# Patient Record
Sex: FEMALE | Race: WHITE | NOT HISPANIC OR LATINO | Employment: OTHER | URBAN - NONMETROPOLITAN AREA
[De-identification: names, ages, dates, MRNs, and addresses within clinical notes are randomized per-mention and may not be internally consistent; named-entity substitution may affect disease eponyms.]

---

## 2017-03-02 ENCOUNTER — OFFICE VISIT (OUTPATIENT)
Dept: NEUROLOGY | Facility: CLINIC | Age: 72
End: 2017-03-02

## 2017-03-02 VITALS
HEIGHT: 68 IN | BODY MASS INDEX: 29.1 KG/M2 | HEART RATE: 76 BPM | WEIGHT: 192 LBS | DIASTOLIC BLOOD PRESSURE: 84 MMHG | SYSTOLIC BLOOD PRESSURE: 138 MMHG

## 2017-03-02 DIAGNOSIS — Z72.0 TOBACCO ABUSE: ICD-10-CM

## 2017-03-02 DIAGNOSIS — G47.33 OSA ON CPAP: Primary | ICD-10-CM

## 2017-03-02 DIAGNOSIS — Z99.89 OSA ON CPAP: Primary | ICD-10-CM

## 2017-03-02 PROCEDURE — 99212 OFFICE O/P EST SF 10 MIN: CPT | Performed by: CLINICAL NURSE SPECIALIST

## 2017-03-02 RX ORDER — AMLODIPINE BESYLATE 5 MG/1
5 TABLET ORAL DAILY
COMMUNITY

## 2017-03-02 RX ORDER — CLONIDINE HYDROCHLORIDE 0.1 MG/1
0.1 TABLET ORAL 2 TIMES DAILY
COMMUNITY
End: 2022-08-03

## 2017-03-02 NOTE — PATIENT INSTRUCTIONS
Sleep Apnea  Sleep apnea is disorder that affects a person's sleep. A person with sleep apnea has abnormal pauses in their breathing when they sleep. It is hard for them to get a good sleep. This makes a person tired during the day. It also can lead to other physical problems. There are three types of sleep apnea. One type is when breathing stops for a short time because your airway is blocked (obstructive sleep apnea). Another type is when the brain sometimes fails to give the normal signal to breathe to the muscles that control your breathing (central sleep apnea). The third type is a combination of the other two types.  HOME CARE  · Do not sleep on your back. Try to sleep on your side.  · Take all medicine as told by your doctor.  · Avoid alcohol, calming medicines (sedatives), and depressant drugs.  · Try to lose weight if you are overweight. Talk to your doctor about a healthy weight goal.  Your doctor may have you use a device that helps to open your airway. It can help you get the air that you need. It is called a positive airway pressure (PAP) device. There are three types of PAP devices:  · Continuous positive airway pressure (CPAP) device.  · Nasal expiratory positive airway pressure (EPAP) device.  · Bilevel positive airway pressure (BPAP) device.  MAKE SURE YOU:  · Understand these instructions.  · Will watch your condition.  · Will get help right away if you are not doing well or get worse.     This information is not intended to replace advice given to you by your health care provider. Make sure you discuss any questions you have with your health care provider.     Document Released: 09/26/2009 Document Revised: 01/08/2016 Document Reviewed: 09/26/2016  [employer] Patient Education ©2016 [employer]

## 2017-03-02 NOTE — PROGRESS NOTES
Subjective     Chief Complaint   Patient presents with   • Sleep Apnea     Doing good. Compliance report in media tab       Edie Hinton is a 71 y.o. female right handed retiree.  She is here today for follow up for ADAL. She does use CPAP. Her DME is [facility].  She denies problems with mask or equipment. She denies changes in medical conditions. She continues to smoke 1/2-1 ppd cigaretts. I did receive compliance report and she is compliance 78% 12/1/16-2/28/17. She has no complaints today.     Sleep Apnea   This is a chronic problem. The current episode started more than 1 year ago. The problem has been unchanged. Pertinent negatives include no arthralgias, chest pain, fatigue, fever, headaches, myalgias, sore throat or weakness. Associated symptoms comments: SOB, daytime sleepiness. Exacerbated by: tobacco use. The treatment provided moderate relief.        Current Outpatient Prescriptions   Medication Sig Dispense Refill   • amLODIPine (NORVASC) 5 MG tablet Take 5 mg by mouth Daily.     • atorvastatin (LIPITOR) 10 MG tablet Take 20 mg by mouth Daily.     • CloNIDine (CATAPRES) 0.1 MG tablet Take 0.1 mg by mouth 2 (Two) Times a Day.     • losartan (COZAAR) 25 MG tablet Take 100 mg by mouth Daily.     • metoclopramide (REGLAN) 10 MG tablet Take 10 mg by mouth 4 (Four) Times a Day Before Meals & at Bedtime.     • metoprolol succinate XL (TOPROL-XL) 100 MG 24 hr tablet Take 100 mg by mouth 2 (Two) Times a Day.     • vitamin D (ERGOCALCIFEROL) 71147 UNITS capsule capsule Take 50,000 Units by mouth 1 (One) Time Per Week.       No current facility-administered medications for this visit.        Past Medical History   Diagnosis Date   • Sleep apnea        Past Surgical History   Procedure Laterality Date   • Appendectomy     • Tonsillectomy     • Hand surgery     • Breast surgery     • Hysterectomy     • Knee surgery Bilateral    • Cervical spine surgery     • Back surgery     • Cataract extraction         family  "history includes No Known Problems in her father and mother.    Social History   Substance Use Topics   • Smoking status: Current Every Day Smoker     Packs/day: 0.50     Types: Cigarettes   • Smokeless tobacco: Never Used   • Alcohol use Yes      Comment: glass of whiskey & sprite daily       Review of Systems   Constitutional: Negative.  Negative for fatigue and fever.   HENT: Negative.  Negative for sinus pressure and sore throat.    Eyes: Negative.    Respiratory: Positive for shortness of breath. Negative for apnea.    Cardiovascular: Negative.  Negative for chest pain.   Gastrointestinal: Negative.  Negative for constipation and diarrhea.   Endocrine: Negative.    Genitourinary: Negative.  Negative for dysuria and frequency.   Musculoskeletal: Negative for arthralgias, gait problem and myalgias.   Skin: Negative.    Allergic/Immunologic: Negative.    Neurological: Negative for dizziness, speech difficulty, weakness and headaches.   Hematological: Negative.  Negative for adenopathy.   Psychiatric/Behavioral: Negative.  Negative for agitation and confusion.   All other systems reviewed and are negative.      Objective     Visit Vitals   • /84   • Pulse 76   • Ht 68\" (172.7 cm)   • Wt 192 lb (87.1 kg)   • BMI 29.19 kg/m2   , Body mass index is 29.19 kg/(m^2).    Physical Exam   Constitutional: She is oriented to person, place, and time. Vital signs are normal. She appears well-developed and well-nourished.   HENT:   Head: Normocephalic and atraumatic.   Right Ear: Hearing, tympanic membrane and external ear normal.   Left Ear: Hearing, tympanic membrane and external ear normal.   Nose: Nose normal.   Mouth/Throat: Uvula is midline, oropharynx is clear and moist and mucous membranes are normal.   Eyes: EOM and lids are normal. Pupils are equal, round, and reactive to light.   Neck: Trachea normal and normal range of motion. Neck supple. Carotid bruit is not present.   Cardiovascular: Normal rate, regular " rhythm, S2 normal and normal heart sounds.    No murmur heard.  Pulmonary/Chest: Effort normal and breath sounds normal.   Abdominal: Soft. Bowel sounds are normal.   Musculoskeletal: Normal range of motion.   Neurological: She is alert and oriented to person, place, and time. She has normal strength and normal reflexes. No cranial nerve deficit. She displays a negative Romberg sign. Gait normal. GCS eye subscore is 4. GCS motor subscore is 6.   Skin: Skin is warm and dry.   Psychiatric: She has a normal mood and affect. Her speech is normal and behavior is normal. Cognition and memory are normal.   Nursing note and vitals reviewed.      No results found for this or any previous visit.     ASSESSMENT/PLAN    Diagnoses and all orders for this visit:    ADAL on CPAP    Tobacco abuse    Other orders  -     CloNIDine (CATAPRES) 0.1 MG tablet; Take 0.1 mg by mouth 2 (Two) Times a Day.  -     amLODIPine (NORVASC) 5 MG tablet; Take 5 mg by mouth Daily.    MEDICAL DECISION MAKIN. Will continue with CPAP   2. Discussed tobacco cessation for greater than 3 minutes to include options for cessation and information given for support groups. All questions answered.  3. F/u PRN for compliance.     allergies and all known medications/prescriptions have been reviewed using resources available on this encounter.    Return if symptoms worsen or fail to improve.        Zhane Parada, MAURICE

## 2017-10-15 ENCOUNTER — HOSPITAL ENCOUNTER (INPATIENT)
Age: 72
LOS: 2 days | Discharge: NURSING, REHAB OR LONG-TERM CARE | DRG: 470 | End: 2017-10-18
Attending: EMERGENCY MEDICINE | Admitting: INTERNAL MEDICINE
Payer: MEDICARE

## 2017-10-15 ENCOUNTER — APPOINTMENT (OUTPATIENT)
Dept: CT IMAGING | Age: 72
DRG: 470 | End: 2017-10-15
Payer: MEDICARE

## 2017-10-15 ENCOUNTER — APPOINTMENT (OUTPATIENT)
Dept: GENERAL RADIOLOGY | Age: 72
DRG: 470 | End: 2017-10-15
Payer: MEDICARE

## 2017-10-15 DIAGNOSIS — E87.1 HYPONATREMIA: ICD-10-CM

## 2017-10-15 DIAGNOSIS — S72.002A CLOSED FRACTURE OF LEFT HIP, INITIAL ENCOUNTER (HCC): ICD-10-CM

## 2017-10-15 DIAGNOSIS — N17.9 ACUTE RENAL FAILURE, UNSPECIFIED ACUTE RENAL FAILURE TYPE (HCC): ICD-10-CM
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
## 2017-10-15 LAB
ANION GAP SERPL CALCULATED.3IONS-SCNC: 18 MMOL/L (ref 7–19)
BUN BLDV-MCNC: 18 MG/DL (ref 8–23)
CALCIUM SERPL-MCNC: 8.9 MG/DL (ref 8.8–10.2)
CHLORIDE BLD-SCNC: 88 MMOL/L (ref 98–111)
CO2: 24 MMOL/L (ref 22–29)
CREAT SERPL-MCNC: 1.4 MG/DL (ref 0.5–0.9)
ETHANOL: 165 MG/DL (ref 0–0.08)
GFR NON-AFRICAN AMERICAN: 37
GLUCOSE BLD-MCNC: 107 MG/DL (ref 74–109)
HCT VFR BLD CALC: 40 % (ref 37–47)
HEMOGLOBIN: 14.1 G/DL (ref 12–16)
INR BLD: 0.87 (ref 0.88–1.18)
MCH RBC QN AUTO: 33.7 PG (ref 27–31)
MCHC RBC AUTO-ENTMCNC: 35.3 G/DL (ref 33–37)
MCV RBC AUTO: 95.5 FL (ref 81–99)
PDW BLD-RTO: 14 % (ref 11.5–14.5)
PLATELET # BLD: 215 K/UL (ref 130–400)
PMV BLD AUTO: 10.1 FL (ref 9.4–12.3)
POTASSIUM SERPL-SCNC: 4.4 MMOL/L (ref 3.5–5)
PROTHROMBIN TIME: 11.7 SEC (ref 12–14.6)
RBC # BLD: 4.19 M/UL (ref 4.2–5.4)
SODIUM BLD-SCNC: 130 MMOL/L (ref 136–145)
WBC # BLD: 7.8 K/UL (ref 4.8–10.8)

## 2017-10-15 PROCEDURE — 71035 XR CHEST 1 VW: CPT

## 2017-10-15 PROCEDURE — 6360000002 HC RX W HCPCS: Performed by: EMERGENCY MEDICINE

## 2017-10-15 PROCEDURE — 36415 COLL VENOUS BLD VENIPUNCTURE: CPT

## 2017-10-15 PROCEDURE — 85610 PROTHROMBIN TIME: CPT

## 2017-10-15 PROCEDURE — 99285 EMERGENCY DEPT VISIT HI MDM: CPT

## 2017-10-15 PROCEDURE — 93005 ELECTROCARDIOGRAM TRACING: CPT

## 2017-10-15 PROCEDURE — 70450 CT HEAD/BRAIN W/O DYE: CPT

## 2017-10-15 PROCEDURE — 80048 BASIC METABOLIC PNL TOTAL CA: CPT

## 2017-10-15 PROCEDURE — 96374 THER/PROPH/DIAG INJ IV PUSH: CPT

## 2017-10-15 PROCEDURE — G0480 DRUG TEST DEF 1-7 CLASSES: HCPCS

## 2017-10-15 PROCEDURE — 73502 X-RAY EXAM HIP UNI 2-3 VIEWS: CPT

## 2017-10-15 PROCEDURE — 85027 COMPLETE CBC AUTOMATED: CPT

## 2017-10-15 RX ORDER — MORPHINE SULFATE 4 MG/ML
4 INJECTION, SOLUTION INTRAMUSCULAR; INTRAVENOUS ONCE
Status: COMPLETED | OUTPATIENT
Start: 2017-10-15 | End: 2017-10-15

## 2017-10-15 RX ORDER — METOPROLOL TARTRATE 100 MG/1
100 TABLET ORAL 2 TIMES DAILY
COMMUNITY
End: 2020-10-26 | Stop reason: DRUGHIGH

## 2017-10-15 RX ORDER — LOSARTAN POTASSIUM 100 MG/1
100 TABLET ORAL DAILY
COMMUNITY
End: 2020-10-26 | Stop reason: ALTCHOICE

## 2017-10-15 RX ORDER — ATORVASTATIN CALCIUM 20 MG/1
40 TABLET, FILM COATED ORAL DAILY
COMMUNITY
End: 2019-10-02 | Stop reason: DRUGHIGH

## 2017-10-15 RX ORDER — CLONIDINE HYDROCHLORIDE 0.1 MG/1
0.1 TABLET ORAL DAILY
COMMUNITY
End: 2020-10-26 | Stop reason: ALTCHOICE

## 2017-10-15 RX ORDER — ERGOCALCIFEROL 1.25 MG/1
50000 CAPSULE ORAL WEEKLY
COMMUNITY
End: 2020-10-26 | Stop reason: DRUGHIGH

## 2017-10-15 RX ORDER — AMLODIPINE BESYLATE 5 MG/1
5 TABLET ORAL DAILY
Status: ON HOLD | COMMUNITY
End: 2017-10-28 | Stop reason: HOSPADM

## 2017-10-15 RX ADMIN — MORPHINE SULFATE 4 MG: 4 INJECTION, SOLUTION INTRAMUSCULAR; INTRAVENOUS at 23:58

## 2017-10-15 ASSESSMENT — PAIN DESCRIPTION - PAIN TYPE: TYPE: ACUTE PAIN

## 2017-10-15 ASSESSMENT — PAIN DESCRIPTION - ORIENTATION: ORIENTATION: LEFT

## 2017-10-15 ASSESSMENT — ENCOUNTER SYMPTOMS
DIARRHEA: 0
EYE PAIN: 0
SHORTNESS OF BREATH: 0
VOMITING: 0
ABDOMINAL PAIN: 0

## 2017-10-15 ASSESSMENT — PAIN SCALES - GENERAL
PAINLEVEL_OUTOF10: 9
PAINLEVEL_OUTOF10: 10

## 2017-10-15 ASSESSMENT — PAIN DESCRIPTION - LOCATION: LOCATION: HIP

## 2017-10-16 ENCOUNTER — ANESTHESIA EVENT (OUTPATIENT)
Dept: OPERATING ROOM | Age: 72
DRG: 470 | End: 2017-10-16
Payer: MEDICARE

## 2017-10-16 ENCOUNTER — APPOINTMENT (OUTPATIENT)
Dept: GENERAL RADIOLOGY | Age: 72
DRG: 470 | End: 2017-10-16
Payer: MEDICARE

## 2017-10-16 ENCOUNTER — ANESTHESIA (OUTPATIENT)
Dept: OPERATING ROOM | Age: 72
DRG: 470 | End: 2017-10-16
Payer: MEDICARE

## 2017-10-16 VITALS
OXYGEN SATURATION: 88 % | SYSTOLIC BLOOD PRESSURE: 179 MMHG | TEMPERATURE: 97.1 F | RESPIRATION RATE: 1 BRPM | DIASTOLIC BLOOD PRESSURE: 91 MMHG

## 2017-10-16 PROBLEM — S72.002A HIP FRACTURE REQUIRING OPERATIVE REPAIR, LEFT, CLOSED, INITIAL ENCOUNTER (HCC): Status: ACTIVE | Noted: 2017-10-16

## 2017-10-16 PROBLEM — N17.9 ACUTE KIDNEY FAILURE (HCC): Status: ACTIVE | Noted: 2017-10-16

## 2017-10-16 PROBLEM — I10 HYPERTENSION: Status: ACTIVE | Noted: 2017-10-16

## 2017-10-16 LAB
EKG P AXIS: 67 DEGREES
EKG P AXIS: 73 DEGREES
EKG P-R INTERVAL: 150 MS
EKG P-R INTERVAL: 162 MS
EKG Q-T INTERVAL: 406 MS
EKG Q-T INTERVAL: 428 MS
EKG QRS DURATION: 88 MS
EKG QRS DURATION: 90 MS
EKG QTC CALCULATION (BAZETT): 420 MS
EKG QTC CALCULATION (BAZETT): 442 MS
EKG T AXIS: 68 DEGREES
EKG T AXIS: 72 DEGREES

## 2017-10-16 PROCEDURE — 2500000003 HC RX 250 WO HCPCS: Performed by: NURSE ANESTHETIST, CERTIFIED REGISTERED

## 2017-10-16 PROCEDURE — 73501 X-RAY EXAM HIP UNI 1 VIEW: CPT

## 2017-10-16 PROCEDURE — 0SRS0J9 REPLACEMENT OF LEFT HIP JOINT, FEMORAL SURFACE WITH SYNTHETIC SUBSTITUTE, CEMENTED, OPEN APPROACH: ICD-10-PCS | Performed by: ORTHOPAEDIC SURGERY

## 2017-10-16 PROCEDURE — 3600000004 HC SURGERY LEVEL 4 BASE: Performed by: ORTHOPAEDIC SURGERY

## 2017-10-16 PROCEDURE — 1210000000 HC MED SURG R&B

## 2017-10-16 PROCEDURE — 6360000002 HC RX W HCPCS: Performed by: NURSE ANESTHETIST, CERTIFIED REGISTERED

## 2017-10-16 PROCEDURE — 6360000002 HC RX W HCPCS: Performed by: ORTHOPAEDIC SURGERY

## 2017-10-16 PROCEDURE — 93005 ELECTROCARDIOGRAM TRACING: CPT

## 2017-10-16 PROCEDURE — 2580000003 HC RX 258: Performed by: EMERGENCY MEDICINE

## 2017-10-16 PROCEDURE — 6370000000 HC RX 637 (ALT 250 FOR IP): Performed by: INTERNAL MEDICINE

## 2017-10-16 PROCEDURE — C1713 ANCHOR/SCREW BN/BN,TIS/BN: HCPCS | Performed by: ORTHOPAEDIC SURGERY

## 2017-10-16 PROCEDURE — 2500000003 HC RX 250 WO HCPCS: Performed by: ORTHOPAEDIC SURGERY

## 2017-10-16 PROCEDURE — 99223 1ST HOSP IP/OBS HIGH 75: CPT | Performed by: INTERNAL MEDICINE

## 2017-10-16 PROCEDURE — 2580000003 HC RX 258: Performed by: ANESTHESIOLOGY

## 2017-10-16 PROCEDURE — 3600000014 HC SURGERY LEVEL 4 ADDTL 15MIN: Performed by: ORTHOPAEDIC SURGERY

## 2017-10-16 PROCEDURE — 2720000000 HC MISC SURG SUPPLY STERILE $0-50: Performed by: ORTHOPAEDIC SURGERY

## 2017-10-16 PROCEDURE — 3700000000 HC ANESTHESIA ATTENDED CARE: Performed by: ORTHOPAEDIC SURGERY

## 2017-10-16 PROCEDURE — 2720000001 HC MISC SURG SUPPLY STERILE $51-500: Performed by: ORTHOPAEDIC SURGERY

## 2017-10-16 PROCEDURE — C1776 JOINT DEVICE (IMPLANTABLE): HCPCS | Performed by: ORTHOPAEDIC SURGERY

## 2017-10-16 PROCEDURE — 2720000003 HC MISC SUTURE/STAPLES/RELOADS/ETC: Performed by: ORTHOPAEDIC SURGERY

## 2017-10-16 PROCEDURE — 2580000003 HC RX 258: Performed by: INTERNAL MEDICINE

## 2017-10-16 PROCEDURE — 99284 EMERGENCY DEPT VISIT MOD MDM: CPT | Performed by: EMERGENCY MEDICINE

## 2017-10-16 PROCEDURE — 6360000002 HC RX W HCPCS: Performed by: INTERNAL MEDICINE

## 2017-10-16 PROCEDURE — L8690 AUD OSSEO DEV, INT/EXT COMP: HCPCS | Performed by: ORTHOPAEDIC SURGERY

## 2017-10-16 PROCEDURE — 6360000002 HC RX W HCPCS: Performed by: EMERGENCY MEDICINE

## 2017-10-16 PROCEDURE — 7100000001 HC PACU RECOVERY - ADDTL 15 MIN: Performed by: ORTHOPAEDIC SURGERY

## 2017-10-16 PROCEDURE — A4364 ADHESIVE, LIQUID OR EQUAL: HCPCS | Performed by: ORTHOPAEDIC SURGERY

## 2017-10-16 PROCEDURE — 7100000000 HC PACU RECOVERY - FIRST 15 MIN: Performed by: ORTHOPAEDIC SURGERY

## 2017-10-16 PROCEDURE — 3700000001 HC ADD 15 MINUTES (ANESTHESIA): Performed by: ORTHOPAEDIC SURGERY

## 2017-10-16 PROCEDURE — 73502 X-RAY EXAM HIP UNI 2-3 VIEWS: CPT

## 2017-10-16 DEVICE — SPACER FEM +5MM OFFSET 12/14 TAPR HIP MOD CATHCART UPLR: Type: IMPLANTABLE DEVICE | Site: HIP | Status: FUNCTIONAL

## 2017-10-16 DEVICE — STEM FEM SZ 5 L120MM NK L34MM 40MM OFFSET 130DEG 12/14 TAPR: Type: IMPLANTABLE DEVICE | Site: HIP | Status: FUNCTIONAL

## 2017-10-16 DEVICE — PREP IM ENCHANCED TOTAL HIP BONE                                    PREPARATION KIT
Type: IMPLANTABLE DEVICE | Site: HIP | Status: FUNCTIONAL
Brand: PREP-IM

## 2017-10-16 DEVICE — DISCONTINUED USE 416978 CEMENT PALACOS R SING DOSE 40GR: Type: IMPLANTABLE DEVICE | Site: HIP | Status: FUNCTIONAL

## 2017-10-16 DEVICE — CENTRALIZER STEM DIA10MM DST FEM CEM MOLD SUMMIT BASIC: Type: IMPLANTABLE DEVICE | Site: HIP | Status: FUNCTIONAL

## 2017-10-16 DEVICE — HEAD UPLR DIA47MM HIP BALL MOD CATHCART SELF CNTR: Type: IMPLANTABLE DEVICE | Site: HIP | Status: FUNCTIONAL

## 2017-10-16 RX ORDER — PROMETHAZINE HYDROCHLORIDE 25 MG/ML
6.25 INJECTION, SOLUTION INTRAMUSCULAR; INTRAVENOUS
Status: DISCONTINUED | OUTPATIENT
Start: 2017-10-16 | End: 2017-10-16 | Stop reason: HOSPADM

## 2017-10-16 RX ORDER — METOPROLOL TARTRATE 50 MG/1
100 TABLET, FILM COATED ORAL 2 TIMES DAILY
Status: DISCONTINUED | OUTPATIENT
Start: 2017-10-16 | End: 2017-10-18 | Stop reason: HOSPADM

## 2017-10-16 RX ORDER — FENTANYL CITRATE 50 UG/ML
50 INJECTION, SOLUTION INTRAMUSCULAR; INTRAVENOUS
Status: DISCONTINUED | OUTPATIENT
Start: 2017-10-16 | End: 2017-10-16 | Stop reason: HOSPADM

## 2017-10-16 RX ORDER — DEXAMETHASONE SODIUM PHOSPHATE 10 MG/ML
INJECTION INTRAMUSCULAR; INTRAVENOUS PRN
Status: DISCONTINUED | OUTPATIENT
Start: 2017-10-16 | End: 2017-10-16 | Stop reason: SDUPTHER

## 2017-10-16 RX ORDER — SODIUM CHLORIDE 9 MG/ML
INJECTION, SOLUTION INTRAVENOUS CONTINUOUS
Status: DISCONTINUED | OUTPATIENT
Start: 2017-10-16 | End: 2017-10-16

## 2017-10-16 RX ORDER — ENALAPRILAT 2.5 MG/2ML
1.25 INJECTION INTRAVENOUS
Status: DISCONTINUED | OUTPATIENT
Start: 2017-10-16 | End: 2017-10-16 | Stop reason: HOSPADM

## 2017-10-16 RX ORDER — SODIUM CHLORIDE 0.9 % (FLUSH) 0.9 %
10 SYRINGE (ML) INJECTION EVERY 12 HOURS SCHEDULED
Status: DISCONTINUED | OUTPATIENT
Start: 2017-10-16 | End: 2017-10-16

## 2017-10-16 RX ORDER — LIDOCAINE HYDROCHLORIDE 10 MG/ML
1 INJECTION, SOLUTION EPIDURAL; INFILTRATION; INTRACAUDAL; PERINEURAL
Status: DISCONTINUED | OUTPATIENT
Start: 2017-10-16 | End: 2017-10-16 | Stop reason: HOSPADM

## 2017-10-16 RX ORDER — SODIUM CHLORIDE, SODIUM LACTATE, POTASSIUM CHLORIDE, CALCIUM CHLORIDE 600; 310; 30; 20 MG/100ML; MG/100ML; MG/100ML; MG/100ML
INJECTION, SOLUTION INTRAVENOUS CONTINUOUS
Status: DISCONTINUED | OUTPATIENT
Start: 2017-10-16 | End: 2017-10-16

## 2017-10-16 RX ORDER — CLONIDINE HYDROCHLORIDE 0.1 MG/1
0.1 TABLET ORAL 2 TIMES DAILY
Status: DISCONTINUED | OUTPATIENT
Start: 2017-10-16 | End: 2017-10-16

## 2017-10-16 RX ORDER — SODIUM CHLORIDE 0.9 % (FLUSH) 0.9 %
10 SYRINGE (ML) INJECTION PRN
Status: CANCELLED | OUTPATIENT
Start: 2017-10-16

## 2017-10-16 RX ORDER — PROPOFOL 10 MG/ML
INJECTION, EMULSION INTRAVENOUS PRN
Status: DISCONTINUED | OUTPATIENT
Start: 2017-10-16 | End: 2017-10-16 | Stop reason: SDUPTHER

## 2017-10-16 RX ORDER — SODIUM CHLORIDE 0.9 % (FLUSH) 0.9 %
10 SYRINGE (ML) INJECTION PRN
Status: DISCONTINUED | OUTPATIENT
Start: 2017-10-16 | End: 2017-10-16

## 2017-10-16 RX ORDER — BUPIVACAINE HYDROCHLORIDE AND EPINEPHRINE 2.5; 5 MG/ML; UG/ML
INJECTION, SOLUTION EPIDURAL; INFILTRATION; INTRACAUDAL; PERINEURAL PRN
Status: DISCONTINUED | OUTPATIENT
Start: 2017-10-16 | End: 2017-10-16 | Stop reason: HOSPADM

## 2017-10-16 RX ORDER — FENTANYL CITRATE 50 UG/ML
INJECTION, SOLUTION INTRAMUSCULAR; INTRAVENOUS PRN
Status: DISCONTINUED | OUTPATIENT
Start: 2017-10-16 | End: 2017-10-16 | Stop reason: SDUPTHER

## 2017-10-16 RX ORDER — SODIUM CHLORIDE 0.9 % (FLUSH) 0.9 %
10 SYRINGE (ML) INJECTION EVERY 12 HOURS SCHEDULED
Status: DISCONTINUED | OUTPATIENT
Start: 2017-10-16 | End: 2017-10-18 | Stop reason: HOSPADM

## 2017-10-16 RX ORDER — METOPROLOL TARTRATE 5 MG/5ML
INJECTION INTRAVENOUS PRN
Status: DISCONTINUED | OUTPATIENT
Start: 2017-10-16 | End: 2017-10-16 | Stop reason: SDUPTHER

## 2017-10-16 RX ORDER — PROMETHAZINE HYDROCHLORIDE 25 MG/ML
12.5 INJECTION, SOLUTION INTRAMUSCULAR; INTRAVENOUS EVERY 4 HOURS PRN
Status: DISCONTINUED | OUTPATIENT
Start: 2017-10-16 | End: 2017-10-18

## 2017-10-16 RX ORDER — DIPHENHYDRAMINE HYDROCHLORIDE 50 MG/ML
12.5 INJECTION INTRAMUSCULAR; INTRAVENOUS
Status: DISCONTINUED | OUTPATIENT
Start: 2017-10-16 | End: 2017-10-16 | Stop reason: HOSPADM

## 2017-10-16 RX ORDER — CLINDAMYCIN PHOSPHATE 150 MG/ML
INJECTION, SOLUTION INTRAVENOUS PRN
Status: DISCONTINUED | OUTPATIENT
Start: 2017-10-16 | End: 2017-10-16 | Stop reason: SDUPTHER

## 2017-10-16 RX ORDER — SODIUM CHLORIDE 0.9 % (FLUSH) 0.9 %
10 SYRINGE (ML) INJECTION EVERY 12 HOURS SCHEDULED
Status: DISCONTINUED | OUTPATIENT
Start: 2017-10-16 | End: 2017-10-16 | Stop reason: HOSPADM

## 2017-10-16 RX ORDER — SODIUM CHLORIDE 0.9 % (FLUSH) 0.9 %
10 SYRINGE (ML) INJECTION PRN
Status: DISCONTINUED | OUTPATIENT
Start: 2017-10-16 | End: 2017-10-16 | Stop reason: HOSPADM

## 2017-10-16 RX ORDER — DOCUSATE SODIUM 100 MG/1
100 CAPSULE, LIQUID FILLED ORAL 2 TIMES DAILY
Status: DISCONTINUED | OUTPATIENT
Start: 2017-10-16 | End: 2017-10-18 | Stop reason: HOSPADM

## 2017-10-16 RX ORDER — CLINDAMYCIN PHOSPHATE 900 MG/50ML
900 INJECTION INTRAVENOUS
Status: CANCELLED | OUTPATIENT
Start: 2017-10-16 | End: 2017-10-16

## 2017-10-16 RX ORDER — MORPHINE SULFATE 4 MG/ML
2 INJECTION, SOLUTION INTRAMUSCULAR; INTRAVENOUS
Status: DISCONTINUED | OUTPATIENT
Start: 2017-10-16 | End: 2017-10-18 | Stop reason: HOSPADM

## 2017-10-16 RX ORDER — LABETALOL HYDROCHLORIDE 5 MG/ML
5 INJECTION, SOLUTION INTRAVENOUS EVERY 10 MIN PRN
Status: DISCONTINUED | OUTPATIENT
Start: 2017-10-16 | End: 2017-10-16 | Stop reason: HOSPADM

## 2017-10-16 RX ORDER — ONDANSETRON 2 MG/ML
4 INJECTION INTRAMUSCULAR; INTRAVENOUS EVERY 6 HOURS PRN
Status: DISCONTINUED | OUTPATIENT
Start: 2017-10-16 | End: 2017-10-18 | Stop reason: HOSPADM

## 2017-10-16 RX ORDER — MIDAZOLAM HYDROCHLORIDE 1 MG/ML
2 INJECTION INTRAMUSCULAR; INTRAVENOUS
Status: DISCONTINUED | OUTPATIENT
Start: 2017-10-16 | End: 2017-10-16 | Stop reason: HOSPADM

## 2017-10-16 RX ORDER — ACETAMINOPHEN 325 MG/1
650 TABLET ORAL EVERY 4 HOURS PRN
Status: DISCONTINUED | OUTPATIENT
Start: 2017-10-16 | End: 2017-10-18 | Stop reason: HOSPADM

## 2017-10-16 RX ORDER — KETOROLAC TROMETHAMINE 30 MG/ML
INJECTION, SOLUTION INTRAMUSCULAR; INTRAVENOUS PRN
Status: DISCONTINUED | OUTPATIENT
Start: 2017-10-16 | End: 2017-10-16 | Stop reason: SDUPTHER

## 2017-10-16 RX ORDER — ERGOCALCIFEROL 1.25 MG/1
50000 CAPSULE ORAL WEEKLY
Status: DISCONTINUED | OUTPATIENT
Start: 2017-10-21 | End: 2017-10-18 | Stop reason: HOSPADM

## 2017-10-16 RX ORDER — VIT C/E/ZN/COPPR/LUTEIN/ZEAXAN 60 MG-6 MG
1 CAPSULE ORAL DAILY
Status: DISCONTINUED | OUTPATIENT
Start: 2017-10-16 | End: 2017-10-18 | Stop reason: HOSPADM

## 2017-10-16 RX ORDER — LIDOCAINE HYDROCHLORIDE 10 MG/ML
INJECTION, SOLUTION INFILTRATION; PERINEURAL PRN
Status: DISCONTINUED | OUTPATIENT
Start: 2017-10-16 | End: 2017-10-16 | Stop reason: SDUPTHER

## 2017-10-16 RX ORDER — PROMETHAZINE HYDROCHLORIDE 25 MG/ML
12.5 INJECTION, SOLUTION INTRAMUSCULAR; INTRAVENOUS EVERY 4 HOURS PRN
Status: DISCONTINUED | OUTPATIENT
Start: 2017-10-16 | End: 2017-10-16

## 2017-10-16 RX ORDER — MORPHINE SULFATE 4 MG/ML
2 INJECTION, SOLUTION INTRAMUSCULAR; INTRAVENOUS EVERY 5 MIN PRN
Status: DISCONTINUED | OUTPATIENT
Start: 2017-10-16 | End: 2017-10-16 | Stop reason: HOSPADM

## 2017-10-16 RX ORDER — SODIUM CHLORIDE 9 MG/ML
INJECTION, SOLUTION INTRAVENOUS CONTINUOUS
Status: DISCONTINUED | OUTPATIENT
Start: 2017-10-16 | End: 2017-10-18

## 2017-10-16 RX ORDER — OXYCODONE HYDROCHLORIDE AND ACETAMINOPHEN 5; 325 MG/1; MG/1
1 TABLET ORAL EVERY 4 HOURS PRN
Status: DISCONTINUED | OUTPATIENT
Start: 2017-10-16 | End: 2017-10-18 | Stop reason: HOSPADM

## 2017-10-16 RX ORDER — CLINDAMYCIN PHOSPHATE 900 MG/50ML
900 INJECTION INTRAVENOUS EVERY 8 HOURS
Status: COMPLETED | OUTPATIENT
Start: 2017-10-16 | End: 2017-10-17

## 2017-10-16 RX ORDER — MEPERIDINE HYDROCHLORIDE 50 MG/ML
12.5 INJECTION INTRAMUSCULAR; INTRAVENOUS; SUBCUTANEOUS EVERY 5 MIN PRN
Status: DISCONTINUED | OUTPATIENT
Start: 2017-10-16 | End: 2017-10-16 | Stop reason: HOSPADM

## 2017-10-16 RX ORDER — BACITRACIN 50000 [USP'U]/1
INJECTION, POWDER, LYOPHILIZED, FOR SOLUTION INTRAMUSCULAR PRN
Status: DISCONTINUED | OUTPATIENT
Start: 2017-10-16 | End: 2017-10-16 | Stop reason: HOSPADM

## 2017-10-16 RX ORDER — ATORVASTATIN CALCIUM 20 MG/1
20 TABLET, FILM COATED ORAL DAILY
Status: DISCONTINUED | OUTPATIENT
Start: 2017-10-16 | End: 2017-10-18 | Stop reason: HOSPADM

## 2017-10-16 RX ORDER — ONDANSETRON 2 MG/ML
INJECTION INTRAMUSCULAR; INTRAVENOUS PRN
Status: DISCONTINUED | OUTPATIENT
Start: 2017-10-16 | End: 2017-10-16 | Stop reason: SDUPTHER

## 2017-10-16 RX ORDER — MORPHINE SULFATE 4 MG/ML
4 INJECTION, SOLUTION INTRAMUSCULAR; INTRAVENOUS EVERY 5 MIN PRN
Status: DISCONTINUED | OUTPATIENT
Start: 2017-10-16 | End: 2017-10-16 | Stop reason: HOSPADM

## 2017-10-16 RX ORDER — LOSARTAN POTASSIUM 100 MG/1
100 TABLET ORAL DAILY
Status: DISCONTINUED | OUTPATIENT
Start: 2017-10-16 | End: 2017-10-18 | Stop reason: HOSPADM

## 2017-10-16 RX ORDER — ROCURONIUM BROMIDE 10 MG/ML
INJECTION, SOLUTION INTRAVENOUS PRN
Status: DISCONTINUED | OUTPATIENT
Start: 2017-10-16 | End: 2017-10-16 | Stop reason: SDUPTHER

## 2017-10-16 RX ORDER — AMLODIPINE BESYLATE 5 MG/1
5 TABLET ORAL DAILY
Status: DISCONTINUED | OUTPATIENT
Start: 2017-10-16 | End: 2017-10-18 | Stop reason: HOSPADM

## 2017-10-16 RX ORDER — SODIUM CHLORIDE 0.9 % (FLUSH) 0.9 %
10 SYRINGE (ML) INJECTION EVERY 12 HOURS SCHEDULED
Status: CANCELLED | OUTPATIENT
Start: 2017-10-16

## 2017-10-16 RX ORDER — SODIUM CHLORIDE 0.9 % (FLUSH) 0.9 %
10 SYRINGE (ML) INJECTION PRN
Status: DISCONTINUED | OUTPATIENT
Start: 2017-10-16 | End: 2017-10-18 | Stop reason: HOSPADM

## 2017-10-16 RX ORDER — ACETAMINOPHEN 325 MG/1
650 TABLET ORAL EVERY 4 HOURS PRN
Status: DISCONTINUED | OUTPATIENT
Start: 2017-10-16 | End: 2017-10-16

## 2017-10-16 RX ORDER — HYDRALAZINE HYDROCHLORIDE 20 MG/ML
5 INJECTION INTRAMUSCULAR; INTRAVENOUS EVERY 10 MIN PRN
Status: DISCONTINUED | OUTPATIENT
Start: 2017-10-16 | End: 2017-10-16 | Stop reason: HOSPADM

## 2017-10-16 RX ORDER — METOCLOPRAMIDE HYDROCHLORIDE 5 MG/ML
10 INJECTION INTRAMUSCULAR; INTRAVENOUS
Status: DISCONTINUED | OUTPATIENT
Start: 2017-10-16 | End: 2017-10-16 | Stop reason: HOSPADM

## 2017-10-16 RX ORDER — FENTANYL CITRATE 50 UG/ML
25 INJECTION, SOLUTION INTRAMUSCULAR; INTRAVENOUS
Status: DISCONTINUED | OUTPATIENT
Start: 2017-10-16 | End: 2017-10-16 | Stop reason: HOSPADM

## 2017-10-16 RX ORDER — MORPHINE SULFATE 4 MG/ML
4 INJECTION, SOLUTION INTRAMUSCULAR; INTRAVENOUS ONCE
Status: COMPLETED | OUTPATIENT
Start: 2017-10-16 | End: 2017-10-16

## 2017-10-16 RX ORDER — HEPARIN SODIUM 5000 [USP'U]/ML
5000 INJECTION, SOLUTION INTRAVENOUS; SUBCUTANEOUS EVERY 8 HOURS SCHEDULED
Status: DISCONTINUED | OUTPATIENT
Start: 2017-10-16 | End: 2017-10-16

## 2017-10-16 RX ADMIN — DOCUSATE SODIUM 100 MG: 100 CAPSULE, LIQUID FILLED ORAL at 20:55

## 2017-10-16 RX ADMIN — HYDROMORPHONE HYDROCHLORIDE 0.5 MG: 1 INJECTION, SOLUTION INTRAMUSCULAR; INTRAVENOUS; SUBCUTANEOUS at 16:29

## 2017-10-16 RX ADMIN — KETOROLAC TROMETHAMINE 15 MG: 30 INJECTION, SOLUTION INTRAMUSCULAR at 15:14

## 2017-10-16 RX ADMIN — PROPOFOL 130 MG: 10 INJECTION, EMULSION INTRAVENOUS at 13:17

## 2017-10-16 RX ADMIN — METOPROLOL TARTRATE 100 MG: 50 TABLET ORAL at 11:06

## 2017-10-16 RX ADMIN — SODIUM CHLORIDE: 9 INJECTION, SOLUTION INTRAVENOUS at 03:24

## 2017-10-16 RX ADMIN — SODIUM CHLORIDE, SODIUM LACTATE, POTASSIUM CHLORIDE, AND CALCIUM CHLORIDE: 600; 310; 30; 20 INJECTION, SOLUTION INTRAVENOUS at 14:06

## 2017-10-16 RX ADMIN — METOPROLOL TARTRATE 2.5 MG: 5 INJECTION, SOLUTION INTRAVENOUS at 16:20

## 2017-10-16 RX ADMIN — FENTANYL CITRATE 100 MCG: 50 INJECTION INTRAMUSCULAR; INTRAVENOUS at 13:17

## 2017-10-16 RX ADMIN — ROCURONIUM BROMIDE 50 MG: 10 INJECTION INTRAVENOUS at 13:17

## 2017-10-16 RX ADMIN — MORPHINE SULFATE 2 MG: 4 INJECTION, SOLUTION INTRAMUSCULAR; INTRAVENOUS at 11:06

## 2017-10-16 RX ADMIN — MORPHINE SULFATE 2 MG: 4 INJECTION, SOLUTION INTRAMUSCULAR; INTRAVENOUS at 06:09

## 2017-10-16 RX ADMIN — DEXAMETHASONE SODIUM PHOSPHATE 10 MG: 10 INJECTION INTRAMUSCULAR; INTRAVENOUS at 13:38

## 2017-10-16 RX ADMIN — HEPARIN SODIUM 5000 UNITS: 5000 INJECTION INTRAVENOUS; SUBCUTANEOUS at 05:55

## 2017-10-16 RX ADMIN — MORPHINE SULFATE 4 MG: 4 INJECTION, SOLUTION INTRAMUSCULAR; INTRAVENOUS at 01:21

## 2017-10-16 RX ADMIN — SODIUM CHLORIDE, SODIUM LACTATE, POTASSIUM CHLORIDE, AND CALCIUM CHLORIDE: 600; 310; 30; 20 INJECTION, SOLUTION INTRAVENOUS at 13:07

## 2017-10-16 RX ADMIN — LIDOCAINE HYDROCHLORIDE 50 MG: 10 INJECTION, SOLUTION INFILTRATION; PERINEURAL at 13:17

## 2017-10-16 RX ADMIN — CLINDAMYCIN PHOSPHATE 900 MG: 150 INJECTION, SOLUTION INTRAMUSCULAR; INTRAVENOUS at 13:26

## 2017-10-16 RX ADMIN — METOPROLOL TARTRATE 2.5 MG: 5 INJECTION, SOLUTION INTRAVENOUS at 16:14

## 2017-10-16 RX ADMIN — ONDANSETRON HYDROCHLORIDE 4 MG: 2 INJECTION, SOLUTION INTRAVENOUS at 13:38

## 2017-10-16 RX ADMIN — FENTANYL CITRATE 50 MCG: 50 INJECTION INTRAMUSCULAR; INTRAVENOUS at 13:53

## 2017-10-16 RX ADMIN — HYDROMORPHONE HYDROCHLORIDE 0.5 MG: 1 INJECTION, SOLUTION INTRAMUSCULAR; INTRAVENOUS; SUBCUTANEOUS at 17:57

## 2017-10-16 RX ADMIN — SODIUM CHLORIDE: 9 INJECTION, SOLUTION INTRAVENOUS at 00:19

## 2017-10-16 RX ADMIN — PROMETHAZINE HYDROCHLORIDE 12.5 MG: 25 INJECTION INTRAMUSCULAR; INTRAVENOUS at 02:56

## 2017-10-16 RX ADMIN — HYDROMORPHONE HYDROCHLORIDE 0.5 MG: 1 INJECTION, SOLUTION INTRAMUSCULAR; INTRAVENOUS; SUBCUTANEOUS at 16:43

## 2017-10-16 RX ADMIN — CLINDAMYCIN PHOSPHATE 900 MG: 900 INJECTION INTRAVENOUS at 20:54

## 2017-10-16 RX ADMIN — METOPROLOL TARTRATE 100 MG: 50 TABLET ORAL at 20:55

## 2017-10-16 RX ADMIN — FENTANYL CITRATE 50 MCG: 50 INJECTION INTRAMUSCULAR; INTRAVENOUS at 14:40

## 2017-10-16 RX ADMIN — SUGAMMADEX 180 MG: 100 INJECTION, SOLUTION INTRAVENOUS at 15:21

## 2017-10-16 ASSESSMENT — PAIN SCALES - GENERAL
PAINLEVEL_OUTOF10: 8
PAINLEVEL_OUTOF10: 9
PAINLEVEL_OUTOF10: 8
PAINLEVEL_OUTOF10: 9

## 2017-10-16 NOTE — PLAN OF CARE
Problem: Pain - Acute  Goal: Pain control  Patient will demonstrate personal actions to control pain.     Outcome: Ongoing      Problem: HIP PRECAUTIONS  Goal: STG - Pt will demonstrate comprehension of Total Hip precautions to perform LE self cares with/without adaptive equipment  Outcome: Ongoing

## 2017-10-16 NOTE — CONSULTS
tenderness to palpation about the hip, knee, ankle or foot. Unrestricted full function motion is present. Stability is normal with provocative tests, 5/5 strength, and skin is normal.     Left lower extremity exam:  Left lower extremity is shortened and externally rotated. There is exquisite pain with attempted log roll of the left lower extremity. Sensation is intact to light touch in the superficial and deep peroneals, saphenous and cervical, medial and lateral plantar distributions. Dorsalis pedis and tibialis posterior pulses are 2+ palpable. Extensor hallucis longus, flexor hallucis longus, tibialis anterior and gastrocsoleus motor is 5 out of 5 and symmetric to the uninvolved right lower extremity. DATA:    CBC with Differential:    Lab Results   Component Value Date    WBC 7.8 10/15/2017    RBC 4.19 10/15/2017    HGB 14.1 10/15/2017    HCT 40.0 10/15/2017     10/15/2017    MCV 95.5 10/15/2017    MCH 33.7 10/15/2017    MCHC 35.3 10/15/2017    RDW 14.0 10/15/2017     CMP:    Lab Results   Component Value Date     10/15/2017    K 4.4 10/15/2017    CL 88 10/15/2017    CO2 24 10/15/2017    BUN 18 10/15/2017    CREATININE 1.4 10/15/2017    LABGLOM 37 10/15/2017    GLUCOSE 107 10/15/2017    CALCIUM 8.9 10/15/2017     BMP:    Lab Results   Component Value Date     10/15/2017    K 4.4 10/15/2017    CL 88 10/15/2017    CO2 24 10/15/2017    BUN 18 10/15/2017    CREATININE 1.4 10/15/2017    CALCIUM 8.9 10/15/2017    LABGLOM 37 10/15/2017    GLUCOSE 107 10/15/2017         Radiology: I have reviewed the radiology images listed below and agree with the findings of the interpreting radiologist(s). Ct Head Wo Contrast    Result Date: 10/16/2017  Examination. CT HEAD WO CONTRAST History: The patient fell and has a closed head trauma. DLP: 948 mGy. The CT scan of the head is performed without intravenous contrast enhancement.  The images are acquired in axial plane with subsequent reconstruction

## 2017-10-16 NOTE — BRIEF OP NOTE
Brief Postoperative Note  ______________________________________________________________    Patient: Brooklyn Baez  YOB: 1945  MRN: [id number]  Date of Procedure: 10/16/2017    Pre-Op Diagnosis: Left displaced femoral neck fracture    Post-Op Diagnosis: Same       Procedure(s):  HIP HEMIARTHROPLASTY    Anesthesia: Other    Surgeon(s):  Carey Mclain MD    Staff:  First Assistant: Sendy Biggs  Scrub Person First: Lyle Oakley     Estimated Blood Loss: 100 (units unknown)    Complications: None    Specimens:   * No specimens in log *    Implants:    Implant Name Type Inv.  Item Serial No.  Lot No. LRB No. Used   CEMENT PALACOS R SING DOSE 40GR Cement CEMENT PALACOS R SING DOSE 40GR  Rohith Kitchen [id number] Left 1   IMPL HIP FEM DISTL STEM CENTRALZR SZ10 Hip IMPL HIP FEM DISTL STEM CENTRALZR SZ10  JNJ: [facility] [id number] Left 1   CEMENT PALACOS R SING DOSE 40GR Cement CEMENT PALACOS R SING DOSE 40GR  Rohith Kitchen [id number] Left 1   IMPL HIP FEM BALL HEAD FX UNIPOLAR 47MM Hip IMPL HIP FEM BALL HEAD FX UNIPOLAR 47MM  JNJ: Michelle Heller [id number] Left 1   IMPL HIP FEM ACET TAPR SPACR 5.0MM Hip IMPL HIP FEM ACET TAPR SPACR 5.0MM  J: [facility] [id number] Left 1   IMPL HIP FEM STEM CMNTD TAPR SZ 5 Hip IMPL HIP FEM STEM CMNTD TAPR SZ 5   JNJ: Michelle Heller [id number] Left 1         Drains:   Urethral Catheter Straight-tip 16 fr (Active)   Catheter Indications Perioperative use in selected surgeries including but not limited to urologic, pelvic or need for intraoperative monitoring of urinary output due to prolonged surgery, large volume infusion or need for diuretic therapy in surgery 10/16/2017  2:27 AM   Urine Color Yellow 10/16/2017  2:27 AM   Urine Appearance Clear 10/16/2017  2:27 AM   Output (mL) 400 mL 10/16/2017  3:45 AM       Findings: Left displaced femoral neck fracture    Carey Mclain MD  Date: 10/16/2017  Time: 4:13 PM

## 2017-10-16 NOTE — ANESTHESIA PRE PROCEDURE
Department of Anesthesiology  Preprocedure Note       Name:  Saravanan Oneil   Age:  80 y.o.  :  1945                                          MRN:  [id number]         Date:  10/16/2017      Surgeon: Ilir Portillo):  Eric June MD    Procedure: Procedure(s):  HIP HEMIARTHROPLASTY    Medications prior to admission:   Prior to Admission medications    Medication Sig Start Date End Date Taking?  Authorizing Provider   amLODIPine (NORVASC) 5 MG tablet Take 5 mg by mouth daily   Yes Historical Provider, MD   vitamin D (ERGOCALCIFEROL) 70160 units CAPS capsule Take 50,000 Units by mouth once a week   Yes Historical Provider, MD   Multiple Vitamins-Minerals (OCUVITE ADULT 50+ PO) Take by mouth   Yes Historical Provider, MD   metoprolol (LOPRESSOR) 100 MG tablet Take 100 mg by mouth 2 times daily   Yes Historical Provider, MD   atorvastatin (LIPITOR) 20 MG tablet Take 40 mg by mouth daily    Yes Historical Provider, MD   losartan (COZAAR) 100 MG tablet Take 100 mg by mouth daily   Yes Historical Provider, MD   cloNIDine (CATAPRES) 0.1 MG tablet Take 0.1 mg by mouth 2 times daily   Yes Historical Provider, MD       Current medications:    Current Facility-Administered Medications   Medication Dose Route Frequency Provider Last Rate Last Dose    0.9 % sodium chloride infusion   Intravenous Continuous Senthil Moeller  mL/hr at 10/16/17 0019      morphine injection 2 mg  2 mg Intravenous Q3H PRN Ann-Marie Gonzales MD   2 mg at 10/16/17 1106    amLODIPine (NORVASC) tablet 5 mg  5 mg Oral Daily Ann-Marie Gonzales MD   Stopped at 10/16/17 1119    [START ON 10/21/2017] vitamin D (ERGOCALCIFEROL) capsule 50,000 Units  50,000 Units Oral Weekly Ann-Marie Gonzales MD        ocuvite-lutein multivitamin 1 capsule  1 capsule Oral Daily Ann-Marie Gonzales MD        metoprolol tartrate (LOPRESSOR) tablet 100 mg  100 mg Oral BID Ann-Marie Gonzales MD   100 mg at 10/16/17 1106    atorvastatin (LIPITOR) tablet 20 mg  20 mg Oral Daily Laura Albrecht MD        losartan (COZAAR) tablet 100 mg  100 mg Oral Daily Laura Albrecht MD        cloNIDine (CATAPRES) tablet 0.1 mg  0.1 mg Oral BID Laura Albrecht MD        0.9 % sodium chloride infusion   Intravenous Continuous Laura Albrecht MD 75 mL/hr at 10/16/17 0324      sodium chloride flush 0.9 % injection 10 mL  10 mL Intravenous 2 times per day Laura Albrecht MD        sodium chloride flush 0.9 % injection 10 mL  10 mL Intravenous PRN Laura Albrecht MD        acetaminophen (TYLENOL) tablet 650 mg  650 mg Oral Q4H PRN Laura Albrecht MD        docusate sodium (COLACE) capsule 100 mg  100 mg Oral BID Laura Albrecht MD        ondansetron [facility]) injection 4 mg  4 mg Intravenous Q6H PRN Laura Albrecht MD        heparin (porcine) injection 5,000 Units  5,000 Units Subcutaneous 3 times per day Laura Albrecht MD   5,000 Units at 10/16/17 0555    promethazine (PHENERGAN) injection 12.5 mg  12.5 mg Intravenous Q4H PRN Laura Albrecht MD   12.5 mg at 10/16/17 0256       Allergies:     Allergies   Allergen Reactions    Lyrica [Pregabalin] Other (See Comments)     \"jitters\"    Bactrim [Sulfamethoxazole-Trimethoprim] Rash    Keflex [Cephalexin] Rash       Problem List:    Patient Active Problem List   Diagnosis Code    [facility] S[facility].002A    Hypertension I10    Acute kidney failure ([facility] N17.9       Past Medical History:        Diagnosis Date    Hypertension        Past Surgical History:        Procedure Laterality Date    APPENDECTOMY      BACK SURGERY      BREAST SURGERY      biopsy    HAND SURGERY Bilateral     HYSTERECTOMY      KNEE SURGERY Bilateral     L 2008/ R 2009    TONSILLECTOMY         Social History:    Social History   Substance Use Topics    Smoking status: Current Every Day Smoker     Packs/day: 0.50     Types: Cigarettes    Smokeless tobacco: Never Used    Alcohol use Yes      Comment: 3/ day                                Ready to quit: Not Answered  Counseling given: Not Answered      Vital Signs (Current):   Vitals:    10/16/17 0111 10/16/17 0154 10/16/17 0609 10/16/17 1118   BP: 122/64 117/63 (!) 155/80 (!) 144/76   Pulse: 70 68 71 79   Resp: 18 20 18 18   Temp: 98 °F (36.7 °C) 98.2 °F (36.8 °C) 99.2 °F (37.3 °C) 98.8 °F (37.1 °C)   TempSrc:  Temporal Temporal Oral   SpO2: 94% 92% 94% 93%   Weight:  187 lb (84.8 kg)     Height:  5' 8\" (1.727 m)                                                BP Readings from Last 3 Encounters:   10/16/17 (!) 144/76       NPO Status:                                                                                 BMI:   Wt Readings from Last 3 Encounters:   10/16/17 187 lb (84.8 kg)     Body mass index is 28.43 kg/m². CBC:   Lab Results   Component Value Date    WBC 7.8 10/15/2017    RBC 4.19 10/15/2017    HGB 14.1 10/15/2017    HCT 40.0 10/15/2017    MCV 95.5 10/15/2017    RDW 14.0 10/15/2017     10/15/2017       CMP:   Lab Results   Component Value Date     10/15/2017    K 4.4 10/15/2017    CL 88 10/15/2017    CO2 24 10/15/2017    BUN 18 10/15/2017    CREATININE 1.4 10/15/2017    LABGLOM 37 10/15/2017    GLUCOSE 107 10/15/2017    CALCIUM 8.9 10/15/2017       POC Tests: No results for input(s): POCGLU, POCNA, POCK, POCCL, POCBUN, POCHEMO, POCHCT in the last 72 hours.     Coags:   Lab Results   Component Value Date    PROTIME 11.7 10/15/2017    INR 0.87 10/15/2017       HCG (If Applicable): No results found for: PREGTESTUR, PREGSERUM, HCG, HCGQUANT     ABGs: No results found for: PHART, PO2ART, MPT0JNG, TAF2JOB, BEART, L3ZDUAOK     Type & Screen (If Applicable):  No results found for: [facility]    Anesthesia Evaluation  Patient summary reviewed and Nursing notes reviewed no history of anesthetic complications:   Airway: Mallampati: II  TM distance: >3 FB   Neck ROM: full   Dental:    (+) edentulous      Pulmonary:   (+) COPD:  sleep apnea:                          ROS comment: Tob 1/2 PPD    Home O2 qhs   Cardiovascular:    (+) hypertension:,     (-) pacemaker, past MI and CAD       Beta Blocker:  Dose within 24 Hrs         Neuro/Psych:   {neg ROS              GI/Hepatic/Renal:        (-) GERD       Endo/Other:        (-) hypothyroidism, hyperthyroidism, blood dyscrasia, no Type II DM, no Type I DM               Abdominal:           Vascular:                                      Anesthesia Plan      general     ASA 3     (Decadron/Zofran Intraop)  Induction: intravenous. BIS  MIPS: Postoperative opioids intended and Prophylactic antiemetics administered. Anesthetic plan and risks discussed with patient.                       Kera Almazan MD   10/16/2017

## 2017-10-16 NOTE — ANESTHESIA POSTPROCEDURE EVALUATION
Department of Anesthesiology  Postprocedure Note    Patient: Heide Palma  MRN: [id number]  YOB: 1945  Date of evaluation: 10/16/2017  Time:  4:16 PM     Procedure Summary     Date:  10/16/17 Room / Location:  [facility] OR  / [facility] OR    Anesthesia Start:  7060 Anesthesia Stop:  0198    Procedure:  HIP HEMIARTHROPLASTY (Left Hip) Diagnosis:  (Left displaced femoral neck fracture)    Surgeon:  Juan Francisco Dejesus MD Responsible Provider:  Michael Silver CRNA    Anesthesia Type:  general ASA Status:  3          Anesthesia Type: general    Maryjane Phase I:      Maryjane Phase II:      Last vitals: Reviewed and per EMR flowsheets. Anesthesia Post Evaluation    Patient location during evaluation: PACU  Patient participation: complete - patient participated  Level of consciousness: awake and alert  Pain score: 0  Airway patency: patent  Nausea & Vomiting: no nausea and no vomiting  Complications: no  Cardiovascular status: hypertensive  Respiratory status: acceptable  Hydration status: euvolemic  Comments: Patients /109 in PACU; patient states that she doesn't know if she is hurting. IV Metoprolol given.

## 2017-10-16 NOTE — ED NOTES
Bed: 04  Expected date:   Expected time:   Means of arrival:   Comments:  [facility]/ [address] [place] [address] [place], RN  10/15/17 0411

## 2017-10-16 NOTE — H&P
Lyrica [pregabalin]; Bactrim [sulfamethoxazole-trimethoprim]; and Keflex [cephalexin]    Social History:    The patient currently lives at home  Tobacco:   reports that she has been smoking Cigarettes. She has been smoking about 0.50 packs per day. She has never used smokeless tobacco.  Alcohol:   reports that she drinks alcohol. Illicit Drugs:     Family History:     Reviewed in detail and negative for DM, CAD, Cancer, CVA. Positive as follows:  History reviewed. No pertinent family history. Review of Systems:   Pertinent items are noted in HPI. Physical Examination:  General Appearance:  awake, alert, oriented, in no acute distress, well developed, well nourished and in no acute distress  Skin:  Skin color, texture, turgor normal. No rashes or lesions. Head/face:  NCAT  Eyes:  No gross abnormalities. , PERRL and EOMI  Neck:  neck- supple, no mass, non-tender and no bruits  Lungs:  Normal expansion. Clear to auscultation. No rales, rhonchi, or wheezing. Heart:  Heart sounds are normal.  Regular rate and rhythm without murmur, gallop or rub. Heart regular rate and rhythm  Abdomen:  Soft, non-tender, normal bowel sounds. No bruits, organomegaly or masses. Extremities: Extremities warm to touch, pink, with no edema.  and pulses present in all extremities  Joint:  ROM limited flexion, limited extension  tenderness of hip(s): left  pain elicited with movement of hip(s): left  Neurologic:  negative    Diagnostic Data:  CXR:  I have reviewed the report with the following interpretation: Normal  EKG:  I have reviewed the EKG with the following interpretation: Pending  LEFT HIP XRAY: Fracture neck of femur  LABS:   CBC:  Recent Labs      10/15/17   2235   WBC  7.8   HGB  14.1   HCT  40.0   PLT  215     BMP:  Recent Labs      10/15/17   2235   NA  130*   K  4.4   CL  88*   CO2  24   BUN  18   CREATININE  1.4*   CALCIUM  8.9   No results for input(s): AST, ALT, BILIDIR, BILITOT, ALKPHOS in the last 72 hours.  Coag Panel:   Recent Labs      10/15/17   2235   INR  0.87*   PROTIME  11.7*     Cardiac Enzymes: No results for input(s): Tremayne Napier in the last 72 hours. ABGs:No results found for: PHART, PO2ART, CLI0PEW  Urinalysis:No results found for: NITRU, WBCUA, BACTERIA, RBCUA, BLOODU, SPECGRAV, GLUCOSEU    Assessment:  Active Hospital Problems    Diagnosis Date Noted    [facility] operative repair, left, closed[facility] Patt Linares 10/16/2017    Hypertension [I10] 10/16/2017    Acute kidney failure ([facility] [N17.9] 10/16/2017   Hyponatremia    Plan:  1. Analgesia  2. Fluids  3. Heparin  4. Continue anti hypertensives  5. Orthopedic consult  6.  NPO    DVT Prophylaxis: Heparin  Diet: Diet NPO Effective Now  Code Status: Full Code  GI Prophylaxis:   PT/OT Eval Status: Yes after surgery  Baltazar Wong MD  10/16/69866:22 AM

## 2017-10-16 NOTE — OP NOTE
Patient Name: Connor Santoyo  : 1945  MRN: [id number]      DATE of SURGERY: 10/16/2017    SURGEON: Carey Mclain MD    ASSISTANT: NONE    Preoperative Diagnosis:  Acute traumatic displaced subcapital fracture of the neck of the Left femur, initial encounter for closed fracture     Postoperative Diagnosis:  Acute traumatic displaced subcapital fracture of the neck of the Left femur, initial encounter for closed fracture     Procedure Performed: Left Hip Hemiarthroplasty    Implants: DePuy Corail System with the following components:            47 mm monopolar head          +5 neck           Size 5 cemented fit stem    Anesthesia Used: GETA    Operative Indications: 80 y.o. female status post fall with a displaced femoral neck fracture. Surgical indications include fracture displacement, pain control, ability to mobilize the patient, and prevent sequelae of prolonged bedrest (DVT, pneumonia, skin ulceration). Risk include, but are not limited to, bleeding, infection, pain, damage to neurovascular structures, leg length inequality, hip dislocation, blood clots, intraoperative death. Risks, benefits, and alternatives were discussed and the patient wished to proceed. Estimated Blood Loss: 100 mL    Drains: None    Specimens: None    Complications: None    Procedure In Detail:  The patient was seen in the preoperative holding room, the informed consent was reviewed and signed, and the correct operative extremity marked with the patients agreement. The patient was transported to the operating room, where a timeout was performed identifying the correct patient and operative site. Perioperative antibiotics were administered prior to incision. Once placed under general anesthesia, the patient was positioned in the lateral decubitus position and all bony prominences were padded. An axillary roll was placed. The extremity was prepped and draped in the usual sterile fashion.     An anterolateral approach to the hip was utilized as a slightly curved incision was made centered from the posterior aspect of the greater trochanter. Soft tissue was dissected in-line with the incision and the tensor fascia and gluteus guillermina muscle were split. The trochanteric bursa was excised revealing the short external rotators of the hip, which were tagged and incised from the posterolateral aspect of the greater trochanter. A T-shaped capsulotomy was performed and with progressive external rotation of the hip the fracture was noted. While protecting soft tissue structures, a standard femoral neck cut was made with an oscillating saw approximately one finger-breadth proximal to the lesser trochanter. The native femoral head was removed with a corkscrew device and taken the back table and measured. Preparation of the femoral canal was then performed, first with a box-cut chisel, followed by a canal finder, lateralizing device, and sequential broaches up to a stable fit. Trial components were inserted, the hip was reduced showing excellent stability and approximately equal leg lengths. The trial components were removed, the proximal femoral canal was prepared and cement was mixed in the back table and injected into the proximal femur, then final implants were advanced into appropriate position without complication with findings as described. The incision was thoroughly irrigated followed by closure of the capsule with 0-vicryl, re-approximation of the rotators with #2 fiberwire suture, and closure of the wound in layers. The skin was closed with adhesive glue. A soft tissue dressing was placed. The patient was placed supine on a hospital bed, legs lengths again checked showing them to be equal and a knee immobilizer was placed. The patient was awakened by anesthesia transported to the PACU in stable condition. POSTOPERATIVE PLAN: Admit inpatient for monitoring, PT/OT, 3 weeks of DVT prophylaxis, and IV antibiotics for 24 hrs. Weight bear as tolerated with posterior hip precautions.

## 2017-10-16 NOTE — ED PROVIDER NOTES
[facility] EMERGENCY DEPT  eMERGENCY dEPARTMENT eNCOUnter      Pt Name: Alessia Casey  MRN: [id number]  [place] 1945  Date of evaluation: 10/15/2017  Provider: Axel Hickman MD    [address] [place] [address]       Chief Complaint   Patient presents with    Fall     left hip pain         HISTORY OF PRESENT ILLNESS   (Location/Symptom, Timing/Onset, Context/Setting, Quality, Duration, Modifying Factors, Severity)  Note limiting factors. Alessia Casey is a 80 y.o. female who presents to the emergency department Complaining of left hip pain after fall. Patient intoxicated. She admits to drinking 3 drinks tonight and said she drinks nearly every evening. Said that she tripped as she was stepping over house shoes and fell and landed on her left hip. Having left hip pain and has been unable to ambulate since she fell. Denies any other injuries. Denies headache or neck pain. No back pain. No chest pain or difficulty breathing. No abdominal pain. No numbness or weakness. HPI    Nursing Notes were reviewed. REVIEW OF SYSTEMS    (2-9 systems for level 4, 10 or more for level 5)     Review of Systems   Constitutional: Negative for fever. Eyes: Negative for pain. Respiratory: Negative for shortness of breath. Cardiovascular: Negative for chest pain and palpitations. Gastrointestinal: Negative for abdominal pain, diarrhea and vomiting. Genitourinary: Negative for dysuria. Skin: Negative for rash. Neurological: Negative for weakness and headaches. All other systems reviewed and are negative. A complete review of systems was performed and is negative except as noted above in the HPI.        PAST MEDICAL HISTORY     Past Medical History:   Diagnosis Date    Hypertension          SURGICAL HISTORY       Past Surgical History:   Procedure Laterality Date    APPENDECTOMY      BACK SURGERY      BREAST SURGERY      biopsy    HAND SURGERY Bilateral     HYSTERECTOMY      KNEE SURGERY Bilateral     L 2008/ R LABS:  Labs Reviewed   CBC - Abnormal; Notable for the following:        Result Value    RBC 4.19 (*)     MCH 33.7 (*)     All other components within normal limits   BASIC METABOLIC PANEL - Abnormal; Notable for the following:     Sodium 130 (*)     Chloride 88 (*)     CREATININE 1.4 (*)     GFR Non- 37 (*)     All other components within normal limits   PROTIME-INR - Abnormal; Notable for the following:     Protime 11.7 (*)     INR 0.87 (*)     All other components within normal limits   ETHANOL       All other labs were within normal range or not returned as of this dictation. EMERGENCY DEPARTMENT COURSE and DIFFERENTIAL DIAGNOSIS/MDM:   Vitals:    Vitals:    10/15/17 2230 10/15/17 2239 10/16/17 0000   BP:  (!) 140/71 138/72   Pulse:  65 70   Resp:  14 20   Temp:  98 °F (36.7 °C)    SpO2:  92% 92%   Weight: 182 lb (82.6 kg)     Height: 5' 8\" (1.727 m)         MDM  Case discussed with Dr. Micaela Wyatt who will see in consult in the morning. We'll keep patient NPO per his instructions as he will likely do surgery in the morning. Call placed to hospitalist for admission. Case d/w Dr. Lopez Lenz who is agreeable with plan of care and admit. CONSULTS:  IP CONSULT TO ORTHOPEDIC SURGERY    PROCEDURES:  Unless otherwise noted below, none     Procedures    FINAL IMPRESSION      1. Fall, initial encounter    2. Closed fracture of left hip, initial encounter ([facility] [address]    3. Acute renal failure, unspecified acute renal failure type ([facility] [address]    4. Hyponatremia          DISPOSITION/PLAN   DISPOSITION     PATIENT REFERRED TO:  No follow-up provider specified.     DISCHARGE MEDICATIONS:  New Prescriptions    No medications on file          (Please note that portions of this note were completed with a voice recognition program.  Efforts were made to edit the dictations but occasionally words are mis-transcribed.)    April Dial MD (electronically signed)  Attending Emergency Physician        Demetrice Tong

## 2017-10-17 PROBLEM — E78.2 MIXED HYPERLIPIDEMIA: Chronic | Status: ACTIVE | Noted: 2017-10-17

## 2017-10-17 LAB
ANION GAP SERPL CALCULATED.3IONS-SCNC: 13 MMOL/L (ref 7–19)
BASOPHILS ABSOLUTE: 0 K/UL (ref 0–0.2)
BASOPHILS RELATIVE PERCENT: 0.1 % (ref 0–1)
BUN BLDV-MCNC: 17 MG/DL (ref 8–23)
CALCIUM SERPL-MCNC: 7.9 MG/DL (ref 8.8–10.2)
CHLORIDE BLD-SCNC: 95 MMOL/L (ref 98–111)
CO2: 25 MMOL/L (ref 22–29)
CREAT SERPL-MCNC: 0.9 MG/DL (ref 0.5–0.9)
EOSINOPHILS ABSOLUTE: 0 K/UL (ref 0–0.6)
EOSINOPHILS RELATIVE PERCENT: 0 % (ref 0–5)
GFR NON-AFRICAN AMERICAN: >60
GLUCOSE BLD-MCNC: 161 MG/DL (ref 74–109)
HCT VFR BLD CALC: 33.5 % (ref 37–47)
HEMOGLOBIN: 11.3 G/DL (ref 12–16)
LYMPHOCYTES ABSOLUTE: 0.4 K/UL (ref 1.1–4.5)
LYMPHOCYTES RELATIVE PERCENT: 3.6 % (ref 20–40)
MCH RBC QN AUTO: 33.5 PG (ref 27–31)
MCHC RBC AUTO-ENTMCNC: 33.7 G/DL (ref 33–37)
MCV RBC AUTO: 99.4 FL (ref 81–99)
MONOCYTES ABSOLUTE: 0.4 K/UL (ref 0–0.9)
MONOCYTES RELATIVE PERCENT: 3.8 % (ref 0–10)
NEUTROPHILS ABSOLUTE: 9.2 K/UL (ref 1.5–7.5)
NEUTROPHILS RELATIVE PERCENT: 92.2 % (ref 50–65)
PDW BLD-RTO: 14.1 % (ref 11.5–14.5)
PLATELET # BLD: 171 K/UL (ref 130–400)
PMV BLD AUTO: 9.9 FL (ref 9.4–12.3)
POTASSIUM SERPL-SCNC: 4.1 MMOL/L (ref 3.5–5)
RBC # BLD: 3.37 M/UL (ref 4.2–5.4)
SODIUM BLD-SCNC: 133 MMOL/L (ref 136–145)
WBC # BLD: 10 K/UL (ref 4.8–10.8)

## 2017-10-17 PROCEDURE — 2580000003 HC RX 258: Performed by: ORTHOPAEDIC SURGERY

## 2017-10-17 PROCEDURE — 97166 OT EVAL MOD COMPLEX 45 MIN: CPT

## 2017-10-17 PROCEDURE — G8978 MOBILITY CURRENT STATUS: HCPCS

## 2017-10-17 PROCEDURE — 6370000000 HC RX 637 (ALT 250 FOR IP): Performed by: ORTHOPAEDIC SURGERY

## 2017-10-17 PROCEDURE — 80048 BASIC METABOLIC PNL TOTAL CA: CPT

## 2017-10-17 PROCEDURE — 99232 SBSQ HOSP IP/OBS MODERATE 35: CPT | Performed by: HOSPITALIST

## 2017-10-17 PROCEDURE — 6370000000 HC RX 637 (ALT 250 FOR IP): Performed by: INTERNAL MEDICINE

## 2017-10-17 PROCEDURE — 85025 COMPLETE CBC W/AUTO DIFF WBC: CPT

## 2017-10-17 PROCEDURE — G8988 SELF CARE GOAL STATUS: HCPCS

## 2017-10-17 PROCEDURE — 36415 COLL VENOUS BLD VENIPUNCTURE: CPT

## 2017-10-17 PROCEDURE — G8979 MOBILITY GOAL STATUS: HCPCS

## 2017-10-17 PROCEDURE — G8987 SELF CARE CURRENT STATUS: HCPCS

## 2017-10-17 PROCEDURE — 2500000003 HC RX 250 WO HCPCS: Performed by: ORTHOPAEDIC SURGERY

## 2017-10-17 PROCEDURE — 97161 PT EVAL LOW COMPLEX 20 MIN: CPT

## 2017-10-17 PROCEDURE — 6360000002 HC RX W HCPCS: Performed by: ORTHOPAEDIC SURGERY

## 2017-10-17 PROCEDURE — 1210000000 HC MED SURG R&B

## 2017-10-17 RX ADMIN — DOCUSATE SODIUM 100 MG: 100 CAPSULE, LIQUID FILLED ORAL at 20:23

## 2017-10-17 RX ADMIN — HYDROMORPHONE HYDROCHLORIDE 0.5 MG: 1 INJECTION, SOLUTION INTRAMUSCULAR; INTRAVENOUS; SUBCUTANEOUS at 08:18

## 2017-10-17 RX ADMIN — RIVAROXABAN 10 MG: 10 TABLET, FILM COATED ORAL at 18:44

## 2017-10-17 RX ADMIN — OXYCODONE HYDROCHLORIDE AND ACETAMINOPHEN 1 TABLET: 5; 325 TABLET ORAL at 21:32

## 2017-10-17 RX ADMIN — Medication 10 ML: at 11:28

## 2017-10-17 RX ADMIN — HYDROMORPHONE HYDROCHLORIDE 0.5 MG: 1 INJECTION, SOLUTION INTRAMUSCULAR; INTRAVENOUS; SUBCUTANEOUS at 17:13

## 2017-10-17 RX ADMIN — Medication 10 ML: at 20:23

## 2017-10-17 RX ADMIN — Medication 1 CAPSULE: at 09:07

## 2017-10-17 RX ADMIN — OXYCODONE HYDROCHLORIDE AND ACETAMINOPHEN 1 TABLET: 5; 325 TABLET ORAL at 13:08

## 2017-10-17 RX ADMIN — HYDROMORPHONE HYDROCHLORIDE 0.5 MG: 1 INJECTION, SOLUTION INTRAMUSCULAR; INTRAVENOUS; SUBCUTANEOUS at 11:27

## 2017-10-17 RX ADMIN — METOPROLOL TARTRATE 100 MG: 50 TABLET ORAL at 20:23

## 2017-10-17 RX ADMIN — RIVAROXABAN 10 MG: 10 TABLET, FILM COATED ORAL at 00:10

## 2017-10-17 RX ADMIN — LOSARTAN POTASSIUM 100 MG: 100 TABLET ORAL at 09:07

## 2017-10-17 RX ADMIN — SODIUM CHLORIDE: 9 INJECTION, SOLUTION INTRAVENOUS at 12:51

## 2017-10-17 RX ADMIN — METOPROLOL TARTRATE 100 MG: 50 TABLET ORAL at 09:10

## 2017-10-17 RX ADMIN — CLINDAMYCIN PHOSPHATE 900 MG: 900 INJECTION INTRAVENOUS at 05:46

## 2017-10-17 RX ADMIN — DOCUSATE SODIUM 100 MG: 100 CAPSULE, LIQUID FILLED ORAL at 09:07

## 2017-10-17 ASSESSMENT — PAIN DESCRIPTION - ORIENTATION: ORIENTATION: LEFT

## 2017-10-17 ASSESSMENT — PAIN SCALES - GENERAL
PAINLEVEL_OUTOF10: 7
PAINLEVEL_OUTOF10: 10
PAINLEVEL_OUTOF10: 7
PAINLEVEL_OUTOF10: 8
PAINLEVEL_OUTOF10: 6
PAINLEVEL_OUTOF10: 7
PAINLEVEL_OUTOF10: 7

## 2017-10-17 ASSESSMENT — PAIN DESCRIPTION - LOCATION
LOCATION: HIP

## 2017-10-17 ASSESSMENT — PAIN DESCRIPTION - PAIN TYPE: TYPE: SURGICAL PAIN

## 2017-10-17 NOTE — PROGRESS NOTES
Orthopedic Surgery Progress Note    Gema Rankinker  10/17/2017      Subjective:     Systemic or Specific Complaints: No Complaints    Pain mild    Objective:     Patient Vitals for the past 24 hrs:   BP Temp Temp src Pulse Resp SpO2   10/17/17 0910 (!) 153/85 - - 64 - -   10/17/17 0842 (!) 153/85 98 °F (36.7 °C) Temporal 64 14 98 %   10/17/17 0657 (!) 143/76 96.7 °F (35.9 °C) Temporal 63 17 92 %   10/17/17 0338 (!) 145/82 98.3 °F (36.8 °C) Temporal 59 20 95 %   10/16/17 2239 133/78 98.3 °F (36.8 °C) Temporal 60 16 92 %   10/16/17 2016 132/71 97.2 °F (36.2 °C) Temporal 60 20 92 %   10/16/17 1940 122/79 98.1 °F (36.7 °C) Temporal 54 20 93 %   10/16/17 1820 137/80 97.8 °F (36.6 °C) Temporal 64 20 92 %   10/16/17 1725 (!) 143/85 97.7 °F (36.5 °C) Temporal 73 17 95 %   10/16/17 1709 - - - 62 - -   10/16/17 1700 116/73 - - 63 9 93 %   10/16/17 1655 136/72 - - 63 9 92 %   10/16/17 1650 138/70 - - 70 13 93 %   10/16/17 1645 (!) 142/79 - - 68 14 94 %   10/16/17 1640 129/88 - - 68 12 91 %   10/16/17 1635 (!) 163/89 - - 72 16 94 %   10/16/17 1630 (!) 187/84 - - 78 15 95 %   10/16/17 1620 (!) 177/91 - - 72 16 96 %   10/16/17 1615 (!) 197/109 97 °F (36.1 °C) Temporal 79 18 90 %   10/16/17 1610 (!) 180/109 97 °F (36.1 °C) Temporal 79 18 90 %   10/16/17 1118 (!) 144/76 98.8 °F (37.1 °C) Oral 79 18 93 %       left lower  General: alert, appears stated age and cooperative   Wound: clean, dry, intact             Dressing: clean, dry, and intact   Extremity: Distal NVI           DVT Exam: No evidence of DVT seen on physical exam.                   Data Review:  Recent Labs      10/15/17   2235  10/17/17   0152   HGB  14.1  11.3*     Recent Labs      10/17/17   0152   NA  133*   K  4.1   CREATININE  0.9       Assessment:     POD# 1 s/p left hip hardy    Plan:      1:  DVT prophylaxis, ICE, elevate  2:  Pain control  3:  Physical therapy/Occupational therapy  4:  Anticipate discharge tomorrow if pain well controlled  5: Weight bearing

## 2017-10-17 NOTE — PROGRESS NOTES
member  Objective          AROM RLE (degrees)  RLE AROM: WFL  AROM LLE (degrees)  LLE General AROM: LIMITED DUE TO PAIN  Strength RLE  Comment: GROSSLY 5/5  Strength LLE  Comment: GROSSLY +3/5        Bed mobility  Supine to Sit: Minimal assistance (Simultaneous filing. User may not have seen previous data.)  Transfers  Sit to Stand: Moderate Assistance  Bed to Chair: Minimal assistance  Ambulation  WB Status: WBAT  Ambulation 1  Device: Rolling Walker  Assistance: Minimal assistance  Quality of Gait: SUFFICIENT WEIGHTBEARING, DEC STEP LENGTH  Distance: 5'     Balance  Sitting - Dynamic: Good  Standing - Dynamic: Fair;+        Assessment   Body structures, Functions, Activity limitations: Decreased functional mobility ; Decreased balance;Decreased strength  Assessment: UP TO RECLINER, MOST DIFFICULTY WITH SIT TO STAND. Patient Education: REVIEWED POSTERIOR HIP PRECAUTIONS AND KNEE IMMOBILIZER WITH Pt/SPOUSE  REQUIRES PT FOLLOW UP: Yes  Activity Tolerance  Activity Tolerance: Patient Tolerated treatment well     Discharge Recommendations:  Continue to assess pending progress      Plan   Plan  Times per week: AT LEAST 7  Current Treatment Recommendations: Strengthening, Balance Training, Functional Mobility Training, Transfer Training, Gait Training, Patient/Caregiver Education & Training, Safety Education & Training, Stair training  Safety Devices  Type of devices: Left in chair, Call light within reach    G-Code  PT G-Codes  Functional Assessment Tool Used: BED TO CHAIR  Functional Limitation: Mobility: Walking and moving around  Mobility: Walking and Moving Around Current Status (): At least 60 percent but less than 80 percent impaired, limited or restricted  Mobility: Walking and Moving Around Goal Status ():  At least 1 percent but less than 20 percent impaired, limited or restricted  OutComes Score                                           Goals  Short term goals  Time Frame for Short term goals: 15

## 2017-10-17 NOTE — PROGRESS NOTES
Occupational Therapy   Occupational Therapy Initial Assessment  Date: 10/17/2017   Patient Name: Victor Manuel Kendrick  MRN: [id number]     : 1945    Patient Diagnosis(es): The primary encounter diagnosis was Fall, initial encounter. Diagnoses of Closed fracture of left hip, initial encounter ([facility] [address] Acute renal failure, unspecified acute renal failure type ([facility] [address] and Hyponatremia were also pertinent to this visit. has a past medical history of Hypertension. has a past surgical history that includes Tonsillectomy; Appendectomy; Breast surgery; Hand surgery (Bilateral); Hysterectomy; back surgery; and knee surgery (Bilateral).     Treatment Diagnosis: Left Hip Jerman Arthroplasty      Restrictions  Restrictions/Precautions  Restrictions/Precautions: Weight Bearing  Lower Extremity Weight Bearing Restrictions  Left Lower Extremity Weight Bearing: Weight Bearing As Tolerated  Position Activity Restriction  Hip Precautions: Posterior hip precautions  Other position/activity restrictions: KNEE IMMOBILIZER WHEN IN BED    Subjective   General  Chart Reviewed: Yes  Patient assessed for rehabilitation services?: Yes  Family / Caregiver Present: Yes ()  Pain Assessment  Patient Currently in Pain: Yes  Pain Level: 7  Pain Type: Surgical pain  Pain Location: Hip  Pain Orientation: Left  Pain Intervention(s): Medication (see eMar)     Social/Functional History  Social/Functional History  Lives With: Spouse  Type of Home: House  Home Layout: Performs ADL's on one level  Home Access: Stairs to enter with rails  Entrance Stairs - Number of Steps: 4  Bathroom Shower/Tub: Walk-in shower  Bathroom Toilet: Handicap height  Home Equipment: Standard walker  ADL Assistance: Independent  Homemaking Assistance: Independent  Ambulation Assistance: Independent (no device)  Transfer Assistance: Independent  Active : Yes  Mode of Transportation: Car  Additional Comments: may have a BSC of a family member       Objective   Vision: Within Functional Limits  Hearing: Exceptions to [facility]  Hearing Exceptions: Bilateral hearing aid (but not here)    Orientation  Overall Orientation Status: Within Normal Limits     Balance  Sitting Balance: Supervision  Standing Balance: Contact guard assistance (to min A)  ADL  Feeding: Independent  Grooming: Independent  UE Bathing: Supervision  LE Bathing: Moderate assistance  UE Dressing: Independent;Setup  LE Dressing: Moderate assistance  Toileting: Moderate assistance           Transfers  Stand Step Transfers: Minimal assistance (to mod A due to need for boost for sit to stand, then CGA once up)     Cognition  Overall Cognitive Status: WFL                 LUE PROM (degrees)  LUE PROM: WFL  LUE AROM (degrees)  LUE AROM : WFL  RUE PROM (degrees)  RUE PROM: WFL  RUE AROM (degrees)  RUE AROM : WFL  LUE Strength  Gross LUE Strength: WFL  RUE Strength  Gross RUE Strength: WFL                  Assessment   Performance deficits / Impairments: Decreased functional mobility ; Decreased ADL status; Decreased high-level IADLs  Assessment: Primary limiting factor is decreased sit to stand and bed mobility. Will also need further training on application of posterior hip precautions to ADL  Treatment Diagnosis: Left Hip Jerman Arthroplasty  Decision Making: Medium Complexity  Patient Education: fall protocol, posterior precautions  Barriers to Learning: none but will benefit from repetition  Discharge Recommendations: Patient would benefit from continued therapy after discharge  REQUIRES OT FOLLOW UP: Yes  Activity Tolerance  Activity Tolerance: Patient limited by pain  Activity Tolerance: but did well for first time OOB  Safety Devices  Safety Devices in place: Yes  Type of devices: Call light within reach; Left in chair        Discharge Recommendations:  Patient would benefit from continued therapy after discharge     Plan   Plan  Times per week: 4-8 visits weekly qd to bid as tolerated  Current Treatment Recommendations:

## 2017-10-18 ENCOUNTER — HOSPITAL ENCOUNTER (INPATIENT)
Age: 72
LOS: 10 days | Discharge: HOME HEALTH | DRG: 560 | End: 2017-10-28
Attending: PSYCHIATRY & NEUROLOGY | Admitting: PSYCHIATRY & NEUROLOGY
Payer: MEDICARE

## 2017-10-18 VITALS
RESPIRATION RATE: 14 BRPM | OXYGEN SATURATION: 96 % | BODY MASS INDEX: 28.34 KG/M2 | HEIGHT: 68 IN | TEMPERATURE: 98.5 F | SYSTOLIC BLOOD PRESSURE: 158 MMHG | HEART RATE: 69 BPM | DIASTOLIC BLOOD PRESSURE: 78 MMHG | WEIGHT: 187 LBS

## 2017-10-18 DIAGNOSIS — S72.002A HIP FRACTURE REQUIRING OPERATIVE REPAIR, LEFT, CLOSED, INITIAL ENCOUNTER (HCC): ICD-10-CM

## 2017-10-18 DIAGNOSIS — I10 ESSENTIAL HYPERTENSION: ICD-10-CM

## 2017-10-18 DIAGNOSIS — R26.9 GAIT ABNORMALITY: Primary | ICD-10-CM

## 2017-10-18 LAB
ANION GAP SERPL CALCULATED.3IONS-SCNC: 15 MMOL/L (ref 7–19)
BACTERIA: NEGATIVE /HPF
BILIRUBIN URINE: NEGATIVE
BLOOD, URINE: ABNORMAL
BUN BLDV-MCNC: 17 MG/DL (ref 8–23)
CALCIUM SERPL-MCNC: 7.7 MG/DL (ref 8.8–10.2)
CHLORIDE BLD-SCNC: 97 MMOL/L (ref 98–111)
CLARITY: CLEAR
CO2: 23 MMOL/L (ref 22–29)
COLOR: YELLOW
CREAT SERPL-MCNC: 0.8 MG/DL (ref 0.5–0.9)
EPITHELIAL CELLS, UA: 2 /HPF (ref 0–5)
GFR NON-AFRICAN AMERICAN: >60
GLUCOSE BLD-MCNC: 113 MG/DL (ref 74–109)
GLUCOSE URINE: NEGATIVE MG/DL
HCT VFR BLD CALC: 31.9 % (ref 37–47)
HEMOGLOBIN: 10.8 G/DL (ref 12–16)
HYALINE CASTS: 0 /HPF (ref 0–8)
KETONES, URINE: NEGATIVE MG/DL
LEUKOCYTE ESTERASE, URINE: NEGATIVE
MCH RBC QN AUTO: 33.5 PG (ref 27–31)
MCHC RBC AUTO-ENTMCNC: 33.9 G/DL (ref 33–37)
MCV RBC AUTO: 99.1 FL (ref 81–99)
NITRITE, URINE: NEGATIVE
PDW BLD-RTO: 14.2 % (ref 11.5–14.5)
PH UA: 7
PLATELET # BLD: 167 K/UL (ref 130–400)
PMV BLD AUTO: 9.8 FL (ref 9.4–12.3)
POTASSIUM SERPL-SCNC: 3.6 MMOL/L (ref 3.5–5)
PROTEIN UA: NEGATIVE MG/DL
RBC # BLD: 3.22 M/UL (ref 4.2–5.4)
RBC UA: 27 /HPF (ref 0–4)
SODIUM BLD-SCNC: 135 MMOL/L (ref 136–145)
SPECIFIC GRAVITY UA: 1.01
UROBILINOGEN, URINE: 0.2 E.U./DL
WBC # BLD: 7.8 K/UL (ref 4.8–10.8)
WBC UA: 0 /HPF (ref 0–5)

## 2017-10-18 PROCEDURE — 1180000000 HC REHAB R&B

## 2017-10-18 PROCEDURE — 2700000000 HC OXYGEN THERAPY PER DAY

## 2017-10-18 PROCEDURE — 2580000003 HC RX 258: Performed by: ORTHOPAEDIC SURGERY

## 2017-10-18 PROCEDURE — 85027 COMPLETE CBC AUTOMATED: CPT

## 2017-10-18 PROCEDURE — 36415 COLL VENOUS BLD VENIPUNCTURE: CPT

## 2017-10-18 PROCEDURE — 6370000000 HC RX 637 (ALT 250 FOR IP): Performed by: ORTHOPAEDIC SURGERY

## 2017-10-18 PROCEDURE — 6370000000 HC RX 637 (ALT 250 FOR IP): Performed by: INTERNAL MEDICINE

## 2017-10-18 PROCEDURE — 81001 URINALYSIS AUTO W/SCOPE: CPT

## 2017-10-18 PROCEDURE — 80048 BASIC METABOLIC PNL TOTAL CA: CPT

## 2017-10-18 PROCEDURE — 97116 GAIT TRAINING THERAPY: CPT

## 2017-10-18 PROCEDURE — 6370000000 HC RX 637 (ALT 250 FOR IP): Performed by: HOSPITALIST

## 2017-10-18 PROCEDURE — 97535 SELF CARE MNGMENT TRAINING: CPT

## 2017-10-18 PROCEDURE — 97530 THERAPEUTIC ACTIVITIES: CPT

## 2017-10-18 PROCEDURE — 6370000000 HC RX 637 (ALT 250 FOR IP): Performed by: PSYCHIATRY & NEUROLOGY

## 2017-10-18 PROCEDURE — 87086 URINE CULTURE/COLONY COUNT: CPT

## 2017-10-18 PROCEDURE — 99239 HOSP IP/OBS DSCHRG MGMT >30: CPT | Performed by: HOSPITALIST

## 2017-10-18 RX ORDER — SODIUM CHLORIDE 0.9 % (FLUSH) 0.9 %
10 SYRINGE (ML) INJECTION EVERY 12 HOURS SCHEDULED
Status: CANCELLED | OUTPATIENT
Start: 2017-10-18

## 2017-10-18 RX ORDER — SODIUM CHLORIDE 0.9 % (FLUSH) 0.9 %
10 SYRINGE (ML) INJECTION PRN
Status: DISCONTINUED | OUTPATIENT
Start: 2017-10-18 | End: 2017-10-19

## 2017-10-18 RX ORDER — DOCUSATE SODIUM 100 MG/1
100 CAPSULE, LIQUID FILLED ORAL 2 TIMES DAILY
Status: DISCONTINUED | OUTPATIENT
Start: 2017-10-18 | End: 2017-10-28 | Stop reason: HOSPADM

## 2017-10-18 RX ORDER — ONDANSETRON 2 MG/ML
4 INJECTION INTRAMUSCULAR; INTRAVENOUS EVERY 6 HOURS PRN
Status: CANCELLED | OUTPATIENT
Start: 2017-10-18

## 2017-10-18 RX ORDER — DOCUSATE SODIUM 100 MG/1
100 CAPSULE, LIQUID FILLED ORAL 2 TIMES DAILY PRN
Status: DISCONTINUED | OUTPATIENT
Start: 2017-10-18 | End: 2017-10-28 | Stop reason: HOSPADM

## 2017-10-18 RX ORDER — ERGOCALCIFEROL 1.25 MG/1
50000 CAPSULE ORAL WEEKLY
Status: CANCELLED | OUTPATIENT
Start: 2017-10-21

## 2017-10-18 RX ORDER — OXYCODONE HYDROCHLORIDE AND ACETAMINOPHEN 5; 325 MG/1; MG/1
1 TABLET ORAL EVERY 4 HOURS PRN
Status: DISCONTINUED | OUTPATIENT
Start: 2017-10-18 | End: 2017-10-28 | Stop reason: HOSPADM

## 2017-10-18 RX ORDER — BISACODYL 10 MG
10 SUPPOSITORY, RECTAL RECTAL DAILY PRN
Status: DISCONTINUED | OUTPATIENT
Start: 2017-10-18 | End: 2017-10-28 | Stop reason: HOSPADM

## 2017-10-18 RX ORDER — OXYCODONE HYDROCHLORIDE AND ACETAMINOPHEN 5; 325 MG/1; MG/1
1 TABLET ORAL EVERY 4 HOURS PRN
Status: CANCELLED | OUTPATIENT
Start: 2017-10-18

## 2017-10-18 RX ORDER — OXYCODONE HYDROCHLORIDE AND ACETAMINOPHEN 5; 325 MG/1; MG/1
1 TABLET ORAL EVERY 4 HOURS PRN
Status: DISCONTINUED | OUTPATIENT
Start: 2017-10-18 | End: 2017-10-19

## 2017-10-18 RX ORDER — METOPROLOL TARTRATE 50 MG/1
100 TABLET, FILM COATED ORAL 2 TIMES DAILY
Status: DISCONTINUED | OUTPATIENT
Start: 2017-10-18 | End: 2017-10-22

## 2017-10-18 RX ORDER — ONDANSETRON 2 MG/ML
4 INJECTION INTRAMUSCULAR; INTRAVENOUS EVERY 6 HOURS PRN
Status: DISCONTINUED | OUTPATIENT
Start: 2017-10-18 | End: 2017-10-28 | Stop reason: HOSPADM

## 2017-10-18 RX ORDER — AMLODIPINE BESYLATE 5 MG/1
5 TABLET ORAL DAILY
Status: CANCELLED | OUTPATIENT
Start: 2017-10-19

## 2017-10-18 RX ORDER — VIT C/E/ZN/COPPR/LUTEIN/ZEAXAN 60 MG-6 MG
1 CAPSULE ORAL DAILY
Status: DISCONTINUED | OUTPATIENT
Start: 2017-10-19 | End: 2017-10-28 | Stop reason: HOSPADM

## 2017-10-18 RX ORDER — ACETAMINOPHEN 325 MG/1
650 TABLET ORAL EVERY 4 HOURS PRN
Status: DISCONTINUED | OUTPATIENT
Start: 2017-10-18 | End: 2017-10-28 | Stop reason: HOSPADM

## 2017-10-18 RX ORDER — ATORVASTATIN CALCIUM 20 MG/1
20 TABLET, FILM COATED ORAL DAILY
Status: CANCELLED | OUTPATIENT
Start: 2017-10-19

## 2017-10-18 RX ORDER — SODIUM CHLORIDE 0.9 % (FLUSH) 0.9 %
10 SYRINGE (ML) INJECTION EVERY 12 HOURS SCHEDULED
Status: DISCONTINUED | OUTPATIENT
Start: 2017-10-18 | End: 2017-10-19

## 2017-10-18 RX ORDER — MORPHINE SULFATE 4 MG/ML
2 INJECTION, SOLUTION INTRAMUSCULAR; INTRAVENOUS
Status: CANCELLED | OUTPATIENT
Start: 2017-10-18

## 2017-10-18 RX ORDER — SODIUM CHLORIDE 0.9 % (FLUSH) 0.9 %
10 SYRINGE (ML) INJECTION PRN
Status: CANCELLED | OUTPATIENT
Start: 2017-10-18

## 2017-10-18 RX ORDER — VIT C/E/ZN/COPPR/LUTEIN/ZEAXAN 60 MG-6 MG
1 CAPSULE ORAL DAILY
Status: CANCELLED | OUTPATIENT
Start: 2017-10-19

## 2017-10-18 RX ORDER — DOCUSATE SODIUM 100 MG/1
100 CAPSULE, LIQUID FILLED ORAL 2 TIMES DAILY
Status: CANCELLED | OUTPATIENT
Start: 2017-10-18

## 2017-10-18 RX ORDER — OXYCODONE HYDROCHLORIDE AND ACETAMINOPHEN 5; 325 MG/1; MG/1
1 TABLET ORAL EVERY 4 HOURS PRN
Qty: 40 TABLET | Refills: 0 | Status: ON HOLD | OUTPATIENT
Start: 2017-10-18 | End: 2017-10-28 | Stop reason: HOSPADM

## 2017-10-18 RX ORDER — MORPHINE SULFATE 4 MG/ML
2 INJECTION, SOLUTION INTRAMUSCULAR; INTRAVENOUS
Status: DISCONTINUED | OUTPATIENT
Start: 2017-10-18 | End: 2017-10-28 | Stop reason: HOSPADM

## 2017-10-18 RX ORDER — LOSARTAN POTASSIUM 100 MG/1
100 TABLET ORAL DAILY
Status: CANCELLED | OUTPATIENT
Start: 2017-10-19

## 2017-10-18 RX ORDER — PSEUDOEPHEDRINE HCL 30 MG
100 TABLET ORAL 2 TIMES DAILY
Qty: 60 CAPSULE | Refills: 1 | Status: SHIPPED | OUTPATIENT
Start: 2017-10-18 | End: 2020-10-26 | Stop reason: ALTCHOICE

## 2017-10-18 RX ORDER — ERGOCALCIFEROL 1.25 MG/1
50000 CAPSULE ORAL WEEKLY
Status: DISCONTINUED | OUTPATIENT
Start: 2017-10-21 | End: 2017-10-28 | Stop reason: HOSPADM

## 2017-10-18 RX ORDER — ATORVASTATIN CALCIUM 10 MG/1
20 TABLET, FILM COATED ORAL DAILY
Status: DISCONTINUED | OUTPATIENT
Start: 2017-10-19 | End: 2017-10-28 | Stop reason: HOSPADM

## 2017-10-18 RX ORDER — ACETAMINOPHEN 325 MG/1
650 TABLET ORAL EVERY 4 HOURS PRN
Status: CANCELLED | OUTPATIENT
Start: 2017-10-18

## 2017-10-18 RX ORDER — METOPROLOL TARTRATE 50 MG/1
100 TABLET, FILM COATED ORAL 2 TIMES DAILY
Status: CANCELLED | OUTPATIENT
Start: 2017-10-18

## 2017-10-18 RX ORDER — LOSARTAN POTASSIUM 50 MG/1
100 TABLET ORAL DAILY
Status: DISCONTINUED | OUTPATIENT
Start: 2017-10-19 | End: 2017-10-28 | Stop reason: HOSPADM

## 2017-10-18 RX ORDER — AMLODIPINE BESYLATE 5 MG/1
5 TABLET ORAL DAILY
Status: DISCONTINUED | OUTPATIENT
Start: 2017-10-19 | End: 2017-10-21

## 2017-10-18 RX ADMIN — AMLODIPINE BESYLATE 5 MG: 10 TABLET ORAL at 09:52

## 2017-10-18 RX ADMIN — DOCUSATE SODIUM 100 MG: 100 CAPSULE, LIQUID FILLED ORAL at 09:52

## 2017-10-18 RX ADMIN — LOSARTAN POTASSIUM 100 MG: 100 TABLET ORAL at 09:52

## 2017-10-18 RX ADMIN — METOPROLOL TARTRATE 100 MG: 50 TABLET ORAL at 21:13

## 2017-10-18 RX ADMIN — ATORVASTATIN CALCIUM 20 MG: 20 TABLET, FILM COATED ORAL at 09:52

## 2017-10-18 RX ADMIN — DOCUSATE SODIUM 100 MG: 100 CAPSULE, LIQUID FILLED ORAL at 21:13

## 2017-10-18 RX ADMIN — OXYCODONE HYDROCHLORIDE AND ACETAMINOPHEN 1 TABLET: 5; 325 TABLET ORAL at 09:52

## 2017-10-18 RX ADMIN — Medication 1 CAPSULE: at 09:53

## 2017-10-18 RX ADMIN — OXYCODONE HYDROCHLORIDE AND ACETAMINOPHEN 1 TABLET: 5; 325 TABLET ORAL at 18:07

## 2017-10-18 RX ADMIN — Medication 10 ML: at 09:55

## 2017-10-18 RX ADMIN — OXYCODONE HYDROCHLORIDE AND ACETAMINOPHEN 1 TABLET: 5; 325 TABLET ORAL at 04:55

## 2017-10-18 RX ADMIN — METOPROLOL TARTRATE 100 MG: 50 TABLET ORAL at 09:52

## 2017-10-18 ASSESSMENT — PAIN SCALES - GENERAL
PAINLEVEL_OUTOF10: 9
PAINLEVEL_OUTOF10: 9
PAINLEVEL_OUTOF10: 7
PAINLEVEL_OUTOF10: 6

## 2017-10-18 ASSESSMENT — PAIN DESCRIPTION - DESCRIPTORS: DESCRIPTORS: ACHING

## 2017-10-18 ASSESSMENT — PAIN DESCRIPTION - PAIN TYPE: TYPE: SURGICAL PAIN

## 2017-10-18 ASSESSMENT — PAIN DESCRIPTION - LOCATION: LOCATION: HIP

## 2017-10-18 ASSESSMENT — PAIN DESCRIPTION - ORIENTATION: ORIENTATION: LEFT

## 2017-10-18 NOTE — PROGRESS NOTES
[facility]      Patient:  Baljit Browne  YOB: 1945  Date of Service: 10/18/2017  MRN: [id number]   Acct: [de-identified]   Primary Care Physician: Radha Bowers MD  Advance Directive: Full Code  Admit Date: 10/15/2017       Hospital Day: 2    CHIEF COMPLAINT Left hip pain - \"I don't think I can make it at home yet. \"     SUBJECTIVE: Mrs. Graham [place] complaints of pain but states it is well controlled overall. She has been ambulatory with physical therapy and is now resting comfortably in the chair. Cumulative Hospital Course: The patient is a 80 y.o. female who presented to ED with left hip pain after a fall. She has had her usual evening alcohol drink and was half asleep when she was awakened to go to bed. She stood up and fell unbale to bear weight on the left leg and was brought to the hospital. There was no LOC no other trauma. She denies chest pain SOB and has no neurological symptoms. Case was discussed with orthopedics and she underwent hemiarthroplasty 10/16/17.      14 point review of systems is negative except as specifically addressed above.     Objective:   VITALS:  BP (!) 150/78   Pulse 79   Temp 98.2 °F (36.8 °C) (Temporal)   Resp 14   Ht 5' 8\" (1.727 m)   Wt 187 lb (84.8 kg)   SpO2 95%   BMI 28.43 kg/m²   24HR INTAKE/OUTPUT:      Intake/Output Summary (Last 24 hours) at 10/18/17 1108  Last data filed at 10/18/17 0900   Gross per 24 hour   Intake             2145 ml   Output              700 ml   Net             1445 ml     General appearance: alert and cooperative with exam  HEENT: atraumatic, eyes with clear conjunctiva and normal lids, pupils and irises normal, external ears and nose are normal, lips normal. Neck without masses, lympadenopathy, bruit, thyroid normal  Lungs: no increased work of breathing, clear to auscultation bilaterally without rales, rhonchi or wheezes  Heart: regular rate and rhythm, S1, S2 normal, no murmur, click, rub or gallop  Abdomen: soft,

## 2017-10-18 NOTE — CARE COORDINATION
Spoke with patient regarding MD orders for home health services. Patient was agreeable and chose [facility]. Before I called [facility] with referral, received a call from [facility], stating patient was going to [facility]. Will not be sending referral and sign off. Home Health can be set up when patient is ready to leave [facility] as her needs might change.    Electronically signed by Tran Richards RN on 10/18/17 at 2:06 PM

## 2017-10-18 NOTE — PLAN OF CARE
Surgical intervention   [x] Safety   [] Body mechanics and or joint protection   [] Health maintenance         PHYSICAL THERAPY:  Goals:                  Short term goals  Time Frame for Short term goals: 1 WEEK  Short term goal 1: TF FIM 4  Short term goal 2: WALK FIM 5  Short term goal 3: STEP FIM 1 (UP/DOWN 1 STEP W/ CGA)  Short term goal 4: HEP SBA            Long term goals  Time Frame for Long term goals : 2-3 WEEKS  Long term goal 1: TF FIM 7  Long term goal 2: WALK FIM 6  Long term goal 3: STEP FIM 5.0 (UP/DOWN 6 STEPS INDEP)  Long term goal 4: W/C FIM 5  Long term goal 5: HEP INDEPENDENT  These goals were reviewed with this patient at the time of assessment and Brittni Winkler is in agreement. Plan of Care: Pt to be seen 5 days per week for a minimum of 60 minutes. OCCUPATIONAL THERAPY:  Goals:             Short term goals  Time Frame for Short term goals: 5-7 days   Short term goal 1: CGA for Lower Body Dressing and Bathing with AE prn  Short term goal 2: Sup A Toilet Transfers with recommended technique and DME  Short term goal 3: Sup A toileting  Short term goal 4: CGA shower transfer with AE prn  Short term goal 5: Stand 5 minutes during minimallly challenging dynamic task  Short term goal 6: Sup A with light ambulatory kitchen/homemaking tasks  Short term goal 7: Demonstrate 4-/5 strength throughout BUE major muscle groups :  Long term goals  Time Frame for Long term goals : 10-12 days  Long term goal 1: Mod I Total Body Dressing with AE prn  Long term goal 2: Mod I Total Body Bathing with AE prn  Long term goal 3: Mod I Toilet Transfer with recommended technique and DME  Long term goal 4: Mod I Toileting  Long term goal 5: Mod I Shower transfer with AE prn  Long term goals 6: Stand 7 minutes during moderately challenging dynamic tasks  Long term goal 7: Mod I with light ambulatory kitchen/homemaking tasks  Long term goal 8: Verbalize DME needs :     These goals were reviewed occur  GOAL: Dressing-Upper: 7  Dressing-Lower: 0 - Did not occur  GOAL: Dressing-Lower: 6  Toiletin - Requires setup/supervision/cues  GOAL: Toiletin  Bowel Level of Assistance: 5- Supervision (Subject equal 100%)  Transfers  Bed, Chair, Wheel Chair: 4 - Requires steadying assistance only <25% assist  and/or requires assist with one leg only  GOAL: Bed, Chair, Wheel Chair: 7  Toilet Transfer: 4 - Requires steadying assistance only < 25% assist  GOAL: Toilet: 6  Primary Mode: Shower  Tub Transfer: 0 - Activity does not occur  Shower Transfer: 0 - Activity does not occur  Sphincter Control  Bladder: 5 - Voids, but staff empties urinal/BSC and/or requires setup/sup/cues to urinate (e.g. timed voids/self cath) (no accidents, no touching) (help w/I&O cath)  Bladder Level of Assistance: 5- Supervision (Subject equal 100%)  Bladder Frequency of Accidents: 6 - No accidents: uses device  Bowel: 5 - Requires setup/supervision/cues to manage device  Bowel Level of Assistance: 5- Supervision (Subject equal 100%)  Bowel Frequency of Accidents: 6 - No accidents: uses device  Locomotion  Primary Mode: Walk  GOAL: Walk/Wheel Chair: 6  Distance Walked: 50 FT  Distance Traveled in Wheel Chair: 160 FT  Walk: 4 - Contact Guard/Minimal Assistance Requires up to [employer] or Minimal Assistance to walk/operate wheelchair at least 150 feet  Wheel Chair: 4 - [address] [place] up to [employer] or Minimal Assistance to walk/operate wheelchair at least 150 feet  Stairs: 0 - Activity Does not Occur ( 0 only for the admission assessment)  GOAL: Stairs: 5  Communication  Comprehension: 5 - Patient understands basic needs (hungry/hot/pain)  GOAL: Comprehension: 7  Expression: 5 - Expresses basic ideas/needs only (hungry/hot/pain)  GOAL: Expression: 7  Social Cognition  Social Interaction: 5 - Patient is appropriate with supervision/cues  GOAL: Social Interaction: 7  Problem Solvin - Patient able to solve simple/routine tasks  GOAL: Problem Solvin  Memory: 5 - Patient requires prompting with stress/unfamiliar situations  GOAL: Memory: 7    Activities Prior to Admit:         Mode of Transportation: Car  Occupation: Retired            Alessia Casey will be seen a minimum of 3 hours of therapy per day/a minimum of 5 out of 7 days per week. [] In this rare instance due to the nature of this patient's medical involvement, this patient will be seen 15 hours per week (900 minutes within a 7 day period). Treatments may include therapeutic exercises, gait training, neuromuscular re-ed, transfer training, community reintegration, bed mobility, w/c mobility and training, self care, home mgmt, cognitive training, energy conservation,dysphagia tx, speech/language/communication therapy, group therapy, and patient/family education. In addition, dietician/nutritionist may monitor calorie count as well as intake and collaboratively work with SLP on dietary upgrades. Neuropsychology/Psychology may evaluate and provide necessary support. Medical issues being managed closely and that require 24 hour availability of a physician:   [] Swallowing Precautions  [x] Bowel/Bladder Fx  [] Weight bearing precautions   [x] Wound Care    [x] Pain Mgmt   [] Infection Protection   [] DVT Prophylaxis   [x] Fall Precautions  [] Fluid/Electrolyte/Nutrition Balance   [] Voice Protection   [] Respiratory  [] Other:    Medical Prognosis: [] Good  [x] Fair    [] Guarded   Total expected IRF days:  12  Anticipated discharge destination:    [] Home Independently   [x] Home with supervision    []SNF     [] Other                                           Physician anticipated functional outcomes:  Ambulate independently household distances with assistive device.    IPOC brief synthesis: Acute inpatient rehabilitation with occupational   physical therapy and 180 minutes, 5 every 7   days will address basic and advancing mobility with self-care

## 2017-10-18 NOTE — CARE COORDINATION
SW met with Pt in the room to discuss DC plans. Pt stated she resides at home with her spouse and plans to DC home with [facility] services. Pt does not have any DME needs at this time. ALLYSSA will continue to monitor and update as needed.   Electronically signed by Wanda Dukes on 10/18/2017 at 10:04 AM

## 2017-10-18 NOTE — DISCHARGE SUMMARY
Sienna Colbert  :  1945  MRN:  [id number]    Admit date:  10/15/2017  Discharge date:  10/18/17  Admitting Physician:  Seth Dewey DO    Advance Directive: Full Code    Consults: Orthopedics; Dr Daryl Ramos    Primary Care Physician:  Nawaf Saleem MD    Discharge Diagnoses:      Principal Problem:   Hip fracture requiring operative repair, left, closed - s/p left hemiathroplasty 10/16/2017 per Dr. Shadia Boateng for  DVT prophylaxis, to Rehab for ongoing PT/ OT     Active Problems:    Acute kidney failure - resolved    Hypertension - controlled, monitor    Mixed hyperlipidemia - statin continued    Acute blood loss anemia - expected 2' post op blood loss-monitor, no need for PRBCs        Significant Diagnostic Studies:     Ct Head Wo Contrast 10/16/17   Impression:  No acute intracranial abnormality. Chronic ischemic and atrophic changes. No evidence of a skull fracture. Evidence of right maxillary sinusitis and right mastoiditis. Signed by Dr Lieutenant Wilkins 10/16/2017  Impression  No active cardiopulmonary disease. Chronic inflammatory and granulomatous lung changes. Signed by Dr Debbie Miranda on  7:51 AM    2-3 vw hip 10/16/2017  1. Left hip arthroplasty. No postoperative complication identified. Xr Hip 2-3 Vw W Pelvis Left  10/16/2017  Impression  There is a transcervical fracture of the left proximal femur with moderate varus displacement. The right hip is normal. There is hardware fusion of the lower lumbar spine. A transcervical fracture of the left proximal femur with moderate varus displacement.      Signed by Dr Samantha Allen:   CBC:   Recent Labs      10/15/17   2235  10/17/17   0152  10/18/17   0245   WBC  7.8  10.0  7.8   HGB  14.1  11.3*  10.8*   PLT  215  171  167     BMP:    Recent Labs      10/15/17   2235  10/17/17   0152  10/18/17   0245   NA  130*  133*  135*   K  4.4  4.1  3.6   CL  88*  95*  97*   CO2  24  25  23   BUN     CREATININE 1.4*  0.9  0.8   GLUCOSE  107  161*  113*     INR:   Recent Labs      10/15/17   2235   INR  0.87*     Lipids: No results for input(s): CHOL, HDL in the last 72 hours. Invalid input(s): LDLCALCU  ABGs:No results for input(s): PHART, IRW1UCC, PO2ART, UHL0QQW, BEART, HGBAE, Y0WDEGDH, CARBOXHGBART, 02THERAPY in the last 72 hours. HgBA1c:  No components found for: HEMOGLOBIN A1C    Procedures: L hip hemiarthroplasty Dr Aida Moses 10/16/17      Hospital Course:    80 y.o. female who presented to [address] [place] [address] left hip pain after a fall. She has had her usual evening alcohol drink and was half asleep when she was awakened to go to bed. She stood up and fell unbale to bear weight on the left leg and was brought to the hospital. There was no LOC no other trauma. She denies chest pain SOB and has no neurological symptoms. Case was discussed with orthopedics and she underwent hemiarthroplasty 10/16/17. She did well but is not yet confident she can safely go home, has been accepted at [facility] unit today.       Physical Exam:  Vital Signs: BP (!) 150/78   Pulse 79   Temp 98.2 °F (36.8 °C) (Temporal)   Resp 14   Ht 5' 8\" (1.727 m)   Wt 187 lb (84.8 kg)   SpO2 95%   BMI 28.43 kg/m²     General appearance: alert and cooperative with exam  HEENT: atraumatic, eyes with clear conjunctiva and normal lids, pupils and irises normal, external ears and nose are normal, lips normal. Neck without masses, lympadenopathy, bruit, thyroid normal  Lungs: no increased work of breathing, clear to auscultation bilaterally without rales, rhonchi or wheezes  Heart: regular rate and rhythm, S1, S2 normal, no murmur, click, rub or gallop  Abdomen: soft, non-tender; bowel sounds normal; no masses,  no organomegaly  Extremities: extremities normal, atraumatic, no cyanosis or edema  Neurologic: No focal neurologic deficits, normal sensation, alert and oriented, affect and mood appropriate.     Discharge Medications:       Lance Almaguer Home Medication Instructions [facility]:[id number]    Printed on:10/18/17 5564   Medication Information                      amLODIPine (NORVASC) 5 MG tablet  Take 5 mg by mouth daily             atorvastatin (LIPITOR) 20 MG tablet  Take 40 mg by mouth daily              cloNIDine (CATAPRES) 0.1 MG tablet  Take 0.1 mg by mouth 2 times daily             losartan (COZAAR) 100 MG tablet  Take 100 mg by mouth daily             metoprolol (LOPRESSOR) 100 MG tablet  Take 100 mg by mouth 2 times daily             Multiple Vitamins-Minerals (OCUVITE ADULT 50+ PO)  Take by mouth             vitamin D (ERGOCALCIFEROL) 82354 units CAPS capsule  Take 50,000 Units by mouth once a week                 Discharge Instructions: Follow up with Radha Bowers MD in after DC from Rehab Unit days. Take medications as directed. Resume activity per PT/OT/Orthopedics. Diet: Dietary Nutrition Supplements: Standard High Calorie Oral Supplement  DIET GENERAL;     Disposition: Patient is medically stable and will be discharged to [address] [place] [address]. Time spent on discharge 33 mintues.     Signed:  Ronnie Casillas MD

## 2017-10-18 NOTE — PROGRESS NOTES
Orthopedic Surgery Progress Note    Kelsey Shook  10/18/2017      Subjective:     Systemic or Specific Complaints: No Complaints aside from mobility issues - going to inpatient rehab today    Pain mild    Objective:     Patient Vitals for the past 24 hrs:   BP Temp Temp src Pulse Resp SpO2   10/18/17 1413 (!) 158/78 98.5 °F (36.9 °C) Temporal 69 14 96 %   10/18/17 1007 (!) 150/78 98.2 °F (36.8 °C) Temporal 79 14 95 %   10/18/17 0628 (!) 149/71 98.9 °F (37.2 °C) Temporal 66 14 90 %   10/18/17 0328 137/73 97 °F (36.1 °C) Temporal 64 18 98 %   10/17/17 2354 (!) 149/85 97.4 °F (36.3 °C) Temporal 64 18 94 %   10/17/17 1927 115/60 97.5 °F (36.4 °C) Temporal 65 20 97 %       left lower  General: alert, appears stated age and cooperative   Wound: clean, dry, intact             Dressing: clean, dry, and intact   Extremity: Distal NVI           DVT Exam: No evidence of DVT seen on physical exam.                   Data Review:  Recent Labs      10/17/17   0152  10/18/17   0245   HGB  11.3*  10.8*     Recent Labs      10/18/17   0245   NA  135*   K  3.6   CREATININE  0.8       Assessment:     POD# 2 s/p left hip hardy    Plan:      1:  DVT prophylaxis, ICE, elevate  2:  Pain control  3:  Physical therapy/Occupational therapy  4:  Anticipate discharge today if pain well controlled  5: Weight bearing as tolerated   6: May D/C use of knee immobilizer now       Electronically signed by Caitlyn Aranda MD on 10/18/2017 at 4:06 PM

## 2017-10-18 NOTE — PROGRESS NOTES
Physical Therapy  Name: Smiley Stevens  MRN:  [id number]  Date of service:  10/18/2017       10/18/17 1200   Subjective   Subjective Patient agreeable to work with therapy. Bed Mobility   Sit to Supine Minimal assistance   Transfers   Sit to Stand Minimal Assistance   Stand to sit Minimal Assistance   Bed to Chair Minimal assistance   Ambulation   WB Status WBAT   Ambulation 1   Device Rolling Walker   Assistance Minimal assistance   Quality of Gait SUFFICIENT WEIGHTBEARING, DEC STEP LENGTH   Distance 60'   Comments increased gait distance   Other Activities   Comment Assisted patient to/from bathroom   Short term goals   Time Frame for Short term goals 14 DAYS   Short term goal 1 BED MOB SUPERVISION   Short term goal 2 TRANSFERS SUPERVISION   Short term goal 3 ' RW SUPERVISION   Short term goal 4 Pt/SPOUSE ABLE TO VERBALIZE POSTERIOR HIP PRECAUTIONS   Conditions Requiring Skilled Therapeutic Intervention   Assessment Assisted patient back to bed and left with all needs in reach.          Electronically signed by Jean Carlos Parrish PTA on 10/18/2017 at 3:31 PM

## 2017-10-19 LAB
ALBUMIN SERPL-MCNC: 2.9 G/DL (ref 3.5–5.2)
ALP BLD-CCNC: 65 U/L (ref 35–104)
ALT SERPL-CCNC: 17 U/L (ref 5–33)
ANION GAP SERPL CALCULATED.3IONS-SCNC: 11 MMOL/L (ref 7–19)
AST SERPL-CCNC: 21 U/L (ref 5–32)
BASOPHILS ABSOLUTE: 0 K/UL (ref 0–0.2)
BASOPHILS RELATIVE PERCENT: 0.2 % (ref 0–1)
BILIRUB SERPL-MCNC: 0.6 MG/DL (ref 0.2–1.2)
BUN BLDV-MCNC: 10 MG/DL (ref 8–23)
CALCIUM SERPL-MCNC: 7.9 MG/DL (ref 8.8–10.2)
CHLORIDE BLD-SCNC: 99 MMOL/L (ref 98–111)
CO2: 27 MMOL/L (ref 22–29)
CREAT SERPL-MCNC: 0.6 MG/DL (ref 0.5–0.9)
EOSINOPHILS ABSOLUTE: 0.1 K/UL (ref 0–0.6)
EOSINOPHILS RELATIVE PERCENT: 0.8 % (ref 0–5)
GFR NON-AFRICAN AMERICAN: >60
GLUCOSE BLD-MCNC: 108 MG/DL (ref 74–109)
HCT VFR BLD CALC: 30.7 % (ref 37–47)
HEMOGLOBIN: 10.4 G/DL (ref 12–16)
LYMPHOCYTES ABSOLUTE: 1 K/UL (ref 1.1–4.5)
LYMPHOCYTES RELATIVE PERCENT: 14.9 % (ref 20–40)
MCH RBC QN AUTO: 33.1 PG (ref 27–31)
MCHC RBC AUTO-ENTMCNC: 33.9 G/DL (ref 33–37)
MCV RBC AUTO: 97.8 FL (ref 81–99)
MONOCYTES ABSOLUTE: 0.6 K/UL (ref 0–0.9)
MONOCYTES RELATIVE PERCENT: 9.8 % (ref 0–10)
NEUTROPHILS ABSOLUTE: 4.8 K/UL (ref 1.5–7.5)
NEUTROPHILS RELATIVE PERCENT: 73.8 % (ref 50–65)
PDW BLD-RTO: 14.5 % (ref 11.5–14.5)
PLATELET # BLD: 174 K/UL (ref 130–400)
PMV BLD AUTO: 9.3 FL (ref 9.4–12.3)
POTASSIUM SERPL-SCNC: 3.4 MMOL/L (ref 3.5–5)
PREALBUMIN: 11 MG/DL (ref 20–40)
RBC # BLD: 3.14 M/UL (ref 4.2–5.4)
SODIUM BLD-SCNC: 137 MMOL/L (ref 136–145)
T4 FREE: 1.3 NG/DL (ref 0.9–1.7)
TOTAL PROTEIN: 5.7 G/DL (ref 6.6–8.7)
TSH SERPL DL<=0.05 MIU/L-ACNC: 1.86 UIU/ML (ref 0.27–4.2)
VITAMIN B-12: 307 PG/ML (ref 211–946)
WBC # BLD: 6.5 K/UL (ref 4.8–10.8)

## 2017-10-19 PROCEDURE — G0009 ADMIN PNEUMOCOCCAL VACCINE: HCPCS | Performed by: PSYCHIATRY & NEUROLOGY

## 2017-10-19 PROCEDURE — 3E0234Z INTRODUCTION OF SERUM, TOXOID AND VACCINE INTO MUSCLE, PERCUTANEOUS APPROACH: ICD-10-PCS | Performed by: PSYCHIATRY & NEUROLOGY

## 2017-10-19 PROCEDURE — 84443 ASSAY THYROID STIM HORMONE: CPT

## 2017-10-19 PROCEDURE — 97116 GAIT TRAINING THERAPY: CPT

## 2017-10-19 PROCEDURE — 2700000000 HC OXYGEN THERAPY PER DAY

## 2017-10-19 PROCEDURE — 97535 SELF CARE MNGMENT TRAINING: CPT

## 2017-10-19 PROCEDURE — 97110 THERAPEUTIC EXERCISES: CPT

## 2017-10-19 PROCEDURE — 97530 THERAPEUTIC ACTIVITIES: CPT

## 2017-10-19 PROCEDURE — 1180000000 HC REHAB R&B

## 2017-10-19 PROCEDURE — 84439 ASSAY OF FREE THYROXINE: CPT

## 2017-10-19 PROCEDURE — 97161 PT EVAL LOW COMPLEX 20 MIN: CPT

## 2017-10-19 PROCEDURE — 84134 ASSAY OF PREALBUMIN: CPT

## 2017-10-19 PROCEDURE — 99223 1ST HOSP IP/OBS HIGH 75: CPT | Performed by: PSYCHIATRY & NEUROLOGY

## 2017-10-19 PROCEDURE — 36415 COLL VENOUS BLD VENIPUNCTURE: CPT

## 2017-10-19 PROCEDURE — 80053 COMPREHEN METABOLIC PANEL: CPT

## 2017-10-19 PROCEDURE — 85025 COMPLETE CBC W/AUTO DIFF WBC: CPT

## 2017-10-19 PROCEDURE — 90670 PCV13 VACCINE IM: CPT | Performed by: PSYCHIATRY & NEUROLOGY

## 2017-10-19 PROCEDURE — G0008 ADMIN INFLUENZA VIRUS VAC: HCPCS | Performed by: PSYCHIATRY & NEUROLOGY

## 2017-10-19 PROCEDURE — 90674 CCIIV4 VAC NO PRSV 0.5 ML IM: CPT | Performed by: PSYCHIATRY & NEUROLOGY

## 2017-10-19 PROCEDURE — 97166 OT EVAL MOD COMPLEX 45 MIN: CPT

## 2017-10-19 PROCEDURE — 6370000000 HC RX 637 (ALT 250 FOR IP): Performed by: HOSPITALIST

## 2017-10-19 PROCEDURE — 6360000002 HC RX W HCPCS: Performed by: PSYCHIATRY & NEUROLOGY

## 2017-10-19 PROCEDURE — 82607 VITAMIN B-12: CPT

## 2017-10-19 RX ADMIN — METOPROLOL TARTRATE 100 MG: 50 TABLET ORAL at 21:06

## 2017-10-19 RX ADMIN — OXYCODONE AND ACETAMINOPHEN 1 TABLET: 5; 325 TABLET ORAL at 21:10

## 2017-10-19 RX ADMIN — DOCUSATE SODIUM 100 MG: 100 CAPSULE, LIQUID FILLED ORAL at 08:24

## 2017-10-19 RX ADMIN — Medication 1 CAPSULE: at 08:24

## 2017-10-19 RX ADMIN — RIVAROXABAN 10 MG: 10 TABLET, FILM COATED ORAL at 17:22

## 2017-10-19 RX ADMIN — METOPROLOL TARTRATE 100 MG: 50 TABLET ORAL at 08:23

## 2017-10-19 RX ADMIN — LOSARTAN POTASSIUM 100 MG: 50 TABLET ORAL at 08:23

## 2017-10-19 RX ADMIN — A/SINGAPORE/GP1908/2015 IVR-180 (H1N1) (AN A/MICHIGAN/45/2015-LIKE VIRUS), A/SINGAPORE/GP2050/2015 (H3N2) (AN A/HONG KONG/4801/2014 - LIKE VIRUS), B/UTAH/9/2014 (A B/PHUKET/3073/2013-LIKE VIRUS), B/HONG KONG/259/2010 (A B/BRISBANE/60/08-LIKE VIRUS) 0.5 ML: 15; 15; 15; 15 INJECTION, SUSPENSION INTRAMUSCULAR at 15:24

## 2017-10-19 RX ADMIN — OXYCODONE AND ACETAMINOPHEN 1 TABLET: 5; 325 TABLET ORAL at 12:58

## 2017-10-19 RX ADMIN — AMLODIPINE BESYLATE 5 MG: 5 TABLET ORAL at 08:24

## 2017-10-19 RX ADMIN — DOCUSATE SODIUM 100 MG: 100 CAPSULE, LIQUID FILLED ORAL at 21:06

## 2017-10-19 RX ADMIN — PNEUMOCOCCAL 13-VALENT CONJUGATE VACCINE 0.5 ML: 2.2; 2.2; 2.2; 2.2; 2.2; 4.4; 2.2; 2.2; 2.2; 2.2; 2.2; 2.2; 2.2 INJECTION, SUSPENSION INTRAMUSCULAR at 12:58

## 2017-10-19 RX ADMIN — ATORVASTATIN CALCIUM 20 MG: 10 TABLET, FILM COATED ORAL at 08:23

## 2017-10-19 RX ADMIN — OXYCODONE AND ACETAMINOPHEN 1 TABLET: 5; 325 TABLET ORAL at 17:22

## 2017-10-19 RX ADMIN — OXYCODONE AND ACETAMINOPHEN 1 TABLET: 5; 325 TABLET ORAL at 08:23

## 2017-10-19 ASSESSMENT — PAIN SCALES - GENERAL
PAINLEVEL_OUTOF10: 6
PAINLEVEL_OUTOF10: 7
PAINLEVEL_OUTOF10: 7
PAINLEVEL_OUTOF10: 4
PAINLEVEL_OUTOF10: 5
PAINLEVEL_OUTOF10: 1
PAINLEVEL_OUTOF10: 2
PAINLEVEL_OUTOF10: 5
PAINLEVEL_OUTOF10: 4

## 2017-10-19 ASSESSMENT — PAIN DESCRIPTION - PAIN TYPE
TYPE: SURGICAL PAIN
TYPE: SURGICAL PAIN

## 2017-10-19 ASSESSMENT — PAIN DESCRIPTION - DESCRIPTORS
DESCRIPTORS: ACHING
DESCRIPTORS: ACHING

## 2017-10-19 ASSESSMENT — PAIN DESCRIPTION - ORIENTATION
ORIENTATION: LEFT
ORIENTATION: LEFT

## 2017-10-19 ASSESSMENT — PAIN DESCRIPTION - LOCATION
LOCATION: HIP
LOCATION: HIP

## 2017-10-19 NOTE — H&P
[facility]   History and Physical        Patient:   Baljit Browne  MR#:    [id number]  Account Number:                   [id number]      Room:    [address] [place] [address]   YOB: 1945  Date of Progress Note: 10/19/2017  Time of Note                           6:45 AM  Attending Physician:  Marisela Louis MD        Admitting diagnosis: Status post, unspecified intracapsular fracture, left femur, initial encounter for close fracture    Secondary diagnoses: Essential hypertension, posthemorrhagic anemia, mixed hyperlipidemia, hyperosmolar Hyponatremia, nicotine dependence    CHIEF COMPLAINT: Status post left hip fracture and repair      HISTORY OF PRESENT ILLNESS:   This 80 y.o. female  who presented to [facility] ER on 10/15/17 after having a fall at home. She was unable to bear weight on her left lower extremity. X-ray done revealed a displaced subcaptial fracture of the neck of her left femur. She was admitted with consult for orthopedic surgery. She was seen by Dr. Rick Tovar, who recommended a left hemiarthroplasty. She was in agreement and went for surgery on 10/16/17. She tolerated the procedure well. She will be weightbearing as tolerated on her left lower extremity. She will need DVT prophylaxis for 3 weeks. During her hospital stay she has had acute kidney injury which has now resolved, hyponatremia and acute blood loss anemia not yet requiring transfusion. She is participating in both PT/OT and is felt to need a stay on Rehab to work towards her goal of returning home with her . She is now felt ready to start the Rehab proram.    REVIEW OF SYSTEMS:  Constitutional  No fever or chills. No diaphoresis or significant fatigue. HENT   No tinnitus or significant hearing loss. Eyes  no sudden vision change or eye pain  Respiratory  no significant shortness of breath or cough  Cardiovascular  no chest pain No palpitations or significant leg swelling  Gastrointestinal  no abdominal swelling or pain. Genitourinary  No difficulty urinating, dysuria  Musculoskeletal  no back pain or myalgia. Skin  no color change or rash  Neurologic  No seizures. No lateralizing weakness. Hematologic  no easy bruising or excessive bleeding. Psychiatric  no severe anxiety or nervousness. All other review of systems are negative.       Past Medical History:      Diagnosis Date    Arthritis     COPD (chronic obstructive pulmonary disease) ([facility] [address]     Hypertension     Pneumonia     in the past       Past Surgical History:      Procedure Laterality Date    APPENDECTOMY      BACK SURGERY      BREAST SURGERY      biopsy    HAND SURGERY Bilateral     HYSTERECTOMY      JOINT REPLACEMENT      s/p left hip, bilateral total knee replacements    KNEE SURGERY Bilateral     L 2008/ R 2009    TONSILLECTOMY         Medications in Hospital:      Current Facility-Administered Medications:     oxyCODONE-acetaminophen (PERCOCET) 5-325 MG per tablet 1 tablet, 1 tablet, Oral, Q4H PRN, Ami Hyman MD, 1 tablet at 10/18/17 1807    pneumococcal 13-valent conjugate (PREVNAR) injection 0.5 mL, 0.5 mL, Intramuscular, Once, Ami Hyman MD    influenza quadrivalent subunit vaccine (FLUCELVAX) injection 0.5 mL, 0.5 mL, Intramuscular, Once, Ami Hyman MD    acetaminophen (TYLENOL) tablet 650 mg, 650 mg, Oral, Q4H PRN, Tomer Mauricio MD    oxyCODONE-acetaminophen (PERCOCET) 5-325 MG per tablet 1 tablet, 1 tablet, Oral, Q4H PRN, Tomer Mauricio MD    sodium chloride flush 0.9 % injection 10 mL, 10 mL, Intravenous, 2 times per day, Tomer Mauricio MD    sodium chloride flush 0.9 % injection 10 mL, 10 mL, Intravenous, PRN, Tomer Mauricio MD    docusate sodium (COLACE) capsule 100 mg, 100 mg, Oral, BID, Tomer Mauricio MD, 100 mg at 10/18/17 2113    ondansetron (ZOFRAN) injection 4 mg, 4 mg, Intravenous, Q6H PRN, Tomer Mauricio MD    amLODIPine (NORVASC) tablet 5 mg, 5 mg, Oral, Daily, Cletis Naval, MD    atorvastatin (LIPITOR) tablet 20 mg, 20 mg, Oral, Daily, Liu Puente MD    losartan (COZAAR) tablet 100 mg, 100 mg, Oral, Daily, Liu Puente MD    metoprolol tartrate (LOPRESSOR) tablet 100 mg, 100 mg, Oral, BID, Liu Puente MD, 100 mg at 10/18/17 2113    ocuvite-lutein multivitamin 1 capsule, 1 capsule, Oral, Daily, Liu Puente MD    rivaroxaban (XARELTO) tablet 10 mg, 10 mg, Oral, Daily, Liu Puente MD  [facility] [place] 10/21/2017] vitamin D (ERGOCALCIFEROL) capsule 50,000 Units, 50,000 Units, Oral, Weekly, Liu Puente MD    magnesium hydroxide (MILK OF MAGNESIA) 400 MG/5ML suspension 30 mL, 30 mL, Oral, Daily PRN, Lizet Hooper MD    docusate sodium (COLACE) capsule 100 mg, 100 mg, Oral, BID PRN, Lizet Hooper MD    bisacodyl (DULCOLAX) suppository 10 mg, 10 mg, Rectal, Daily PRN, Lizet Hooper MD    morphine injection 2 mg, 2 mg, Intravenous, Q3H PRN, Liu Puente MD    HYDROmorphone (DILAUDID) injection 0.25 mg, 0.25 mg, Intravenous, Q3H PRN **OR** HYDROmorphone (DILAUDID) injection 0.5 mg, 0.5 mg, Intravenous, Q3H PRN, Liu Puente MD    Allergies:  Lyrica [pregabalin]; Bactrim [sulfamethoxazole-trimethoprim]; and Keflex [cephalexin]    Social History:   TOBACCO:   reports that she has been smoking Cigarettes. She has a 7.50 pack-year smoking history. She has never used smokeless tobacco.  ETOH:   reports that she drinks alcohol. Family History:       Problem Relation Age of Onset    Cancer Mother     Cancer Father            Physical Exam:    Vitals: BP (!) 145/79   Pulse 77   Temp 98 °F (36.7 °C) (Temporal)   Resp 16   Ht 5' 8\" (1.727 m)   Wt 188 lb (85.3 kg)   SpO2 95%   Breastfeeding? No   BMI 28.59 kg/m²     Constitutional  well developed, well nourished.     Eyes  conjunctiva normal.  Pupils react to light  Ear, nose, throat -hearing intact to finger rub No scars, masses, or lesions over external nose or ears, no atrophy of tongue  Neck-symmetric, no masses noted, no jugular vein distension  Respiration- chest wall appears symmetric, good expansion,   normal effort without use of accessory muscles  Musculoskeletal  no significant wasting of muscles noted, no bony deformities  Extremities-no clubbing, cyanosis or edema  Skin  warm, dry, and intact. No rash, erythema, or pallor.   Psychiatric  mood, affect, and behavior appear normal.      Neurological exam  Awake, alert, fluent oriented x 3 appropriate affect  Attention and concentration appear appropriate  Recent and remote memory appears unremarkable  Speech normal without dysarthria  No clear issues with language of fund of knowledge    Cranial Nerve Exam   CN II- Visual fields grossly unremarkable  CN III, IV,VI-EOMI, No nystagmus, conjugate eye movements, no ptosis  CN V-sensation intact to LT over face  CN VII-no facial assymetry  CN VIII-Hearing intact to finger rub  CN IX and X- Palate elevates in midline  CN XI-good shoulder shrug  CN XII-Tongue midline with no fasciculations or fibrillations    Motor Exam  V/V throughout upper and lower extremities bilaterally except limited left lower extremity due to hip fracture, no cogwheeling, normal tone    Sensory Exam  Sensation intact to light touch and temperature upper and lower extremities bilaterally    Reflexes   2+ biceps bilaterally  2+ brachioradialis  2+patella  2+ ankle jerks  No clonus ankles  No Kong's sign bilateral hands    Tremors- no tremors in hands or head noted    Gait  Not tested    Coordination  Finger to nose-unremarkable        CBC:   Recent Labs      10/17/17   0152  10/18/17   0245  10/19/17   0317   WBC  10.0  7.8  6.5   HGB  11.3*  10.8*  10.4*   PLT  171  167  174       BMP:    Recent Labs      10/17/17   0152  10/18/17   0245  10/19/17   0317   NA  133*  135*  137   K  4.1  3.6  3.4*   CL  95*  97*  99   CO2  25  23  27   BUN  17 17  10   CREATININE  0.9  0.8  0.6   GLUCOSE  161*  113*  108 Hepatic:   Recent Labs      10/19/17   0317   AST  21   ALT  17   BILITOT  0.6   ALKPHOS  65       Lipids: No results for input(s): CHOL, HDL in the last 72 hours. Invalid input(s): LDLCALCU    INR: No results for input(s): INR in the last 72 hours. Assessment and Plan     1. Status post left hip fracture comparedpain control/mobilization/DVT prophylaxis. 2. Hypertensionon medications continue to monitor. 3. Hyperlipidemiaon statin. Continue to monitor. 4. GIbowel regimen continue to monitor. 5. DVT prophylaxison sural toe does continue to monitor. 6. Vitamin D deficiencyon vitamin D continue to monitor  7. pain controlon Percocet when necessary  8. PT/OT    Patient's functional assessment  Prior to hospitalization the patient was continent of bowel and bladder    Functional assessment  All notes from reehab data were reviewed regarding the patient's functional status. Prior to admission in the hospital. Patient is independent of all function. Now. Independentfeeding, grooming, expression, memory  Modified independentupper extremity dressing  Moderate assistance test lower extremity dressing, bladder management, bowel management  Minimal assist contact guard. His bed mobility, supine to sit, transfer, toilet transfer, ambulation  Moderate assistance to stand. Prior to admission to the rehab.  Patient is able to 5 feet with rolling walker, minimal assist, decreased step length    Current therapy  Requires PT, OT and/or speech therapy    Anticipated discharge approximately  12 days    Anticipated discharge setting  Home with home care    No clear barriers to discharge    The patient appears to be an appropriate candidate for inpatient rehabilitation    Sufficiently stable: Patient's condition is sufficiently stable at the time of admission to allow the patient to actively participate in an intensive rehabilitation program    Close medical supervision: A rehabilitation physician, or other licensed treating physician with specialized training and experience in inpatient rehabilitation, will conduct face-to-face visits with the patient a minimum of at least 3 days per week throughout the patient's stay    This patient requires close medical supervision for the active management of the ongoing conditions and potential complications stated in the admission note    Intensive rehabilitation nursing: The patient demonstrates the need for 24-hour rehabilitation nursing care for active management of the multiple medical issues documented in the admission note    Appropriate therapy needed: The patient requires the active and ongoing therapeutic intervention of at least 2 therapeutic disciplines, one of which must be physical or occupational therapy and/or speech therapy    Intensive therapy: The patient requires and is reasonably expected to actively participate in at least 3 hours of therapy per day at least 5 days per week, and expected to make measurable improvements that will be of practical value to improve the patient's functional capacity or adaptation to impairments. In addition, therapy treatments will begin within 36 hours from midnight of the day of the patient's admission to the inpatient rehabilitation facility    Expected duration and frequency therapy: 180 minutes per day, 5 days per week    Interdisciplinary team: The patient demonstrates the need for an interdisciplinary team for active management of the following medical issues including ataxia, motor planning, balance, disease management, elimination, endurance, family training, education, independent ADLs, pain management, precautions, range of motion, safety, strength, and transfers    I have reviewed the preadmission screening documents and concur with the findings.  I believe the patient meets criteria and is sufficiently stable to allow participation in the program. This requires an intensive level of therapy, close medical supervision, and an interdisciplinary team approach provided through an individualized plan of care.  I approve admitting this patient for an intensive inpatient rehabilitation program.    Please feel free to call with any questions   [phone] (cell phone)    Dr Diann Juarez certified in Neurology  Board Certified in Clinical Neurophysiology  Fellowship Trained in [place] [address] Neurophysiology

## 2017-10-19 NOTE — PLAN OF CARE
Problem: Pain:  Goal: Pain level will decrease  Pain level will decrease   Outcome: Ongoing  Patient pain has remained under control with use of pain medicine as ordered for pain control. Goal: Control of acute pain  Control of acute pain   Outcome: Ongoing  Patient pain has remained under control with use of pain medicine as ordered for pain control.   Goal: Control of chronic pain  Control of chronic pain   Outcome: Completed Date Met: 10/19/17      Problem: Falls - Risk of  Goal: Absence of falls  Outcome: Ongoing  Bed and personal alarms in use, side rails up times 2

## 2017-10-19 NOTE — PROGRESS NOTES
Nutrition Assessment    Type and Reason for Visit: Initial    Malnutrition Assessment:  · Malnutrition Status: No malnutrition    Nutrition Diagnosis:   · Problem: No nutrition diagnosis at this time    Nutrition Assessment:  · Subjective Assessment: States appetite is good, but has taste changes. Nutrition Risk Level   Risk Level: Low    Nutrition Intervention  Food and/or Delivery: Continue current diet, Discontinue ONS  Nutrition Education/Counseling/Coordination of Care:  Continued Inpatient Monitoring    Patient assessed for nutrition risk. Deemed to be at low risk at this time. Will continue to follow patient.       Electronically signed by Andrea Leyva MS, RD, LD on 10/19/17 at 2:37 PM    Contact Number: [phone]

## 2017-10-19 NOTE — PROGRESS NOTES
walk/operate wheelchair at least 150 feet  Stairs: 0 - Activity Does not Occur ( 0 only for the admission assessment)  ADMIT  Bed, Chair, Wheel Chair: 3 - Requires 25-49% assistance to transfer   Walk: 4 - Contact Guard/Minimal Assistance Requires up to Contact Guard or Minimal Assistance to walk/operate wheelchair at least 150 feet   Wheel Chair: 4 - [address] [place] up to [address] or Minimal Assistance to walk/operate wheelchair at least 150 feet   Stairs: 0 - Activity Does not Occur ( 0 only for the admission assessment)      GOALS  GOAL: Bed, Chair, Wheel Chair: 7  GOAL: Walk/Wheel Chair: 6  GOAL: Stairs: 5     GOALS:  Short term goals  Time Frame for Short term goals: 1 WEEK  Short term goal 1: TF FIM 4  Short term goal 2: WALK FIM 5  Short term goal 3: STEP FIM 1 (UP/DOWN 1 STEP W/ CGA)  Short term goal 4: HEP SBA    Long term goals  Time Frame for Long term goals : 2-3 WEEKS  Long term goal 1: TF FIM 7  Long term goal 2: WALK FIM 6  Long term goal 3: STEP FIM 5.0 (UP/DOWN 6 STEPS INDEP)  Long term goal 4: W/C FIM 5  Long term goal 5: HEP INDEPENDENT  HOME LIVING:  Lives With: Spouse  Type of Home: House  Home Layout: Performs ADL's on one level, Two level  Home Access: Stairs to enter with rails  Entrance Stairs - Number of Steps: 4     ASSESSMENT (IMPAIRMENTS/BARRIERS): Body structures, Functions, Activity limitations: Decreased functional mobility , Decreased ADL status, Decreased strength, Decreased ROM, Decreased endurance, Decreased balance, Decreased high-level IADLs  Assessment: PT PRESENTS WITH ROM, STRENGTH, BALANCE AND ENDURANCE IMPAIRMENTS WHICH CAUSE LIMITATIONS IN FUNCTIONAL MOBILITY AND AMBULATION. PT IS DOING WELL S/P HEMIARTHROPLASTY AND IS WELL MOTIVATED TO RETURN HOME INDEPENDENTLY.    Activity Tolerance: Patient Tolerated treatment well     EDUCATION:  Patient Education: POSTERIOR HIP PRECAUTIONS;  FALLS PRECAUTIONS, STS TECHNIQUE     PLAN:  Plan  Times per week: 5-6  Times per day: Twice a day  Plan weeks: 2-3  PATIENT REQUIRES AND IS REASONABLY EXPECTED TO ACTIVELY PARTICIPATE IN AT LEAST 3 HOURS OF INTENSIVE THERAPY PER DAY AT LEAST 5 DAYS PER WEEK, AND BE EXPECTED TO MAKE MEASURABLE IMPROVEMENT THAT WILL BE OF PRACTICAL VALUE TO IMPROVE THE PATIENT'S FUNCTIONAL CAPACITY OR ADAPTATION TO IMPAIRMENTS.    PATIENT GOAL FOR REHAB:  RETURN TO PRIOR LEVEL OF FUNCTION   DISCHARGE PLANS:     ELOS:  2-3 WEEKS      IRF/[facility]    ROLLING  Roll Left and Right  Reason if not Attempted: Medical concerns  CARE Score: 88  SIT TO SUPINE  Sit to Lying  Assistance Needed: Physical assistance  Physical Assistance Level: 25%-49%  CARE Score: 3  SUPINE TO SIT  Lying to Sitting on Side of Bed  Assistance Needed: Physical assistance  Physical Assistance Level: 25%-49%  CARE Score: 3  Discharge Goal: Independent  SIT TO STAND  Sit to Stand  Assistance Needed: Incidental touching, Verbal cues  Physical Assistance Level: No physical assistance  CARE Score: 4  TRANSFERS  Chair/Bed-to-Chair Transfer  Assistance Needed: Physical assistance  Physical Assistance Level: 25%-49%  CARE Score: 3  CAR TRANSFERS  Car Transfer  Reason if not Attempted: Medical concerns  CARE Score: 88  WALK 10 FT  Walk 10 Feet  Assistance Needed: Incidental touching  Physical Assistance Level: No physical assistance  CARE Score: 4  WALK 50 FT  Walk 50 Feet with Two Turns  Assistance Needed: Incidental touching  Physical Assistance Level: No physical assistance  CARE Score: 4  WALK 150 FT  Walk 150 Feet  Reason if not Attempted: Safety concerns  CARE Score: 88  WALK 10 FT UNEVEN SURFACES  Walking 10 Feet on Uneven Surfaces  Reason if not Attempted: Safety concerns  CARE Score: 88  1 STEP  1 Step (Curb)  Reason if not Attempted: Safety concerns  CARE Score: 88  4 STEPS  4 Steps  Reason if not Attempted: Safety concerns  CARE Score: 88  12 STEPS  12 Steps  Reason if not Attempted: Safety concerns  CARE Score: 88  PICKING UP OBJECT  Picking Up Object  Reason if not Attempted: Safety concerns  CARE Score: 88  WHEELCHAIR 50 FT  Wheel 50 Feet with Two Turns  Assistance Needed: Incidental touching, Verbal cues  Physical Assistance Level: No physical assistance  CARE Score: 4  WHEELCHAIR 150 FT  Wheel 150 Feet  Assistance Needed: Incidental touching, Verbal cues  Physical Assistance Level: No physical assistance  CARE Score: 4        Electronically signed by Venkat Mascorro on 10/19/2017 at 3:33 PM

## 2017-10-19 NOTE — PROGRESS NOTES
Occupational Therapy   Occupational Therapy Initial Assessment  Date: 10/19/2017   Patient Name: Alessia Casey  MRN: [id number]     : 1945    Patient Diagnosis(es): There were no encounter diagnoses. has a past medical history of Arthritis; COPD (chronic obstructive pulmonary disease) ([facility] [address] Hypertension; and Pneumonia. has a past surgical history that includes Tonsillectomy; Appendectomy; Breast surgery; Hand surgery (Bilateral); Hysterectomy; back surgery; knee surgery (Bilateral); and joint replacement.     Treatment Diagnosis: Left Hip Jerman Arthroplasty      Restrictions  Restrictions/Precautions  Restrictions/Precautions: Weight Bearing, Fall Risk  Lower Extremity Weight Bearing Restrictions  Right Lower Extremity Weight Bearing: Weight Bearing As Tolerated  Left Lower Extremity Weight Bearing: Weight Bearing As Tolerated  Position Activity Restriction  Hip Precautions: Posterior hip precautions    Subjective   General  Chart Reviewed: Yes  Patient assessed for rehabilitation services?: Yes  Family / Caregiver Present: No  Pain Assessment  Patient Currently in Pain: Yes  Pain Level: 2  Pain Type: Surgical pain  Pain Location: Hip  Pain Orientation: Left  Pain Descriptors: Aching  Pain Intervention(s): Medication (see eMar)     Social/Functional History  Social/Functional History  Lives With: Spouse  Type of Home: House  Home Layout: Performs ADL's on one level, Two level  Home Access: Stairs to enter with rails  Entrance Stairs - Number of Steps: 4  Bathroom Shower/Tub: Walk-in shower  Bathroom Toilet: Handicap height  Home Equipment: Standard walker (From when she had knee surgeries)  ADL Assistance: Independent  Homemaking Assistance: Independent  Ambulation Assistance: Independent  Transfer Assistance: Independent  Mode of Transportation: Car  Occupation: Retired  Additional Comments: Family member may have BSC       Objective   Vision: Within Functional Limits  Hearing: Exceptions to [facility]  Hearing Exceptions: Hard of hearing/hearing concerns (not here at hospital)    Orientation  Overall Orientation Status: Within Normal Limits     Balance  Sitting Balance: Independent  Standing Balance: Contact guard assistance  Standing Balance  Time: 1 minute  Activity: Standing Portion of Lower Body Dressing  Sit to stand: Minimal assistance  Stand to sit: Contact guard assistance  Toilet Transfers  Toilet - Technique: Ambulating  Equipment Used: Grab bars  Toilet Transfer: Minimal assistance  Toilet Transfers Comments: RW  Tub Transfers  Tub Transfers: Not tested  Wheelchair Bed Transfers  Wheelchair/Bed - Technique: Ambulating  Equipment Used: Bed  Level of Asssistance: Minimal assistance  Wheelchair Transfers Comments: RW  ADL  Feeding: Independent  Grooming: Setup  UE Bathing: Supervision  LE Bathing: Maximum assistance  UE Dressing: Supervision  LE Dressing: Maximum assistance  Toileting: Minimal assistance           Transfers  Stand Step Transfers: Contact guard assistance  Sit to stand: Minimal assistance  Stand to sit: Contact guard assistance  Transfer Comments: RW  Vision - Basic Assessment  Prior Vision: Wears glasses all the time  Cognition  Overall Cognitive Status: WNL  Perception  Overall Perceptual Status: WFL     Sensation  Overall Sensation Status: WFL        LUE PROM (degrees)  LUE PROM: WNL  LUE AROM (degrees)  LUE AROM : WNL  RUE PROM (degrees)  RUE PROM: WNL  RUE AROM (degrees)  RUE AROM : WNL  LUE Strength  LUE Strength Comment: 3+/5 throughout all major muscle groups  RUE Strength  RUE Strength Comment: 3+/5 throughout all major muscle groups                  Assessment   Performance deficits / Impairments: Decreased functional mobility ; Decreased ADL status; Decreased strength;Decreased high-level IADLs;Decreased endurance;Decreased balance;Decreased safe awareness  Assessment: Patient presents with decreased ADL skills and decreased functional mobility due to L Hip Jerman Arthroplasty thus preventing her returning home to PLOF  Treatment Diagnosis: Left Hip Jerman Arthroplasty  Prognosis: Good  Decision Making: Medium Complexity  Patient Education: fall protocol, posterior precautions  Discharge Recommendations: Continue to assess pending progress  REQUIRES OT FOLLOW UP: Yes  Activity Tolerance  Activity Tolerance: Patient Tolerated treatment well  Safety Devices  Type of devices: Left in chair;Chair alarm in place;Call light within reach  OT Equipment Recommendations  Other:  To be determined closer to discharge        Discharge Recommendations:  Continue to assess pending progress     Plan   Plan  Times per week: 5  Times per day: Twice a day  Current Treatment Recommendations: Strengthening, Balance Training, Functional Mobility Training, Endurance Training, Patient/Caregiver Education & Training, Equipment Evaluation, Education, & procurement, Self-Care / ADL, Home Management Training  Plan Comment: Patient in agreement with OT POC    G-Code     OutComes Score      Self-Care   Eating 7   GOAL: Eating 7   Grooming 7   GOAL: Grooming 7   Bathing 3   GOAL: Bathing 6   Dressing-Upper 5   GOAL: Dressing-Upper 7   Dressing-Lower 3   GOAL: Dressing-Lower 6   Toileting 4   GOAL: Toileting 6   Transfers   Toilet Transfer 4   GOAL: Toilet 6   Primary Mode Shower   Shower Transfer 0   Communication   Comprehension 5   GOAL: Comprehension 7   Expression 5   GOAL: Expression 7   Social Cognition   Social Interaction 5   GOAL: Social Interaction 7   Problem Solving 5   GOAL: Problem Solving 7   Memory 5   GOAL: Memory 7       Goals  Short term goals  Time Frame for Short term goals: 5-7 days   Short term goal 1: CGA for Lower Body Dressing and Bathing with AE prn  Short term goal 2: Sup A Toilet Transfers with recommended technique and DME  Short term goal 3: Sup A toileting  Short term goal 4: CGA shower transfer with AE prn  Short term goal 5: Stand 5 minutes during minimallly challenging dynamic task  Short term goal 6: Sup A with light ambulatory kitchen/homemaking tasks  Short term goal 7: Demonstrate 4-/5 strength throughout BUE major muscle groups  Long term goals  Time Frame for Long term goals : 10-12 days  Long term goal 1: Mod I Total Body Dressing with AE prn  Long term goal 2: Mod I Total Body Bathing with AE prn  Long term goal 3: Mod I Toilet Transfer with recommended technique and DME  Long term goal 4: Mod I Toileting  Long term goal 5: Mod I Shower transfer with AE prn  Long term goals 6: Stand 7 minutes during moderately challenging dynamic tasks  Long term goal 7:  Mod I with light ambulatory kitchen/homemaking tasks  Long term goal 8: Verbalize DME needs  Patient Goals   Patient goals : Return home to PLOF       Therapy Time   Individual Concurrent Group Co-treatment   Time In 1000         Time Out 1100         Minutes 60         Timed Code Treatment Minutes: [address] OT

## 2017-10-20 LAB — URINE CULTURE, ROUTINE: NORMAL

## 2017-10-20 PROCEDURE — 97116 GAIT TRAINING THERAPY: CPT

## 2017-10-20 PROCEDURE — 99232 SBSQ HOSP IP/OBS MODERATE 35: CPT | Performed by: PSYCHIATRY & NEUROLOGY

## 2017-10-20 PROCEDURE — 2700000000 HC OXYGEN THERAPY PER DAY

## 2017-10-20 PROCEDURE — 97535 SELF CARE MNGMENT TRAINING: CPT

## 2017-10-20 PROCEDURE — 6370000000 HC RX 637 (ALT 250 FOR IP): Performed by: HOSPITALIST

## 2017-10-20 PROCEDURE — 1180000000 HC REHAB R&B

## 2017-10-20 PROCEDURE — 97110 THERAPEUTIC EXERCISES: CPT

## 2017-10-20 PROCEDURE — 97530 THERAPEUTIC ACTIVITIES: CPT

## 2017-10-20 RX ADMIN — ATORVASTATIN CALCIUM 20 MG: 10 TABLET, FILM COATED ORAL at 07:41

## 2017-10-20 RX ADMIN — AMLODIPINE BESYLATE 5 MG: 5 TABLET ORAL at 07:41

## 2017-10-20 RX ADMIN — OXYCODONE AND ACETAMINOPHEN 1 TABLET: 5; 325 TABLET ORAL at 18:02

## 2017-10-20 RX ADMIN — OXYCODONE AND ACETAMINOPHEN 1 TABLET: 5; 325 TABLET ORAL at 08:45

## 2017-10-20 RX ADMIN — RIVAROXABAN 10 MG: 10 TABLET, FILM COATED ORAL at 18:02

## 2017-10-20 RX ADMIN — OXYCODONE AND ACETAMINOPHEN 1 TABLET: 5; 325 TABLET ORAL at 13:34

## 2017-10-20 RX ADMIN — DOCUSATE SODIUM 100 MG: 100 CAPSULE, LIQUID FILLED ORAL at 07:42

## 2017-10-20 RX ADMIN — METOPROLOL TARTRATE 100 MG: 50 TABLET ORAL at 20:35

## 2017-10-20 RX ADMIN — Medication 1 CAPSULE: at 07:41

## 2017-10-20 RX ADMIN — METOPROLOL TARTRATE 100 MG: 50 TABLET ORAL at 07:41

## 2017-10-20 RX ADMIN — LOSARTAN POTASSIUM 100 MG: 50 TABLET ORAL at 07:42

## 2017-10-20 RX ADMIN — OXYCODONE AND ACETAMINOPHEN 1 TABLET: 5; 325 TABLET ORAL at 04:45

## 2017-10-20 RX ADMIN — DOCUSATE SODIUM 100 MG: 100 CAPSULE, LIQUID FILLED ORAL at 20:35

## 2017-10-20 ASSESSMENT — PAIN DESCRIPTION - LOCATION
LOCATION: HIP

## 2017-10-20 ASSESSMENT — PAIN DESCRIPTION - ORIENTATION
ORIENTATION: LEFT

## 2017-10-20 ASSESSMENT — PAIN SCALES - GENERAL
PAINLEVEL_OUTOF10: 5
PAINLEVEL_OUTOF10: 6
PAINLEVEL_OUTOF10: 4
PAINLEVEL_OUTOF10: 6
PAINLEVEL_OUTOF10: 4
PAINLEVEL_OUTOF10: 5
PAINLEVEL_OUTOF10: 4

## 2017-10-20 ASSESSMENT — PAIN DESCRIPTION - FREQUENCY
FREQUENCY: CONTINUOUS
FREQUENCY: CONTINUOUS

## 2017-10-20 ASSESSMENT — PAIN DESCRIPTION - PAIN TYPE
TYPE: SURGICAL PAIN

## 2017-10-20 ASSESSMENT — PAIN DESCRIPTION - ONSET: ONSET: ON-GOING

## 2017-10-20 ASSESSMENT — PAIN DESCRIPTION - DESCRIPTORS
DESCRIPTORS: ACHING
DESCRIPTORS: ACHING

## 2017-10-20 NOTE — PLAN OF CARE
Problem: Pain:  Goal: Pain level will decrease  Pain level will decrease   Outcome: Ongoing      Problem: Falls - Risk of  Goal: Absence of falls  Outcome: Ongoing

## 2017-10-20 NOTE — PROGRESS NOTES
Patient:   Binta Balderas  MR#:    [id number]   Room:    [facility]/820-   YOB: 1945  Date of Progress Note: 10/20/2017  Time of Note                           9:37 AM  Consulting Physician:   Karen Hicks M.D. Attending Physician:  Karen Hicks MD     Chief complaint Status post left hip fracture and repair    S:This 80 y.o. female  who presented to [facility] ER on 10/15/17 after having a fall at home. She was unable to bear weight on her left lower extremity. X-ray done revealed a displaced subcaptial fracture of the neck of her left femur. She was admitted with consult for orthopedic surgery. She was seen by Dr. Uday Harman, who recommended a left hemiarthroplasty. She was in agreement and went for surgery on 10/16/17. She tolerated the procedure well. She will be weightbearing as tolerated on her left lower extremity. She will need DVT prophylaxis for 3 weeks. During her hospital stay she has had acute kidney injury which has now resolved, hyponatremia and acute blood loss anemia not yet requiring transfusion. She is participating in both PT/OT and is felt to need a stay on Rehab to work towards her goal of returning home with her .  She is now felt ready to start the Rehab proram.    REVIEW OF SYSTEMS:  Constitutional: No fevers No chills  Neck:No stiffness  Respiratory: No shortness of breath  Cardiovascular: No chest pain No palpitations  Gastrointestinal: No abdominal pain    Genitourinary: No Dysuria  Neurological: No headache, no confusion    Past Medical History:      Diagnosis Date    Arthritis     COPD [facility]chronic obstructive pulmonary disease) ([facility] [address]     Hypertension     Pneumonia     in the past       Past Surgical History:      Procedure Laterality Date    APPENDECTOMY      BACK SURGERY      BREAST SURGERY      biopsy    HAND SURGERY Bilateral     HYSTERECTOMY      JOINT REPLACEMENT      s/p left hip, bilateral total knee replacements    KNEE SURGERY Bilateral     L 2008/ R 2009    reports that she drinks alcohol. Family History:       Problem Relation Age of Onset    Cancer Mother     Cancer Father          PHYSICAL EXAM:  /74   Pulse 68   Temp 97.8 °F (36.6 °C) (Temporal)   Resp 16   Ht 5' 8\" (1.727 m)   Wt 188 lb (85.3 kg)   SpO2 92%   Breastfeeding? No   BMI 28.59 kg/m²     Constitutional: she appears well-developed and well-nourished. Eyes  conjunctiva normal.    Ear, nose, throat -No scars, masses, or lesions over external nose or ears, no atrophy of tongue  Neck-symmetric, no masses noted, no jugular vein distension  Respiration- chest wall appears symmetric, good expansion,   normal effort without use of accessory muscles  Musculoskeletal  no significant wasting of muscles noted, no bony deformities Extremities-no clubbing, cyanosis or edema  Skin  warm, dry, and intact. No rash, erythema, or pallor. Psychiatric  mood, affect, and behavior appear normal.      Neurology  NEUROLOGICAL EXAM:      Mental status   Awake, alert, fluent oriented x 3 appropriate affect  Attention and concentration appear appropriate  Recent and remote memory appears unremarkable  Speech normal without dysarthria  No clear issues with language       Cranial Nerves     CN III, IV,VI-EOMI, No nystagmus, conjugate eye movements, no ptosis    CN VII-no facial assymetry          Motor function  Antigravity x 4 limited LLE   Sensory function Intact to light touch     Cerebellar F-N intact     Tremor None present     Gait                  Not tested           Nursing/pcp notes, imaging,labs and vitals reviewed.      PT,OT and/or speech notes reviewed    Lab Results   Component Value Date    WBC 6.5 10/19/2017    HGB 10.4 (L) 10/19/2017    HCT 30.7 (L) 10/19/2017    MCV 97.8 10/19/2017     10/19/2017     Lab Results   Component Value Date     10/19/2017    K 3.4 (L) 10/19/2017    CL 99 10/19/2017    CO2 27 10/19/2017    BUN 10 10/19/2017    CREATININE 0.6 10/19/2017    GLUCOSE 108

## 2017-10-20 NOTE — PLAN OF CARE
Problem: Pain:  Goal: Pain level will decrease  Pain level will decrease   Outcome: Ongoing  Patient will state pain improved to tolerable level with use of pain medication. Goal: Control of acute pain  Control of acute pain   Outcome: Ongoing  Patient will state pain improved to tolerable level with use of pain medication. Problem: Falls - Risk of  Goal: Absence of falls  Outcome: Ongoing  Will remain free of injury from fall. Continue to use call light for any assistance needed. Bed alarm on.

## 2017-10-21 PROCEDURE — 97535 SELF CARE MNGMENT TRAINING: CPT

## 2017-10-21 PROCEDURE — 97116 GAIT TRAINING THERAPY: CPT

## 2017-10-21 PROCEDURE — 1180000000 HC REHAB R&B

## 2017-10-21 PROCEDURE — 97110 THERAPEUTIC EXERCISES: CPT

## 2017-10-21 PROCEDURE — 97530 THERAPEUTIC ACTIVITIES: CPT

## 2017-10-21 PROCEDURE — 6370000000 HC RX 637 (ALT 250 FOR IP): Performed by: HOSPITALIST

## 2017-10-21 PROCEDURE — 2700000000 HC OXYGEN THERAPY PER DAY

## 2017-10-21 PROCEDURE — 99232 SBSQ HOSP IP/OBS MODERATE 35: CPT | Performed by: PSYCHIATRY & NEUROLOGY

## 2017-10-21 PROCEDURE — 6370000000 HC RX 637 (ALT 250 FOR IP): Performed by: PSYCHIATRY & NEUROLOGY

## 2017-10-21 RX ORDER — AMLODIPINE BESYLATE 10 MG/1
10 TABLET ORAL DAILY
Status: DISCONTINUED | OUTPATIENT
Start: 2017-10-22 | End: 2017-10-28 | Stop reason: HOSPADM

## 2017-10-21 RX ADMIN — Medication 1 CAPSULE: at 07:43

## 2017-10-21 RX ADMIN — RIVAROXABAN 10 MG: 10 TABLET, FILM COATED ORAL at 17:38

## 2017-10-21 RX ADMIN — LOSARTAN POTASSIUM 100 MG: 50 TABLET ORAL at 07:43

## 2017-10-21 RX ADMIN — OXYCODONE AND ACETAMINOPHEN 1 TABLET: 5; 325 TABLET ORAL at 08:15

## 2017-10-21 RX ADMIN — OXYCODONE AND ACETAMINOPHEN 1 TABLET: 5; 325 TABLET ORAL at 22:43

## 2017-10-21 RX ADMIN — ATORVASTATIN CALCIUM 20 MG: 10 TABLET, FILM COATED ORAL at 07:43

## 2017-10-21 RX ADMIN — METOPROLOL TARTRATE 100 MG: 50 TABLET ORAL at 20:24

## 2017-10-21 RX ADMIN — METOPROLOL TARTRATE 100 MG: 50 TABLET ORAL at 08:15

## 2017-10-21 RX ADMIN — OXYCODONE AND ACETAMINOPHEN 1 TABLET: 5; 325 TABLET ORAL at 18:03

## 2017-10-21 RX ADMIN — DOCUSATE SODIUM 100 MG: 100 CAPSULE, LIQUID FILLED ORAL at 07:43

## 2017-10-21 RX ADMIN — AMLODIPINE BESYLATE 5 MG: 5 TABLET ORAL at 07:43

## 2017-10-21 RX ADMIN — DOCUSATE SODIUM 100 MG: 100 CAPSULE, LIQUID FILLED ORAL at 20:25

## 2017-10-21 RX ADMIN — OXYCODONE AND ACETAMINOPHEN 1 TABLET: 5; 325 TABLET ORAL at 13:00

## 2017-10-21 RX ADMIN — OXYCODONE AND ACETAMINOPHEN 1 TABLET: 5; 325 TABLET ORAL at 01:06

## 2017-10-21 RX ADMIN — MAGESIUM CITRATE 296 ML: 1.75 LIQUID ORAL at 12:28

## 2017-10-21 ASSESSMENT — PAIN SCALES - GENERAL
PAINLEVEL_OUTOF10: 6
PAINLEVEL_OUTOF10: 4
PAINLEVEL_OUTOF10: 6
PAINLEVEL_OUTOF10: 5
PAINLEVEL_OUTOF10: 5
PAINLEVEL_OUTOF10: 4
PAINLEVEL_OUTOF10: 4
PAINLEVEL_OUTOF10: 7
PAINLEVEL_OUTOF10: 3
PAINLEVEL_OUTOF10: 5
PAINLEVEL_OUTOF10: 8

## 2017-10-21 ASSESSMENT — PAIN DESCRIPTION - ORIENTATION: ORIENTATION: LEFT

## 2017-10-21 ASSESSMENT — PAIN DESCRIPTION - LOCATION: LOCATION: HIP;LEG

## 2017-10-21 NOTE — PROGRESS NOTES
Patient:   Lady Maldonado  MR#:    [id number]   Room:    20/820-02   YOB: 1945  Date of Progress Note: 10/21/2017  Time of Note                           9:17 AM  Consulting Physician:   Fatou Butler M.D. Attending Physician:  Fatou Butler MD     Chief complaint Status post left hip fracture and repair    S:This 80 y.o. female  who presented to [facility] ER on 10/15/17 after having a fall at home. She was unable to bear weight on her left lower extremity. X-ray done revealed a displaced subcaptial fracture of the neck of her left femur. She was admitted with consult for orthopedic surgery. She was seen by Dr. Hellen Welch, who recommended a left hemiarthroplasty. She was in agreement and went for surgery on 10/16/17. She tolerated the procedure well. She will be weightbearing as tolerated on her left lower extremity. She will need DVT prophylaxis for 3 weeks. During her hospital stay she has had acute kidney injury which has now resolved, hyponatremia and acute blood loss anemia not yet requiring transfusion. She is participating in both PT/OT and is felt to need a stay on Rehab to work towards her goal of returning home with her .  She is now felt ready to start the Rehab proram.    REVIEW OF SYSTEMS:  Constitutional: No fevers No chills  Neck:No stiffness  Respiratory: No shortness of breath  Cardiovascular: No chest pain No palpitations  Gastrointestinal: No abdominal pain    Genitourinary: No Dysuria  Neurological: No headache, no confusion    Past Medical History:      Diagnosis Date    Arthritis     [facility]chronic obstructive pulmonary disease) ([facility] [address]     Hypertension     Pneumonia     in the past       Past Surgical History:      Procedure Laterality Date    APPENDECTOMY      BACK SURGERY      BREAST SURGERY      biopsy    HAND SURGERY Bilateral     HEMIARTHROPLASTY HIP Left 10/16/2017    HIP HEMIARTHROPLASTY performed by Abilio Irizarry MD at . [facility]  [cephalexin]    Social History:   TOBACCO:   reports that she has been smoking Cigarettes. She has a 7.50 pack-year smoking history. She has never used smokeless tobacco.  ETOH:   reports that she drinks alcohol. Family History:       Problem Relation Age of Onset    Cancer Mother     Cancer Father          PHYSICAL EXAM:  BP (!) 156/87   Pulse 72   Temp 97.5 °F (36.4 °C) (Temporal)   Resp 20   Ht 5' 8\" (1.727 m)   Wt 188 lb (85.3 kg)   SpO2 92%   Breastfeeding? No   BMI 28.59 kg/m²     Constitutional: she appears well-developed and well-nourished. Eyes  conjunctiva normal.    Ear, nose, throat -No scars, masses, or lesions over external nose or ears, no atrophy of tongue  Neck-symmetric, no masses noted, no jugular vein distension  Respiration- chest wall appears symmetric, good expansion,   normal effort without use of accessory muscles  Musculoskeletal  no significant wasting of muscles noted, no bony deformities Extremities-no clubbing, cyanosis or edema  Skin  warm, dry, and intact. No rash, erythema, or pallor. Psychiatric  mood, affect, and behavior appear normal.      Neurology  NEUROLOGICAL EXAM:      Mental status   Awake, alert, fluent oriented x 3 appropriate affect  Attention and concentration appear appropriate  Recent and remote memory appears unremarkable  Speech normal without dysarthria  No clear issues with language       Cranial Nerves     CN III, IV,VI-EOMI, No nystagmus, conjugate eye movements, no ptosis    CN VII-no facial assymetry          Motor function  Antigravity x 4 limited LLE   Sensory function Intact to light touch     Cerebellar F-N intact     Tremor None present     Gait                  Not tested           Nursing/pcp notes, imaging,labs and vitals reviewed.      PT,OT and/or speech notes reviewed    Lab Results   Component Value Date    WBC 6.5 10/19/2017    HGB 10.4 (L) 10/19/2017    HCT 30.7 (L) 10/19/2017    MCV 97.8 10/19/2017     10/19/2017 Lab Results   Component Value Date     10/19/2017    K 3.4 (L) 10/19/2017    CL 99 10/19/2017    CO2 27 10/19/2017    BUN 10 10/19/2017    CREATININE 0.6 10/19/2017    GLUCOSE 108 10/19/2017    CALCIUM 7.9 (L) 10/19/2017    PROT 5.7 (L) 10/19/2017    LABALBU 2.9 (L) 10/19/2017    BILITOT 0.6 10/19/2017    ALKPHOS 65 10/19/2017    AST 21 10/19/2017    ALT 17 10/19/2017    LABGLOM >60 10/19/2017     Lab Results   Component Value Date    INR 0.87 (L) 10/15/2017    PROTIME 11.7 (L) 10/15/2017             RECORD REVIEW: Previous medical records, medications were reviewed at today's visit    IMPRESSION:   1. Status post left hip fracture comparedpain control/mobilization/DVT prophylaxis. 2. Hypertensionon medications continue to monitor. 3. Hyperlipidemiaon statin. Continue to monitor. 4. GIbowel regimen continue to monitor. 5. DVT prophylaxison sural toe does continue to monitor. 6. Vitamin D deficiencyon vitamin D continue to monitor  7. pain controlon Percocet when necessary  8.  PT/OT    CALL WITH ANY QUESTIONS  [phone] CELL  Dr Alisha Wright

## 2017-10-22 PROCEDURE — 99232 SBSQ HOSP IP/OBS MODERATE 35: CPT | Performed by: PSYCHIATRY & NEUROLOGY

## 2017-10-22 PROCEDURE — 6370000000 HC RX 637 (ALT 250 FOR IP): Performed by: HOSPITALIST

## 2017-10-22 PROCEDURE — 1180000000 HC REHAB R&B

## 2017-10-22 PROCEDURE — 6370000000 HC RX 637 (ALT 250 FOR IP): Performed by: PSYCHIATRY & NEUROLOGY

## 2017-10-22 RX ADMIN — OXYCODONE AND ACETAMINOPHEN 1 TABLET: 5; 325 TABLET ORAL at 14:23

## 2017-10-22 RX ADMIN — RIVAROXABAN 10 MG: 10 TABLET, FILM COATED ORAL at 17:44

## 2017-10-22 RX ADMIN — Medication 1 CAPSULE: at 09:11

## 2017-10-22 RX ADMIN — METOPROLOL TARTRATE 125 MG: 50 TABLET, FILM COATED ORAL at 20:02

## 2017-10-22 RX ADMIN — DOCUSATE SODIUM 100 MG: 100 CAPSULE, LIQUID FILLED ORAL at 20:02

## 2017-10-22 RX ADMIN — OXYCODONE AND ACETAMINOPHEN 1 TABLET: 5; 325 TABLET ORAL at 20:02

## 2017-10-22 RX ADMIN — METOPROLOL TARTRATE 100 MG: 50 TABLET ORAL at 09:12

## 2017-10-22 RX ADMIN — LOSARTAN POTASSIUM 100 MG: 50 TABLET ORAL at 09:12

## 2017-10-22 RX ADMIN — AMLODIPINE BESYLATE 10 MG: 10 TABLET ORAL at 09:11

## 2017-10-22 RX ADMIN — ATORVASTATIN CALCIUM 20 MG: 10 TABLET, FILM COATED ORAL at 11:46

## 2017-10-22 RX ADMIN — OXYCODONE AND ACETAMINOPHEN 1 TABLET: 5; 325 TABLET ORAL at 09:11

## 2017-10-22 ASSESSMENT — PAIN DESCRIPTION - ORIENTATION: ORIENTATION: LEFT

## 2017-10-22 ASSESSMENT — PAIN SCALES - GENERAL
PAINLEVEL_OUTOF10: 5
PAINLEVEL_OUTOF10: 4
PAINLEVEL_OUTOF10: 1
PAINLEVEL_OUTOF10: 1
PAINLEVEL_OUTOF10: 0
PAINLEVEL_OUTOF10: 6

## 2017-10-22 ASSESSMENT — PAIN DESCRIPTION - FREQUENCY: FREQUENCY: CONTINUOUS

## 2017-10-22 ASSESSMENT — PAIN DESCRIPTION - LOCATION: LOCATION: HIP;LEG

## 2017-10-22 ASSESSMENT — PAIN DESCRIPTION - DESCRIPTORS: DESCRIPTORS: ACHING

## 2017-10-22 ASSESSMENT — PAIN DESCRIPTION - ONSET: ONSET: ON-GOING

## 2017-10-22 ASSESSMENT — PAIN DESCRIPTION - PAIN TYPE: TYPE: SURGICAL PAIN

## 2017-10-22 NOTE — PLAN OF CARE
Problem: Pain:  Goal: Pain level will decrease  Pain level will decrease   Outcome: Ongoing  Patient pain has remained under control with use of pain medicine as ordered for pain control. Goal: Control of acute pain  Control of acute pain   Outcome: Ongoing  Patient pain has remained under control with use of pain medicine as ordered for pain control.     Problem: Falls - Risk of  Goal: Absence of falls  Outcome: Ongoing  Bed and personal alarms in use, side rails up times 2

## 2017-10-22 NOTE — PROGRESS NOTES
Patient:   Suellen Almeida  MR#:    [id number]   Room:    [address] [place] [address]   YOB: 1945  Date of Progress Note: 10/22/2017  Time of Note                           11:07 AM  Consulting Physician:   Van Mendoza M.D. Attending Physician:  Van Mendoza MD     Chief complaint Status post left hip fracture and repair    S:This 80 y.o. female  who presented to [facility] ER on 10/15/17 after having a fall at home. She was unable to bear weight on her left lower extremity. X-ray done revealed a displaced subcaptial fracture of the neck of her left femur. She was admitted with consult for orthopedic surgery. She was seen by Dr. Jean Carlos Johnson, who recommended a left hemiarthroplasty. She was in agreement and went for surgery on 10/16/17. She tolerated the procedure well. She will be weightbearing as tolerated on her left lower extremity. She will need DVT prophylaxis for 3 weeks. During her hospital stay she has had acute kidney injury which has now resolved, hyponatremia and acute blood loss anemia not yet requiring transfusion. She is participating in both PT/OT and is felt to need a stay on Rehab to work towards her goal of returning home with her .  She is now felt ready to start the Rehab proram.    REVIEW OF SYSTEMS:  Constitutional: No fevers No chills  Neck:No stiffness  Respiratory: No shortness of breath  Cardiovascular: No chest pain No palpitations  Gastrointestinal: No abdominal pain    Genitourinary: No Dysuria  Neurological: No headache, no confusion    Past Medical History:      Diagnosis Date    Arthritis     COPD (chronic obstructive pulmonary disease) ([address]     Hypertension     Pneumonia     in the past       Past Surgical History:      Procedure Laterality Date    APPENDECTOMY      BACK SURGERY      BREAST SURGERY      biopsy    HAND SURGERY Bilateral     HEMIARTHROPLASTY HIP Left 10/16/2017    HIP HEMIARTHROPLASTY performed by Angeles Iraheta MD at . [facility] She has a 7.50 pack-year smoking history. She has never used smokeless tobacco.  ETOH:   reports that she drinks alcohol. Family History:       Problem Relation Age of Onset    Cancer Mother     Cancer Father          PHYSICAL EXAM:  BP (!) 160/77   Pulse 69   Temp 97.5 °F (36.4 °C) (Temporal)   Resp 18   Ht 5' 8\" (1.727 m)   Wt 188 lb (85.3 kg)   SpO2 93%   Breastfeeding? No   BMI 28.59 kg/m²     Constitutional: she appears well-developed and well-nourished. Eyes  conjunctiva normal.    Ear, nose, throat -No scars, masses, or lesions over external nose or ears, no atrophy of tongue  Neck-symmetric, no masses noted, no jugular vein distension  Respiration- chest wall appears symmetric, good expansion,   normal effort without use of accessory muscles  Musculoskeletal  no significant wasting of muscles noted, no bony deformities Extremities-no clubbing, cyanosis or edema  Skin  warm, dry, and intact. No rash, erythema, or pallor. Psychiatric  mood, affect, and behavior appear normal.      Neurology  NEUROLOGICAL EXAM:      Mental status   Awake, alert, fluent oriented x 3 appropriate affect  Attention and concentration appear appropriate  Recent and remote memory appears unremarkable  Speech normal without dysarthria  No clear issues with language       Cranial Nerves     CN III, IV,VI-EOMI, No nystagmus, conjugate eye movements, no ptosis    CN VII-no facial assymetry          Motor function  Antigravity x 4 limited LLE   Sensory function Intact to light touch     Cerebellar F-N intact     Tremor None present     Gait                  Not tested           Nursing/pcp notes, imaging,labs and vitals reviewed.      PT,OT and/or speech notes reviewed    Lab Results   Component Value Date    WBC 6.5 10/19/2017    HGB 10.4 (L) 10/19/2017    HCT 30.7 (L) 10/19/2017    MCV 97.8 10/19/2017     10/19/2017     Lab Results   Component Value Date     10/19/2017    K 3.4 (L) 10/19/2017    CL 99 10/19/2017    CO2 27 10/19/2017    BUN 10 10/19/2017    CREATININE 0.6 10/19/2017    GLUCOSE 108 10/19/2017    CALCIUM 7.9 (L) 10/19/2017    PROT 5.7 (L) 10/19/2017    LABALBU 2.9 (L) 10/19/2017    BILITOT 0.6 10/19/2017    ALKPHOS 65 10/19/2017    AST 21 10/19/2017    ALT 17 10/19/2017    LABGLOM >60 10/19/2017     Lab Results   Component Value Date    INR 0.87 (L) 10/15/2017    PROTIME 11.7 (L) 10/15/2017             RECORD REVIEW: Previous medical records, medications were reviewed at today's visit    IMPRESSION:   1. Status post left hip fracture comparedpain control/mobilization/DVT prophylaxis. 2. Hypertensionon medications continue to monitor. 3. Hyperlipidemiaon statin. Continue to monitor. 4. GIbowel regimen continue to monitor. 5. DVT prophylaxison sural toe does continue to monitor. 6. Vitamin D deficiencyon vitamin D continue to monitor  7. pain controlon Percocet when necessary  8.  PT/OT    CALL WITH ANY QUESTIONS  [phone] CELL  Dr Van Mendoza

## 2017-10-22 NOTE — PROGRESS NOTES
[facility])  Active Problems:    Hypertension    Acute kidney failure ([address]    Mixed hyperlipidemia    Essential hypertension    Acute blood loss as cause of postoperative anemia    Blood pressure (!) 160/77, pulse 69, temperature 97.5 °F (36.4 °C), temperature source Temporal, resp. rate 18, height 5' 8\" (1.727 m), weight 188 lb (85.3 kg), SpO2 93 %, not currently breastfeeding. Subjective:  Symptoms:  Stable. Diet:  Adequate intake. Activity level: Normal with assistance. Pain:  She complains of pain that is mild. She reports pain is improving. Pain is well controlled.       Objective  Assessment & Plan    Riley Parker RN  10/22/2017

## 2017-10-23 LAB
ALBUMIN SERPL-MCNC: 2.7 G/DL (ref 3.5–5.2)
ALP BLD-CCNC: 70 U/L (ref 35–104)
ALT SERPL-CCNC: 17 U/L (ref 5–33)
ANION GAP SERPL CALCULATED.3IONS-SCNC: 11 MMOL/L (ref 7–19)
AST SERPL-CCNC: 21 U/L (ref 5–32)
BASOPHILS ABSOLUTE: 0 K/UL (ref 0–0.2)
BASOPHILS RELATIVE PERCENT: 0.3 % (ref 0–1)
BILIRUB SERPL-MCNC: 0.6 MG/DL (ref 0.2–1.2)
BUN BLDV-MCNC: 13 MG/DL (ref 8–23)
CALCIUM SERPL-MCNC: 9 MG/DL (ref 8.8–10.2)
CHLORIDE BLD-SCNC: 99 MMOL/L (ref 98–111)
CO2: 28 MMOL/L (ref 22–29)
CREAT SERPL-MCNC: 0.7 MG/DL (ref 0.5–0.9)
EOSINOPHILS ABSOLUTE: 0.2 K/UL (ref 0–0.6)
EOSINOPHILS RELATIVE PERCENT: 2.3 % (ref 0–5)
GFR NON-AFRICAN AMERICAN: >60
GLUCOSE BLD-MCNC: 109 MG/DL (ref 74–109)
HCT VFR BLD CALC: 33.8 % (ref 37–47)
HEMOGLOBIN: 11.5 G/DL (ref 12–16)
LYMPHOCYTES ABSOLUTE: 1.4 K/UL (ref 1.1–4.5)
LYMPHOCYTES RELATIVE PERCENT: 19.6 % (ref 20–40)
MCH RBC QN AUTO: 33.6 PG (ref 27–31)
MCHC RBC AUTO-ENTMCNC: 34 G/DL (ref 33–37)
MCV RBC AUTO: 98.8 FL (ref 81–99)
MONOCYTES ABSOLUTE: 0.8 K/UL (ref 0–0.9)
MONOCYTES RELATIVE PERCENT: 11.1 % (ref 0–10)
NEUTROPHILS ABSOLUTE: 4.8 K/UL (ref 1.5–7.5)
NEUTROPHILS RELATIVE PERCENT: 65.7 % (ref 50–65)
PDW BLD-RTO: 14.2 % (ref 11.5–14.5)
PLATELET # BLD: 276 K/UL (ref 130–400)
PMV BLD AUTO: 9.2 FL (ref 9.4–12.3)
POTASSIUM SERPL-SCNC: 3.8 MMOL/L (ref 3.5–5)
RBC # BLD: 3.42 M/UL (ref 4.2–5.4)
SODIUM BLD-SCNC: 138 MMOL/L (ref 136–145)
TOTAL PROTEIN: 6.1 G/DL (ref 6.6–8.7)
WBC # BLD: 7.3 K/UL (ref 4.8–10.8)

## 2017-10-23 PROCEDURE — 6370000000 HC RX 637 (ALT 250 FOR IP): Performed by: HOSPITALIST

## 2017-10-23 PROCEDURE — 97530 THERAPEUTIC ACTIVITIES: CPT

## 2017-10-23 PROCEDURE — 99232 SBSQ HOSP IP/OBS MODERATE 35: CPT | Performed by: PSYCHIATRY & NEUROLOGY

## 2017-10-23 PROCEDURE — 80053 COMPREHEN METABOLIC PANEL: CPT

## 2017-10-23 PROCEDURE — 97110 THERAPEUTIC EXERCISES: CPT

## 2017-10-23 PROCEDURE — 36415 COLL VENOUS BLD VENIPUNCTURE: CPT

## 2017-10-23 PROCEDURE — 1180000000 HC REHAB R&B

## 2017-10-23 PROCEDURE — 6370000000 HC RX 637 (ALT 250 FOR IP): Performed by: PSYCHIATRY & NEUROLOGY

## 2017-10-23 PROCEDURE — 97535 SELF CARE MNGMENT TRAINING: CPT

## 2017-10-23 PROCEDURE — 97116 GAIT TRAINING THERAPY: CPT

## 2017-10-23 PROCEDURE — 85025 COMPLETE CBC W/AUTO DIFF WBC: CPT

## 2017-10-23 RX ADMIN — DOCUSATE SODIUM 100 MG: 100 CAPSULE, LIQUID FILLED ORAL at 10:04

## 2017-10-23 RX ADMIN — RIVAROXABAN 10 MG: 10 TABLET, FILM COATED ORAL at 17:12

## 2017-10-23 RX ADMIN — Medication 1 CAPSULE: at 10:04

## 2017-10-23 RX ADMIN — LOSARTAN POTASSIUM 100 MG: 50 TABLET ORAL at 10:03

## 2017-10-23 RX ADMIN — METOPROLOL TARTRATE 125 MG: 50 TABLET, FILM COATED ORAL at 19:23

## 2017-10-23 RX ADMIN — ATORVASTATIN CALCIUM 20 MG: 10 TABLET, FILM COATED ORAL at 10:03

## 2017-10-23 RX ADMIN — OXYCODONE AND ACETAMINOPHEN 1 TABLET: 5; 325 TABLET ORAL at 11:12

## 2017-10-23 RX ADMIN — OXYCODONE AND ACETAMINOPHEN 1 TABLET: 5; 325 TABLET ORAL at 02:07

## 2017-10-23 RX ADMIN — OXYCODONE AND ACETAMINOPHEN 1 TABLET: 5; 325 TABLET ORAL at 07:22

## 2017-10-23 RX ADMIN — DOCUSATE SODIUM 100 MG: 100 CAPSULE, LIQUID FILLED ORAL at 19:24

## 2017-10-23 RX ADMIN — AMLODIPINE BESYLATE 10 MG: 10 TABLET ORAL at 10:04

## 2017-10-23 RX ADMIN — METOPROLOL TARTRATE 125 MG: 50 TABLET, FILM COATED ORAL at 10:04

## 2017-10-23 RX ADMIN — OXYCODONE AND ACETAMINOPHEN 1 TABLET: 5; 325 TABLET ORAL at 15:05

## 2017-10-23 ASSESSMENT — PAIN DESCRIPTION - PAIN TYPE
TYPE: SURGICAL PAIN

## 2017-10-23 ASSESSMENT — PAIN SCALES - GENERAL
PAINLEVEL_OUTOF10: 0
PAINLEVEL_OUTOF10: 0
PAINLEVEL_OUTOF10: 6
PAINLEVEL_OUTOF10: 5
PAINLEVEL_OUTOF10: 3
PAINLEVEL_OUTOF10: 5
PAINLEVEL_OUTOF10: 9
PAINLEVEL_OUTOF10: 2
PAINLEVEL_OUTOF10: 0
PAINLEVEL_OUTOF10: 2

## 2017-10-23 ASSESSMENT — PAIN DESCRIPTION - ORIENTATION
ORIENTATION: LEFT

## 2017-10-23 ASSESSMENT — PAIN DESCRIPTION - LOCATION
LOCATION: HIP
LOCATION: HIP;LEG

## 2017-10-23 ASSESSMENT — PAIN DESCRIPTION - ONSET: ONSET: ON-GOING

## 2017-10-23 ASSESSMENT — PAIN DESCRIPTION - DESCRIPTORS
DESCRIPTORS: ACHING
DESCRIPTORS: ACHING

## 2017-10-23 ASSESSMENT — PAIN DESCRIPTION - FREQUENCY
FREQUENCY: INTERMITTENT
FREQUENCY: CONTINUOUS
FREQUENCY: INTERMITTENT

## 2017-10-23 NOTE — PROGRESS NOTES
Nutrition Assessment    Type and Reason for Visit: Reassess    Malnutrition Assessment:  · Malnutrition Status: No malnutrition    Nutrition Diagnosis:   · Problem: No nutrition diagnosis at this time  · Etiology:      Signs and symptoms:      Nutrition Assessment:  · Subjective Assessment: States appetite is \"picking up,\" no concerns. Intakes %. Wt stable. Nutrition Risk Level   Risk Level: Low    Nutrition Intervention  Food and/or Delivery: Continue current diet  Nutrition Education/Counseling/Coordination of Care:  Continued Inpatient Monitoring    Patient assessed for nutrition risk. Deemed to be at low risk at this time. Will continue to follow patient.       Electronically signed by Flavia Kirby, MS, RD, LD on 10/23/17 at 10:49 AM

## 2017-10-23 NOTE — PROGRESS NOTES
Patient:   Tawana Casper  MR#:    [id number]   Room:    [facility]/820-   YOB: 1945  Date of Progress Note: 10/23/2017  Time of Note                           11:53 AM  Consulting Physician:   Vidal Gar M.D. Attending Physician:  Vidal Gar MD     Chief complaint Status post left hip fracture and repair    S:This 80 y.o. female  who presented to [facility] on 10/15/17 after having a fall at home. She was unable to bear weight on her left lower extremity. X-ray done revealed a displaced subcaptial fracture of the neck of her left femur. She was admitted with consult for orthopedic surgery. She was seen by Dr. Selwyn Bob, who recommended a left hemiarthroplasty. She was in agreement and went for surgery on 10/16/17. She tolerated the procedure well. She will be weightbearing as tolerated on her left lower extremity. She will need DVT prophylaxis for 3 weeks. During her hospital stay she has had acute kidney injury which has now resolved, hyponatremia and acute blood loss anemia not yet requiring transfusion. She is participating in both PT/OT and is felt to need a stay on Rehab to work towards her goal of returning home with her .  She is now felt ready to start the Rehab proram.    REVIEW OF SYSTEMS:  Constitutional: No fevers No chills  Neck:No stiffness  Respiratory: No shortness of breath  Cardiovascular: No chest pain No palpitations  Gastrointestinal: No abdominal pain    Genitourinary: No Dysuria  Neurological: No headache, no confusion    Past Medical History:      Diagnosis Date    Arthritis     COPD (chronic obstructive pulmonary disease) ([address]     Hypertension     Pneumonia     in the past       Past Surgical History:      Procedure Laterality Date    APPENDECTOMY      BACK SURGERY      BREAST SURGERY      biopsy    HAND SURGERY Bilateral     HEMIARTHROPLASTY HIP Left 10/16/2017    HIP HEMIARTHROPLASTY performed by Brandon Cisneros MD at . [facility]  has a 7.50 pack-year smoking history. She has never used smokeless tobacco.  ETOH:   reports that she drinks alcohol. Family History:       Problem Relation Age of Onset    Cancer Mother     Cancer Father          PHYSICAL EXAM:  /71   Pulse 64   Temp 97.5 °F (36.4 °C) (Temporal)   Resp 16   Ht 5' 8\" (1.727 m)   Wt 188 lb (85.3 kg)   SpO2 94%   Breastfeeding? No   BMI 28.59 kg/m²     Constitutional: she appears well-developed and well-nourished. Eyes  conjunctiva normal.    Ear, nose, throat -No scars, masses, or lesions over external nose or ears, no atrophy of tongue  Neck-symmetric, no masses noted, no jugular vein distension  Respiration- chest wall appears symmetric, good expansion,   normal effort without use of accessory muscles  Musculoskeletal  no significant wasting of muscles noted, no bony deformities Extremities-no clubbing, cyanosis or edema  Skin  warm, dry, and intact. No rash, erythema, or pallor. Psychiatric  mood, affect, and behavior appear normal.      Neurology  NEUROLOGICAL EXAM:      Mental status   Awake, alert, fluent oriented x 3 appropriate affect  Attention and concentration appear appropriate  Recent and remote memory appears unremarkable  Speech normal without dysarthria  No clear issues with language       Cranial Nerves     CN III, IV,VI-EOMI, No nystagmus, conjugate eye movements, no ptosis    CN VII-no facial assymetry          Motor function  Antigravity x 4 limited LLE   Sensory function Intact to light touch     Cerebellar F-N intact     Tremor None present     Gait                  Not tested           Nursing/pcp notes, imaging,labs and vitals reviewed.      PT,OT and/or speech notes reviewed    Lab Results   Component Value Date    WBC 7.3 10/23/2017    HGB 11.5 (L) 10/23/2017    HCT 33.8 (L) 10/23/2017    MCV 98.8 10/23/2017     10/23/2017     Lab Results   Component Value Date     10/23/2017    K 3.8 10/23/2017    CL 99 10/23/2017 CO2 28 10/23/2017    BUN 13 10/23/2017    CREATININE 0.7 10/23/2017    GLUCOSE 109 10/23/2017    CALCIUM 9.0 10/23/2017    PROT 6.1 (L) 10/23/2017    LABALBU 2.7 (L) 10/23/2017    BILITOT 0.6 10/23/2017    ALKPHOS 70 10/23/2017    AST 21 10/23/2017    ALT 17 10/23/2017    LABGLOM >60 10/23/2017     Lab Results   Component Value Date    INR 0.87 (L) 10/15/2017    PROTIME 11.7 (L) 10/15/2017             RECORD REVIEW: Previous medical records, medications were reviewed at today's visit    IMPRESSION:   1. Status post left hip fracture comparedpain control/mobilization/DVT prophylaxis. 2. Hypertensionon medications continue to monitor. 3. Hyperlipidemiaon statin. Continue to monitor. 4. GIbowel regimen continue to monitor. 5. DVT prophylaxison sural toe does continue to monitor. 6. Vitamin D deficiencyon vitamin D continue to monitor  7. pain controlon Percocet when necessary  8.  PT/OT    CALL WITH ANY QUESTIONS  [phone] CELL  Dr Alicia Anne

## 2017-10-23 NOTE — PATIENT CARE CONFERENCE
[facility] ACUTE INPATIENT REHABILITATION  TEAM CONFERENCE NOTE    Date: 10/23/2017  Patient Name: Shabana Ortiz        MRN: [id number]    : 1945  (80 y.o.)  Gender: female   Referring Practitioner: DR. Juancho Madsen  Diagnosis: S/P L FEMORAL HEMIARTHROPLASTY       PHYSICAL THERAPY  STRENGTH  Strength RLE  Comment: GROSSLY 3+/5;  DF/PF ARE 5/5  Strength LLE  Comment: NO RESISTANCE PROVIDED,  GROSSLY 3/5  ROM  AROM RLE (degrees)  RLE AROM: WFL  AROM LLE (degrees)  LLE General AROM: HIP FLEXION, KNEE FLEX LIMITED D/T PAIN  BED MOBILITY     Supine to Sit: Stand by assistance  Sit to Supine: Minimal assistance  TRANSFERS  Sit to Stand: Stand by assistance  Bed to Chair: Stand by assistance, Contact guard assistance     WHEELCHAIR PROPULSION  Level of Assistance: Stand by assistance  Distance: 150  Description/ Details: ABLE TO PERFROM 2 TURNS  AMBULATION  Device: [address]: Stand by assistance, Contact guard assistance  Distance: 100  Quality of Gait: MILD ANTALGIC PATTERN WITH STANCE LLE, IMPROVED AFTER 10 TO 15 STEPS  STAIRS  # Steps : 6  Rails: Bilateral  Assistance: Stand by assistance, Contact guard assistance     FIM SCORES  Bed, Chair, Wheel Chair: 4  Walk: 2 - Maximal Assistance Requires up to Maximal Assistance AND requires assistance of one person to walk/operate wheelchair between  feet (Patient performs 25-49% of locomotion effort or goes between  feet)  Wheel Chair: 5 - Supervision Requires standby supervision or cuing to walk/operate wheelchair at least 150 feet  Stairs: 2- Maximal Assistance Performs 25-49% of the effort to go up and down 4 to 6 stairs and requires the assistance of one person only  GOALS:  Short term goals  Time Frame for Short term goals: 1 WEEK  Short term goal 1: TF FIM 4  Short term goal 2: WALK FIM 5  Short term goal 3: STEP FIM 1 (UP/DOWN 1 STEP W/ CGA)  Short term goal 4: HEP SBA    Long term goals  Time Frame for Long term goals : 2-3 WEEKS  Long term goal 1: TF FIM 7  Long term goal 2: WALK FIM 6  Long term goal 3: STEP FIM 5.0 (UP/DOWN 6 STEPS INDEP)  Long term goal 4: W/C FIM 5  Long term goal 5: HEP INDEPENDENT    ASSESSMENT:  Assessment: AMB WITH MILD ANTALIC PATTERN WITH STANCE LLE, ABLE TO PERFORM RECIPROCAL GAIT PATTERN WITH RW.    SPEECH THERAPY      OCCUPATIONAL THERAPY    OT FIM PROGRESS   ADMIT SCORE CURRENT SCORE GOAL   EATING Eatin - Patient feeds self Eatin - Patient feeds self GOAL: Eatin   GROOMING Groomin - Patient independent with all grooming tasks Groomin - Patient independent with all grooming tasks GOAL: Groomin   BATHING Bathing: 3 - Able to bathe 5-7 areas Bathin - Able to bathe 8-9 areas (Pt required assistance  cleaning between toes.) GOAL: Bathin   UPPER EXTREMITY DRESSING Dressing-Upper: 5 - Requires setup/supervision/cues and/or requires assist with presthesis/brace only Dressing-Upper: 7 - Patient independently dresses upper body GOAL: Dressing-Upper: 7   LOWER EXTREMITY DRESSING Dressing-Lower: 3 - Requires assist with 2-3 parts of dressing Dressing-Lower: 4 - Requires assist with buttons/zippers/shoelaces and/or assist with shoes only GOAL: Dressing-Lower: 6   TOILETING Toiletin - Requires steadying assistance only Toiletin - Requires steadying assistance only GOAL: Toiletin   TOILET TRANSFER Toilet Transfer: 4 - Requires steadying assistance only < 25% assist Toilet Transfer: 5 - Requires setup/supervision/cues GOAL: Toilet: 6   TUB/SHOWER TRANSFER Primary Mode: Shower     Shower Transfer: 6 - Modified independence   Primary Mode: Shower     Shower Transfer: 6 - Modified independence     Primary Mode: Shower            Cognition:  Comprehension: 5 - Patient understands basic needs (hungry/hot/pain)  Expression: 5 - Expresses basic ideas/needs only (hungry/hot/pain)  Social Interaction: 5 - Patient is appropriate with supervision/cues  Problem Solvin - Patient able

## 2017-10-24 PROCEDURE — 6370000000 HC RX 637 (ALT 250 FOR IP): Performed by: HOSPITALIST

## 2017-10-24 PROCEDURE — 97530 THERAPEUTIC ACTIVITIES: CPT

## 2017-10-24 PROCEDURE — 1180000000 HC REHAB R&B

## 2017-10-24 PROCEDURE — 99233 SBSQ HOSP IP/OBS HIGH 50: CPT | Performed by: PSYCHIATRY & NEUROLOGY

## 2017-10-24 PROCEDURE — 97535 SELF CARE MNGMENT TRAINING: CPT

## 2017-10-24 PROCEDURE — 97116 GAIT TRAINING THERAPY: CPT

## 2017-10-24 PROCEDURE — 6370000000 HC RX 637 (ALT 250 FOR IP): Performed by: PSYCHIATRY & NEUROLOGY

## 2017-10-24 PROCEDURE — 97110 THERAPEUTIC EXERCISES: CPT

## 2017-10-24 RX ADMIN — Medication 1 CAPSULE: at 07:28

## 2017-10-24 RX ADMIN — OXYCODONE AND ACETAMINOPHEN 1 TABLET: 5; 325 TABLET ORAL at 20:56

## 2017-10-24 RX ADMIN — METOPROLOL TARTRATE 125 MG: 50 TABLET, FILM COATED ORAL at 20:57

## 2017-10-24 RX ADMIN — LOSARTAN POTASSIUM 100 MG: 50 TABLET ORAL at 07:27

## 2017-10-24 RX ADMIN — OXYCODONE AND ACETAMINOPHEN 1 TABLET: 5; 325 TABLET ORAL at 06:21

## 2017-10-24 RX ADMIN — RIVAROXABAN 10 MG: 10 TABLET, FILM COATED ORAL at 17:06

## 2017-10-24 RX ADMIN — METOPROLOL TARTRATE 125 MG: 50 TABLET, FILM COATED ORAL at 07:27

## 2017-10-24 RX ADMIN — OXYCODONE AND ACETAMINOPHEN 1 TABLET: 5; 325 TABLET ORAL at 16:16

## 2017-10-24 RX ADMIN — AMLODIPINE BESYLATE 10 MG: 10 TABLET ORAL at 07:28

## 2017-10-24 RX ADMIN — ATORVASTATIN CALCIUM 20 MG: 10 TABLET, FILM COATED ORAL at 11:47

## 2017-10-24 RX ADMIN — DOCUSATE SODIUM 100 MG: 100 CAPSULE, LIQUID FILLED ORAL at 07:28

## 2017-10-24 RX ADMIN — DOCUSATE SODIUM 100 MG: 100 CAPSULE, LIQUID FILLED ORAL at 20:56

## 2017-10-24 RX ADMIN — OXYCODONE AND ACETAMINOPHEN 1 TABLET: 5; 325 TABLET ORAL at 11:47

## 2017-10-24 ASSESSMENT — PAIN DESCRIPTION - ORIENTATION
ORIENTATION: LEFT

## 2017-10-24 ASSESSMENT — PAIN DESCRIPTION - LOCATION
LOCATION: HIP

## 2017-10-24 ASSESSMENT — PAIN SCALES - GENERAL
PAINLEVEL_OUTOF10: 0
PAINLEVEL_OUTOF10: 6
PAINLEVEL_OUTOF10: 7
PAINLEVEL_OUTOF10: 8
PAINLEVEL_OUTOF10: 2
PAINLEVEL_OUTOF10: 4
PAINLEVEL_OUTOF10: 3
PAINLEVEL_OUTOF10: 9
PAINLEVEL_OUTOF10: 2

## 2017-10-24 ASSESSMENT — PAIN DESCRIPTION - PAIN TYPE
TYPE: ACUTE PAIN
TYPE: SURGICAL PAIN

## 2017-10-24 ASSESSMENT — PAIN DESCRIPTION - FREQUENCY: FREQUENCY: INTERMITTENT

## 2017-10-24 ASSESSMENT — PAIN DESCRIPTION - DESCRIPTORS: DESCRIPTORS: ACHING

## 2017-10-24 NOTE — PLAN OF CARE
Problem: Pain:  Goal: Pain level will decrease  Pain level will decrease   Outcome: Ongoing  Patient pain has remained under control with use of pain medicine (prn Percocet) as ordered for pain control. Goal: Control of acute pain  Control of acute pain   Outcome: Ongoing      Problem: Falls - Risk of  Goal: Absence of falls  Outcome: Ongoing  Patient  remained free from falls this shift. Fall precautions in place. Bed alarm in use. Will continue to monitor. Problem: Risk for Impaired Skin Integrity  Goal: Tissue integrity - skin and mucous membranes  Structural intactness and normal physiological function of skin and  mucous membranes. Outcome: Ongoing  Skin remains intact with no skin breakdown this shift. Problem: Infection - Surgical Site:  Goal: Will show no infection signs and symptoms  Will show no infection signs and symptoms  Outcome: Ongoing  Patient left hip incision remains free from infection with no redness or drainage noted. Problem: Bleeding:  Goal: Will show no signs and symptoms of excessive bleeding  Will show no signs and symptoms of excessive bleeding  Outcome: Ongoing  No bleeding noted this shift. Will continue to monitor.

## 2017-10-24 NOTE — PROGRESS NOTES
REPLACEMENT      s/p left hip, bilateral total knee replacements    KNEE SURGERY Bilateral     L 2008/ R 2009    TONSILLECTOMY         Medications in Hospital:      Current Facility-Administered Medications:     metoprolol tartrate (LOPRESSOR) tablet 125 mg, 125 mg, Oral, BID, Wanita Prader, MD, 125 mg at 10/24/17 0727    amLODIPine (NORVASC) tablet 10 mg, 10 mg, Oral, Daily, Wanita Prader, MD, 10 mg at 10/24/17 8843    acetaminophen (TYLENOL) tablet 650 mg, 650 mg, Oral, Q4H PRN, Estephania North MD    oxyCODONE-acetaminophen (PERCOCET) 5-325 MG per tablet 1 tablet, 1 tablet, Oral, Q4H PRN, Estephania North MD, 1 tablet at 10/24/17 9728    docusate sodium (COLACE) capsule 100 mg, 100 mg, Oral, BID, Estephania North MD, 100 mg at 10/24/17 4486    ondansetron (ZOFRAN) injection 4 mg, 4 mg, Intravenous, Q6H PRN, Estephania North MD    atorvastatin (LIPITOR) tablet 20 mg, 20 mg, Oral, Daily, Estephania North MD, 20 mg at 10/23/17 1003    losartan (COZAAR) tablet 100 mg, 100 mg, Oral, Daily, Estephania North MD, 100 mg at 10/24/17 6243    ocuvite-lutein multivitamin 1 capsule, 1 capsule, Oral, Daily, Estephania North MD, 1 capsule at 10/24/17 9495    rivaroxaban (XARELTO) tablet 10 mg, 10 mg, Oral, Daily, Estephania North MD, 10 mg at 10/23/17 1712    vitamin D (ERGOCALCIFEROL) capsule 50,000 Units, 50,000 Units, Oral, Weekly, Estephania North MD    magnesium hydroxide (MILK OF MAGNESIA) 400 MG/5ML suspension 30 mL, 30 mL, Oral, Daily PRN, Wanita Prader, MD    docusate sodium (COLACE) capsule 100 mg, 100 mg, Oral, BID PRN, Wanita Prader, MD    bisacodyl (DULCOLAX) suppository 10 mg, 10 mg, Rectal, Daily PRN, Wanita Prader, MD    morphine injection 2 mg, 2 mg, Intravenous, Q3H PRN, Estephania North MD    Allergies:  Lyrica [pregabalin]; Bactrim [sulfamethoxazole-trimethoprim]; and Keflex [cephalexin]    Social History:   TOBACCO:   reports that she has been smoking Cigarettes.   She has a 7.50 pack-year smoking history. She has never used smokeless tobacco.  ETOH:   reports that she drinks alcohol. Family History:       Problem Relation Age of Onset   Buddy Feuntes Cancer Mother     Cancer Father          PHYSICAL EXAM:  /74   Pulse 68   Temp 97.6 °F (36.4 °C) (Temporal)   Resp 22   Ht 5' 8\" (1.727 m)   Wt 188 lb (85.3 kg)   SpO2 96%   Breastfeeding? No   BMI 28.59 kg/m²     Constitutional: she appears well-developed and well-nourished. Eyes  conjunctiva normal.    Ear, nose, throat -No scars, masses, or lesions over external nose or ears, no atrophy of tongue  Neck-symmetric, no masses noted, no jugular vein distension  Respiration- chest wall appears symmetric, good expansion,   normal effort without use of accessory muscles  Musculoskeletal  no significant wasting of muscles noted, no bony deformities Extremities-no clubbing, cyanosis or edema  Skin  warm, dry, and intact. No rash, erythema, or pallor. Psychiatric  mood, affect, and behavior appear normal.      Neurology  NEUROLOGICAL EXAM:      Mental status   Awake, alert, fluent oriented x 3 appropriate affect  Attention and concentration appear appropriate  Recent and remote memory appears unremarkable  Speech normal without dysarthria  No clear issues with language       Cranial Nerves     CN III, IV,VI-EOMI, No nystagmus, conjugate eye movements, no ptosis    CN VII-no facial assymetry          Motor function  Antigravity x 4 limited LLE   Sensory function Intact to light touch     Cerebellar F-N intact     Tremor None present     Gait                  Not tested           Nursing/pcp notes, imaging,labs and vitals reviewed.      PT,OT and/or speech notes reviewed    Lab Results   Component Value Date    WBC 7.3 10/23/2017    HGB 11.5 (L) 10/23/2017    HCT 33.8 (L) 10/23/2017    MCV 98.8 10/23/2017     10/23/2017     Lab Results   Component Value Date     10/23/2017    K 3.8 10/23/2017    CL 99 10/23/2017 CO2 28 10/23/2017    BUN 13 10/23/2017    CREATININE 0.7 10/23/2017    GLUCOSE 109 10/23/2017    CALCIUM 9.0 10/23/2017    PROT 6.1 (L) 10/23/2017    LABALBU 2.7 (L) 10/23/2017    BILITOT 0.6 10/23/2017    ALKPHOS 70 10/23/2017    AST 21 10/23/2017    ALT 17 10/23/2017    LABGLOM >60 10/23/2017     Lab Results   Component Value Date    INR 0.87 (L) 10/15/2017    PROTIME 11.7 (L) 10/15/2017             RECORD REVIEW: Previous medical records, medications were reviewed at today's visit    IMPRESSION:   1. Status post left hip fracture comparedpain control/mobilization/DVT prophylaxis. 2. Hypertensionon medications continue to monitor. 3. Hyperlipidemiaon statin. Continue to monitor. 4. GIbowel regimen continue to monitor. 5. DVT prophylaxison sural toe does continue to monitor. 6. Vitamin D deficiencyon vitamin D continue to monitor  7. pain controlon Percocet when necessary  8.  PT/OT    Team conference with PT/OT/speech/nursing/Care coordinator to review in depth patients care and discharge planning    Wt stable 188     ft SBA  Ambulation 100 ft RW  6 steps bilateral hand rails CGA    ELOS Saturday      CALL WITH ANY QUESTIONS  [phone] CELL  Dr Marisela Louis

## 2017-10-25 PROCEDURE — 99232 SBSQ HOSP IP/OBS MODERATE 35: CPT | Performed by: PSYCHIATRY & NEUROLOGY

## 2017-10-25 PROCEDURE — 1180000000 HC REHAB R&B

## 2017-10-25 PROCEDURE — 97116 GAIT TRAINING THERAPY: CPT

## 2017-10-25 PROCEDURE — 6370000000 HC RX 637 (ALT 250 FOR IP): Performed by: HOSPITALIST

## 2017-10-25 PROCEDURE — 6370000000 HC RX 637 (ALT 250 FOR IP): Performed by: PSYCHIATRY & NEUROLOGY

## 2017-10-25 PROCEDURE — 97530 THERAPEUTIC ACTIVITIES: CPT

## 2017-10-25 PROCEDURE — 97535 SELF CARE MNGMENT TRAINING: CPT

## 2017-10-25 PROCEDURE — 97110 THERAPEUTIC EXERCISES: CPT

## 2017-10-25 RX ADMIN — ATORVASTATIN CALCIUM 20 MG: 10 TABLET, FILM COATED ORAL at 07:56

## 2017-10-25 RX ADMIN — OXYCODONE AND ACETAMINOPHEN 1 TABLET: 5; 325 TABLET ORAL at 21:41

## 2017-10-25 RX ADMIN — BISACODYL 10 MG: 10 SUPPOSITORY RECTAL at 21:36

## 2017-10-25 RX ADMIN — OXYCODONE AND ACETAMINOPHEN 1 TABLET: 5; 325 TABLET ORAL at 07:57

## 2017-10-25 RX ADMIN — DOCUSATE SODIUM 100 MG: 100 CAPSULE, LIQUID FILLED ORAL at 21:36

## 2017-10-25 RX ADMIN — OXYCODONE AND ACETAMINOPHEN 1 TABLET: 5; 325 TABLET ORAL at 13:11

## 2017-10-25 RX ADMIN — METOPROLOL TARTRATE 125 MG: 50 TABLET, FILM COATED ORAL at 07:56

## 2017-10-25 RX ADMIN — LOSARTAN POTASSIUM 100 MG: 50 TABLET ORAL at 07:57

## 2017-10-25 RX ADMIN — RIVAROXABAN 10 MG: 10 TABLET, FILM COATED ORAL at 16:59

## 2017-10-25 RX ADMIN — AMLODIPINE BESYLATE 10 MG: 10 TABLET ORAL at 07:57

## 2017-10-25 RX ADMIN — OXYCODONE AND ACETAMINOPHEN 1 TABLET: 5; 325 TABLET ORAL at 18:28

## 2017-10-25 RX ADMIN — DOCUSATE SODIUM 100 MG: 100 CAPSULE, LIQUID FILLED ORAL at 07:57

## 2017-10-25 RX ADMIN — Medication 1 CAPSULE: at 07:56

## 2017-10-25 RX ADMIN — METOPROLOL TARTRATE 125 MG: 50 TABLET, FILM COATED ORAL at 21:35

## 2017-10-25 RX ADMIN — OXYCODONE AND ACETAMINOPHEN 1 TABLET: 5; 325 TABLET ORAL at 03:12

## 2017-10-25 ASSESSMENT — PAIN DESCRIPTION - LOCATION
LOCATION: HIP
LOCATION: HIP
LOCATION: LEG;HIP
LOCATION: HIP
LOCATION: HIP
LOCATION: HIP;LEG
LOCATION: HIP

## 2017-10-25 ASSESSMENT — PAIN DESCRIPTION - DESCRIPTORS
DESCRIPTORS: ACHING
DESCRIPTORS: ACHING

## 2017-10-25 ASSESSMENT — PAIN DESCRIPTION - PAIN TYPE
TYPE: ACUTE PAIN;SURGICAL PAIN
TYPE: ACUTE PAIN;SURGICAL PAIN

## 2017-10-25 ASSESSMENT — PAIN DESCRIPTION - ORIENTATION
ORIENTATION: LEFT

## 2017-10-25 ASSESSMENT — PAIN DESCRIPTION - FREQUENCY
FREQUENCY: INTERMITTENT
FREQUENCY: CONTINUOUS

## 2017-10-25 ASSESSMENT — PAIN SCALES - GENERAL
PAINLEVEL_OUTOF10: 5
PAINLEVEL_OUTOF10: 2
PAINLEVEL_OUTOF10: 9
PAINLEVEL_OUTOF10: 7
PAINLEVEL_OUTOF10: 8
PAINLEVEL_OUTOF10: 5
PAINLEVEL_OUTOF10: 2
PAINLEVEL_OUTOF10: 7
PAINLEVEL_OUTOF10: 6
PAINLEVEL_OUTOF10: 2
PAINLEVEL_OUTOF10: 4
PAINLEVEL_OUTOF10: 9

## 2017-10-25 ASSESSMENT — PAIN DESCRIPTION - PROGRESSION: CLINICAL_PROGRESSION: GRADUALLY IMPROVING

## 2017-10-25 NOTE — PROGRESS NOTES
Patient:   Sera Morgan  MR#:    [id number]   Room:    [facility]820-   YOB: 1945  Date of Progress Note: 10/25/2017  Time of Note                           8:22 AM  Consulting Physician:   Ami Hyman M.D. Attending Physician:  Ami Hyman MD     Chief complaint Status post left hip fracture and repair    S:This 80 y.o. female  who presented to [facility] ER on 10/15/17 after having a fall at home. She was unable to bear weight on her left lower extremity. X-ray done revealed a displaced subcaptial fracture of the neck of her left femur. She was admitted with consult for orthopedic surgery. She was seen by Dr. Fabio Alanis, who recommended a left hemiarthroplasty. She was in agreement and went for surgery on 10/16/17. She tolerated the procedure well. She will be weightbearing as tolerated on her left lower extremity. She will need DVT prophylaxis for 3 weeks. During her hospital stay she has had acute kidney injury which has now resolved, hyponatremia and acute blood loss anemia not yet requiring transfusion. She is participating in both PT/OT and is felt to need a stay on Rehab to work towards her goal of returning home with her .  She is now felt ready to start the Rehab proram.    REVIEW OF SYSTEMS:  Constitutional: No fevers No chills  Neck:No stiffness  Respiratory: No shortness of breath  Cardiovascular: No chest pain No palpitations  Gastrointestinal: No abdominal pain    Genitourinary: No Dysuria  Neurological: No headache, no confusion    Past Medical History:      Diagnosis Date    Arthritis     COPD (chronic obstructive pulmonary disease) ([address]     Hypertension     Pneumonia     in the past       Past Surgical History:      Procedure Laterality Date    APPENDECTOMY      BACK SURGERY      BREAST SURGERY      biopsy    HAND SURGERY Bilateral     HEMIARTHROPLASTY HIP Left 10/16/2017    HIP HEMIARTHROPLASTY performed by Eliel Lewis MD at . [facility]  REPLACEMENT      s/p left hip, bilateral total knee replacements    KNEE SURGERY Bilateral     L 2008/ R 2009    TONSILLECTOMY         Medications in Hospital:      Current Facility-Administered Medications:     metoprolol tartrate (LOPRESSOR) tablet 125 mg, 125 mg, Oral, BID, Wanita Prader, MD, 125 mg at 10/25/17 0756    amLODIPine (NORVASC) tablet 10 mg, 10 mg, Oral, Daily, Wanita Prader, MD, 10 mg at 10/25/17 0757    acetaminophen (TYLENOL) tablet 650 mg, 650 mg, Oral, Q4H PRN, Estephania North MD    oxyCODONE-acetaminophen (PERCOCET) 5-325 MG per tablet 1 tablet, 1 tablet, Oral, Q4H PRN, Estephania North MD, 1 tablet at 10/25/17 0757    docusate sodium (COLACE) capsule 100 mg, 100 mg, Oral, BID, Estephania North MD, 100 mg at 10/25/17 0757    ondansetron (ZOFRAN) injection 4 mg, 4 mg, Intravenous, Q6H PRN, Estephania North MD    atorvastatin (LIPITOR) tablet 20 mg, 20 mg, Oral, Daily, Estephania oNrth MD, 20 mg at 10/25/17 0756    losartan (COZAAR) tablet 100 mg, 100 mg, Oral, Daily, Estephania North MD, 100 mg at 10/25/17 0757    ocuvite-lutein multivitamin 1 capsule, 1 capsule, Oral, Daily, Estephania North MD, 1 capsule at 10/25/17 0756    rivaroxaban (XARELTO) tablet 10 mg, 10 mg, Oral, Daily, Estephania North MD, 10 mg at 10/24/17 1706    vitamin D (ERGOCALCIFEROL) capsule 50,000 Units, 50,000 Units, Oral, Weekly, Estephania North MD    magnesium hydroxide (MILK OF MAGNESIA) 400 MG/5ML suspension 30 mL, 30 mL, Oral, Daily PRN, Wanita Prader, MD    docusate sodium (COLACE) capsule 100 mg, 100 mg, Oral, BID PRN, Wanita Prader, MD    bisacodyl (DULCOLAX) suppository 10 mg, 10 mg, Rectal, Daily PRN, Wanita Prader, MD    morphine injection 2 mg, 2 mg, Intravenous, Q3H PRN, Estephania North MD    Allergies:  Lyrica [pregabalin]; Bactrim [sulfamethoxazole-trimethoprim]; and Keflex [cephalexin]    Social History:   TOBACCO:   reports that she has been smoking Cigarettes.   She has a 7.50 pack-year smoking history. She has never used smokeless tobacco.  ETOH:   reports that she drinks alcohol. Family History:       Problem Relation Age of Onset    Cancer Mother     Cancer Father          PHYSICAL EXAM:  BP (!) 130/58   Pulse 76   Temp 97.5 °F (36.4 °C) (Temporal)   Resp 18   Ht 5' 8\" (1.727 m)   Wt 188 lb (85.3 kg)   SpO2 93%   Breastfeeding? No   BMI 28.59 kg/m²     Constitutional: she appears well-developed and well-nourished. Eyes  conjunctiva normal.    Ear, nose, throat -No scars, masses, or lesions over external nose or ears, no atrophy of tongue  Neck-symmetric, no masses noted, no jugular vein distension  Respiration- chest wall appears symmetric, good expansion,   normal effort without use of accessory muscles  Musculoskeletal  no significant wasting of muscles noted, no bony deformities Extremities-no clubbing, cyanosis or edema  Skin  warm, dry, and intact. No rash, erythema, or pallor. Psychiatric  mood, affect, and behavior appear normal.      Neurology  NEUROLOGICAL EXAM:      Mental status   Awake, alert, fluent oriented x 3 appropriate affect  Attention and concentration appear appropriate  Recent and remote memory appears unremarkable  Speech normal without dysarthria  No clear issues with language       Cranial Nerves     CN III, IV,VI-EOMI, No nystagmus, conjugate eye movements, no ptosis    CN VII-no facial assymetry          Motor function  Antigravity x 4 limited LLE   Sensory function Intact to light touch     Cerebellar F-N intact     Tremor None present     Gait                  Not tested           Nursing/pcp notes, imaging,labs and vitals reviewed.      PT,OT and/or speech notes reviewed    Lab Results   Component Value Date    WBC 7.3 10/23/2017    HGB 11.5 (L) 10/23/2017    HCT 33.8 (L) 10/23/2017    MCV 98.8 10/23/2017     10/23/2017     Lab Results   Component Value Date     10/23/2017    K 3.8 10/23/2017    CL 99 10/23/2017

## 2017-10-25 NOTE — PLAN OF CARE
Problem: Pain:  Goal: Pain level will decrease  Pain level will decrease   Outcome: Ongoing  Pain controlled with current regimen    Problem: Falls - Risk of  Goal: Absence of falls  Outcome: Ongoing  Encourage use of nonskid socks or slippers. Problem: Risk for Impaired Skin Integrity  Goal: Tissue integrity - skin and mucous membranes  Structural intactness and normal physiological function of skin and  mucous membranes. Outcome: Ongoing  Turn and reposition every 2 hours    Problem: Infection - Surgical Site:  Goal: Will show no infection signs and symptoms  Will show no infection signs and symptoms   Outcome: Ongoing  Pt will remain free of infection    Problem: Bleeding:  Goal: Will show no signs and symptoms of excessive bleeding  Will show no signs and symptoms of excessive bleeding   Outcome: Ongoing  Continue to monitor signs and symptoms of bleeding due to anticoagulant.

## 2017-10-25 NOTE — PLAN OF CARE
Problem: Pain:  Goal: Pain level will decrease  Pain level will decrease   Outcome: Ongoing  Patient continues with current pain medication (prn Percocet) for pain control. Goal: Control of acute pain  Control of acute pain   Outcome: Ongoing      Problem: Falls - Risk of  Goal: Absence of falls  Outcome: Ongoing  Patient understands safety measures to prevent injury: use of assistive devices, clear pathways, avoid sudden movements and calling for assistance as needed. Problem: Risk for Impaired Skin Integrity  Goal: Tissue integrity - skin and mucous membranes  Structural intactness and normal physiological function of skin and  mucous membranes. Outcome: Ongoing  Patient monitored for skin breakdown every shift and prn. Encouraged to reposition frequently. Problem: Infection - Surgical Site:  Goal: Will show no infection signs and symptoms  Will show no infection signs and symptoms   Outcome: Ongoing  Patient instructed on importance of daily monitoring of left hip incision. Knows signs and symptoms of infection: redness, increased drainage, odor and increased pain. Problem: Bleeding:  Goal: Will show no signs and symptoms of excessive bleeding  Will show no signs and symptoms of excessive bleeding   Outcome: Ongoing  Continue to monitor for signs and symptoms of bleeding due to patient receiving Xarelto.

## 2017-10-25 NOTE — PROGRESS NOTES
[facility]  Test for Patient [place] in the Areas of Transfers/Ambulation    Ambulation/Transfers   · Independent ambulation in room with assistive device:  YES    -Device Type:  RW  · Independent transfers from wheelchair to surface in room:  YES    Bathroom Transfers  · Independent transfers in patient bathroom:  Yes         Inpatient Rehabilitation Nursing and Therapies feel as though the patient qualifies for an acute rehabilitation test for independence in the areas of transfers and ambulation prior to discharging from Inpatient Rehabilitation Unit at [facility].  This test for independence in the areas of transfers and ambulation must be agreed upon by the patient's physician, nurse, and therapists.         Nurse Approval: Electronically signed by Namrata Live LPN on 39/11/2883 at 2:30 PM    Physical Therapist Approval:  Electronically signed by Garrett Escobedo PTA on 10/25/2017 at 1:09 PM    Occupational Therapist Approval: Electronically signed by Evelyn Dunham OT on 10/25/2017 at 10:26 AM

## 2017-10-26 LAB
ALBUMIN SERPL-MCNC: 2.8 G/DL (ref 3.5–5.2)
ALP BLD-CCNC: 67 U/L (ref 35–104)
ALT SERPL-CCNC: 14 U/L (ref 5–33)
ANION GAP SERPL CALCULATED.3IONS-SCNC: 12 MMOL/L (ref 7–19)
AST SERPL-CCNC: 15 U/L (ref 5–32)
BASOPHILS ABSOLUTE: 0 K/UL (ref 0–0.2)
BASOPHILS RELATIVE PERCENT: 0.3 % (ref 0–1)
BILIRUB SERPL-MCNC: 0.5 MG/DL (ref 0.2–1.2)
BUN BLDV-MCNC: 17 MG/DL (ref 8–23)
CALCIUM SERPL-MCNC: 8.9 MG/DL (ref 8.8–10.2)
CHLORIDE BLD-SCNC: 102 MMOL/L (ref 98–111)
CO2: 25 MMOL/L (ref 22–29)
CREAT SERPL-MCNC: 0.8 MG/DL (ref 0.5–0.9)
EOSINOPHILS ABSOLUTE: 0.2 K/UL (ref 0–0.6)
EOSINOPHILS RELATIVE PERCENT: 2.3 % (ref 0–5)
GFR NON-AFRICAN AMERICAN: >60
GLUCOSE BLD-MCNC: 102 MG/DL (ref 74–109)
HCT VFR BLD CALC: 30.5 % (ref 37–47)
HEMOGLOBIN: 10.1 G/DL (ref 12–16)
LYMPHOCYTES ABSOLUTE: 1.2 K/UL (ref 1.1–4.5)
LYMPHOCYTES RELATIVE PERCENT: 18.3 % (ref 20–40)
MCH RBC QN AUTO: 32.7 PG (ref 27–31)
MCHC RBC AUTO-ENTMCNC: 33.1 G/DL (ref 33–37)
MCV RBC AUTO: 98.7 FL (ref 81–99)
MONOCYTES ABSOLUTE: 0.7 K/UL (ref 0–0.9)
MONOCYTES RELATIVE PERCENT: 10.2 % (ref 0–10)
NEUTROPHILS ABSOLUTE: 4.4 K/UL (ref 1.5–7.5)
NEUTROPHILS RELATIVE PERCENT: 68.1 % (ref 50–65)
PDW BLD-RTO: 14.1 % (ref 11.5–14.5)
PLATELET # BLD: 286 K/UL (ref 130–400)
PMV BLD AUTO: 9.1 FL (ref 9.4–12.3)
POTASSIUM SERPL-SCNC: 3.7 MMOL/L (ref 3.5–5)
RBC # BLD: 3.09 M/UL (ref 4.2–5.4)
SODIUM BLD-SCNC: 139 MMOL/L (ref 136–145)
TOTAL PROTEIN: 5.8 G/DL (ref 6.6–8.7)
WBC # BLD: 6.5 K/UL (ref 4.8–10.8)

## 2017-10-26 PROCEDURE — 6370000000 HC RX 637 (ALT 250 FOR IP): Performed by: PSYCHIATRY & NEUROLOGY

## 2017-10-26 PROCEDURE — 36415 COLL VENOUS BLD VENIPUNCTURE: CPT

## 2017-10-26 PROCEDURE — 97535 SELF CARE MNGMENT TRAINING: CPT

## 2017-10-26 PROCEDURE — 85025 COMPLETE CBC W/AUTO DIFF WBC: CPT

## 2017-10-26 PROCEDURE — 97116 GAIT TRAINING THERAPY: CPT

## 2017-10-26 PROCEDURE — 99232 SBSQ HOSP IP/OBS MODERATE 35: CPT | Performed by: PSYCHIATRY & NEUROLOGY

## 2017-10-26 PROCEDURE — 80053 COMPREHEN METABOLIC PANEL: CPT

## 2017-10-26 PROCEDURE — 97530 THERAPEUTIC ACTIVITIES: CPT

## 2017-10-26 PROCEDURE — 1180000000 HC REHAB R&B

## 2017-10-26 PROCEDURE — 97110 THERAPEUTIC EXERCISES: CPT

## 2017-10-26 PROCEDURE — 6370000000 HC RX 637 (ALT 250 FOR IP): Performed by: HOSPITALIST

## 2017-10-26 RX ADMIN — OXYCODONE AND ACETAMINOPHEN 1 TABLET: 5; 325 TABLET ORAL at 17:21

## 2017-10-26 RX ADMIN — DOCUSATE SODIUM 100 MG: 100 CAPSULE, LIQUID FILLED ORAL at 08:12

## 2017-10-26 RX ADMIN — RIVAROXABAN 10 MG: 10 TABLET, FILM COATED ORAL at 17:21

## 2017-10-26 RX ADMIN — DOCUSATE SODIUM 100 MG: 100 CAPSULE, LIQUID FILLED ORAL at 21:51

## 2017-10-26 RX ADMIN — Medication 1 CAPSULE: at 08:13

## 2017-10-26 RX ADMIN — LOSARTAN POTASSIUM 100 MG: 50 TABLET ORAL at 08:13

## 2017-10-26 RX ADMIN — OXYCODONE AND ACETAMINOPHEN 1 TABLET: 5; 325 TABLET ORAL at 08:19

## 2017-10-26 RX ADMIN — OXYCODONE AND ACETAMINOPHEN 1 TABLET: 5; 325 TABLET ORAL at 21:51

## 2017-10-26 RX ADMIN — OXYCODONE AND ACETAMINOPHEN 1 TABLET: 5; 325 TABLET ORAL at 12:24

## 2017-10-26 RX ADMIN — ATORVASTATIN CALCIUM 20 MG: 10 TABLET, FILM COATED ORAL at 08:13

## 2017-10-26 RX ADMIN — AMLODIPINE BESYLATE 10 MG: 10 TABLET ORAL at 08:13

## 2017-10-26 RX ADMIN — OXYCODONE AND ACETAMINOPHEN 1 TABLET: 5; 325 TABLET ORAL at 03:59

## 2017-10-26 RX ADMIN — METOPROLOL TARTRATE 125 MG: 50 TABLET, FILM COATED ORAL at 08:12

## 2017-10-26 ASSESSMENT — PAIN DESCRIPTION - PROGRESSION
CLINICAL_PROGRESSION: GRADUALLY IMPROVING

## 2017-10-26 ASSESSMENT — PAIN DESCRIPTION - LOCATION
LOCATION: HIP
LOCATION: HIP;LEG
LOCATION: HIP;LEG
LOCATION: INCISION;HIP

## 2017-10-26 ASSESSMENT — PAIN DESCRIPTION - ORIENTATION
ORIENTATION: LEFT

## 2017-10-26 ASSESSMENT — PAIN DESCRIPTION - FREQUENCY
FREQUENCY: CONTINUOUS
FREQUENCY: CONTINUOUS

## 2017-10-26 ASSESSMENT — PAIN SCALES - GENERAL
PAINLEVEL_OUTOF10: 2
PAINLEVEL_OUTOF10: 5
PAINLEVEL_OUTOF10: 7
PAINLEVEL_OUTOF10: 6
PAINLEVEL_OUTOF10: 2
PAINLEVEL_OUTOF10: 7
PAINLEVEL_OUTOF10: 5
PAINLEVEL_OUTOF10: 1
PAINLEVEL_OUTOF10: 6
PAINLEVEL_OUTOF10: 1
PAINLEVEL_OUTOF10: 3
PAINLEVEL_OUTOF10: 6

## 2017-10-26 ASSESSMENT — PAIN DESCRIPTION - PAIN TYPE
TYPE: ACUTE PAIN;SURGICAL PAIN
TYPE: ACUTE PAIN;SURGICAL PAIN

## 2017-10-26 ASSESSMENT — PAIN DESCRIPTION - DESCRIPTORS
DESCRIPTORS: ACHING
DESCRIPTORS: ACHING

## 2017-10-26 NOTE — PROGRESS NOTES
Sienna Colbert is a 80 y.o. female patient.     Current Facility-Administered Medications   Medication Dose Route Frequency Provider Last Rate Last Dose    metoprolol tartrate (LOPRESSOR) tablet 125 mg  125 mg Oral BID Opal Kong MD   125 mg at 10/26/17 0812    amLODIPine (NORVASC) tablet 10 mg  10 mg Oral Daily Opal Kong MD   10 mg at 10/26/17 0813    acetaminophen (TYLENOL) tablet 650 mg  650 mg Oral Q4H PRN Jamshid Ridley MD        oxyCODONE-acetaminophen (PERCOCET) 5-325 MG per tablet 1 tablet  1 tablet Oral Q4H PRN Jamshid Ridley MD   1 tablet at 10/26/17 5123    docusate sodium (COLACE) capsule 100 mg  100 mg Oral BID Jamshid Ridley MD   100 mg at 10/26/17 4493    ondansetron (ZOFRAN) injection 4 mg  4 mg Intravenous Q6H PRN Jamshid Ridley MD        atorvastatin (LIPITOR) tablet 20 mg  20 mg Oral Daily Jamshid Ridley MD   20 mg at 10/26/17 0813    losartan (COZAAR) tablet 100 mg  100 mg Oral Daily Jamshid Ridley MD   100 mg at 10/26/17 0813    ocuvite-lutein multivitamin 1 capsule  1 capsule Oral Daily Jamshid Ridley MD   1 capsule at 10/26/17 0813    rivaroxaban (XARELTO) tablet 10 mg  10 mg Oral Daily Jamshid Ridley MD   10 mg at 10/25/17 1659    vitamin D (ERGOCALCIFEROL) capsule 50,000 Units  50,000 Units Oral Weekly Jamshid Ridley MD        magnesium hydroxide (MILK OF MAGNESIA) 400 MG/5ML suspension 30 mL  30 mL Oral Daily PRN Opal Kong MD        docusate sodium (COLACE) capsule 100 mg  100 mg Oral BID PRN Opal Kong MD        bisacodyl (DULCOLAX) suppository 10 mg  10 mg Rectal Daily PRN Opal Kong MD   10 mg at 10/25/17 2136    morphine injection 2 mg  2 mg Intravenous Q3H PRN Jamshid Ridley MD         Allergies   Allergen Reactions    Lyrica [Pregabalin] Other (See Comments)     \"jitters\"    Bactrim [Sulfamethoxazole-Trimethoprim] Rash    Keflex [Cephalexin] Rash     Principal Problem:    Hip fracture requiring operative repair, left, closed, initial encounter [facility])  Active Problems:    Hypertension    Acute kidney failure ([facility] [address]    Mixed hyperlipidemia    Essential hypertension    Acute blood loss as cause of postoperative anemia    Blood pressure 112/71, pulse 68, temperature 97.9 °F (36.6 °C), temperature source Temporal, resp. rate 20, height 5' 8\" (1.727 m), weight 188 lb (85.3 kg), SpO2 95 %, not currently breastfeeding. Subjective:  Symptoms:  Improved. Diet:  Adequate intake. Activity level: Returning to normal.    Pain:  She complains of pain that is moderate. She reports pain is improving. Pain is well controlled.       Objective  Assessment & Plan    Riley Parker RN  10/26/2017

## 2017-10-26 NOTE — PLAN OF CARE
Problem: Pain:  Goal: Pain level will decrease  Pain level will decrease   Outcome: Ongoing  Patient pain has remained under control with use of pain medicine as ordered for pain control. Goal: Control of acute pain  Control of acute pain   Outcome: Ongoing  Patient pain has remained under control with use of pain medicine as ordered for pain control. Problem: Falls - Risk of  Goal: Absence of falls  Outcome: Ongoing  Patient independent transfers in room, will continue to monitor for safety. Problem: Risk for Impaired Skin Integrity  Goal: Tissue integrity - skin and mucous membranes  Structural intactness and normal physiological function of skin and  mucous membranes. Outcome: Ongoing  No new skin issues noted, will continue to monitor. Problem: Infection - Surgical Site:  Goal: Will show no infection signs and symptoms  Will show no infection signs and symptoms   Outcome: Ongoing  PATIENT'S INCISION REMAINS FREE FROM INFECTION WITH NO REDNESS OR DRAINAGE NOTED    Problem: Bleeding:  Goal: Will show no signs and symptoms of excessive bleeding  Will show no signs and symptoms of excessive bleeding   Outcome: Ongoing  No bleeding noted this shift. Will continue to monitor.

## 2017-10-26 NOTE — PROGRESS NOTES
Patient:   Soham Rodriguez  MR#:    [id number]   Room:    [facility]820-   YOB: 1945  Date of Progress Note: 10/26/2017  Time of Note                           7:25 AM  Consulting Physician:   Stevie Germain M.D. Attending Physician:  Stevie Germain MD     Chief complaint Status post left hip fracture and repair    S:This 80 y.o. female  who presented to [facility] ER on 10/15/17 after having a fall at home. She was unable to bear weight on her left lower extremity. X-ray done revealed a displaced subcaptial fracture of the neck of her left femur. She was admitted with consult for orthopedic surgery. She was seen by Dr. Tianna Aguilar, who recommended a left hemiarthroplasty. She was in agreement and went for surgery on 10/16/17. She tolerated the procedure well. She will be weightbearing as tolerated on her left lower extremity. She will need DVT prophylaxis for 3 weeks. During her hospital stay she has had acute kidney injury which has now resolved, hyponatremia and acute blood loss anemia not yet requiring transfusion. She is participating in both PT/OT and is felt to need a stay on Rehab to work towards her goal of returning home with her .  She is now felt ready to start the Rehab proram.    REVIEW OF SYSTEMS:  Constitutional: No fevers No chills  Neck:No stiffness  Respiratory: No shortness of breath  Cardiovascular: No chest pain No palpitations  Gastrointestinal: No abdominal pain    Genitourinary: No Dysuria  Neurological: No headache, no confusion    Past Medical History:      Diagnosis Date    Arthritis     COPD (chronic obstructive pulmonary disease) ([address]     Hypertension     Pneumonia     in the past       Past Surgical History:      Procedure Laterality Date    APPENDECTOMY      BACK SURGERY      BREAST SURGERY      biopsy    HAND SURGERY Bilateral     HEMIARTHROPLASTY HIP Left 10/16/2017    HIP HEMIARTHROPLASTY performed by Suzie Escobedo MD at . [facility]  REPLACEMENT      s/p left hip, bilateral total knee replacements    KNEE SURGERY Bilateral     L 2008/ R 2009    TONSILLECTOMY         Medications in Hospital:      Current Facility-Administered Medications:     metoprolol tartrate (LOPRESSOR) tablet 125 mg, 125 mg, Oral, BID, Louann De Los Santos MD, 125 mg at 10/25/17 2135    amLODIPine (NORVASC) tablet 10 mg, 10 mg, Oral, Daily, Louann De Los Santos MD, 10 mg at 10/25/17 0757    acetaminophen (TYLENOL) tablet 650 mg, 650 mg, Oral, Q4H PRN, Michael Encarnacion MD    oxyCODONE-acetaminophen (PERCOCET) 5-325 MG per tablet 1 tablet, 1 tablet, Oral, Q4H PRN, Michael Encarnacion MD, 1 tablet at 10/26/17 0359    docusate sodium (COLACE) capsule 100 mg, 100 mg, Oral, BID, Michael Encarnacion MD, 100 mg at 10/25/17 2136    ondansetron (ZOFRAN) injection 4 mg, 4 mg, Intravenous, Q6H PRN, Michael Encarnacion MD    atorvastatin (LIPITOR) tablet 20 mg, 20 mg, Oral, Daily, Michael Encarnacion MD, 20 mg at 10/25/17 0756    losartan (COZAAR) tablet 100 mg, 100 mg, Oral, Daily, Michael Encarnacion MD, 100 mg at 10/25/17 0757    ocuvite-lutein multivitamin 1 capsule, 1 capsule, Oral, Daily, Michael Encarnacion MD, 1 capsule at 10/25/17 0756    rivaroxaban (XARELTO) tablet 10 mg, 10 mg, Oral, Daily, Michael Encarnacion MD, 10 mg at 10/25/17 1659    vitamin D (ERGOCALCIFEROL) capsule 50,000 Units, 50,000 Units, Oral, Weekly, Michael Encarnacion MD    magnesium hydroxide (MILK OF MAGNESIA) 400 MG/5ML suspension 30 mL, 30 mL, Oral, Daily PRN, Louann De Los Santos MD    docusate sodium (COLACE) capsule 100 mg, 100 mg, Oral, BID PRN, Louann De Los Santos MD    bisacodyl (DULCOLAX) suppository 10 mg, 10 mg, Rectal, Daily PRN, Louann De Los Santos MD, 10 mg at 10/25/17 2136    morphine injection 2 mg, 2 mg, Intravenous, Q3H PRN, Michael Encarnacion MD    Allergies:  Lyrica [pregabalin];  Bactrim [sulfamethoxazole-trimethoprim]; and Keflex [cephalexin]    Social History:   TOBACCO:   reports that she has been smoking Cigarettes. She has a 7.50 pack-year smoking history. She has never used smokeless tobacco.  ETOH:   reports that she drinks alcohol. Family History:       Problem Relation Age of Onset    Cancer Mother     Cancer Father          PHYSICAL EXAM:  /71   Pulse 68   Temp 97.9 °F (36.6 °C) (Temporal)   Resp 20   Ht 5' 8\" (1.727 m)   Wt 188 lb (85.3 kg)   SpO2 95%   Breastfeeding? No   BMI 28.59 kg/m²     Constitutional: she appears well-developed and well-nourished. Eyes  conjunctiva normal.    Ear, nose, throat -No scars, masses, or lesions over external nose or ears, no atrophy of tongue  Neck-symmetric, no masses noted, no jugular vein distension  Respiration- chest wall appears symmetric, good expansion,   normal effort without use of accessory muscles  Musculoskeletal  no significant wasting of muscles noted, no bony deformities Extremities-no clubbing, cyanosis or edema  Skin  warm, dry, and intact. No rash, erythema, or pallor. Psychiatric  mood, affect, and behavior appear normal.      Neurology  NEUROLOGICAL EXAM:      Mental status   Awake, alert, fluent oriented x 3 appropriate affect  Attention and concentration appear appropriate  Recent and remote memory appears unremarkable  Speech normal without dysarthria  No clear issues with language       Cranial Nerves     CN III, IV,VI-EOMI, No nystagmus, conjugate eye movements, no ptosis    CN VII-no facial assymetry          Motor function  Antigravity x 4 limited LLE   Sensory function Intact to light touch     Cerebellar F-N intact     Tremor None present     Gait                  Not tested           Nursing/pcp notes, imaging,labs and vitals reviewed.      PT,OT and/or speech notes reviewed    Lab Results   Component Value Date    WBC 6.5 10/26/2017    HGB 10.1 (L) 10/26/2017    HCT 30.5 (L) 10/26/2017    MCV 98.7 10/26/2017     10/26/2017     Lab Results   Component Value Date     10/26/2017    K 3.7 10/26/2017

## 2017-10-27 PROCEDURE — 97110 THERAPEUTIC EXERCISES: CPT

## 2017-10-27 PROCEDURE — 6370000000 HC RX 637 (ALT 250 FOR IP): Performed by: PSYCHIATRY & NEUROLOGY

## 2017-10-27 PROCEDURE — 97530 THERAPEUTIC ACTIVITIES: CPT

## 2017-10-27 PROCEDURE — 6370000000 HC RX 637 (ALT 250 FOR IP): Performed by: HOSPITALIST

## 2017-10-27 PROCEDURE — 97116 GAIT TRAINING THERAPY: CPT

## 2017-10-27 PROCEDURE — 99232 SBSQ HOSP IP/OBS MODERATE 35: CPT | Performed by: PSYCHIATRY & NEUROLOGY

## 2017-10-27 PROCEDURE — 1180000000 HC REHAB R&B

## 2017-10-27 RX ADMIN — OXYCODONE AND ACETAMINOPHEN 1 TABLET: 5; 325 TABLET ORAL at 13:26

## 2017-10-27 RX ADMIN — ATORVASTATIN CALCIUM 20 MG: 10 TABLET, FILM COATED ORAL at 07:43

## 2017-10-27 RX ADMIN — AMLODIPINE BESYLATE 10 MG: 10 TABLET ORAL at 07:43

## 2017-10-27 RX ADMIN — LOSARTAN POTASSIUM 100 MG: 50 TABLET ORAL at 07:43

## 2017-10-27 RX ADMIN — OXYCODONE AND ACETAMINOPHEN 1 TABLET: 5; 325 TABLET ORAL at 17:32

## 2017-10-27 RX ADMIN — DOCUSATE SODIUM 100 MG: 100 CAPSULE, LIQUID FILLED ORAL at 07:43

## 2017-10-27 RX ADMIN — OXYCODONE AND ACETAMINOPHEN 1 TABLET: 5; 325 TABLET ORAL at 21:39

## 2017-10-27 RX ADMIN — METOPROLOL TARTRATE 125 MG: 50 TABLET, FILM COATED ORAL at 07:42

## 2017-10-27 RX ADMIN — RIVAROXABAN 10 MG: 10 TABLET, FILM COATED ORAL at 17:32

## 2017-10-27 RX ADMIN — OXYCODONE AND ACETAMINOPHEN 1 TABLET: 5; 325 TABLET ORAL at 09:23

## 2017-10-27 RX ADMIN — Medication 1 CAPSULE: at 07:42

## 2017-10-27 RX ADMIN — DOCUSATE SODIUM 100 MG: 100 CAPSULE, LIQUID FILLED ORAL at 21:39

## 2017-10-27 RX ADMIN — METOPROLOL TARTRATE 125 MG: 50 TABLET, FILM COATED ORAL at 21:40

## 2017-10-27 RX ADMIN — OXYCODONE AND ACETAMINOPHEN 1 TABLET: 5; 325 TABLET ORAL at 05:30

## 2017-10-27 ASSESSMENT — PAIN SCALES - GENERAL
PAINLEVEL_OUTOF10: 3
PAINLEVEL_OUTOF10: 0
PAINLEVEL_OUTOF10: 7
PAINLEVEL_OUTOF10: 4
PAINLEVEL_OUTOF10: 7
PAINLEVEL_OUTOF10: 5
PAINLEVEL_OUTOF10: 5
PAINLEVEL_OUTOF10: 7
PAINLEVEL_OUTOF10: 3
PAINLEVEL_OUTOF10: 6
PAINLEVEL_OUTOF10: 8
PAINLEVEL_OUTOF10: 4

## 2017-10-27 ASSESSMENT — PAIN DESCRIPTION - ORIENTATION
ORIENTATION: LEFT
ORIENTATION: LEFT

## 2017-10-27 ASSESSMENT — PAIN DESCRIPTION - PAIN TYPE
TYPE: ACUTE PAIN
TYPE: ACUTE PAIN

## 2017-10-27 ASSESSMENT — PAIN DESCRIPTION - LOCATION
LOCATION: HIP
LOCATION: HIP

## 2017-10-27 ASSESSMENT — PAIN DESCRIPTION - FREQUENCY: FREQUENCY: CONTINUOUS

## 2017-10-27 ASSESSMENT — PAIN DESCRIPTION - PROGRESSION: CLINICAL_PROGRESSION: GRADUALLY IMPROVING

## 2017-10-27 ASSESSMENT — PAIN DESCRIPTION - DESCRIPTORS: DESCRIPTORS: ACHING

## 2017-10-27 NOTE — PROGRESS NOTES
Patient:   Alessia Casey  MR#:    [id number]   Room:    [facility]820-   YOB: 1945  Date of Progress Note: 10/27/2017  Time of Note                           8:12 AM  Consulting Physician:   Milka Chavira M.D. Attending Physician:  Milka Chavira MD     Chief complaint Status post left hip fracture and repair    S:This 80 y.o. female  who presented to [facility] ER on 10/15/17 after having a fall at home. She was unable to bear weight on her left lower extremity. X-ray done revealed a displaced subcaptial fracture of the neck of her left femur. She was admitted with consult for orthopedic surgery. She was seen by Dr. Tiarra Garcia, who recommended a left hemiarthroplasty. She was in agreement and went for surgery on 10/16/17. She tolerated the procedure well. She will be weightbearing as tolerated on her left lower extremity. She will need DVT prophylaxis for 3 weeks. During her hospital stay she has had acute kidney injury which has now resolved, hyponatremia and acute blood loss anemia not yet requiring transfusion. She is participating in both PT/OT and is felt to need a stay on Rehab to work towards her goal of returning home with her .  She is now felt ready to start the Rehab proram.    REVIEW OF SYSTEMS:  Constitutional: No fevers No chills  Neck:No stiffness  Respiratory: No shortness of breath  Cardiovascular: No chest pain No palpitations  Gastrointestinal: No abdominal pain    Genitourinary: No Dysuria  Neurological: No headache, no confusion    Past Medical History:      Diagnosis Date    Arthritis     COPD (chronic obstructive pulmonary disease) ([address]     Hypertension     Pneumonia     in the past       Past Surgical History:      Procedure Laterality Date    APPENDECTOMY      BACK SURGERY      BREAST SURGERY      biopsy    HAND SURGERY Bilateral     HEMIARTHROPLASTY HIP Left 10/16/2017    HIP HEMIARTHROPLASTY performed by Emeli Minor MD at . [facility]  REPLACEMENT      s/p left hip, bilateral total knee replacements    KNEE SURGERY Bilateral     L 2008/ R 2009    TONSILLECTOMY         Medications in Hospital:      Current Facility-Administered Medications:     metoprolol tartrate (LOPRESSOR) tablet 125 mg, 125 mg, Oral, BID, Evelyne Evans MD, 125 mg at 10/27/17 0742    amLODIPine (NORVASC) tablet 10 mg, 10 mg, Oral, Daily, Evelyne Evans MD, 10 mg at 10/27/17 0743    acetaminophen (TYLENOL) tablet 650 mg, 650 mg, Oral, Q4H PRN, Christina Beltran MD    oxyCODONE-acetaminophen (PERCOCET) 5-325 MG per tablet 1 tablet, 1 tablet, Oral, Q4H PRN, Christina Beltran MD, 1 tablet at 10/27/17 0530    docusate sodium (COLACE) capsule 100 mg, 100 mg, Oral, BID, Christina Beltran MD, 100 mg at 10/27/17 0743    ondansetron (ZOFRAN) injection 4 mg, 4 mg, Intravenous, Q6H PRN, Christina Beltran MD    atorvastatin (LIPITOR) tablet 20 mg, 20 mg, Oral, Daily, Christina Beltran MD, 20 mg at 10/27/17 8404    losartan (COZAAR) tablet 100 mg, 100 mg, Oral, Daily, Christina Beltran MD, 100 mg at 10/27/17 7433    ocuvite-lutein multivitamin 1 capsule, 1 capsule, Oral, Daily, Christina Beltran MD, 1 capsule at 10/27/17 1691    rivaroxaban (XARELTO) tablet 10 mg, 10 mg, Oral, Daily, Christina Beltran MD, 10 mg at 10/26/17 1721    vitamin D (ERGOCALCIFEROL) capsule 50,000 Units, 50,000 Units, Oral, Weekly, Christina Beltran MD    magnesium hydroxide (MILK OF MAGNESIA) 400 MG/5ML suspension 30 mL, 30 mL, Oral, Daily PRN, Evelyne Evans MD    docusate sodium (COLACE) capsule 100 mg, 100 mg, Oral, BID PRN, Evelyne Evans MD    bisacodyl (DULCOLAX) suppository 10 mg, 10 mg, Rectal, Daily PRN, Evelyne Evans MD, 10 mg at 10/25/17 2136    morphine injection 2 mg, 2 mg, Intravenous, Q3H PRN, Christina Beltran MD    Allergies:  Lyrica [pregabalin];  Bactrim [sulfamethoxazole-trimethoprim]; and Keflex [cephalexin]    Social History:   TOBACCO:   reports that she has been

## 2017-10-27 NOTE — PLAN OF CARE
Problem: Pain:  Goal: Pain level will decrease  Pain level will decrease   Outcome: Ongoing      Problem: Falls - Risk of  Goal: Absence of falls  Outcome: Ongoing      Problem: Risk for Impaired Skin Integrity  Goal: Tissue integrity - skin and mucous membranes  Structural intactness and normal physiological function of skin and  mucous membranes.    Outcome: Ongoing      Problem: Infection - Surgical Site:  Goal: Will show no infection signs and symptoms  Will show no infection signs and symptoms   Outcome: Ongoing      Problem: Bleeding:  Goal: Will show no signs and symptoms of excessive bleeding  Will show no signs and symptoms of excessive bleeding   Outcome: Ongoing

## 2017-10-28 VITALS
SYSTOLIC BLOOD PRESSURE: 117 MMHG | HEART RATE: 61 BPM | WEIGHT: 182.01 LBS | TEMPERATURE: 97.4 F | RESPIRATION RATE: 16 BRPM | DIASTOLIC BLOOD PRESSURE: 68 MMHG | OXYGEN SATURATION: 90 % | BODY MASS INDEX: 27.59 KG/M2 | HEIGHT: 68 IN

## 2017-10-28 PROCEDURE — 6370000000 HC RX 637 (ALT 250 FOR IP): Performed by: HOSPITALIST

## 2017-10-28 PROCEDURE — 99239 HOSP IP/OBS DSCHRG MGMT >30: CPT | Performed by: PSYCHIATRY & NEUROLOGY

## 2017-10-28 PROCEDURE — 6370000000 HC RX 637 (ALT 250 FOR IP): Performed by: PSYCHIATRY & NEUROLOGY

## 2017-10-28 RX ORDER — AMLODIPINE BESYLATE 10 MG/1
10 TABLET ORAL DAILY
Qty: 30 TABLET | Refills: 0 | Status: SHIPPED | OUTPATIENT
Start: 2017-10-29 | End: 2019-10-02 | Stop reason: DRUGHIGH

## 2017-10-28 RX ORDER — OXYCODONE HYDROCHLORIDE AND ACETAMINOPHEN 5; 325 MG/1; MG/1
1 TABLET ORAL EVERY 6 HOURS PRN
Qty: 120 TABLET | Refills: 0 | Status: SHIPPED | OUTPATIENT
Start: 2017-10-28 | End: 2017-11-27

## 2017-10-28 RX ORDER — VIT C/E/ZN/COPPR/LUTEIN/ZEAXAN 60 MG-6 MG
1 CAPSULE ORAL DAILY
Qty: 30 CAPSULE | Refills: 0 | Status: SHIPPED | OUTPATIENT
Start: 2017-10-29 | End: 2022-07-11

## 2017-10-28 RX ADMIN — ATORVASTATIN CALCIUM 20 MG: 10 TABLET, FILM COATED ORAL at 08:36

## 2017-10-28 RX ADMIN — METOPROLOL TARTRATE 125 MG: 50 TABLET, FILM COATED ORAL at 08:36

## 2017-10-28 RX ADMIN — DOCUSATE SODIUM 100 MG: 100 CAPSULE, LIQUID FILLED ORAL at 08:37

## 2017-10-28 RX ADMIN — OXYCODONE AND ACETAMINOPHEN 1 TABLET: 5; 325 TABLET ORAL at 03:01

## 2017-10-28 RX ADMIN — Medication 1 CAPSULE: at 08:36

## 2017-10-28 RX ADMIN — ERGOCALCIFEROL 50000 UNITS: 1.25 CAPSULE ORAL at 08:36

## 2017-10-28 RX ADMIN — AMLODIPINE BESYLATE 10 MG: 10 TABLET ORAL at 08:36

## 2017-10-28 RX ADMIN — LOSARTAN POTASSIUM 100 MG: 50 TABLET ORAL at 08:36

## 2017-10-28 RX ADMIN — OXYCODONE AND ACETAMINOPHEN 1 TABLET: 5; 325 TABLET ORAL at 08:36

## 2017-10-28 ASSESSMENT — PAIN SCALES - GENERAL
PAINLEVEL_OUTOF10: 7
PAINLEVEL_OUTOF10: 6
PAINLEVEL_OUTOF10: 6
PAINLEVEL_OUTOF10: 3

## 2017-10-28 NOTE — PROGRESS NOTES
REPLACEMENT      s/p left hip, bilateral total knee replacements    KNEE SURGERY Bilateral     L 2008/ R 2009    TONSILLECTOMY         Medications in Hospital:      Current Facility-Administered Medications:     metoprolol tartrate (LOPRESSOR) tablet 125 mg, 125 mg, Oral, BID, Marisela Louis MD, 125 mg at 10/28/17 0836    amLODIPine (NORVASC) tablet 10 mg, 10 mg, Oral, Daily, Marisela Louis MD, 10 mg at 10/28/17 0836    acetaminophen (TYLENOL) tablet 650 mg, 650 mg, Oral, Q4H PRN, Ronnie Casillas MD    oxyCODONE-acetaminophen (PERCOCET) 5-325 MG per tablet 1 tablet, 1 tablet, Oral, Q4H PRN, Ronnie Casillas MD, 1 tablet at 10/28/17 8875    docusate sodium (COLACE) capsule 100 mg, 100 mg, Oral, BID, Ronnie Casillas MD, 100 mg at 10/28/17 0837    ondansetron (ZOFRAN) injection 4 mg, 4 mg, Intravenous, Q6H PRN, Ronnie Casillas MD    atorvastatin (LIPITOR) tablet 20 mg, 20 mg, Oral, Daily, Ronnie Casillas MD, 20 mg at 10/28/17 5816    losartan (COZAAR) tablet 100 mg, 100 mg, Oral, Daily, Ronnie Casillas MD, 100 mg at 10/28/17 2167    ocuvite-lutein multivitamin 1 capsule, 1 capsule, Oral, Daily, Ronnie Casillas MD, 1 capsule at 10/28/17 4890    rivaroxaban (XARELTO) tablet 10 mg, 10 mg, Oral, Daily, Ronnie Casillas MD, 10 mg at 10/27/17 1732    vitamin D (ERGOCALCIFEROL) capsule 50,000 Units, 50,000 Units, Oral, Weekly, Ronnie Casillas MD, 50,000 Units at 10/28/17 0836    magnesium hydroxide (MILK OF MAGNESIA) 400 MG/5ML suspension 30 mL, 30 mL, Oral, Daily PRN, Marisela Louis MD    docusate sodium (COLACE) capsule 100 mg, 100 mg, Oral, BID PRN, Marisela Louis MD    bisacodyl (DULCOLAX) suppository 10 mg, 10 mg, Rectal, Daily PRN, Marisela Louis MD, 10 mg at 10/25/17 2136    morphine injection 2 mg, 2 mg, Intravenous, Q3H PRN, Ronnie Casillas MD    Allergies:  Lyrica [pregabalin];  Bactrim [sulfamethoxazole-trimethoprim]; and Keflex [cephalexin]    Social History:   TOBACCO: 139 10/26/2017    K 3.7 10/26/2017     10/26/2017    CO2 25 10/26/2017    BUN 17 10/26/2017    CREATININE 0.8 10/26/2017    GLUCOSE 102 10/26/2017    CALCIUM 8.9 10/26/2017    PROT 5.8 (L) 10/26/2017    LABALBU 2.8 (L) 10/26/2017    BILITOT 0.5 10/26/2017    ALKPHOS 67 10/26/2017    AST 15 10/26/2017    ALT 14 10/26/2017    LABGLOM >60 10/26/2017     Lab Results   Component Value Date    INR 0.87 (L) 10/15/2017    PROTIME 11.7 (L) 10/15/2017             RECORD REVIEW: Previous medical records, medications were reviewed at today's visit    IMPRESSION:   1. Status post left hip fracture comparedpain control/mobilization/DVT prophylaxis. 2. Hypertensionon medications continue to monitor. 3. Hyperlipidemiaon statin. Continue to monitor. 4. GIbowel regimen continue to monitor. 5. DVT prophylaxison sural toe does continue to monitor. 6. Vitamin D deficiencyon vitamin D continue to monitor  7. pain controlon Percocet when necessary  8.  PT/OT    DC home    CALL WITH ANY QUESTIONS  [phone] CELL  Dr Fatou Butler

## 2017-10-28 NOTE — DISCHARGE SUMMARY
Physical Therapy DISCHARGE Note  DATE:  10/28/2017  NAME:  Karoline Hutchison  :  1945  (80 y.o.,female)  MRN:  [id number]    HEIGHT:  Height: 5' 8\" (172.7 cm)  WEIGHT:  Weight: 182 lb 0.1 oz (82.6 kg)    PATIENT DIAGNOSIS(ES):    Diagnosis: S/P L FEMORAL HEMIARTHROPLASTY   RESTRICTIONS/PRECAUTIONS:    Restrictions/Precautions: Weight Bearing, Up Ad Jocelyn  Right Lower Extremity Weight Bearing: Weight Bearing As Tolerated  Left Lower Extremity Weight Bearing: Weight Bearing As Tolerated     OVERALL  ORIENTATION STATUS:  Overall Orientation Status: Within Normal Limits  PAIN:  Pain Level: 6  NEUROLOGICAL           Overall Sensation Status: WFL  POSTURE  Posture: Fair  STRENGTH  Strength RLE  Comment: GROSSLY 3+/5;  DF/PF ARE 5/5  Strength LLE  Comment: NO RESISTANCE PROVIDED,  GROSSLY 3/5  ROM  AROM RLE (degrees)  RLE AROM: WFL  AROM LLE (degrees)  LLE General AROM: HIP FLEXION, KNEE FLEX LIMITED D/T PAIN  BALANCE  Sitting - Static: Good  Standing - Dynamic: Fair, +     ACTIVITY TOLERANCE  Activity Tolerance: Patient Tolerated treatment well     BED MOBILITY  Rolling: Independent  Supine to Sit: Independent  Sit to Supine: Independent  TRANSFERS  Sit to Stand: Modified independent  Bed to Chair: Modified independent  Car Transfer: Contact guard assistance  WHEELCHAIR PROPULSION  Level of Assistance: Stand by assistance  Distance: 150  Description/ Details: ABLE TO PERFROM 2 TURNS  AMBULATION  Device: Rolling Walker  Assistance: Modified Independent  Distance: 225 ft  Quality of Gait: SLIGHT ANTALGIC LEFT STANCE  STAIRS  # Steps : 12  Rails: Right ascending  Assistance: SUPERVISION  Comment: STEP TOO PATTERN WITH RIGHT UP LEFT DOWN  FIM SCORES    CURRENT  Bed, Chair, Wheel Chair: 6 - Requires assistive device (slide rail)  Walk: 6 - Modified [place]  Walks/operates wheelchair at least 150 feet with an ambulatory device, orthosis or prosthesis OR requires extra amount of time OR there is concern for safety  Wheel

## 2017-10-28 NOTE — PROGRESS NOTES
Discharge instructions and med info given and explained to patient and family. Verbalized understanding. To be discharged into care of son.

## 2017-10-28 NOTE — DISCHARGE SUMMARY
Neurology Discharge Summary     Patient Identification:  Nisa Bradshaw is a 80 y.o. female. :  1945  Admit Date:  10/18/2017  Discharge date : 2017   Attending Provider: Yodit Peguero MD     Account Number: [de-identified]                                   Admission Diagnoses:   Closed left hip fracture, initial encounter [facility]) [S72.002A]  Closed left hip fracture, initial encounter [facility]) [S72.002A]    Discharge Diagnoses:  Principal Problem:    Hip fracture requiring operative repair, left, closed, initial encounter [facility])  Active Problems:    Hypertension    Acute kidney failure ([facility] [address]    Mixed hyperlipidemia    Essential hypertension    Acute blood loss as cause of postoperative anemia      Discharge Medications:    Current Discharge Medication List           Details   oxyCODONE-acetaminophen (PERCOCET) 5-325 MG per tablet Take 1 tablet by mouth every 6 hours as needed for Pain . Qty: 120 tablet, Refills: 0      rivaroxaban (XARELTO) 10 MG TABS tablet Take 1 tablet by mouth daily for 9 days  Qty: 30 tablet, Refills: 0      amLODIPine (NORVASC) 10 MG tablet Take 1 tablet by mouth daily  Qty: 30 tablet, Refills: 0      Multiple Vitamins-Minerals (OCUVITE-LUTEIN) CAPS multivitamin Take 1 capsule by mouth daily  Qty: 30 capsule, Refills: 0              Details   docusate sodium (COLACE, DULCOLAX) 100 MG CAPS Take 100 mg by mouth 2 times daily  Qty: 60 capsule, Refills: 1      vitamin D (ERGOCALCIFEROL) 72049 units CAPS capsule Take 50,000 Units by mouth once a week      metoprolol (LOPRESSOR) 100 MG tablet Take 100 mg by mouth 2 times daily      atorvastatin (LIPITOR) 20 MG tablet Take 40 mg by mouth daily       losartan (COZAAR) 100 MG tablet Take 100 mg by mouth daily      cloNIDine (CATAPRES) 0.1 MG tablet Take 0.1 mg by mouth 2 times daily           Current Discharge Medication List            Consults:   orthopedic surgery    Hospital Course: This 80 y.o. female  who presented to [facility] ER on 10/15/17 after having a fall at home. She was unable to bear weight on her left lower extremity. X-ray done revealed a displaced subcaptial fracture of the neck of her left femur. She was admitted with consult for orthopedic surgery. She was seen by Dr. Selwyn Bob, who recommended a left hemiarthroplasty. She was in agreement and went for surgery on 10/16/17. She tolerated the procedure well. She will be weightbearing as tolerated on her left lower extremity. She will need DVT prophylaxis for 3 weeks. During her hospital stay she has had acute kidney injury which has now resolved, hyponatremia and acute blood loss anemia not yet requiring transfusion. She is participating in both PT/OT and is felt to need a stay on Rehab to work towards her goal of returning home with her . Patient met inpatient rehab. Clinically improved. Plan is to discharge home with home health.           Discharge Instructions     Patient Instructions:   Home with [address] orders: PT and Nursing   Discharge lab work: none  Code status: Full Code   Activity: activity as tolerated  Diet: DIET GENERAL;    Wound Care: as directed  Equipment: as per therapy      Vidal Gar MD    At least 35 minutes were spent in discharging the patient

## 2017-10-31 NOTE — DISCHARGE SUMMARY
Occupational Therapy Discharge Summary         Date: 10/31/2017  Patient Name: Amna Dominguez        MRN: [id number]    : 1945  (80 y.o.)  Gender: female   Referring Practitioner: DR. Ashley San  Diagnosis: S/P L FEMORAL HEMIARTHROPLASTY   Restrictions/Precautions  Restrictions/Precautions: Weight Bearing, Up Ad Jocelyn      Discharge Date: 10/28/17    ADL Discharge FIM Scores:  Eatin - Patient feeds self  Groomin - Patient independent with all grooming tasks  Bathin - Able to bathe all 10 areas with device  Dressing-Upper: 7 - Patient independently dresses upper body  Dressing-Lower: 6 - Independent with device/prosthesis  Toiletin - Requires device (grab bar/walker/etc.)  Toilet Transfer: 6 - Independent with device (grab bar/walker/slide bar)     Shower Transfer: 6 - Modified independence      Comprehension: 5 - Patient understands basic needs (hungry/hot/pain)  Expression: 5 - Expresses basic ideas/needs only (hungry/hot/pain)  Social Interaction: 5 - Patient is appropriate with supervision/cues  Problem Solvin - Patient able to solve simple/routine tasks  Memory: 5 - Patient requires prompting with stress/unfamiliar situations    UE Functioning:  WNLs    Home Management:  Functional Mobility  Functional - Mobility Device: Rolling Walker  Activity: Other  Assist Level: Modified independent   Functional Mobility Comments: Short distance x2 ocassions.     Adaptive Equipment/DME Status:     Home Equipment: Standard walker (From when she had knee surgeries)  RW ordered    Pain Assessment:  Pain Level: 4  Pain Location: Hip    Remaining Problems:  Decreased endurance; Decreased strength    STGs:  Short term goals  Time Frame for Short term goals: 5-7 days   Short term goal 1: CGA for Lower Body Dressing and Bathing with AE prn  Short term goal 2: Sup A Toilet Transfers with recommended technique and DME  Short term goal 3: Sup A toileting  Short term goal 4: CGA shower transfer with AE prn  Short term goal 5: Stand 5 minutes during minimallly challenging dynamic task  Short term goal 6: Sup A with light ambulatory kitchen/homemaking tasks  Short term goal 7: Demonstrate 4-/5 strength throughout BUE major muscle groups  7/7 met  LTGs:  Long term goals  Time Frame for Long term goals : 10-12 days  Long term goal 1: Mod I Total Body Dressing with AE prn  Long term goal 2: Mod I Total Body Bathing with AE prn  Long term goal 3: Mod I Toilet Transfer with recommended technique and DME  Long term goal 4: Mod I Toileting  Long term goal 5: Mod I Shower transfer with AE prn  Long term goals 6: Stand 7 minutes during moderately challenging dynamic tasks  Long term goal 7:  Mod I with light ambulatory kitchen/homemaking tasks  Long term goal 8: Verbalize DME needs  8/8 met    Discharge Setting and Recommendations:  Home with spouse and homecare    Discharge Care Scores  Eating: CARE Score: 6  Oral Hygiene: CARE Score: 6  Toileting: CARE Score: 6  Shower/Bathe: CARE Score: 6  Upper Body Dressing: CARE Score: 6  Lower Body Dressing: CARE Score: 6  Footwear: CARE Score: 6  Toilet Transfers: CARE Score: 6      Electronically signed by Yrn Proctor OT on 10/31/17 at 3:47 PM

## 2019-10-02 RX ORDER — TOLTERODINE TARTRATE 2 MG/1
2 TABLET, EXTENDED RELEASE ORAL 2 TIMES DAILY
COMMUNITY
End: 2021-06-08 | Stop reason: SDUPTHER

## 2019-10-02 RX ORDER — AMLODIPINE BESYLATE 5 MG/1
5 TABLET ORAL DAILY
COMMUNITY
End: 2021-01-05 | Stop reason: SDUPTHER

## 2019-10-02 RX ORDER — VARENICLINE TARTRATE 0.5 MG/1
0.5 TABLET, FILM COATED ORAL 2 TIMES DAILY
COMMUNITY
End: 2020-03-17

## 2019-10-02 RX ORDER — FOLIC ACID 1 MG/1
1 TABLET ORAL DAILY
COMMUNITY
End: 2021-01-05 | Stop reason: SDUPTHER

## 2019-10-02 RX ORDER — ATORVASTATIN CALCIUM 40 MG/1
40 TABLET, FILM COATED ORAL DAILY
COMMUNITY
End: 2021-06-08 | Stop reason: SDUPTHER

## 2019-11-21 ENCOUNTER — TELEPHONE (OUTPATIENT)
Dept: CARDIOLOGY | Age: 74
End: 2019-11-21

## 2019-11-26 ENCOUNTER — TELEPHONE (OUTPATIENT)
Dept: CARDIOLOGY | Age: 74
End: 2019-11-26

## 2019-12-02 ENCOUNTER — TELEPHONE (OUTPATIENT)
Dept: CARDIOLOGY | Age: 74
End: 2019-12-02

## 2019-12-04 ENCOUNTER — TELEPHONE (OUTPATIENT)
Dept: CARDIOLOGY | Age: 74
End: 2019-12-04

## 2019-12-05 ENCOUNTER — TELEPHONE (OUTPATIENT)
Dept: CARDIOLOGY | Age: 74
End: 2019-12-05

## 2020-03-17 ENCOUNTER — OFFICE VISIT (OUTPATIENT)
Dept: CARDIOLOGY | Age: 75
End: 2020-03-17
Payer: MEDICARE

## 2020-03-17 VITALS
WEIGHT: 204 LBS | DIASTOLIC BLOOD PRESSURE: 78 MMHG | HEIGHT: 68 IN | HEART RATE: 70 BPM | BODY MASS INDEX: 30.92 KG/M2 | SYSTOLIC BLOOD PRESSURE: 122 MMHG

## 2020-03-17 PROCEDURE — 3017F COLORECTAL CA SCREEN DOC REV: CPT | Performed by: NURSE PRACTITIONER

## 2020-03-17 PROCEDURE — G8400 PT W/DXA NO RESULTS DOC: HCPCS | Performed by: NURSE PRACTITIONER

## 2020-03-17 PROCEDURE — 1090F PRES/ABSN URINE INCON ASSESS: CPT | Performed by: NURSE PRACTITIONER

## 2020-03-17 PROCEDURE — 1123F ACP DISCUSS/DSCN MKR DOCD: CPT | Performed by: NURSE PRACTITIONER

## 2020-03-17 PROCEDURE — 4004F PT TOBACCO SCREEN RCVD TLK: CPT | Performed by: NURSE PRACTITIONER

## 2020-03-17 PROCEDURE — 4040F PNEUMOC VAC/ADMIN/RCVD: CPT | Performed by: NURSE PRACTITIONER

## 2020-03-17 PROCEDURE — G8427 DOCREV CUR MEDS BY ELIG CLIN: HCPCS | Performed by: NURSE PRACTITIONER

## 2020-03-17 PROCEDURE — 99204 OFFICE O/P NEW MOD 45 MIN: CPT | Performed by: NURSE PRACTITIONER

## 2020-03-17 PROCEDURE — 93000 ELECTROCARDIOGRAM COMPLETE: CPT | Performed by: NURSE PRACTITIONER

## 2020-03-17 PROCEDURE — G8484 FLU IMMUNIZE NO ADMIN: HCPCS | Performed by: NURSE PRACTITIONER

## 2020-03-17 PROCEDURE — G8417 CALC BMI ABV UP PARAM F/U: HCPCS | Performed by: NURSE PRACTITIONER

## 2020-03-17 NOTE — PROGRESS NOTES
Genitourinary: Negative for dysuria, urgency and frequency. Musculoskeletal: Negative for myalgias, arthralgias and gait problem. Skin: Negative for color change, pallor, rash and wound. Neurological: Negative for dizziness, tremors, speech difficulty, weakness and numbness. Hematological: Does not bruise/bleed easily. Psychiatric/Behavioral: Negative.         Past Medical History:   Diagnosis Date    Arthritis     COPD (chronic obstructive pulmonary disease) ([facility] [address]     Hyperlipidemia     Hypertension     Pneumonia     in the past    Vitamin B12 deficiency     Vitamin D deficiency        Past Surgical History:   Procedure Laterality Date    APPENDECTOMY      BACK SURGERY      BREAST SURGERY      biopsy    HAND SURGERY Bilateral     HEMIARTHROPLASTY HIP Left 10/16/2017    HIP HEMIARTHROPLASTY performed by Yvonnie Mohs, MD at [address] [place] [address].      s/p left hip, bilateral total knee replacements    KNEE SURGERY Bilateral     L 2008/ R 2009    NECK SURGERY      TONSILLECTOMY         Family History   Problem Relation Age of Onset    Cancer Mother     Heart Attack Mother         cause of death    Cancer Father        Social History     Socioeconomic History    Marital status:      Spouse name: Not on file    Number of children: Not on file    Years of education: Not on file    Highest education level: Not on file   Occupational History    Not on file   Social Needs    Financial resource strain: Not on file    Food insecurity     Worry: Not on file     Inability: Not on file    Transportation needs     Medical: Not on file     Non-medical: Not on file   Tobacco Use    Smoking status: Current Every Day Smoker     Packs/day: 0.50     Years: 15.00     Pack years: 7.50     Types: Cigarettes    Smokeless tobacco: Never Used   Substance and Sexual Activity    Alcohol use: Yes     Comment: 3/day    Drug use: No    Sexual activity: Not on file Pulse: 70       Estimated body mass index is 31.02 kg/m² as calculated from the following:    Height as of this encounter: 5' 8\" (1.727 m). Weight as of this encounter: 204 lb (92.5 kg). Constitutional: She is oriented to person, place, and time. She appears well-developed and well-nourished in no acute distress. Neck:  Neck supple without JVD present. No trachea deviation present. No thyromegaly present. Eyes:Conjunctivae and EOM are normal. Pupils equal and reactive to light. ENT:Hearing appears normal.conjunctiva and lids are normal, ears and nose appear normal.  Cardiovascular: Normal rate, normal rhythm, normal heart sounds. No murmur ascultated. No gallop and no friction rub. No carotid bruits. No peripheral edema. Pulmonary/Chest:  Lungs clear to auscultation bilaterally without evidence of respiratory distress. She without wheezes. She without rales or ronchi. Musculoskeletal: Normal range of motion. Gait is normal.  Head is normocephalic and atraumatic. Skin: Skin is warm and dry without rash or pallor. .=  Psychiatric:She is alert and oriented to person, place, and time. She has a normal mood and affect. Her behavior is normal. Thought content normal.     Lab Results   Component Value Date    CREATININE 0.8 10/26/2017    CREATININE 0.7 10/23/2017    CREATININE 0.6 10/19/2017    HGB 10.1 10/26/2017    HGB 11.5 10/23/2017    HGB 10.4 10/19/2017       ECG 03/17/20  Sinus rhythm, HR 62 bpm    TTE-5/12/2015, [facility] AND LUNG [place] study is technically limited due to patient body habitus.    Left Ventricle:    The left ventricular chamber size is normal. Mild concentric left  ventricular hypertrophy is observed. Global left ventricular wall motion  and contractility are within normal limits. There is normal left  ventricular systolic function. The estimated ejection fraction is  55-60%.  [facility] Grade 1 diastolic dysfunction.    Left Atrium:    The left atrial chamber size is normal.   Right Ventricle:    The right ventricular chamber size and systolic function are within  normal limits. Right Atrium:    The right atrial cavity size is normal. The intratrial septum is  intact. Aortic Valve:    The aortic valve structure is normal. Mild aortic cusp sclerosis is  present. There is no evidence of aortic regurgitation. There is no  evidence of aortic stenosis. Mitral Valve:    The mitral valve appears normal in structure and function. The mitral  valve leaflets appear normal. There is no evidence of mitral  regurgitation. There is no evidence of mitral stenosis. Tricuspid Valve:    The tricuspid valve leaflets are normal.  There is a trace tricuspid  regurgitation.  The right ventricular systolic pressure is calculated at  38.94 mmHg.  There is evidence of borderline pulmonary hypertension. There is no tricuspid stenosis. Pulmonic Valve:    The pulmonic valve is not well visualized. Pericardium:    The pericardium appears normal. There is no pericardial effusion. Aorta:   [place] is no dilatation of the ascending aorta. Assessment, Recommendations, & Plan:  69 y.o. female with HTN, HLD, shortness of breath, & fatigue    HTN-controlled, no changes    HLD-followed by PCP, on Lipitor, no changes    Shortness of breath-TTE & DSE    Fatigue-TTE and DSE      Disposition - RTC in 1 month with Dr. Pham Been or sooner if needed      Please do not hesitate to contact me for any questions or concerns.     Sincerely yours,    GÉNESIS Nunez

## 2020-03-30 ENCOUNTER — TELEPHONE (OUTPATIENT)
Dept: CARDIOLOGY | Age: 75
End: 2020-03-30

## 2020-03-30 NOTE — TELEPHONE ENCOUNTER
LVM; Left vm for pt to contact the office; [facility] office has been closed until May 26 due to the COVID 19; [facility] after May 26

## 2020-04-06 ENCOUNTER — TELEPHONE (OUTPATIENT)
Dept: CARDIOLOGY | Age: 75
End: 2020-04-06

## 2020-10-26 ENCOUNTER — OFFICE VISIT (OUTPATIENT)
Dept: PRIMARY CARE CLINIC | Age: 75
End: 2020-10-26
Payer: MEDICARE

## 2020-10-26 VITALS
TEMPERATURE: 96.8 F | DIASTOLIC BLOOD PRESSURE: 80 MMHG | BODY MASS INDEX: 31.52 KG/M2 | OXYGEN SATURATION: 93 % | HEIGHT: 68 IN | HEART RATE: 50 BPM | WEIGHT: 208 LBS | SYSTOLIC BLOOD PRESSURE: 118 MMHG

## 2020-10-26 PROCEDURE — G8484 FLU IMMUNIZE NO ADMIN: HCPCS | Performed by: PEDIATRICS

## 2020-10-26 PROCEDURE — 3023F SPIROM DOC REV: CPT | Performed by: PEDIATRICS

## 2020-10-26 PROCEDURE — G8417 CALC BMI ABV UP PARAM F/U: HCPCS | Performed by: PEDIATRICS

## 2020-10-26 PROCEDURE — 99205 OFFICE O/P NEW HI 60 MIN: CPT | Performed by: PEDIATRICS

## 2020-10-26 PROCEDURE — 4040F PNEUMOC VAC/ADMIN/RCVD: CPT | Performed by: PEDIATRICS

## 2020-10-26 PROCEDURE — 1090F PRES/ABSN URINE INCON ASSESS: CPT | Performed by: PEDIATRICS

## 2020-10-26 PROCEDURE — G8427 DOCREV CUR MEDS BY ELIG CLIN: HCPCS | Performed by: PEDIATRICS

## 2020-10-26 PROCEDURE — G8400 PT W/DXA NO RESULTS DOC: HCPCS | Performed by: PEDIATRICS

## 2020-10-26 PROCEDURE — 94618 PULMONARY STRESS TESTING: CPT | Performed by: PEDIATRICS

## 2020-10-26 PROCEDURE — 1036F TOBACCO NON-USER: CPT | Performed by: PEDIATRICS

## 2020-10-26 PROCEDURE — 3017F COLORECTAL CA SCREEN DOC REV: CPT | Performed by: PEDIATRICS

## 2020-10-26 PROCEDURE — G8926 SPIRO NO PERF OR DOC: HCPCS | Performed by: PEDIATRICS

## 2020-10-26 PROCEDURE — 1123F ACP DISCUSS/DSCN MKR DOCD: CPT | Performed by: PEDIATRICS

## 2020-10-26 RX ORDER — AMOXICILLIN 250 MG
2 CAPSULE ORAL 2 TIMES DAILY
Qty: 120 TABLET | Refills: 5 | Status: SHIPPED | OUTPATIENT
Start: 2020-10-26 | End: 2021-06-08

## 2020-10-26 RX ORDER — METOPROLOL TARTRATE 100 MG/1
50 TABLET ORAL 2 TIMES DAILY
Qty: 60 TABLET | Refills: 5 | Status: SHIPPED
Start: 2020-10-26 | End: 2021-06-08 | Stop reason: SDUPTHER

## 2020-10-26 RX ORDER — ALBUTEROL SULFATE 90 UG/1
2 AEROSOL, METERED RESPIRATORY (INHALATION) 4 TIMES DAILY PRN
Qty: 1 INHALER | Refills: 5 | Status: SHIPPED | OUTPATIENT
Start: 2020-10-26 | End: 2021-06-08 | Stop reason: SDUPTHER

## 2020-10-26 RX ORDER — FUROSEMIDE 20 MG/1
20 TABLET ORAL DAILY
Qty: 30 TABLET | Refills: 11 | Status: SHIPPED | OUTPATIENT
Start: 2020-10-26 | End: 2021-05-25

## 2020-10-26 RX ORDER — METOCLOPRAMIDE 10 MG/1
1 TABLET ORAL 2 TIMES DAILY
COMMUNITY
End: 2020-10-26 | Stop reason: ALTCHOICE

## 2020-10-26 RX ORDER — FLUTICASONE FUROATE, UMECLIDINIUM BROMIDE AND VILANTEROL TRIFENATATE 100; 62.5; 25 UG/1; UG/1; UG/1
1 POWDER RESPIRATORY (INHALATION) DAILY
Qty: 1 EACH | Refills: 11 | Status: SHIPPED | OUTPATIENT
Start: 2020-10-26 | End: 2021-06-08 | Stop reason: SDUPTHER

## 2020-10-26 RX ORDER — VALSARTAN 160 MG/1
160 TABLET ORAL DAILY
Qty: 30 TABLET | Refills: 11 | Status: SHIPPED | OUTPATIENT
Start: 2020-10-26 | End: 2021-06-08 | Stop reason: SDUPTHER

## 2020-10-26 ASSESSMENT — ENCOUNTER SYMPTOMS
WHEEZING: 0
EYE PAIN: 0
SHORTNESS OF BREATH: 1
PHOTOPHOBIA: 0
VOICE CHANGE: 0
SORE THROAT: 1
BLOOD IN STOOL: 0
EYES NEGATIVE: 1
RECTAL PAIN: 1
ABDOMINAL PAIN: 0
COUGH: 1
VOMITING: 0
EYE DISCHARGE: 0
DIARRHEA: 0
CONSTIPATION: 1
TROUBLE SWALLOWING: 0
NAUSEA: 0
RHINORRHEA: 0
EYE ITCHING: 0
ABDOMINAL DISTENTION: 0
SINUS PRESSURE: 0
EYE REDNESS: 0

## 2020-10-26 NOTE — PROGRESS NOTES
[address],  [address]  Phone [phone]   Fax [phone]      OFFICE VISIT: 10/26/2020    Dalia Hatch-: 1945      HPI  Reason For Visit:  Henrik Prieto is a 69 y.o. Establish Care; Fatigue (hip surgery 3 years ago, feels like she has gone downhill. Constantly weak, tired, balance issues (has done PT with no help), weight gain of 30lbs in the past year. ); Fall (fell 10/23/20, walking and left knee gave out. She laid in the floor for 15 hours. Did not get checked out, right ankle is swollen and bruised, hurts first thing in the morning and late afternoon. ); Constipation (would like to discuss medications she can take to keep her regular. ); and Health Maintenance (colon screen-declined, breast screen-declined, dexa-declined, flu-declined, )      Patient presents today to establish care. She complains of chronic fatigue and weakness. She notes that she has had problems with constipation and weight gain as well. This has been going on for some time now. She is having difficulty with balance. She fell few days ago while walking. She notes that her knee gave way. She did injured her right ankle when she fell. This is bruised and swollen. She can ambulate on it but it is painful. She did not seek any medical attention with the fall. Hypertension:   BP today was   BP Readings from Last 1 Encounters:   10/26/20 118/80      Recent BP readings:    BP Readings from Last 3 Encounters:   10/26/20 118/80   20 122/78   10/28/17 117/68     Medication   Amlodipine 5 mg daily   Cozaar 100 mg daily   Metroprolol tartrate 100 mg twice daily   Clonidine 0.1 mg daily  Medication compliance:  compliant most of the time  Home blood pressure monitoring: No.    She is not adherent to a low sodium diet.      Symptoms: none  Laboratory:  Lab Results   Component Value Date    BUN 17 10/26/2017    CREATININE 0.8 10/26/2017       Hyperlipidemia: Medication:   atorvastatin (Lipitor)  Low Fat, Low Choleterol Diet:  yes -to some degree  Myalgias or GI upset: no  The patient exercises rarely. Laboratory:    No results found for: CHOL, TRIG, HDL, LDLCALC, LDLDIRECT   Lab Results   Component Value Date    ALT 14 10/26/2017    AST 15 10/26/2017       COPD:  Medication:   Breo Ellipta 200-25 mg 1 inhalation daily   She does not have a prescription for albuterol  Symptoms:she is short of breath with minimal exertion. Overactive bladder:  Medication:   Detrol 2 mg twice daily  Symptoms: this is effective. Vitamin D deficiency:  Medication:   Vitamin D 50,000 units weekly  Symptoms: none      Constipation:  Medication:   Reglan 10 mg twice daily  Symptoms: This is severe with very hard stool and bleeding      CKD 3:  She is on RAAS therapy. JUVE on CPAP with in-line O2:  She does not have any oxygen at rest.     height is 5' 8\" (1.727 m) and weight is 208 lb (94.3 kg). Her temporal temperature is 96.8 °F (36 °C). Her blood pressure is 118/80 and her pulse is 50. Her oxygen saturation is 93%. Body mass index is 31.63 kg/m². I have reviewed the following with the Ms. Hatch   Lab Review  No visits with results within 6 Month(s) from this visit. Latest known visit with results is:   Admission on 10/18/2017, Discharged on 10/28/2017   Component Date Value    Urine Culture, Routine 10/18/2017 <50,000 CFU/ml mixed skin/urogenital ailyn.  No further workup     Color, UA 10/18/2017 YELLOW     Clarity, UA 10/18/2017 Clear     Glucose, Ur 10/18/2017 Negative     Bilirubin Urine 10/18/2017 Negative     Ketones, Urine 10/18/2017 Negative     Specific Gravity, UA 10/18/2017 1.007     Blood, Urine 10/18/2017 LARGE*    pH, UA 10/18/2017 7.0     Protein, UA 10/18/2017 Negative     Urobilinogen, Urine 10/18/2017 0.2     Nitrite, Urine 10/18/2017 Negative     Leukocyte Esterase, Urine 10/18/2017 Negative     T4 Free 10/19/2017 1.3     TSH 10/19/2017 1.860     Vitamin B-12 10/19/2017 307     WBC 10/19/2017 6.5     RBC 10/19/2017 3.14*    Hemoglobin 10/19/2017 10.4*    Hematocrit 10/19/2017 30.7*    MCV 10/19/2017 97.8     MCH 10/19/2017 33.1*    MCHC 10/19/2017 33.9     RDW 10/19/2017 14.5     Platelets 55/37/5254 174     MPV 10/19/2017 9.3*    Neutrophils % 10/19/2017 73.8*    Lymphocytes % 10/19/2017 14.9*    Monocytes % 10/19/2017 9.8     Eosinophils % 10/19/2017 0.8     Basophils % 10/19/2017 0.2     Neutrophils Absolute 10/19/2017 4.8     Lymphocytes Absolute 10/19/2017 1.0*    Monocytes Absolute 10/19/2017 0.60     Eosinophils Absolute 10/19/2017 0.10     Basophils Absolute 10/19/2017 0.00     Sodium 10/19/2017 137     Potassium 10/19/2017 3.4*    Chloride 10/19/2017 99     CO2 10/19/2017 27     Anion Gap 10/19/2017 11     Glucose 10/19/2017 108     BUN 10/19/2017 10     CREATININE 10/19/2017 0.6     GFR Non- 10/19/2017 >60     Calcium 10/19/2017 7.9*    Total Protein 10/19/2017 5.7*    Alb 10/19/2017 2.9*    Total Bilirubin 10/19/2017 0.6     Alkaline Phosphatase 10/19/2017 65     ALT 10/19/2017 17     AST 10/19/2017 21     Prealbumin 10/19/2017 11.0*    Bacteria, UA 10/18/2017 NEGATIVE     Hyaline Casts, UA 10/18/2017 0     WBC, UA 10/18/2017 0     RBC, UA 10/18/2017 27*    Epithelial Cells, UA 10/18/2017 2     Sodium 10/23/2017 138     Potassium 10/23/2017 3.8     Chloride 10/23/2017 99     CO2 10/23/2017 28     Anion Gap 10/23/2017 11     Glucose 10/23/2017 109     BUN 10/23/2017 13     CREATININE 10/23/2017 0.7     GFR Non- 10/23/2017 >60     Calcium 10/23/2017 9.0     Total Protein 10/23/2017 6.1*    Alb 10/23/2017 2.7*    Total Bilirubin 10/23/2017 0.6     Alkaline Phosphatase 10/23/2017 70     ALT 10/23/2017 17     AST 10/23/2017 21     WBC 10/23/2017 7.3     RBC 10/23/2017 3.42*    Hemoglobin 10/23/2017 11.5*    Hematocrit 10/23/2017 33.8*    Packs/day: 0.50     Years: 15.00     Pack years: 7.50     Types: Cigarettes     Last attempt to quit: 10/26/2019     Years since quittin.0    Smokeless tobacco: Never Used   Substance Use Topics    Alcohol use: Yes     Comment: 3/day        Review of Systems   Constitutional: Positive for fatigue. Negative for appetite change (appetite normal), chills, fever and unexpected weight change. Weight is gradually increasing. HENT: Positive for congestion, postnasal drip and sore throat (in the mornings). Negative for dental problem, ear pain, hearing loss, rhinorrhea, sinus pressure, tinnitus, trouble swallowing and voice change. No headache, lightheadedness or dizziness   Eyes: Negative. Negative for photophobia, pain, discharge, redness, itching and visual disturbance. No change in vision. No blurred vision. No double vision   Respiratory: Positive for cough (occasional) and shortness of breath. Negative for wheezing. No history of asthma or pneumonia. No orthopnea. No pain with deep excursions   Cardiovascular: Positive for leg swelling. Negative for chest pain (or pressure) and palpitations. Gastrointestinal: Positive for constipation (severe ) and rectal pain (with stooling). Negative for abdominal distention, abdominal pain, blood in stool (or melena), diarrhea, nausea and vomiting. No acid reflux   Endocrine: Negative. Negative for cold intolerance, heat intolerance, polydipsia and polyuria. Genitourinary: Positive for urgency. Negative for pelvic pain. Nocturia frequently every 1-2 hours. Musculoskeletal: Positive for arthralgias (diffuse). Negative for neck pain (no change in range of motion). Skin: Negative. Negative for rash. Allergic/Immunologic: Positive for environmental allergies. Neurological: Positive for weakness (generalized but predominantly legs. ).  Negative for dizziness, tremors, syncope, light-headedness, numbness (or tingling sensation) and headaches. Hematological: Negative for adenopathy. Does not bruise/bleed easily. Psychiatric/Behavioral: Negative. Negative for confusion (or difficulty with memory), dysphoric mood (to the point of interfering with her life functions) and sleep disturbance. The patient is not nervous/anxious (that is out of ordinary). Physical Exam  Vitals signs reviewed. Constitutional:       General: She is not in acute distress. Appearance: She is well-developed. She is obese. She is not toxic-appearing. HENT:      Head: Normocephalic and atraumatic. Right Ear: Hearing, tympanic membrane, ear canal and external ear normal.      Left Ear: Hearing, tympanic membrane, ear canal and external ear normal.      Nose: Nose normal.      Mouth/Throat:      Mouth: Mucous membranes are moist.      Pharynx: Oropharynx is clear. Eyes:      General: Lids are normal.      Extraocular Movements: Extraocular movements intact. Conjunctiva/sclera: Conjunctivae normal.      Pupils: Pupils are equal, round, and reactive to light. Neck:      Musculoskeletal: Neck supple. Thyroid: No thyromegaly. Vascular: No carotid bruit or JVD. Trachea: Phonation normal.   Cardiovascular:      Rate and Rhythm: Regular rhythm. Bradycardia present. No extrasystoles are present. Chest Wall: PMI is not displaced. Pulses: Normal pulses. Heart sounds: Normal heart sounds. No murmur. No friction rub. No gallop. Comments: Stasis changes bilaterally  Pulmonary:      Effort: Pulmonary effort is normal. No respiratory distress. Breath sounds: Normal breath sounds. Decreased air movement (throughout) present. No wheezing, rhonchi or rales. Abdominal:      General: Bowel sounds are normal. There is no distension. Palpations: Abdomen is soft. There is no mass. Tenderness: There is no abdominal tenderness.    Genitourinary:     Comments: Examination deferred  Musculoskeletal: Normal range of motion. Right lower le+ Edema present. Left lower le+ Edema present. Comments: Joint examination reveals no acute arthritis or synovitis. Lymphadenopathy:      Cervical: No cervical adenopathy. Skin:     General: Skin is warm and dry. Findings: No rash. Comments: She has stasis changes in bilateral lower extremities   Neurological:      General: No focal deficit present. Mental Status: She is alert and oriented to person, place, and time. Cranial Nerves: No cranial nerve deficit (by gross examination). Sensory: No sensory deficit. Motor: No tremor or atrophy. Gait: Gait normal.   Psychiatric:         Speech: Speech normal.         Behavior: Behavior normal. Behavior is cooperative. ASSESSMENT      ICD-10-CM    1. Essential hypertension  I10 CBC Auto Differential     Comprehensive Metabolic Panel     Microalbumin / Creatinine Urine Ratio     metoprolol (LOPRESSOR) 100 MG tablet     valsartan (DIOVAN) 160 MG tablet   2. Mixed hyperlipidemia  E78.2 Lipid Panel   3. COPD with hypoxia (HCC)  J44.9 CBC Auto Differential    R09.02 Comprehensive Metabolic Panel     BREO ELLIPTA 200-25 MCG/INH AEPB inhaler     albuterol sulfate HFA (VENTOLIN HFA) 108 (90 Base) MCG/ACT inhaler     fluticasone-umeclidin-vilant (TRELEGY ELLIPTA) 100-62.5-25 MCG/INH AEPB     KS PORTABLE OXYGEN CONCENTRATOR     KS PULMONARY STRESS TESTING   4. Overactive bladder  N32.81    5. Chronic idiopathic constipation  K59.04 senna-docusate (PERICOLACE) 8.6-50 MG per tablet   6. Vitamin D deficiency  E55.9 Vitamin D 25 Hydroxy   7. Stage 3 chronic kidney disease, unspecified whether stage 3a or 3b CKD  N18.30 Microalbumin / Creatinine Urine Ratio     valsartan (DIOVAN) 160 MG tablet   8. Obstructive sleep apnea  G47.33 CBC Auto Differential     Comprehensive Metabolic Panel   9. Hearing decreased, right  H91.91    10.  Fatigue, unspecified type  R53.83 FREE T4     TSH without Reflex     Vitamin B12   11. Folate deficiency  E53.8 Folate   12. Bradycardia  R00.1 metoprolol (LOPRESSOR) 100 MG tablet   13. Peripheral edema  R60.9 furosemide (LASIX) 20 MG tablet         PLAN    1. Essential hypertension  We are going to stop her clonidine  We are going to stop her losartan  We are going to decrease her Metroprolol tartrate from 100 mg twice daily to 50 mg twice daily to maintain beta-1 selectivity  We are going to add Diovan 160 mg daily  We are going to add Lasix 20 mg daily  - CBC Auto Differential; Future  - Comprehensive Metabolic Panel; Future  - Microalbumin / Creatinine Urine Ratio; Future  - metoprolol (LOPRESSOR) 100 MG tablet; Take 0.5 tablets by mouth 2 times daily  Dispense: 60 tablet; Refill: 5  - valsartan (DIOVAN) 160 MG tablet; Take 1 tablet by mouth daily  Dispense: 30 tablet; Refill: 11    2. Mixed hyperlipidemia  Continue present lipid-lowering regimen but check lipid levels to ensure that she is less than 70 for LDL value  - Lipid Panel; Future    3. COPD with hypoxia ([facility] [address]  We are going to add in Umeclidinium in the form of Trelegy inhaler. To qualify for home oxygen via 6-minute walk test with significant desaturations. She required 4 L of oxygen via nasal cannula in order to maintain saturations. Hopefully with improvement of her pulmonary situation, we can wean this down to 2 to 3 L of oxygen. We did back off on her beta-blockade in order to limit to beta-1 selectivity. We are also going to add Lasix to eliminate any fluid overload status. We are also adding albuterol to her medication regimen. Once stable, we will consider pulmonary function testing  Hopefully this will dramatically increase her functional capacity  - CBC Auto Differential; Future  - Comprehensive Metabolic Panel; Future  - BREO ELLIPTA 200-25 MCG/INH AEPB inhaler; Take 1 puff by mouth daily  - albuterol sulfate HFA (VENTOLIN HFA) 108 (90 Base) MCG/ACT inhaler;  Inhale 2 puffs into the lungs 4 times daily as needed for Wheezing  Dispense: 1 Inhaler; Refill: 5  - fluticasone-umeclidin-vilant ([employer]) 100-62.5-25 MCG/INH AEPB; Inhale 1 puff into the lungs daily  Dispense: 1 each; Refill: 11  - NC PORTABLE OXYGEN CONCENTRATOR    4. Overactive bladder  We will continue with her Detrol 2 mg twice daily. We are going to add Lasix during the daytime as this will likely decrease her need to get up and void throughout the night by eliminating excess fluid prior to going to bed    5. Chronic idiopathic constipation  We are aluminating several medications that will exacerbate her constipation. We will also replace Colace with Jillian-Colace. We can titrate this to effect. If needed, we can add additional medications to this regimen to maintain effective control of her bowels  - senna-docusate (PERICOLACE) 8.6-50 MG per tablet; Take 2 tablets by mouth 2 times daily  Dispense: 120 tablet; Refill: 5    6. Vitamin D deficiency  She has been on vitamin D2. She really requires vitamin D3. She does not require 50,000 international units. We will decrease this to 2 to 4000 international units on a daily basis of D3. We will also assess vitamin D levels  - Vitamin D 25 Hydroxy; Future    7. Stage 3 chronic kidney disease, unspecified whether stage 3a or 3b CKD  We will check labs to see the degree of her kidney disease. We will also check microalbumin creatinine ratio  - Microalbumin / Creatinine Urine Ratio; Future  - valsartan (DIOVAN) 160 MG tablet; Take 1 tablet by mouth daily  Dispense: 30 tablet; Refill: 11  We will add back R AAS therapy to ensure appropriate preservation of renal function    8. Obstructive sleep apnea  Strongly encouraged her to use her Pap device consistently  - CBC Auto Differential; Future  - Comprehensive Metabolic Panel; Future    9. Hearing decreased, right  Discussed options in this regard. We will hold off on any intervention at this time    10.  Fatigue, unspecified type  Laboratory survey to evaluate for the cause of her fatigue beyond her respiratory compromise  - FREE T4; Future  - TSH without Reflex; Future  - Vitamin B12; Future    11. Folate deficiency  Check folate level. Is unclear as to why she requires this medication  - Folate; Future    12. Bradycardia  We are backing off on her Metroprolol tartrate from 100 mg twice daily to 50 mg twice daily  - metoprolol (LOPRESSOR) 100 MG tablet; Take 0.5 tablets by mouth 2 times daily  Dispense: 60 tablet; Refill: 5    13. Peripheral edema  Add Lasix to her regimen. We will need to follow electrolytes on this regimen.  - furosemide (LASIX) 20 MG tablet; Take 1 tablet by mouth daily  Dispense: 30 tablet; Refill: 11      Orders Placed This Encounter   Procedures    CBC Auto Differential    Comprehensive Metabolic Panel    FREE T4    Lipid Panel    Microalbumin / Creatinine Urine Ratio    TSH without Reflex    Folate    Vitamin B12    Vitamin D 25 Hydroxy    MT PORTABLE OXYGEN CONCENTRATOR    MT PULMONARY STRESS TESTING        Return in about 1 month (around 11/26/2020) for 30. 6-minute walk test    6-minute walk test showed baseline pulse ox of 93% on room air. With ambulation she decreased her saturation to 85% on room air.   With oxygen at 4 liters per minute, she was able to ambulate and maintain saturation at 91%  With sitting at 4 liters she came back up to 95%        Interventions made in this is evaluation:    Change from Breo Ellipta to Trelegy 1 elation daily   Samples were given of this    Stop Reglan  Stop Colace  Start Jillian-Colace 2 tablets twice daily    Decrease metoprolol tartrate from 100 mg twice daily to 50 mg twice daily  Stop Cozaar 100 mg daily  Stop clonidine 0.1 mg at bedtime  Start valsartan 160 mg daily  Start Lasix 20 mg daily    Start vitamin D3 over-the-counter 2000 to 4000 international units daily  Stop vitamin D 50,000 units weekly when prescription is      I spent well in excess of an hour with this patient in face-to-face encounter. We reviewed every single one of her medications. We reviewed all of her health issues. We made several adjustments to medications as above.   The majority of this was consultation      This was an in-house visit

## 2020-11-03 PROBLEM — I10 HYPERTENSION: Status: RESOLVED | Noted: 2017-10-16 | Resolved: 2020-11-03

## 2020-11-03 PROBLEM — N17.9 ACUTE KIDNEY FAILURE (HCC): Status: RESOLVED | Noted: 2017-10-16 | Resolved: 2020-11-03

## 2020-11-16 DIAGNOSIS — N18.30 STAGE 3 CHRONIC KIDNEY DISEASE, UNSPECIFIED WHETHER STAGE 3A OR 3B CKD (HCC): ICD-10-CM

## 2020-11-16 DIAGNOSIS — J44.9 COPD WITH HYPOXIA (HCC): ICD-10-CM

## 2020-11-16 DIAGNOSIS — E53.8 FOLATE DEFICIENCY: ICD-10-CM

## 2020-11-16 DIAGNOSIS — I10 ESSENTIAL HYPERTENSION: ICD-10-CM

## 2020-11-16 DIAGNOSIS — R53.83 FATIGUE, UNSPECIFIED TYPE: ICD-10-CM

## 2020-11-16 DIAGNOSIS — G47.33 OBSTRUCTIVE SLEEP APNEA: ICD-10-CM

## 2020-11-16 DIAGNOSIS — E55.9 VITAMIN D DEFICIENCY: ICD-10-CM

## 2020-11-16 DIAGNOSIS — E78.2 MIXED HYPERLIPIDEMIA: Chronic | ICD-10-CM

## 2020-11-16 DIAGNOSIS — R09.02 COPD WITH HYPOXIA (HCC): ICD-10-CM

## 2020-11-16 LAB
ALBUMIN SERPL-MCNC: 4 G/DL (ref 3.5–5.2)
ALP BLD-CCNC: 110 U/L (ref 35–104)
ALT SERPL-CCNC: 26 U/L (ref 5–33)
ANION GAP SERPL CALCULATED.3IONS-SCNC: 18 MMOL/L (ref 7–19)
AST SERPL-CCNC: 24 U/L (ref 5–32)
BASOPHILS ABSOLUTE: 0 K/UL (ref 0–0.2)
BASOPHILS RELATIVE PERCENT: 0.5 % (ref 0–1)
BILIRUB SERPL-MCNC: 0.6 MG/DL (ref 0.2–1.2)
BUN BLDV-MCNC: 22 MG/DL (ref 8–23)
CALCIUM SERPL-MCNC: 9.2 MG/DL (ref 8.8–10.2)
CHLORIDE BLD-SCNC: 103 MMOL/L (ref 98–111)
CHOLESTEROL, TOTAL: 163 MG/DL (ref 160–199)
CO2: 20 MMOL/L (ref 22–29)
CREAT SERPL-MCNC: 1.4 MG/DL (ref 0.5–0.9)
EOSINOPHILS ABSOLUTE: 0.2 K/UL (ref 0–0.6)
EOSINOPHILS RELATIVE PERCENT: 1.8 % (ref 0–5)
FOLATE: >20 NG/ML (ref 4.8–37.3)
GFR AFRICAN AMERICAN: 44
GFR NON-AFRICAN AMERICAN: 37
GLUCOSE BLD-MCNC: 112 MG/DL (ref 74–109)
HCT VFR BLD CALC: 39.3 % (ref 37–47)
HDLC SERPL-MCNC: 69 MG/DL (ref 65–121)
HEMOGLOBIN: 12.6 G/DL (ref 12–16)
IMMATURE GRANULOCYTES #: 0 K/UL
LDL CHOLESTEROL CALCULATED: 73 MG/DL
LYMPHOCYTES ABSOLUTE: 1.9 K/UL (ref 1.1–4.5)
LYMPHOCYTES RELATIVE PERCENT: 22.1 % (ref 20–40)
MCH RBC QN AUTO: 30.9 PG (ref 27–31)
MCHC RBC AUTO-ENTMCNC: 32.1 G/DL (ref 33–37)
MCV RBC AUTO: 96.3 FL (ref 81–99)
MONOCYTES ABSOLUTE: 0.6 K/UL (ref 0–0.9)
MONOCYTES RELATIVE PERCENT: 7 % (ref 0–10)
NEUTROPHILS ABSOLUTE: 5.8 K/UL (ref 1.5–7.5)
NEUTROPHILS RELATIVE PERCENT: 68.1 % (ref 50–65)
PDW BLD-RTO: 14 % (ref 11.5–14.5)
PLATELET # BLD: 293 K/UL (ref 130–400)
PMV BLD AUTO: 11.4 FL (ref 9.4–12.3)
POTASSIUM SERPL-SCNC: 4 MMOL/L (ref 3.5–5)
RBC # BLD: 4.08 M/UL (ref 4.2–5.4)
SODIUM BLD-SCNC: 141 MMOL/L (ref 136–145)
T4 FREE: 1.29 NG/DL (ref 0.93–1.7)
TOTAL PROTEIN: 7.2 G/DL (ref 6.6–8.7)
TRIGL SERPL-MCNC: 104 MG/DL (ref 0–149)
TSH SERPL DL<=0.05 MIU/L-ACNC: 1.51 UIU/ML (ref 0.27–4.2)
VITAMIN B-12: 461 PG/ML (ref 211–946)
VITAMIN D 25-HYDROXY: 60.7 NG/ML
WBC # BLD: 8.5 K/UL (ref 4.8–10.8)

## 2020-11-25 ENCOUNTER — VIRTUAL VISIT (OUTPATIENT)
Dept: PRIMARY CARE CLINIC | Age: 75
End: 2020-11-25
Payer: MEDICARE

## 2020-11-25 PROCEDURE — 99443 PR PHYS/QHP TELEPHONE EVALUATION 21-30 MIN: CPT | Performed by: PEDIATRICS

## 2020-11-25 ASSESSMENT — ENCOUNTER SYMPTOMS
VOICE CHANGE: 0
EYE DISCHARGE: 0
VOMITING: 0
ABDOMINAL DISTENTION: 0
CONSTIPATION: 1
COUGH: 1
SORE THROAT: 0
NAUSEA: 0
BLOOD IN STOOL: 0
DIARRHEA: 0
WHEEZING: 0
ABDOMINAL PAIN: 0
PHOTOPHOBIA: 0
RHINORRHEA: 0
TROUBLE SWALLOWING: 0
SHORTNESS OF BREATH: 1
EYE REDNESS: 0
EYE ITCHING: 0
EYE PAIN: 0
EYES NEGATIVE: 1
RECTAL PAIN: 1
SINUS PRESSURE: 0

## 2020-11-25 NOTE — PROGRESS NOTES
[facility] [facility], [address]  Phone [phone]   Fax [phone]      OFFICE VISIT: 2020    Padilla Hatch-: 1945      [facility]  Reason For Visit:  Mala Garza is a 77 y.o. Hypertension and Hyperlipidemia    Patient presents via telephone conference on follow-up for multiple health issues. She was seen for initial evaluation on 10/26/2020 in our office. Hypertension:   On last visit, we discontinued her clonidine and her losartan and started her on Diovan 160 mg. We also decreased her metoprolol tartrate from 100 mg twice daily to 50 mg twice daily in order to maintain beta-1 selectivity given her COPD. We also added Lasix 20 mg daily to her medication regimen to take care of her peripheral edema. Since on this regimen, she is not checking bp regularly, but she feels good. BP today was   BP Readings from Last 1 Encounters:   10/26/20 118/80      Recent BP readings:    BP Readings from Last 3 Encounters:   10/26/20 118/80   20 122/78   10/28/17 117/68     Medication   Metoprolol tartrate 50 mg twice daily   Valsartan 160 mg daily   Lasix 20 mg daily   Amlodipine 5 mg daily  Medication compliance:  compliant most of the time  Home blood pressure monitoring: No.    She is not adherent to a low sodium diet. Symptoms: she feels great  Laboratory:  Lab Results   Component Value Date    BUN 22 2020    CREATININE 1.4 (H) 2020       Hyperlipidemia:   We did recheck lipids and she is very close to goal at 73 LDL  Medication:   atorvastatin (Lipitor)  Low Fat, Low Choleterol Diet:  yes - to some degree  She will try to watch more closely to get this down a little more. Myalgias or GI upset: no  The patient exercises intermittently.   Laboratory:    Lab Results   Component Value Date    CHOL 163 2020    TRIG 104 2020    HDL 69 2020    LDLCALC 73 2020      Lab Results   Component Value Date    ALT 26 2020    AST 24 11/16/2020       COPD with Hypoxia:  We did adjust her COPD medications by adding back Umeclidinium in the form of Trelegy inhaler. We also qualified her for home oxygen. She states that [facility] would not honor the Rx. For her oxygen. She notes marked improvement in this as well. We also backed off on her metoprolol tartrate to maintain beta-1 selectivity and started Lasix to get rid of any fluid overload status that may have been secondary to that beta-blockade as well. We also added albuterol to her medication regimen. Medications:   Trelegy Ellipta 1 inhalation daily   Albuterol HFA 2 puffs every 4 hours as needed  Symptoms: significant improvement in her breathing. She has oxygen from a friend who passed away and is using that concentrator. She does still need a portable unit. We also strongly urged her to continue with her Pap device for sleep apnea      Chronic idiopathic constipation. We did eliminate some medications that could be exacerbating her constipation. We also recommended utilizing Jillian-Colace to help with maintaining normal regular bowel movements. Symptoms: she is still having some constipation she is taking 2 tablets daily. She notes if she takes 2 tablets bid, she has diarrhea  We discussed 3 tablets daily in association with fiber ought to do it. CKD 3:  Clinically stable based upon labs performed just a little over a week ago       Overactive Bladder:  She is much better in this regard. She is now able to sleep through the night where before she was up every 1-2 hours to void. She is doing well during the day as well.       vitals were not taken for this visit. There is no height or weight on file to calculate BMI. I have reviewed the following with the Ms. Hatch   Lab Review  Orders Only on 11/16/2020   Component Date Value    Vit D, 25-Hydroxy 11/16/2020 60.7     Vitamin B-12 11/16/2020 461     Folate 11/16/2020 >20.0     TSH 11/16/2020 1.510     Cholesterol, Total 11/16/2020 163     Triglycerides 11/16/2020 104     HDL 11/16/2020 69     LDL Calculated 11/16/2020 73     Sodium 11/16/2020 141     Potassium 11/16/2020 4.0     Chloride 11/16/2020 103     CO2 11/16/2020 20*    Anion Gap 11/16/2020 18     Glucose 11/16/2020 112*    BUN 11/16/2020 22     CREATININE 11/16/2020 1.4*    GFR Non- 11/16/2020 37*    GFR  11/16/2020 44*    Calcium 11/16/2020 9.2     Total Protein 11/16/2020 7.2     Alb 11/16/2020 4.0     Total Bilirubin 11/16/2020 0.6     Alkaline Phosphatase 11/16/2020 110*    ALT 11/16/2020 26     AST 11/16/2020 24     WBC 11/16/2020 8.5     RBC 11/16/2020 4.08*    Hemoglobin 11/16/2020 12.6     Hematocrit 11/16/2020 39.3     MCV 11/16/2020 96.3     MCH 11/16/2020 30.9     MCHC 11/16/2020 32.1*    RDW 11/16/2020 14.0     Platelets 85/51/2787 293     MPV 11/16/2020 11.4     Neutrophils % 11/16/2020 68.1*    Lymphocytes % 11/16/2020 22.1     Monocytes % 11/16/2020 7.0     Eosinophils % 11/16/2020 1.8     Basophils % 11/16/2020 0.5     Neutrophils Absolute 11/16/2020 5.8     Immature Granulocytes # 11/16/2020 0.0     Lymphocytes Absolute 11/16/2020 1.9     Monocytes Absolute 11/16/2020 0.60     Eosinophils Absolute 11/16/2020 0.20     Basophils Absolute 11/16/2020 0.00     T4 Free 11/16/2020 1.29      Copies of these are in the chart.     Current Outpatient Medications   Medication Sig Dispense Refill    albuterol sulfate HFA (VENTOLIN HFA) 108 (90 Base) MCG/ACT inhaler Inhale 2 puffs into the lungs 4 times daily as needed for Wheezing 1 Inhaler 5    senna-docusate (PERICOLACE) 8.6-50 MG per tablet Take 2 tablets by mouth 2 times daily 120 tablet 5    metoprolol (LOPRESSOR) 100 MG tablet Take 0.5 tablets by mouth 2 times daily 60 tablet 5    fluticasone-umeclidin-vilant (TRELEGY ELLIPTA) 100-62.5-25 MCG/INH AEPB Inhale 1 puff into the lungs daily 1 each 11    valsartan (DIOVAN) fever and unexpected weight change. Weight is gradually increasing. HENT: Negative for congestion, dental problem, ear pain, hearing loss, postnasal drip, rhinorrhea, sinus pressure, sore throat (in the mornings ), tinnitus, trouble swallowing and voice change. No headache, lightheadedness or dizziness   Eyes: Negative. Negative for photophobia, pain, discharge, redness, itching and visual disturbance. No change in vision. No blurred vision. No double vision   Respiratory: Positive for cough (occasional ) and shortness of breath (significant improvement over past month). Negative for wheezing. No history of asthma or pneumonia. No orthopnea. No pain with deep excursions   Cardiovascular: Positive for leg swelling. Negative for chest pain (or pressure) and palpitations. Gastrointestinal: Positive for constipation (severe ) and rectal pain (with stooling). Negative for abdominal distention, abdominal pain, blood in stool (or melena), diarrhea, nausea and vomiting. No acid reflux   Endocrine: Negative. Negative for cold intolerance, heat intolerance, polydipsia and polyuria. Genitourinary: Negative for pelvic pain and urgency. Nocturia frequently every 1-2 hours. Musculoskeletal: Positive for arthralgias (diffuse). Negative for neck pain (no change in range of motion). Skin: Negative. Negative for rash. Allergic/Immunologic: Positive for environmental allergies. Neurological: Positive for weakness (generalized but predominantly legs. ). Negative for dizziness, tremors, syncope, light-headedness, numbness (or tingling sensation) and headaches. Hematological: Negative for adenopathy. Does not bruise/bleed easily. Psychiatric/Behavioral: Negative. Negative for confusion (or difficulty with memory), dysphoric mood (to the point of interfering with her life functions) and sleep disturbance. The patient is not nervous/anxious (that is out of ordinary). Physical Exam  Physical exam was not performed today as this was a telephone conference visit      ASSESSMENT      ICD-10-CM    1. Essential hypertension  I10    2. Mixed hyperlipidemia  E78.2    3. Chronic obstructive pulmonary disease, unspecified COPD type ([facility]  J44.9 DME Order for Home Oxygen as OP   4. Dependence on continuous supplemental oxygen  Z99.81    5. Stage 3b chronic kidney disease  N18.32    6. Chronic idiopathic constipation  K59.04    7. OAB (overactive bladder)  N32.81  significantly improved on present medication regimen   8. Hypoxia  R09.02 DME Order for Home Oxygen as OP         PLAN    1. Essential hypertension  She is doing well from a blood pressure standpoint. She is not having any problems. I did recommend that she check her blood pressure from time to time with ambulatory monitoring. We will keep her medications as they are. Follow-up in 3 months    2. Mixed hyperlipidemia  Lipids are near optimally controlled with an LDL of 73, the goal being less than 70. Continue present medication regimen    3. Chronic obstructive pulmonary disease, unspecified COPD type ([facility]  Breathing is much better with adding back her long-acting anticholinergic antimuscarinic agent. This is in the form of Trelegy inhaler. We also backed off on her beta-blockade to 50 mg twice daily to maintain beta-1 selectivity. We also treated her with Lasix to get rid of any additional edema. She notes her symptoms are dramatically improved as is her edema. She does still use oxygen around the house with ambulation. She has a fixed unit from a neighbor who passed away. She does not have a portable oxygen unit that she can use from an ambulatory status. [facility], but she states they declined the order. We will look into this    4. Dependence on continuous supplemental oxygen  We will reorder oxygen as above    5.  Stage 3b chronic kidney disease  Renal status is stable with stage IIIb chronic kidney disease    6. Chronic idiopathic constipation  We are going to try Senokot-S 3 tablets daily with additional dietary fiber      Orders Placed This Encounter   Procedures    DME Order for Home Oxygen as OP        No follow-ups on file. Due to patients co-morbidities and risk for potential exposure of COVID 19 pandemic. Patient was contacted and agreed to proceed with a telephone virtual visit. The risks and benefits of converting to a telephone virtual visit were discussed in light of the current infectious disease epidemic. Patient also understood that insurance coverage and co-pays are up to their individual insurance plans. Provider performed history of present illness and review of systems. Diagnosis and treatment plan was discussed with patient. Pharmacy of choice was reviewed along with past medical history, medication allergies, and current medications. Education provided to patient or patient parents/guardian with current illness diagnosis as well as when to seek additional healthcare due to changing or for worsening symptoms. Patient voiced understanding. 25 minutes were spent on the phone with patient. Jomar Allen is a 77 y.o. female being evaluated by a Virtual Visit (Telephone visit) encounter to address concerns as mentioned above. A caregiver was present when appropriate. Due to this being a TeleHealth encounter (During [place] [address] public health emergency), evaluation of the following organ systems was limited: Vitals/Constitutional/EENT/Resp/CV/GI//MS/Neuro/Skin/Heme-Lymph-Imm. Pursuant to the emergency declaration under the [address], [facility] authority and the [employer] Act, this Virtual Visit was conducted with patient's (and/or legal guardian's) consent, to reduce the patient's risk of exposure to COVID-19 and provide necessary medical care.   The patient (and/or legal guardian) has also been advised to contact this office for worsening conditions or problems, and seek emergency medical treatment and/or call 911 if deemed necessary. Patient identification was verified at the start of the visit: Yes    Total time spent for this encounter: 25m    Services were provided through a video synchronous discussion virtually to substitute for in-person clinic visit. Patient and provider were located at their individual homes. --RONNELL Ding DO on 11/25/2020 at 10:48 AM    An electronic signature was used to authenticate this note.

## 2020-12-02 ENCOUNTER — TELEPHONE (OUTPATIENT)
Dept: PRIMARY CARE CLINIC | Age: 75
End: 2020-12-02

## 2020-12-02 NOTE — TELEPHONE ENCOUNTER
----- Message from GÉNESIS Hernandez sent at 11/17/2020  8:26 AM CST -----  Vitamin d normal  b12 normal  Folate normal  Cholesterol at goal  cmp noted decline in kidney function avoid nsaids increase fluids and recheck in 1 week  Thyroid normal  CBC normal no anemia or concerns

## 2020-12-16 DIAGNOSIS — N28.9 DECREASED RENAL FUNCTION: ICD-10-CM

## 2020-12-16 LAB
ANION GAP SERPL CALCULATED.3IONS-SCNC: 12 MMOL/L (ref 7–19)
BUN BLDV-MCNC: 24 MG/DL (ref 8–23)
CALCIUM SERPL-MCNC: 9.6 MG/DL (ref 8.8–10.2)
CHLORIDE BLD-SCNC: 103 MMOL/L (ref 98–111)
CO2: 25 MMOL/L (ref 22–29)
CREAT SERPL-MCNC: 1.4 MG/DL (ref 0.5–0.9)
GFR AFRICAN AMERICAN: 44
GFR NON-AFRICAN AMERICAN: 37
GLUCOSE BLD-MCNC: 92 MG/DL (ref 74–109)
POTASSIUM SERPL-SCNC: 4.5 MMOL/L (ref 3.5–5)
SODIUM BLD-SCNC: 140 MMOL/L (ref 136–145)

## 2020-12-17 ENCOUNTER — TELEPHONE (OUTPATIENT)
Dept: PRIMARY CARE CLINIC | Age: 75
End: 2020-12-17

## 2020-12-17 NOTE — TELEPHONE ENCOUNTER
----- Message from GÉNESIS Zapata sent at 12/17/2020  1:13 PM CST -----  Electrolytes wnl  Kidney function appears slightly dry  Avoid nsaids   Increase fluids  Recheck in 1 month  Limit lasix to just as needed

## 2020-12-18 NOTE — TELEPHONE ENCOUNTER
Called patient, spoke with: Patient regarding the results of the patients most recent labs. I advised Patient of Kaylyn Birmingham recommendations.    Patient did voice understanding

## 2021-01-05 RX ORDER — AMLODIPINE BESYLATE 5 MG/1
5 TABLET ORAL DAILY
Qty: 30 TABLET | Refills: 11 | Status: SHIPPED | OUTPATIENT
Start: 2021-01-05 | End: 2021-06-08 | Stop reason: SDUPTHER

## 2021-01-05 RX ORDER — FOLIC ACID 1 MG/1
1 TABLET ORAL DAILY
Qty: 30 TABLET | Refills: 11 | Status: SHIPPED | OUTPATIENT
Start: 2021-01-05 | End: 2022-04-28 | Stop reason: SDUPTHER

## 2021-01-05 NOTE — TELEPHONE ENCOUNTER
Requested Prescriptions     Signed Prescriptions Disp Refills    amLODIPine (NORVASC) 5 MG tablet 30 tablet 11     Sig: Take 1 tablet by mouth daily     Authorizing Provider: Benjy Grier     Ordering User: Rach Collado folic acid (FOLVITE) 1 MG tablet 30 tablet 11     Sig: Take 1 tablet by mouth daily     Authorizing Provider: Benjy Grier     Ordering User: Jem Frederick

## 2021-01-29 DIAGNOSIS — N28.9 FUNCTION KIDNEY DECREASED: ICD-10-CM

## 2021-01-29 LAB
ANION GAP SERPL CALCULATED.3IONS-SCNC: 9 MMOL/L (ref 7–19)
BUN BLDV-MCNC: 17 MG/DL (ref 8–23)
CALCIUM SERPL-MCNC: 9.6 MG/DL (ref 8.8–10.2)
CHLORIDE BLD-SCNC: 106 MMOL/L (ref 98–111)
CO2: 26 MMOL/L (ref 22–29)
CREAT SERPL-MCNC: 1.1 MG/DL (ref 0.5–0.9)
GFR AFRICAN AMERICAN: 59
GFR NON-AFRICAN AMERICAN: 49
GLUCOSE BLD-MCNC: 91 MG/DL (ref 74–109)
POTASSIUM SERPL-SCNC: 3.9 MMOL/L (ref 3.5–5)
SODIUM BLD-SCNC: 141 MMOL/L (ref 136–145)

## 2021-02-01 ENCOUNTER — TELEPHONE (OUTPATIENT)
Dept: PRIMARY CARE CLINIC | Age: 76
End: 2021-02-01

## 2021-02-01 DIAGNOSIS — N17.9 ACUTE RENAL FAILURE, UNSPECIFIED ACUTE RENAL FAILURE TYPE (HCC): Primary | ICD-10-CM

## 2021-02-01 NOTE — TELEPHONE ENCOUNTER
----- Message from GÉNESIS Parekh sent at 2/1/2021  8:19 AM CST -----  cmp kidney function improved   Cont to avoid nsaids and increase fluids  Recheck in 6 weeks

## 2021-02-25 ENCOUNTER — OFFICE VISIT (OUTPATIENT)
Dept: PRIMARY CARE CLINIC | Age: 76
End: 2021-02-25
Payer: MEDICARE

## 2021-02-25 VITALS
SYSTOLIC BLOOD PRESSURE: 122 MMHG | BODY MASS INDEX: 30.12 KG/M2 | TEMPERATURE: 97.8 F | DIASTOLIC BLOOD PRESSURE: 82 MMHG | WEIGHT: 198.75 LBS | HEIGHT: 68 IN | OXYGEN SATURATION: 98 % | HEART RATE: 62 BPM

## 2021-02-25 DIAGNOSIS — N32.81 OAB (OVERACTIVE BLADDER): ICD-10-CM

## 2021-02-25 DIAGNOSIS — I10 ESSENTIAL HYPERTENSION: Primary | ICD-10-CM

## 2021-02-25 DIAGNOSIS — E78.2 MIXED HYPERLIPIDEMIA: Chronic | ICD-10-CM

## 2021-02-25 DIAGNOSIS — K21.9 GASTROESOPHAGEAL REFLUX DISEASE, UNSPECIFIED WHETHER ESOPHAGITIS PRESENT: ICD-10-CM

## 2021-02-25 DIAGNOSIS — R53.83 FATIGUE, UNSPECIFIED TYPE: ICD-10-CM

## 2021-02-25 DIAGNOSIS — H69.83 DYSFUNCTION OF BOTH EUSTACHIAN TUBES: ICD-10-CM

## 2021-02-25 DIAGNOSIS — Z00.00 ROUTINE GENERAL MEDICAL EXAMINATION AT A HEALTH CARE FACILITY: ICD-10-CM

## 2021-02-25 DIAGNOSIS — N18.31 STAGE 3A CHRONIC KIDNEY DISEASE (HCC): ICD-10-CM

## 2021-02-25 DIAGNOSIS — K59.04 CHRONIC IDIOPATHIC CONSTIPATION: ICD-10-CM

## 2021-02-25 DIAGNOSIS — J44.9 CHRONIC OBSTRUCTIVE PULMONARY DISEASE, UNSPECIFIED COPD TYPE (HCC): ICD-10-CM

## 2021-02-25 PROCEDURE — G8417 CALC BMI ABV UP PARAM F/U: HCPCS | Performed by: PEDIATRICS

## 2021-02-25 PROCEDURE — 99214 OFFICE O/P EST MOD 30 MIN: CPT | Performed by: PEDIATRICS

## 2021-02-25 PROCEDURE — 1036F TOBACCO NON-USER: CPT | Performed by: PEDIATRICS

## 2021-02-25 PROCEDURE — G8926 SPIRO NO PERF OR DOC: HCPCS | Performed by: PEDIATRICS

## 2021-02-25 PROCEDURE — 3017F COLORECTAL CA SCREEN DOC REV: CPT | Performed by: PEDIATRICS

## 2021-02-25 PROCEDURE — 4040F PNEUMOC VAC/ADMIN/RCVD: CPT | Performed by: PEDIATRICS

## 2021-02-25 PROCEDURE — 1123F ACP DISCUSS/DSCN MKR DOCD: CPT | Performed by: PEDIATRICS

## 2021-02-25 PROCEDURE — G8400 PT W/DXA NO RESULTS DOC: HCPCS | Performed by: PEDIATRICS

## 2021-02-25 PROCEDURE — 1090F PRES/ABSN URINE INCON ASSESS: CPT | Performed by: PEDIATRICS

## 2021-02-25 PROCEDURE — 3023F SPIROM DOC REV: CPT | Performed by: PEDIATRICS

## 2021-02-25 PROCEDURE — G8484 FLU IMMUNIZE NO ADMIN: HCPCS | Performed by: PEDIATRICS

## 2021-02-25 PROCEDURE — G8427 DOCREV CUR MEDS BY ELIG CLIN: HCPCS | Performed by: PEDIATRICS

## 2021-02-25 PROCEDURE — G0438 PPPS, INITIAL VISIT: HCPCS | Performed by: PEDIATRICS

## 2021-02-25 RX ORDER — FLUTICASONE PROPIONATE 50 MCG
2 SPRAY, SUSPENSION (ML) NASAL DAILY
Qty: 3 BOTTLE | Refills: 1 | Status: SHIPPED | OUTPATIENT
Start: 2021-02-25 | End: 2021-06-08

## 2021-02-25 RX ORDER — PANTOPRAZOLE SODIUM 40 MG/1
40 TABLET, DELAYED RELEASE ORAL
Qty: 30 TABLET | Refills: 5 | Status: SHIPPED | OUTPATIENT
Start: 2021-02-25 | End: 2021-06-08 | Stop reason: SDUPTHER

## 2021-02-25 ASSESSMENT — ENCOUNTER SYMPTOMS
DIARRHEA: 0
SHORTNESS OF BREATH: 1
WHEEZING: 0
VOMITING: 0
RHINORRHEA: 0
ABDOMINAL PAIN: 0
SORE THROAT: 0
ABDOMINAL DISTENTION: 0
COUGH: 1
BLOOD IN STOOL: 0
RECTAL PAIN: 1
EYE PAIN: 0
SINUS PRESSURE: 0
NAUSEA: 0
VOICE CHANGE: 0
CONSTIPATION: 1
EYE DISCHARGE: 0
PHOTOPHOBIA: 0
TROUBLE SWALLOWING: 0
EYE REDNESS: 0
EYES NEGATIVE: 1
EYE ITCHING: 0

## 2021-02-25 ASSESSMENT — LIFESTYLE VARIABLES
AUDIT-C TOTAL SCORE: 4
HOW OFTEN DURING THE LAST YEAR HAVE YOU FAILED TO DO WHAT WAS NORMALLY EXPECTED FROM YOU BECAUSE OF DRINKING: 0
HAVE YOU OR SOMEONE ELSE BEEN INJURED AS A RESULT OF YOUR DRINKING: 0
HOW OFTEN DURING THE LAST YEAR HAVE YOU NEEDED AN ALCOHOLIC DRINK FIRST THING IN THE MORNING TO GET YOURSELF GOING AFTER A NIGHT OF HEAVY DRINKING: 0
HOW OFTEN DO YOU HAVE SIX OR MORE DRINKS ON ONE OCCASION: 0

## 2021-02-25 ASSESSMENT — PATIENT HEALTH QUESTIONNAIRE - PHQ9
2. FEELING DOWN, DEPRESSED OR HOPELESS: 0
SUM OF ALL RESPONSES TO PHQ QUESTIONS 1-9: 0
SUM OF ALL RESPONSES TO PHQ9 QUESTIONS 1 & 2: 0
SUM OF ALL RESPONSES TO PHQ QUESTIONS 1-9: 0

## 2021-02-25 NOTE — PATIENT INSTRUCTIONS
Personalized Preventive Plan for Girihs Michael - 2/25/2021  Medicare offers a range of preventive health benefits. Some of the tests and screenings are paid in full while other may be subject to a deductible, co-insurance, and/or copay. Some of these benefits include a comprehensive review of your medical history including lifestyle, illnesses that may run in your family, and various assessments and screenings as appropriate. After reviewing your medical record and screening and assessments performed today your provider may have ordered immunizations, labs, imaging, and/or referrals for you. A list of these orders (if applicable) as well as your Preventive Care list are included within your After Visit Summary for your review. Other Preventive Recommendations:    · A preventive eye exam performed by an eye specialist is recommended every 1-2 years to screen for glaucoma; cataracts, macular degeneration, and other eye disorders. · A preventive dental visit is recommended every 6 months. · Try to get at least 150 minutes of exercise per week or 10,000 steps per day on a pedometer . · Order or download the FREE \"Exercise & Physical Activity: Your Everyday Guide\" from [employer]. Call 1-334.148.1341 or search [employer] online. · You need 7930-5100 mg of calcium and 6372-8346 IU of vitamin D per day. It is possible to meet your calcium requirement with diet alone, but a vitamin D supplement is usually necessary to meet this goal.  · When exposed to the sun, use a sunscreen that protects against both UVA and UVB radiation with an SPF of 30 or greater. Reapply every 2 to 3 hours or after sweating, drying off with a towel, or swimming. · Always wear a seat belt when traveling in a car. Always wear a helmet when riding a bicycle or motorcycle.   Patient Education        Well Visit, Over 72: Care Instructions  Your Care Instructions     Physical exams can help you stay healthy. Your doctor has checked your overall health and may have suggested ways to take good care of yourself. He or she also may have recommended tests. At home, you can help prevent illness with healthy eating, regular exercise, and other steps. Follow-up care is a key part of your treatment and safety. Be sure to make and go to all appointments, and call your doctor if you are having problems. It's also a good idea to know your test results and keep a list of the medicines you take. How can you care for yourself at home? Reach and stay at a healthy weight. This will lower your risk for many problems, such as obesity, diabetes, heart disease, and high blood pressure. Get at least 30 minutes of exercise on most days of the week. Walking is a good choice. You also may want to do other activities, such as running, swimming, cycling, or playing tennis or team sports. Do not smoke. Smoking can make health problems worse. If you need help quitting, talk to your doctor about stop-smoking programs and medicines. These can increase your chances of quitting for good. Protect your skin from too much sun. When you're outdoors from 10 a.m. to 4 p.m., stay in the shade or cover up with clothing and a hat with a wide brim. Wear sunglasses that block UV rays. Even when it's cloudy, put broad-spectrum sunscreen (SPF 30 or higher) on any exposed skin. See a dentist one or two times a year for checkups and to have your teeth cleaned. Wear a seat belt in the car. Follow your doctor's advice about when to have certain tests. These tests can spot problems early. For men and women  Cholesterol. Your doctor will tell you how often to have this done based on your overall health and other things that can increase your risk for heart attack and stroke. Blood pressure. Have your blood pressure checked during a routine doctor visit.  Your doctor will tell you how often to check your blood pressure based on your age, your blood pressure results, and other factors. Diabetes. Ask your doctor whether you should have tests for diabetes. Vision. Experts recommend that you have yearly exams for glaucoma and other age-related eye problems. Hearing. Tell your doctor if you notice any change in your hearing. You can have tests to find out how well you hear. Colon cancer tests. Keep having colon cancer tests as your doctor recommends. You can have one of several types of tests. Heart attack and stroke risk. At least every 4 to 6 years, you should have your risk for heart attack and stroke assessed. Your doctor uses factors such as your age, blood pressure, cholesterol, and whether you smoke or have diabetes to show what your risk for a heart attack or stroke is over the next 10 years. Osteoporosis. Talk to your doctor about whether you should have a bone density test to find out whether you have thinning bones. Ask your doctor if you need to take a calcium plus vitamin D supplement. You may be able to get enough calcium and vitamin D through your diet. For women  Pap test and pelvic exam. You may no longer need a Pap test. Talk with your doctor about whether to stop or continue to have Pap tests. Breast exam and mammogram. Ask how often you should have a mammogram, which is an X-ray of your breasts. A mammogram can spot breast cancer before it can be felt and when it is easiest to treat. Thyroid disease. Talk to your doctor about whether to have your thyroid checked as part of a regular physical exam. Women have an increased chance of a thyroid problem. For men  Prostate exam. Talk to your doctor about whether you should have a blood test (called a PSA test) for prostate cancer. Experts recommend that you discuss the benefits and risks of the test with your doctor before you decide whether to have this test. Some experts say that men ages 79 and older no longer need testing. Abdominal aortic aneurysm.  Ask your doctor whether you should information. Patient Education        Preventing Outdoor Falls: Care Instructions  Your Care Instructions     Worries about falls don't need to keep you indoors. Outdoor activities like walking have big benefits for your health. You will need to watch your step and learn a few safety measures. If you are worried about having a fall outdoors, ask your doctor about exercises, classes, or physical therapy that may help. You can learn ways to gain strength, flexibility, and balance. Ask if it might help to use a cane or walker. You can make your time outdoors safer with a few simple measures. Follow-up care is a key part of your treatment and safety. Be sure to make and go to all appointments, and call your doctor if you are having problems. It's also a good idea to know your test results and keep a list of the medicines you take. How can you prevent falls outdoors? Wear shoes with firm soles and low heels. If you have to walk on an icy surface, use grippers that can be worn over your shoes in bad weather. Be extra careful if weather is bad. Walk on the grass when the sidewalks are slick. If you live in a place that gets snow and ice in the winter, sprinkle salt on slippery stairs and sidewalks. Be careful getting on or off buses and trains or getting in and out of cars. If handrails are available, use them. Be careful when you cross the street. Look for crosswalks or places where curb cuts or ramps are present. Try not to hurry, especially if you are carrying something. Be cautious in parking lots or garages. There may be curbs or changes in pavement, or the height of the pavement may vary. Make sure to wear the correct eyeglasses, if you need them. Reading glasses or bifocals can make it harder to see hazards that might be in your way. If you are walking outdoors for exercise, try to: Walk in well-lighted, well-maintained areas.  These include high school or college tracks, shopping malls, and Another common tub bench sits inside the tub. To use this type you need to be able to safely step into the tub before sitting down. Nonskid mats   Water makes both the floor of the tub and the bathroom floor slippery. You can buy adhesive strips that stick to the floor of the tub. Or you can use a nonskid tub mat. Outside the tub, make sure you use a rug with a nonskid bottom. Don't use a plain towel. Hand-held shower faucet   With a hand-held faucet, you can get clean without having to lower yourself into the tub. Hang a shower curtain to keep water from spilling out onto the floor. Follow-up care is a key part of your treatment and safety. Be sure to make and go to all appointments, and call your doctor if you are having problems. It's also a good idea to know your test results and keep a list of the medicines you take. Where can you learn more? Go to https://Instamojo.Freever. org and sign in to your [employer] account. Enter O391 in the [employer] box to learn more about \"Learning About Getting In and Out of a Bathtub Safely. \"     If you do not have an account, please click on the \"Sign Up Now\" link. Current as of: March 2, 2020               Content Version: 12.6  © 1293-3779 [employer]. Care instructions adapted under license by [facility] ([facility]). If you have questions about a medical condition or this instruction, always ask your healthcare professional. [place] [address] any warranty or liability for your use of this information. Patient Education        Learning About COVID-19 and Social Distancing  What is it? Social distancing means putting space between yourself and other people. The recommended distance is 6 feet, or about 2 meters. This also means staying away from any place where people may gather, such as rom[place] or other public gathering places. Why is it important?   Social distancing is the best way to reduce the spread of COVID-19. This virus seems to spread from person to person through droplets from coughing and sneezing. So if you keep your distance from others, you're less likely to get it or spread it. And social distancing is important for everyone, not just those who are at high risk of infection, like older people. You might have the virus but not have symptoms. You could then give the infection to someone you come into contact with. How is it done? Putting 6 feet, or about 2 meters, between you and other people is the recommended distance. Also stay away from any place where people may gather, such as rom[place] or other public gathering places. So if possible:  Work from home, and keep your kids at home. Don't travel if you don't have to. And avoid public transportation, ride-shares, and taxis unless you have no choice. Limit shopping to essentials, like food and medicines. Wear a cloth face cover if you have to go to a public place like the grocery store or pharmacy. Don't eat in restaurants. (You can still get takeout or food deliveries.)  Avoid crowds and busy places. Follow stay-at-home orders or other directions for your area. Where can you learn more? Go to https://PlugaroundpeInvenias.healthOBX Computing Corporation. org and sign in to your [email or web address] account. Enter A133 in the [facility] box to learn more about \"Learning About COVID-19 and Social Distancing. \"     If you do not have an account, please click on the \"Sign Up Now\" link. Current as of: December 18, 2020               Content Version: 12.7  © 2006-2021 Healthwise, Incorporated. Care instructions adapted under license by [facility] ([facility]). If you have questions about a medical condition or this instruction, always ask your healthcare professional. [place] [address] any warranty or liability for your use of this information. Patient Education        Learning About Healthy Weight  What is a healthy weight?     A healthy weight is the weight at which you feel good about yourself and have energy for work and play. It's also one that lowers your risk for health problems. What can you do to stay at a healthy weight? It can be hard to stay at a healthy weight, especially when fast food, vending-machine snacks, and processed foods are so easy to find. And with your busy lifestyle, activity may be low on your list of things to do. But staying at a healthy weight may be easier than you think. Here are some dos and don'ts for staying at a healthy weight:  Do eat healthy foods  The kinds of foods you eat have a big impact on both your weight and your health. Reaching and staying at a healthy weight is not about going on a diet. It's about making healthier food choices every day and changing your diet for good. Healthy eating means eating a variety of foods so that you get all the nutrients you need. Your body needs protein, carbohydrate, and fats for energy. They keep your heart beating, your brain active, and your muscles working. On most days, try to eat from each food group. This means eating a variety of: Whole grains, such as whole wheat breads and pastas. Fruits and vegetables. Dairy products, such as low-fat milk, yogurt, and cheese. Lean proteins, such as all types of fish, chicken without the skin, and beans. Don't have too much or too little of one thing. All foods, if eaten in moderation, can be part of healthy eating. Even sweets can be okay. If your favorite foods are high in fat, salt, sugar, or calories, limit how often you eat them. Eat smaller servings, or look for healthy substitutes. Do watch what you eat  Many people eat more than their bodies need. Part of staying at a healthy weight means learning how much food you really need from day to day and not eating more than that. Even with healthy foods, eating too much can make you gain weight.   Having a well-balanced diet means that you eat enough, but not too much, and that your food gives you the nutrients you need to stay healthy. So listen to your body. Eat when you're hungry. Stop when you feel satisfied. It's a good idea to have healthy snacks ready for when you get hungry. Keep healthy snacks with you at work, in your car, and at home. If you have a healthy snack easily available, you'll be less likely to pick a candy bar or bag of chips from a vending machine instead. Some healthy snacks you might want to keep on hand are fruit, low-fat yogurt, string cheese, low-fat microwave popcorn, raisins and other dried fruit, nuts, whole wheat crackers, pretzels, carrots, celery sticks, and broccoli. Do some physical activity   A big part of reaching and staying at a healthy weight is being active. When you're active, you burn calories. This makes it easier to reach and stay at a healthy weight. When you're active on a regular basis, your body burns more calories, even when you're at rest. Being active helps you lose fat and build lean muscle. Try to be active for at least 1 hour every day. This may sound like a lot, but it's okay to be active in smaller blocks of time that add up to 1 hour a day. Any activity that makes your heart beat faster and keeps it there for a while counts. A brisk walk, run, or swim will get your heart beating faster. So will climbing stairs, shooting baskets, or cycling. Even some household chores like vacuuming and mowing the lawn will get your heart rate up. Pick activities that you enjoy--ones that make your heart beat faster, your muscles stronger, and your muscles and joints more flexible. If you find more than one thing you like doing, do them all. You don't have to do the same thing every day. Don't diet  Diets don't work. Diets are temporary. Because you give up so much when you diet, you may be hungry and think about food all the time. And after you stop dieting, you also may overeat to make up for what you missed.  Most people who diet end up gaining back the pounds they lost--and more. Remember that healthy bodies come in lots of shapes and sizes. Everyone can get healthier by eating better and being more active. Where can you learn more? Go to https://SemiLevjesús.[employer] Systems. org and sign in to your [employer] account. Enter 530 [id number] in the [facility] box to learn more about \"Learning About Healthy Weight. \"     If you do not have an account, please click on the \"Sign Up Now\" link. Current as of: December 11, 2019               Content Version: 12.6  © 1922-6946 [employer]. Care instructions adapted under license by [facility] ([facility]). If you have questions about a medical condition or this instruction, always ask your healthcare professional. [address] any warranty or liability for your use of this information. Patient Education        Eating Healthy Foods: Care Instructions  Your Care Instructions     Eating healthy foods can help lower your risk for disease. Healthy food gives you energy and keeps your heart strong, your brain active, your muscles working, and your bones strong. A healthy diet includes a variety of foods from the basic food groups: grains, vegetables, fruits, milk and milk products, and meat and beans. Some people may eat more of their favorite foods from only one food group and, as a result, miss getting the nutrients they need. So, it is important to pay attention not only to what you eat but also to what you are missing from your diet. You can eat a healthy, balanced diet by making a few small changes. Follow-up care is a key part of your treatment and safety. Be sure to make and go to all appointments, and call your doctor if you are having problems. It's also a good idea to know your test results and keep a list of the medicines you take. How can you care for yourself at home? Look at what you eat  Keep a food diary for a week or two and record everything you eat or drink.  Track the number of servings you eat from each food group. For a balanced diet every day, eat a variety of:  6 or more ounce-equivalents of grains, such as cereals, breads, crackers, rice, or pasta, every day. An ounce-equivalent is 1 slice of bread, 1 cup of ready-to-eat cereal, or ½ cup of cooked rice, cooked pasta, or cooked cereal.  2½ cups of vegetables, especially:  Dark-green vegetables such as broccoli and spinach. Orange vegetables such as carrots and sweet potatoes. Dry beans (such as cintron and kidney beans) and peas (such as lentils). 2 cups of fresh, frozen, or canned fruit. A small apple or 1 banana or orange equals 1 cup.  3 cups of nonfat or low-fat milk, yogurt, or other milk products. 5½ ounces of meat and beans, such as chicken, fish, lean meat, beans, nuts, and seeds. One egg, 1 tablespoon of peanut butter, ½ ounce nuts or seeds, or ¼ cup of cooked beans equals 1 ounce of meat. Learn how to read food labels for serving sizes and ingredients. Fast-food and convenience-food meals often contain few or no fruits or vegetables. Make sure you eat some fruits and vegetables to make the meal more nutritious. Look at your food diary. For each food group, add up what you have eaten and then divide the total by the number of days. This will give you an idea of how much you are eating from each food group. See if you can find some ways to change your diet to make it more healthy. Start small  Do not try to make dramatic changes to your diet all at once. You might feel that you are missing out on your favorite foods and then be more likely to fail. Start slowly, and gradually change your habits. Try some of the following:  Use whole wheat bread instead of white bread. Use nonfat or low-fat milk instead of whole milk. Eat brown rice instead of white rice, and eat whole wheat pasta instead of white-flour pasta. Try low-fat cheeses and low-fat yogurt.   Add more fruits and vegetables to meals and have them for snacks. Add lettuce, tomato, cucumber, and onion to sandwiches. Add fruit to yogurt and cereal.  Enjoy food  You can still eat your favorite foods. You just may need to eat less of them. If your favorite foods are high in fat, salt, and sugar, limit how often you eat them, but do not cut them out entirely. Eat a wide variety of foods. Make healthy choices when eating out  The type of restaurant you choose can help you make healthy choices. Even fast-food chains are now offering more low-fat or healthier choices on the menu. Choose smaller portions, or take half of your meal home. When eating out, try:  A veggie pizza with a whole wheat crust or grilled chicken (instead of sausage or pepperoni). Pasta with roasted vegetables, grilled chicken, or marinara sauce instead of cream sauce. A vegetable wrap or grilled chicken wrap. Broiled or poached food instead of fried or breaded items. Make healthy choices easy  Buy packaged, prewashed, ready-to-eat fresh vegetables and fruits, such as baby carrots, salad mixes, and chopped or shredded broccoli and cauliflower. Buy packaged, presliced fruits, such as melon or pineapple. Choose 100% fruit or vegetable juice instead of soda. Limit juice intake to 4 to 6 oz (½ to ¾ cup) a day. Blend low-fat yogurt, fruit juice, and canned or frozen fruit to make a smoothie for breakfast or a snack. Where can you learn more? Go to https://Turbinejesús.healthFire[facility]. org and sign in to your instruMagic account. Enter I154 in the [facility] box to learn more about \"Eating Healthy Foods: Care Instructions. \"     If you do not have an account, please click on the \"Sign Up Now\" link. Current as of: August 22, 2019               Content Version: 12.6  © 1459-2435 [employer], Incorporated. Care instructions adapted under license by [facility] ([facility]).  If you have questions about a medical condition or this instruction, always ask your healthcare professional. [employer] disclaims any warranty or liability for your use of this information. Patient Education        Learning About Dietary Guidelines  What are the Dietary Guidelines for Americans? Dietary Guidelines for Americans provide tips for eating well and staying healthy. This helps reduce the risk for long-term (chronic) diseases. These adult guidelines from the [place] recommend that you:  Eat lots of fruits, vegetables, whole grains, and low-fat or nonfat dairy products. Try to balance your eating with your activity. This helps you stay at a healthy weight. Drink alcohol in moderation, if at all. Limit foods high in salt, saturated fat, trans fat, and added sugar. What is MyPlate? MyPlate is the [place] government's food guide. It can help you make your own well-balanced eating plan. A balanced eating plan means that you eat enough, but not too much, and that your food gives you the nutrients you need to stay healthy. MyPlate focuses on eating plenty of whole grains, fruits, and vegetables, and on limiting fat and sugar. It is available online at www. ChooseMyPlate.gov. How can you get started? If you're trying to eat healthier, you can slowly change your eating habits over time. You don't have to make big changes all at once. Start by adding one or two healthy foods to your meals each day. Grains  Choose whole-grain breads and cereals and whole-wheat pasta and whole-grain crackers. Vegetables  Eat a variety of vegetables every day. They have lots of nutrients and are part of a heart-healthy diet. Fruits  Eat a variety of fruits every day. Fruits contain lots of nutrients. Choose fresh fruit instead of fruit juice. Protein foods  Choose fish and lean poultry more often. Eat red meat and fried meats less often. Dried beans, tofu, and nuts are also good sources of protein. Dairy  Choose low-fat or fat-free products from this food group.  If you have problems digesting milk, try eating cheese or yogurt instead. Fats and oils  Limit fats and oils if you're trying to cut calories. Choose healthy fats when you cook. These include canola oil and olive oil. Where can you learn more? Go to https://boyd.healthCentral Desktop. org and sign in to your [employer] account. Enter H982 in the [employer] box to learn more about \"Learning About Dietary Guidelines. \"     If you do not have an account, please click on the \"Sign Up Now\" link. Current as of: August 22, 2019               Content Version: 12.6  © 9119-0087 [employer]. Care instructions adapted under license by [facility] [place] ([facility]). If you have questions about a medical condition or this instruction, always ask your healthcare professional. [address] any warranty or liability for your use of this information. Patient Education        Learning About Physical Activity  What is physical activity? Physical activity is any kind of activity that gets your body moving. The types of physical activity that can help you get fit and stay healthy include:  Aerobic or \"cardio\" activities that make your heart beat faster and make you breathe harder, such as brisk walking, riding a bike, or running. Aerobic activities strengthen your heart and lungs and build up your endurance. Strength training activities that make your muscles work against, or \"resist,\" something, such as lifting weights or doing push-ups. These activities help tone and strengthen your muscles. Stretches that allow you to move your joints and muscles through their full range of motion. Stretching helps you be more flexible and avoid injury. What are the benefits of physical activity? Being active is one of the best things you can do for your health. It helps you to:  Feel stronger and have more energy to do all the things you like to do. Focus better at school or work. Feel, think, and sleep better.   Reach and stay at a healthy weight. Lose fat and build lean muscle. Lower your risk for serious health problems. Keep your bones, muscles, and joints strong. How can you make physical activity part of your life? Get at least 30 minutes of exercise on most days of the week. Walking is a good choice. You also may want to do other activities, such as running, swimming, cycling, or playing tennis or team sports. Pick activities that you like--ones that make your heart beat faster, your muscles stronger, and your muscles and joints more flexible. If you find more than one thing you like doing, do them all. You don't have to do the same thing every day. Get your heart pumping every day. Any activity that makes your heart beat faster and keeps it at that rate for a while counts. Here are some great ways to get your heart beating faster:  Go for a brisk walk, run, or bike ride. Go for a hike or swim. Go in-line skating. Play a game of touch football, basketball, or soccer. Ride a bike. Play tennis or racquetball. Climb stairs. Even some household chores can be aerobic--just do them at a faster pace. Vacuuming, raking or mowing the lawn, sweeping the garage, and washing and waxing the car all can help get your heart rate up. Strengthen your muscles during the week. You don't have to lift heavy weights or grow big, bulky muscles to get stronger. Doing a few simple activities that make your muscles work against, or \"resist,\" something can help you get stronger. For example, you can:  Do push-ups or sit-ups, which use your own body weight as resistance. Lift weights or dumbbells or use stretch bands at home or in a gym or community center. Stretch your muscles often. Stretching will help you as you become more active. It can help you stay flexible, loosen tight muscles, and avoid injury. It can also help improve your balance and posture and can be a great way to relax.   Be sure to stretch the muscles you'll be using when you work out. It's best to warm your muscles slightly before you stretch them. Walk or do some other light aerobic activity for a few minutes, and then start stretching. When you stretch your muscles:  Do it slowly. Stretching is not about going fast or making sudden movements. Don't push or bounce during a stretch. Hold each stretch for at least 15 to 30 seconds, if you can. You should feel a stretch in the muscle, but not pain. Breathe out as you do the stretch. Then breathe in as you hold the stretch. Don't hold your breath. If you're worried about how more activity might affect your health, have a checkup before you start. Follow any special advice your doctor gives you for getting a smart start. Where can you learn more? Go to https://[email or web address]. org and sign in to your [employer] account. Enter R000 in the [employer] box to learn more about \"Learning About Physical Activity. \"     If you do not have an account, please click on the \"Sign Up Now\" link. Current as of: January 16, 2020               Content Version: 12.6  © 1105-7886 [employer]. Care instructions adapted under license by [facility] ([facility]). If you have questions about a medical condition or this instruction, always ask your healthcare professional. [address] any warranty or liability for your use of this information. Patient Education        Learning About Vision Tests  What are vision tests? The four most common vision tests are visual acuity tests, refraction, visual field tests, and color vision tests. Visual acuity (sharpness) tests  These tests are used: To see if you need glasses or contact lenses. To monitor an eye problem. To check an eye injury. Visual acuity tests are done as part of routine exams. You may also have this test when you get your 's license or apply for some types of jobs. Visual field tests  These tests are used:   To check for vision loss in any area of your range of vision. To screen for certain eye diseases. To look for nerve damage after a stroke, head injury, or other problem that could reduce blood flow to the brain. Refraction and color tests  A refraction test is done to find the right prescription for glasses and contact lenses. A color vision test is done to check for color blindness. Color vision is often tested as part of a routine exam. You may also have this test when you apply for a job where recognizing different colors is important, such as , electronics, or the [employer]. How are vision tests done? Visual acuity test   You cover one eye at a time. You read aloud from a chart across the room. You read aloud from a small card that you hold in your hand. Refraction   You look into a special device. The device puts lenses of different strengths in front of each eye to see how strong your glasses or contact lenses need to be. Visual field tests   Your doctor may have you look through special machines. Or your doctor may simply have you stare straight ahead while he or she moves a finger into and out of your field of vision. Color vision test   You look at pieces of printed test patterns in various colors. You say what number or symbol you see. Your doctor may have you trace the number or symbol using a pointer. How do these tests feel? There is very little chance of having a problem from this test. If dilating drops are used for a vision test, they may make the eyes sting and cause a medicine taste in the mouth. Follow-up care is a key part of your treatment and safety. Be sure to make and go to all appointments, and call your doctor if you are having problems. It's also a good idea to know your test results and keep a list of the medicines you take. Where can you learn more? Go to https://boyd.[employer]zzoo. org and sign in to your [employer] account.  Enter G551 in the [facility] box to learn more about \"Learning About Vision Tests. \"     If you do not have an account, please click on the \"Sign Up Now\" link. Current as of: December 18, 2019               Content Version: 12.6  © 8056-4292 [employer]. Care instructions adapted under license by [facility] ([facility]). If you have questions about a medical condition or this instruction, always ask your healthcare professional. [address] any warranty or liability for your use of this information.

## 2021-02-25 NOTE — PROGRESS NOTES
[facility] [facility], [address]  Phone [phone]   Fax [phone]      OFFICE VISIT: 2021    Kris Hatch-: 1945      HPI  Reason For Visit:  Kenny Wang is a 77 y.o. Medicare AWV, Otalgia (ear pain in left ear for the past few days), Heartburn (heartburn and acid reflux for the past 3 months and has been getting worse. ), and Health Maintenance (COVID vaccines at [facility] [place], colon screen-  [facility], declined breast screen and flu shot)    Patient presents for routine annual wellness evaluation. She also presents with multiple health issues. Present concerns:  She is having some left ear pain. This is been going on for a few days now. No other respiratory symptoms. Treatment: some otc medication   This did not get any better. GERD:  Medication:   None   Symptoms: She has noticed significant increase in her GERD symptoms with past couple of months. This will occasionally wake her up in the middle of the night. She can also have symptoms during the day. She notes that she can work things in the morning that tasted like food she ate the night before      Hypertension:   BP today was   BP Readings from Last 1 Encounters:   21 122/82      Recent BP readings:    BP Readings from Last 3 Encounters:   21 122/82   10/26/20 118/80   20 122/78     Medication   Amlodipine 5 mg daily   Metoprolol tartrate 100 mg, 1/2 tablet twice daily   Valsartan 160 mg daily   Lasix 20 mg daily  Medication compliance:  compliant most of the time  Home blood pressure monitoring: No.    She Is somewhat adherent to a low sodium diet. Symptoms: none  Laboratory:  Lab Results   Component Value Date    BUN 17 2021    CREATININE 1.1 (H) 2021       Hyperlipidemia:   Medication:   atorvastatin (Lipitor)  Low Fat, Low Choleterol Diet:  yes -to some degree  Myalgias or GI upset: no  The patient exercises intermittently.   Laboratory:    Lab Results   Component Value Date    CHOL 163 11/16/2020    TRIG 104 11/16/2020    HDL 69 11/16/2020    LDLCALC 73 11/16/2020      Lab Results   Component Value Date    ALT 26 11/16/2020    AST 24 11/16/2020       COPD:  Medication:   Trilogy Ellipta 100-60 2.5-25 mcg/inh., 1 inhalation daily   Ventolin HFA, 2 puffs every 4 hours as needed   Oxygen nocturnally   Symptoms: doing very well      Overactive bladder:  Medication:   Detrol 2 mg twice daily  Symptoms: this is much better. Chronic idiopathic constipation:  Medication:   Jillian-Colace 1 tablet twice daily  Symptoms: this is effective at managing her symptoms. CKD 3 a  She is not on any nephrotoxic drugs. Blood pressure is well controlled. No history of any microalbuminuria determination/evaluation     height is 5' 8\" (1.727 m) and weight is 198 lb 12 oz (90.2 kg). Her temporal temperature is 97.8 °F (36.6 °C). Her blood pressure is 122/82 and her pulse is 62. Her oxygen saturation is 98%. Body mass index is 30.22 kg/m². I have reviewed the following with the Ms. Hatch   Lab Review  Orders Only on 01/29/2021   Component Date Value    Sodium 01/29/2021 141     Potassium 01/29/2021 3.9     Chloride 01/29/2021 106     CO2 01/29/2021 26     Anion Gap 01/29/2021 9     Glucose 01/29/2021 91     BUN 01/29/2021 17     CREATININE 01/29/2021 1.1*    GFR Non- 01/29/2021 49*    GFR  01/29/2021 59*    Calcium 01/29/2021 9.6    Orders Only on 12/16/2020   Component Date Value    Sodium 12/16/2020 140     Potassium 12/16/2020 4.5     Chloride 12/16/2020 103     CO2 12/16/2020 25     Anion Gap 12/16/2020 12     Glucose 12/16/2020 92     BUN 12/16/2020 24*    CREATININE 12/16/2020 1.4*    GFR Non- 12/16/2020 37*    GFR  12/16/2020 44*    Calcium 12/16/2020 9.6    Orders Only on 11/16/2020   Component Date Value    Vit D, 25-Hydroxy 11/16/2020 60.7     Vitamin B-12 11/16/2020 461     Folate 11/16/2020 >20.0     TSH 11/16/2020 1.510     Cholesterol, Total 11/16/2020 163     Triglycerides 11/16/2020 104     HDL 11/16/2020 69     LDL Calculated 11/16/2020 73     Sodium 11/16/2020 141     Potassium 11/16/2020 4.0     Chloride 11/16/2020 103     CO2 11/16/2020 20*    Anion Gap 11/16/2020 18     Glucose 11/16/2020 112*    BUN 11/16/2020 22     CREATININE 11/16/2020 1.4*    GFR Non- 11/16/2020 37*    GFR  11/16/2020 44*    Calcium 11/16/2020 9.2     Total Protein 11/16/2020 7.2     Albumin 11/16/2020 4.0     Total Bilirubin 11/16/2020 0.6     Alkaline Phosphatase 11/16/2020 110*    ALT 11/16/2020 26     AST 11/16/2020 24     WBC 11/16/2020 8.5     RBC 11/16/2020 4.08*    Hemoglobin 11/16/2020 12.6     Hematocrit 11/16/2020 39.3     MCV 11/16/2020 96.3     MCH 11/16/2020 30.9     MCHC 11/16/2020 32.1*    RDW 11/16/2020 14.0     Platelets 80/62/4844 293     MPV 11/16/2020 11.4     Neutrophils % 11/16/2020 68.1*    Lymphocytes % 11/16/2020 22.1     Monocytes % 11/16/2020 7.0     Eosinophils % 11/16/2020 1.8     Basophils % 11/16/2020 0.5     Neutrophils Absolute 11/16/2020 5.8     Immature Granulocytes # 11/16/2020 0.0     Lymphocytes Absolute 11/16/2020 1.9     Monocytes Absolute 11/16/2020 0.60     Eosinophils Absolute 11/16/2020 0.20     Basophils Absolute 11/16/2020 0.00     T4 Free 11/16/2020 1.29      Copies of these are in the chart.     Current Outpatient Medications   Medication Sig Dispense Refill    pantoprazole (PROTONIX) 40 MG tablet Take 1 tablet by mouth every morning (before breakfast) 30 tablet 5    fluticasone (FLONASE) 50 MCG/ACT nasal spray 2 sprays by Each Nostril route daily 3 Bottle 1    amLODIPine (NORVASC) 5 MG tablet Take 1 tablet by mouth daily 30 tablet 11    folic acid (FOLVITE) 1 MG tablet Take 1 tablet by mouth daily 30 tablet 11    albuterol sulfate HFA (VENTOLIN HFA) 108 (90 Base) MCG/ACT inhaler Inhale 2 puffs into the lungs 4 times daily as needed for Wheezing 1 Inhaler 5    senna-docusate (PERICOLACE) 8.6-50 MG per tablet Take 2 tablets by mouth 2 times daily (Patient taking differently: Take 1 tablet by mouth 2 times daily ) 120 tablet 5    metoprolol (LOPRESSOR) 100 MG tablet Take 0.5 tablets by mouth 2 times daily 60 tablet 5    fluticasone-umeclidin-vilant (TRELEGY ELLIPTA) 100-62.5-25 MCG/INH AEPB Inhale 1 puff into the lungs daily 1 each 11    valsartan (DIOVAN) 160 MG tablet Take 1 tablet by mouth daily 30 tablet 11    furosemide (LASIX) 20 MG tablet Take 1 tablet by mouth daily 30 tablet 11    tolterodine (DETROL) 2 MG tablet Take 2 mg by mouth 2 times daily      atorvastatin (LIPITOR) 40 MG tablet Take 40 mg by mouth daily      Multiple Vitamins-Minerals (OCUVITE-LUTEIN) CAPS multivitamin Take 1 capsule by mouth daily 30 capsule 0     No current facility-administered medications for this visit.         Allergies: Lyrica [pregabalin], Sulfa antibiotics, Bactrim [sulfamethoxazole-trimethoprim], and Keflex [cephalexin]     Past Medical History:   Diagnosis Date    Arthritis     COPD (chronic obstructive pulmonary disease) ([facility] [address]     Hyperlipidemia     Hypertension     Pneumonia     in the past    Vitamin B12 deficiency     Vitamin D deficiency        Family History   Problem Relation Age of Onset    Cancer Mother     Heart Attack Mother         cause of death    Cancer Father        Past Surgical History:   Procedure Laterality Date    APPENDECTOMY      BACK SURGERY      BREAST SURGERY      biopsy    HAND SURGERY Bilateral     HEMIARTHROPLASTY HIP Left 10/16/2017    HIP HEMIARTHROPLASTY performed by Latrell Dominguez MD at [address] [place] [address]      s/p left hip, bilateral total knee replacements    KNEE SURGERY Bilateral     L 2008/ R 2009    NECK SURGERY      TONSILLECTOMY         Social History     Tobacco Use    Smoking status: Former Smoker Packs/day: 0.50     Years: 15.00     Pack years: 7.50     Types: Cigarettes     Quit date: 10/26/2019     Years since quittin.3    Smokeless tobacco: Never Used   Substance Use Topics    Alcohol use: Yes     Comment: 3/day        Review of Systems   Constitutional: Positive for fatigue. Negative for appetite change (appetite normal), chills, fever and unexpected weight change. Weight is gradually increasing. HENT: Negative for congestion, dental problem, ear pain, hearing loss, postnasal drip, rhinorrhea, sinus pressure, sore throat (in the mornings ), tinnitus, trouble swallowing and voice change. No headache, lightheadedness or dizziness   Eyes: Negative. Negative for photophobia, pain, discharge, redness, itching and visual disturbance. No change in vision. No blurred vision. No double vision   Respiratory: Positive for cough (occasional ) and shortness of breath (significant improvement over past month). Negative for wheezing. No history of asthma or pneumonia. No orthopnea. No pain with deep excursions   Cardiovascular: Positive for leg swelling. Negative for chest pain (or pressure) and palpitations. Gastrointestinal: Positive for constipation (severe ) and rectal pain (with stooling). Negative for abdominal distention, abdominal pain, blood in stool (or melena), diarrhea, nausea and vomiting. No acid reflux   Endocrine: Negative. Negative for cold intolerance, heat intolerance, polydipsia and polyuria. Genitourinary: Negative for pelvic pain and urgency. Nocturia frequently every 1-2 hours. Musculoskeletal: Positive for arthralgias (diffuse). Negative for neck pain (no change in range of motion). Skin: Negative. Negative for rash. Allergic/Immunologic: Positive for environmental allergies. Neurological: Positive for weakness (generalized but predominantly legs. ).  Negative for dizziness, tremors, syncope, light-headedness, numbness (or tingling sensation) and headaches. Hematological: Negative for adenopathy. Does not bruise/bleed easily. Psychiatric/Behavioral: Negative. Negative for confusion (or difficulty with memory), dysphoric mood (to the point of interfering with her life functions) and sleep disturbance. The patient is not nervous/anxious (that is out of ordinary). Physical Exam  Vitals signs reviewed. Constitutional:       General: She is not in acute distress. Appearance: She is well-developed. She is obese. She is not toxic-appearing. HENT:      Head: Normocephalic and atraumatic. Right Ear: Hearing, tympanic membrane, ear canal and external ear normal.      Left Ear: Hearing, tympanic membrane, ear canal and external ear normal.      Nose: Nose normal.      Mouth/Throat:      Mouth: Mucous membranes are moist.      Pharynx: Oropharynx is clear. Eyes:      General: Lids are normal.      Extraocular Movements: Extraocular movements intact. Conjunctiva/sclera: Conjunctivae normal.      Pupils: Pupils are equal, round, and reactive to light. Neck:      Musculoskeletal: Neck supple. Thyroid: No thyromegaly. Vascular: No carotid bruit or JVD. Trachea: Phonation normal.   Cardiovascular:      Rate and Rhythm: Regular rhythm. Bradycardia present. No extrasystoles are present. Chest Wall: PMI is not displaced. Pulses: Normal pulses. Heart sounds: Normal heart sounds. No murmur. No friction rub. No gallop. Comments: Stasis changes bilaterally  Pulmonary:      Effort: Pulmonary effort is normal. No respiratory distress. Breath sounds: Normal breath sounds. Decreased air movement (throughout) present. No wheezing, rhonchi or rales. Abdominal:      General: Bowel sounds are normal. There is no distension. Palpations: Abdomen is soft. There is no mass. Tenderness: There is no abdominal tenderness.    Genitourinary:     Comments: Examination deferred  Musculoskeletal: Normal range of motion. Right lower le+ Edema present. Left lower le+ Edema present. Comments: Joint examination reveals no acute arthritis or synovitis. Lymphadenopathy:      Cervical: No cervical adenopathy. Skin:     General: Skin is warm and dry. Capillary Refill: Capillary refill takes less than 2 seconds. Findings: No rash. Comments: She has stasis changes in bilateral lower extremities   Neurological:      General: No focal deficit present. Mental Status: She is alert and oriented to person, place, and time. Cranial Nerves: No cranial nerve deficit (by gross examination). Sensory: No sensory deficit. Motor: No tremor or atrophy. Gait: Gait normal.   Psychiatric:         Mood and Affect: Mood normal.         Speech: Speech normal.         Behavior: Behavior normal. Behavior is cooperative. Health Maintenance  She does not want typical HM  She did get her covid-19 vaccines x2    ASSESSMENT      ICD-10-CM    1. Essential hypertension  I10 CBC Auto Differential     Comprehensive Metabolic Panel     Microalbumin / Creatinine Urine Ratio   2. Mixed hyperlipidemia  E78.2 Lipid Panel   3. Chronic obstructive pulmonary disease, unspecified COPD type ([facility])  J44.9    4. OAB (overactive bladder)  N32.81    5. Chronic idiopathic constipation  K59.04    6. Stage 3a chronic kidney disease  N18.31    7. Routine general medical examination at a health care facility  Z00.00 CBC Auto Differential     Comprehensive Metabolic Panel     Lipid Panel     Microalbumin / Creatinine Urine Ratio     T4, Free     TSH without Reflex   8. Fatigue, unspecified type  R53.83 T4, Free     TSH without Reflex   9. Gastroesophageal reflux disease, unspecified whether esophagitis present  K21.9 pantoprazole (PROTONIX) 40 MG tablet   10. Dysfunction of both eustachian tubes  H69.83 fluticasone (FLONASE) 50 MCG/ACT nasal spray         PLAN    1. Essential hypertension  Blood pressure is appropriately controlled on present medication regimen. Continue the same  - CBC Auto Differential; Future  - Comprehensive Metabolic Panel; Future  - Microalbumin / Creatinine Urine Ratio; Future    2. Mixed hyperlipidemia  We do need to recheck lipids on this present medication regimen. Last lipid evaluation was slightly elevated beyond what we would like however given that she does not have established coronary artery disease, it is acceptable  - Lipid Panel; Future    3. Chronic obstructive pulmonary disease, unspecified COPD type ([address]  This is her most limiting factor. She is very satisfied with the Trelegy Ellipta  She does rarely use her albuterol however. She is using oxygen at night    4. OAB (overactive bladder)  She is very pleased with present Detrol 2 mg twice daily. She does have some dry mouth but this is very tolerable. 5. Chronic idiopathic constipation  Her constipation is well controlled with Senokot-S. She is taking 2 tablets in the morning. She was concerned as to whether this could be aversive to her health. I reassured her that this is of no significant consequence    6. Stage 3a chronic kidney disease  This is stable. We will recheck urine function we will also need to check microalbumin to ensure that there is no ongoing injury to the kidneys. Risk factors are very well controlled. She is not on any nephrotoxic drugs    7. Routine general medical examination at a health care facility  Routine age-appropriate anticipatory guidance was provided. Annual wellness visit was performed in a separate note and issues were addressed as identified.   - CBC Auto Differential; Future  - Comprehensive Metabolic Panel; Future  - Lipid Panel; Future  - Microalbumin / Creatinine Urine Ratio; Future  - T4, Free; Future  - TSH without Reflex; Future    8.  Fatigue, unspecified type  We will check thyroid with her next labs ordered  - T4, Free; Future  - TSH without Reflex; Future    9. Gastroesophageal reflux disease, unspecified whether esophagitis present  We are going to initiate Protonix 40 mg daily. This will be taken in the morning. We also discussed that if she is still having had symptoms in the evenings, we will add famotidine to her medication regimen. We will try to keep medications as minimal as possible. - pantoprazole (PROTONIX) 40 MG tablet; Take 1 tablet by mouth every morning (before breakfast)  Dispense: 30 tablet; Refill: 5    10. Dysfunction of both eustachian tubes  No evidence of any acute otitis media. She does have acute otitis media with effusion. We discussed that this is most likely secondary to eustachian tube dysfunction. We are going to try Flonase nasal spray 2 sprays each nostril daily. - fluticasone (FLONASE) 50 MCG/ACT nasal spray; 2 sprays by Each Nostril route daily  Dispense: 3 Bottle; Refill: 1      Orders Placed This Encounter   Procedures    CBC Auto Differential    Comprehensive Metabolic Panel    Lipid Panel    Microalbumin / Creatinine Urine Ratio    T4, Free    TSH without Reflex        Return in 3 months (on 2021) for Medicare Annual Wellness Visit in 1 year, 27. We are going to follow-up in 3 months time to make sure that this medication regimen is effective and that her acid reflux is improved. She does not want to do routine health maintenance. She is pretty determined in this regard. This was an in-house visit            Medicare Annual Wellness Visit  Name: Pallavi Lindsey Date: 2021   MRN: [id number] Sex: Female   Age: 77 y.o.  Ethnicity: Non-/Non    : 1945 Race: Missael Seals is here for Medicare AWV, Otalgia (ear pain in left ear for the past few days), Heartburn (heartburn and acid reflux for the past 3 months and has been getting worse. ), and Health Maintenance (COVID vaccines at [facility] [place], colon screen-  [facility], declined breast screen and flu shot)    Screenings for behavioral, psychosocial and functional/safety risks, and cognitive dysfunction are all negative except as indicated below. These results, as well as other patient data from the [address] form, are documented in Flowsheets linked to this Encounter. Allergies   Allergen Reactions    Lyrica [Pregabalin] Other (See Comments)     \"jitters\"    Sulfa Antibiotics     Bactrim [Sulfamethoxazole-Trimethoprim] Rash    Keflex [Cephalexin] Rash         Prior to Visit Medications    Medication Sig Taking?  Authorizing Provider   pantoprazole (PROTONIX) 40 MG tablet Take 1 tablet by mouth every morning (before breakfast) Yes REINA Landin DO   fluticasone (FLONASE) 50 MCG/ACT nasal spray 2 sprays by Each Nostril route daily Yes REINA Landin DO   amLODIPine (NORVASC) 5 MG tablet Take 1 tablet by mouth daily Yes REINA Landin DO   folic acid (FOLVITE) 1 MG tablet Take 1 tablet by mouth daily Yes REINA Landin DO   albuterol sulfate HFA (VENTOLIN HFA) 108 (90 Base) MCG/ACT inhaler Inhale 2 puffs into the lungs 4 times daily as needed for Wheezing Yes REINA Landin DO   senna-docusate (PERICOLACE) 8.6-50 MG per tablet Take 2 tablets by mouth 2 times daily  Patient taking differently: Take 1 tablet by mouth 2 times daily  Yes REINA Landin DO   metoprolol (LOPRESSOR) 100 MG tablet Take 0.5 tablets by mouth 2 times daily Yes REINA Landin DO   fluticasone-umeclidin-vilant (TRELEGY ELLIPTA) 100-62.5-25 MCG/INH AEPB Inhale 1 puff into the lungs daily Yes REINA Landin DO   valsartan (DIOVAN) 160 MG tablet Take 1 tablet by mouth daily Yes REINA Landin DO   furosemide (LASIX) 20 MG tablet Take 1 tablet by mouth daily Yes REINA Landin DO   tolterodine (DETROL) 2 MG tablet Take 2 mg by mouth 2 times daily Yes Historical Provider, MD   atorvastatin (LIPITOR) 40 MG tablet Take 40 mg by mouth daily Yes Historical Provider, MD Multiple Vitamins-Minerals (OCUVITE-LUTEIN) CAPS multivitamin Take 1 capsule by mouth daily Yes Son Blackmon MD         Past Medical History:   Diagnosis Date    Arthritis     COPD (chronic obstructive pulmonary disease) ([address]     Hyperlipidemia     Hypertension     Pneumonia     in the past    Vitamin B12 deficiency     Vitamin D deficiency        Past Surgical History:   Procedure Laterality Date    APPENDECTOMY      BACK SURGERY      BREAST SURGERY      biopsy    HAND SURGERY Bilateral     HEMIARTHROPLASTY HIP Left 10/16/2017    HIP HEMIARTHROPLASTY performed by Destinee Mitchell MD at [address] [address] [address]      s/p left hip, bilateral total knee replacements    KNEE SURGERY Bilateral     L 2008/ R 2009    NECK SURGERY      TONSILLECTOMY           Family History   Problem Relation Age of Onset    Cancer Mother     Heart Attack Mother         cause of death    Cancer Father        CareTeam (Including outside providers/suppliers regularly involved in providing care):   Patient Care Team:  REINA Becerra DO as PCP - General (Pediatrics)  REINA Becerra DO as PCP - [facility] Empaneled Provider    Wt Readings from Last 3 Encounters:   02/25/21 198 lb 12 oz (90.2 kg)   10/26/20 208 lb (94.3 kg)   03/17/20 204 lb (92.5 kg)     Vitals:    02/25/21 1006   BP: 122/82   Site: Left Upper Arm   Position: Sitting   Cuff Size: Large Adult   Pulse: 62   Temp: 97.8 °F (36.6 °C)   TempSrc: Temporal   SpO2: 98%   Weight: 198 lb 12 oz (90.2 kg)   Height: 5' 8\" (1.727 m)     Body mass index is 30.22 kg/m². Based upon direct observation of the patient, evaluation of cognition reveals recent and remote memory intact. Physical exam as documented elsewhere in a separate note    Patient's complete Health Risk Assessment and screening values have been reviewed and are found in Flowsheets.  The following problems were reviewed today and where indicated follow up appointments were made and/or referrals ordered. Positive Risk Factor Screenings with Interventions:     Fall Risk:  2 or more falls in past year?: (!) yes  Fall with injury in past year?: no  Fall Risk Interventions:    · Home safety tips provided          General Health and ACP:  General  In general, how would you say your health is?: Good  In the past 7 days, have you experienced any of the following?  New or Increased Pain, New or Increased Fatigue, Loneliness, Social Isolation, Stress or Anger?: None of These  Do you get the social and emotional support that you need?: (!) No  Do you have a Living Will?: Yes  Advance Directives     Power of [facility] Will ACP-Advance Directive ACP-Power of     Not on File [place] on 10/19/17 Filed [address] [place] Risk Interventions:  · Social isolation: Additional information was provided    Health Habits/Nutrition:  Health Habits/Nutrition  Do you exercise for at least 20 minutes 2-3 times per week?: (!) No  Have you lost any weight without trying in the past 3 months?: No  Do you eat only one meal per day?: No  Have you seen the dentist within the past year?: N/A - wear dentures  Body mass index: (!) 30.22  Health Habits/Nutrition Interventions:  · Inadequate physical activity:  educational materials provided to promote increased physical activity  · Nutritional issues:  educational materials for healthy, well-balanced diet provided, educational materials to promote weight loss provided    Hearing/Vision:  No exam data present  Hearing/Vision  Do you or your family notice any trouble with your hearing that hasn't been managed with hearing aids?: No  Do you have difficulty driving, watching TV, or doing any of your daily activities because of your eyesight?: No  Have you had an eye exam within the past year?: (!) No  Hearing/Vision Interventions:  · Vision concerns:  patient encouraged to make appointment with his/her eye specialist      Personalized Preventive Plan   Current Health Maintenance Status  Immunization History   Administered Date(s) Administered    COVID-19, Moderna, 100mcg/0.5ml 01/19/2021, 02/09/2021    Influenza, MDCK Quadv, IM, PF (Flucelvax 4 yrs and older) 10/19/2017    Pneumococcal Conjugate 13-valent ([place]) 10/19/2017        Health Maintenance   Topic Date Due    Hepatitis C screen  1945    DTaP/Tdap/Td vaccine (1 - Tdap) 11/25/1965    Breast cancer screen  11/25/1996    Shingles Vaccine (1 of 2) 11/25/1996    Colon cancer screen colonoscopy  11/25/1996    Pneumococcal 65+ years Vaccine (2 of 2 - PPSV23) 10/19/2018    Annual Wellness Visit (AWV)  06/23/2019    Flu vaccine (1) 06/30/2021 (Originally 9/1/2020)    DEXA (modify frequency per FRAX score)  02/25/2022 (Originally 11/25/2001)    Lipid screen  11/16/2021    Potassium monitoring  01/29/2022    Creatinine monitoring  01/29/2022    COVID-19 Vaccine  Completed    Hepatitis A vaccine  Aged Out    Hepatitis B vaccine  Aged Out    Hib vaccine  Aged Out    Meningococcal (ACWY) vaccine  Aged Out     Recommendations for [employer] Due: see orders and patient instructions/AVS.  . Recommended screening schedule for the next 5-10 years is provided to the patient in written form: see Patient Instructions/AVS.    Alexi Reyna was seen today for medicare awv, otalgia, heartburn and health maintenance. Diagnoses and all orders for this visit:    Essential hypertension  -     CBC Auto Differential; Future  -     Comprehensive Metabolic Panel; Future  -     Microalbumin / Creatinine Urine Ratio; Future    Mixed hyperlipidemia  -     Lipid Panel; Future    Chronic obstructive pulmonary disease, unspecified COPD type ([facility] [address]    OAB (overactive bladder)    Chronic idiopathic constipation    Stage 3a chronic kidney disease    Routine general medical examination at a health care facility  -     CBC Auto Differential; Future  -     Comprehensive Metabolic Panel; Future  -     Lipid Panel;  Future  - Microalbumin / Creatinine Urine Ratio; Future  -     T4, Free; Future  -     TSH without Reflex; Future    Fatigue, unspecified type  -     T4, Free; Future  -     TSH without Reflex; Future    Gastroesophageal reflux disease, unspecified whether esophagitis present  -     pantoprazole (PROTONIX) 40 MG tablet;  Take 1 tablet by mouth every morning (before breakfast)    Dysfunction of both eustachian tubes  -     fluticasone (FLONASE) 50 MCG/ACT nasal spray; 2 sprays by Each Nostril route daily             This was an in-house visit

## 2021-03-01 ENCOUNTER — TELEPHONE (OUTPATIENT)
Dept: PRIMARY CARE CLINIC | Age: 76
End: 2021-03-01

## 2021-03-01 NOTE — TELEPHONE ENCOUNTER
Pt was seen by you on 2/25/21 and had ear pain  It is worse and keeping her up at hs   She wants to know if there is something else you can call in for her

## 2021-03-02 RX ORDER — DOXYCYCLINE HYCLATE 100 MG/1
100 CAPSULE ORAL 2 TIMES DAILY
Qty: 20 CAPSULE | Refills: 0 | Status: SHIPPED | OUTPATIENT
Start: 2021-03-02 | End: 2021-03-12

## 2021-03-15 ENCOUNTER — OFFICE VISIT (OUTPATIENT)
Dept: PRIMARY CARE CLINIC | Age: 76
End: 2021-03-15
Payer: MEDICARE

## 2021-03-15 VITALS
OXYGEN SATURATION: 97 % | BODY MASS INDEX: 29.59 KG/M2 | TEMPERATURE: 97.2 F | WEIGHT: 199.8 LBS | SYSTOLIC BLOOD PRESSURE: 124 MMHG | DIASTOLIC BLOOD PRESSURE: 80 MMHG | HEART RATE: 68 BPM | HEIGHT: 69 IN

## 2021-03-15 DIAGNOSIS — H66.002 ACUTE SUPPURATIVE OTITIS MEDIA OF LEFT EAR WITHOUT SPONTANEOUS RUPTURE OF TYMPANIC MEMBRANE, RECURRENCE NOT SPECIFIED: Primary | ICD-10-CM

## 2021-03-15 PROCEDURE — G8484 FLU IMMUNIZE NO ADMIN: HCPCS | Performed by: NURSE PRACTITIONER

## 2021-03-15 PROCEDURE — 1090F PRES/ABSN URINE INCON ASSESS: CPT | Performed by: NURSE PRACTITIONER

## 2021-03-15 PROCEDURE — G8427 DOCREV CUR MEDS BY ELIG CLIN: HCPCS | Performed by: NURSE PRACTITIONER

## 2021-03-15 PROCEDURE — 3017F COLORECTAL CA SCREEN DOC REV: CPT | Performed by: NURSE PRACTITIONER

## 2021-03-15 PROCEDURE — G8417 CALC BMI ABV UP PARAM F/U: HCPCS | Performed by: NURSE PRACTITIONER

## 2021-03-15 PROCEDURE — 1123F ACP DISCUSS/DSCN MKR DOCD: CPT | Performed by: NURSE PRACTITIONER

## 2021-03-15 PROCEDURE — 1036F TOBACCO NON-USER: CPT | Performed by: NURSE PRACTITIONER

## 2021-03-15 PROCEDURE — 4040F PNEUMOC VAC/ADMIN/RCVD: CPT | Performed by: NURSE PRACTITIONER

## 2021-03-15 PROCEDURE — 99213 OFFICE O/P EST LOW 20 MIN: CPT | Performed by: NURSE PRACTITIONER

## 2021-03-15 PROCEDURE — G8400 PT W/DXA NO RESULTS DOC: HCPCS | Performed by: NURSE PRACTITIONER

## 2021-03-15 RX ORDER — AMOXICILLIN AND CLAVULANATE POTASSIUM 875; 125 MG/1; MG/1
1 TABLET, FILM COATED ORAL 2 TIMES DAILY
Qty: 20 TABLET | Refills: 0 | Status: SHIPPED | OUTPATIENT
Start: 2021-03-15 | End: 2021-03-25

## 2021-03-15 ASSESSMENT — ENCOUNTER SYMPTOMS
COUGH: 0
BLOOD IN STOOL: 0
CONSTIPATION: 0
EYE DISCHARGE: 0
ABDOMINAL PAIN: 0
WHEEZING: 0
DIARRHEA: 0
EYE REDNESS: 0
SORE THROAT: 0
RHINORRHEA: 0
TROUBLE SWALLOWING: 0

## 2021-03-15 NOTE — PROGRESS NOTES
Gerson Montana (:  1945) is a 77 y.o. female,Established patient, here for evaluation of the following chief complaint(s):  Otalgia (left ear pain)      ASSESSMENT/PLAN:  1. Acute suppurative otitis media of left ear without spontaneous rupture of tympanic membrane, recurrence not specified  -     amoxicillin-clavulanate (AUGMENTIN) 875-125 MG per tablet; Take 1 tablet by mouth 2 times daily for 10 days, Disp-20 tablet, R-0Normal      Return in about 2 weeks (around 3/29/2021) for ear. SUBJECTIVE/OBJECTIVE:  Otalgia   There is pain in the left ear. This is a recurrent problem. The current episode started 1 to 4 weeks ago. The problem has been waxing and waning. There has been no fever. Associated symptoms include hearing loss. Pertinent negatives include no abdominal pain, coughing, diarrhea, rash, rhinorrhea or sore throat. took doxy. Not any better    Review of Systems   Constitutional: Negative for appetite change and unexpected weight change. HENT: Positive for ear pain and hearing loss. Negative for congestion, rhinorrhea, sore throat and trouble swallowing. Eyes: Negative for discharge and redness. Respiratory: Negative for cough and wheezing. Cardiovascular: Negative for chest pain. Gastrointestinal: Negative for abdominal pain, blood in stool, constipation and diarrhea. Genitourinary: Negative for dysuria. Skin: Negative for rash. Neurological: Negative for weakness. Hematological: Negative for adenopathy. Physical Exam  Vitals signs reviewed. Constitutional:       Appearance: She is well-developed. HENT:      Head: Normocephalic. Left Ear: Tympanic membrane is erythematous. Eyes:      Conjunctiva/sclera: Conjunctivae normal.   Neck:      Musculoskeletal: Normal range of motion and neck supple. Cardiovascular:      Rate and Rhythm: Normal rate and regular rhythm. Heart sounds: Normal heart sounds.    Pulmonary:      Effort: Pulmonary effort is normal. Breath sounds: Normal breath sounds. Abdominal:      Palpations: Abdomen is soft. Skin:     General: Skin is warm and dry. Neurological:      Mental Status: She is alert and oriented to person, place, and time. Psychiatric:         Behavior: Behavior normal.                 An electronic signature was used to authenticate this note.     --GÉNESIS Moran

## 2021-03-29 ENCOUNTER — OFFICE VISIT (OUTPATIENT)
Dept: PRIMARY CARE CLINIC | Age: 76
End: 2021-03-29
Payer: MEDICARE

## 2021-03-29 VITALS
SYSTOLIC BLOOD PRESSURE: 130 MMHG | HEIGHT: 69 IN | BODY MASS INDEX: 29.62 KG/M2 | HEART RATE: 104 BPM | WEIGHT: 200 LBS | TEMPERATURE: 96.8 F | OXYGEN SATURATION: 96 % | DIASTOLIC BLOOD PRESSURE: 88 MMHG

## 2021-03-29 DIAGNOSIS — H92.02 REFERRED OTALGIA OF LEFT EAR: ICD-10-CM

## 2021-03-29 DIAGNOSIS — H61.21 IMPACTED CERUMEN OF RIGHT EAR: ICD-10-CM

## 2021-03-29 DIAGNOSIS — M26.622 ARTHRALGIA OF LEFT TEMPOROMANDIBULAR JOINT: Primary | ICD-10-CM

## 2021-03-29 PROCEDURE — 3017F COLORECTAL CA SCREEN DOC REV: CPT | Performed by: NURSE PRACTITIONER

## 2021-03-29 PROCEDURE — G8400 PT W/DXA NO RESULTS DOC: HCPCS | Performed by: NURSE PRACTITIONER

## 2021-03-29 PROCEDURE — G8484 FLU IMMUNIZE NO ADMIN: HCPCS | Performed by: NURSE PRACTITIONER

## 2021-03-29 PROCEDURE — 4040F PNEUMOC VAC/ADMIN/RCVD: CPT | Performed by: NURSE PRACTITIONER

## 2021-03-29 PROCEDURE — G8417 CALC BMI ABV UP PARAM F/U: HCPCS | Performed by: NURSE PRACTITIONER

## 2021-03-29 PROCEDURE — 99213 OFFICE O/P EST LOW 20 MIN: CPT | Performed by: NURSE PRACTITIONER

## 2021-03-29 PROCEDURE — G8427 DOCREV CUR MEDS BY ELIG CLIN: HCPCS | Performed by: NURSE PRACTITIONER

## 2021-03-29 PROCEDURE — 69209 REMOVE IMPACTED EAR WAX UNI: CPT | Performed by: NURSE PRACTITIONER

## 2021-03-29 PROCEDURE — 1123F ACP DISCUSS/DSCN MKR DOCD: CPT | Performed by: NURSE PRACTITIONER

## 2021-03-29 PROCEDURE — 1036F TOBACCO NON-USER: CPT | Performed by: NURSE PRACTITIONER

## 2021-03-29 PROCEDURE — 1090F PRES/ABSN URINE INCON ASSESS: CPT | Performed by: NURSE PRACTITIONER

## 2021-03-29 RX ORDER — METHYLPREDNISOLONE 4 MG/1
TABLET ORAL
Qty: 1 KIT | Refills: 0 | Status: SHIPPED | OUTPATIENT
Start: 2021-03-29 | End: 2021-05-25

## 2021-03-29 ASSESSMENT — ENCOUNTER SYMPTOMS
TROUBLE SWALLOWING: 0
SORE THROAT: 0
EYE DISCHARGE: 0
EYE REDNESS: 0
DIARRHEA: 0
WHEEZING: 0
BLOOD IN STOOL: 0
COUGH: 0
RHINORRHEA: 0
ABDOMINAL PAIN: 0
CONSTIPATION: 0

## 2021-03-29 NOTE — PROGRESS NOTES
Cris Hughes (:  1945) is a 77 y.o. female,Established patient, here for evaluation of the following chief complaint(s):  Check-Up (follow up to left ear pain)      ASSESSMENT/PLAN:  1. Arthralgia of left temporomandibular joint  -     methylPREDNISolone (MEDROL, NAZANIN,) 4 MG tablet; Take po as directed, Disp-1 kit, R-0Normal  -     SC REMOVAL IMPACTED CERUMEN IRRIGATION/LVG UNILAT  2. Referred otalgia of left ear  -     methylPREDNISolone (MEDROL, NAZANIN,) 4 MG tablet; Take po as directed, Disp-1 kit, R-0Normal  3. Impacted cerumen of right ear  -     SC REMOVAL IMPACTED CERUMEN IRRIGATION/LVG UNILAT    TMJ. Her dentures are not fitting properly we will have her follow-up with a dentist.  Return if symptoms worsen or fail to improve. SUBJECTIVE/OBJECTIVE:  HPI  Otalgia  This has improved but she is still having sharp pains on and off. On the left side. Cannot hear out of the right ear. Ear pain hurts when she opens and closes her jaw or is chewing. Review of Systems   Constitutional: Negative for appetite change and unexpected weight change. HENT: Positive for ear pain. Negative for congestion, rhinorrhea, sore throat and trouble swallowing. Eyes: Negative for discharge and redness. Respiratory: Negative for cough and wheezing. Cardiovascular: Negative for chest pain. Gastrointestinal: Negative for abdominal pain, blood in stool, constipation and diarrhea. Genitourinary: Negative for dysuria. Musculoskeletal: Positive for arthralgias. Skin: Negative for rash. Neurological: Negative for weakness. Hematological: Negative for adenopathy. Physical Exam  Vitals signs reviewed. Constitutional:       Appearance: She is well-developed. HENT:      Head: Normocephalic. Comments: Ceruminosis is noted. Wax is removed by syringing and manual debridement. Instructions for home care to prevent wax buildup are given.      Right Ear: Tympanic membrane normal. There is impacted cerumen. Left Ear: Tympanic membrane normal.   Eyes:      Conjunctiva/sclera: Conjunctivae normal.   Neck:      Musculoskeletal: Normal range of motion and neck supple. Cardiovascular:      Rate and Rhythm: Normal rate. Pulmonary:      Effort: Pulmonary effort is normal.      Breath sounds: Normal breath sounds. Abdominal:      Palpations: Abdomen is soft. Musculoskeletal: Normal range of motion. Comments: TMJ on left side   Skin:     General: Skin is warm and dry. Neurological:      Mental Status: She is alert and oriented to person, place, and time. Psychiatric:         Behavior: Behavior normal.                 An electronic signature was used to authenticate this note.     --Edyta Mortonies, APRN

## 2021-06-04 ENCOUNTER — TELEPHONE (OUTPATIENT)
Dept: PRIMARY CARE CLINIC | Age: 76
End: 2021-06-04

## 2021-06-04 NOTE — TELEPHONE ENCOUNTER
Patient was in office on 5/25/21, she waited for what she recalls as \"forever\". Dr. Socorro Bruce was just about to go into her exam room, as she grabbed her purse and said she was leaving and had other things to do and that she had no idea why she was even here today. I asked her if there was anything I could do for her, and she said she would call us if she needed us. There is a break down in her relationship with Dr. Socorro Bruce, so she will no longer be scheduled with him, but is still a member of this practice.

## 2021-06-08 ENCOUNTER — OFFICE VISIT (OUTPATIENT)
Dept: PRIMARY CARE CLINIC | Age: 76
End: 2021-06-08
Payer: MEDICARE

## 2021-06-08 VITALS
WEIGHT: 203 LBS | HEIGHT: 69 IN | TEMPERATURE: 98 F | HEART RATE: 80 BPM | DIASTOLIC BLOOD PRESSURE: 80 MMHG | BODY MASS INDEX: 30.07 KG/M2 | OXYGEN SATURATION: 97 % | SYSTOLIC BLOOD PRESSURE: 130 MMHG

## 2021-06-08 DIAGNOSIS — G47.33 OSA (OBSTRUCTIVE SLEEP APNEA): ICD-10-CM

## 2021-06-08 DIAGNOSIS — N18.30 STAGE 3 CHRONIC KIDNEY DISEASE, UNSPECIFIED WHETHER STAGE 3A OR 3B CKD (HCC): ICD-10-CM

## 2021-06-08 DIAGNOSIS — R09.02 COPD WITH HYPOXIA (HCC): ICD-10-CM

## 2021-06-08 DIAGNOSIS — R00.1 BRADYCARDIA: ICD-10-CM

## 2021-06-08 DIAGNOSIS — I10 ESSENTIAL HYPERTENSION: ICD-10-CM

## 2021-06-08 DIAGNOSIS — J44.9 COPD WITH HYPOXIA (HCC): ICD-10-CM

## 2021-06-08 DIAGNOSIS — Z91.81 AT HIGH RISK FOR FALLS: Primary | ICD-10-CM

## 2021-06-08 DIAGNOSIS — K21.9 GASTROESOPHAGEAL REFLUX DISEASE, UNSPECIFIED WHETHER ESOPHAGITIS PRESENT: ICD-10-CM

## 2021-06-08 PROCEDURE — G8926 SPIRO NO PERF OR DOC: HCPCS | Performed by: NURSE PRACTITIONER

## 2021-06-08 PROCEDURE — 1123F ACP DISCUSS/DSCN MKR DOCD: CPT | Performed by: NURSE PRACTITIONER

## 2021-06-08 PROCEDURE — 99214 OFFICE O/P EST MOD 30 MIN: CPT | Performed by: NURSE PRACTITIONER

## 2021-06-08 PROCEDURE — 3017F COLORECTAL CA SCREEN DOC REV: CPT | Performed by: NURSE PRACTITIONER

## 2021-06-08 PROCEDURE — 1036F TOBACCO NON-USER: CPT | Performed by: NURSE PRACTITIONER

## 2021-06-08 PROCEDURE — 3023F SPIROM DOC REV: CPT | Performed by: NURSE PRACTITIONER

## 2021-06-08 PROCEDURE — G8427 DOCREV CUR MEDS BY ELIG CLIN: HCPCS | Performed by: NURSE PRACTITIONER

## 2021-06-08 PROCEDURE — G8417 CALC BMI ABV UP PARAM F/U: HCPCS | Performed by: NURSE PRACTITIONER

## 2021-06-08 PROCEDURE — 4040F PNEUMOC VAC/ADMIN/RCVD: CPT | Performed by: NURSE PRACTITIONER

## 2021-06-08 PROCEDURE — 1090F PRES/ABSN URINE INCON ASSESS: CPT | Performed by: NURSE PRACTITIONER

## 2021-06-08 PROCEDURE — G8400 PT W/DXA NO RESULTS DOC: HCPCS | Performed by: NURSE PRACTITIONER

## 2021-06-08 RX ORDER — FLUTICASONE FUROATE, UMECLIDINIUM BROMIDE AND VILANTEROL TRIFENATATE 100; 62.5; 25 UG/1; UG/1; UG/1
1 POWDER RESPIRATORY (INHALATION) DAILY
Qty: 3 EACH | Refills: 3 | Status: SHIPPED | OUTPATIENT
Start: 2021-06-08 | End: 2022-04-28 | Stop reason: SDUPTHER

## 2021-06-08 RX ORDER — VALSARTAN 160 MG/1
160 TABLET ORAL DAILY
Qty: 90 TABLET | Refills: 3 | Status: SHIPPED | OUTPATIENT
Start: 2021-06-08 | End: 2022-04-28 | Stop reason: SDUPTHER

## 2021-06-08 RX ORDER — PANTOPRAZOLE SODIUM 40 MG/1
40 TABLET, DELAYED RELEASE ORAL
Qty: 90 TABLET | Refills: 3 | Status: SHIPPED | OUTPATIENT
Start: 2021-06-08 | End: 2022-04-28 | Stop reason: SDUPTHER

## 2021-06-08 RX ORDER — ATORVASTATIN CALCIUM 40 MG/1
40 TABLET, FILM COATED ORAL DAILY
Qty: 90 TABLET | Refills: 3 | Status: SHIPPED | OUTPATIENT
Start: 2021-06-08 | End: 2022-04-18 | Stop reason: ALTCHOICE

## 2021-06-08 RX ORDER — TOLTERODINE TARTRATE 2 MG/1
2 TABLET, EXTENDED RELEASE ORAL 2 TIMES DAILY
Qty: 180 TABLET | Refills: 3 | Status: SHIPPED | OUTPATIENT
Start: 2021-06-08 | End: 2022-04-28 | Stop reason: SDUPTHER

## 2021-06-08 RX ORDER — METOPROLOL TARTRATE 100 MG/1
50 TABLET ORAL 2 TIMES DAILY
Qty: 90 TABLET | Refills: 3 | Status: SHIPPED | OUTPATIENT
Start: 2021-06-08 | End: 2022-04-28 | Stop reason: SDUPTHER

## 2021-06-08 RX ORDER — ALBUTEROL SULFATE 90 UG/1
2 AEROSOL, METERED RESPIRATORY (INHALATION) 4 TIMES DAILY PRN
Qty: 3 INHALER | Refills: 3 | Status: SHIPPED | OUTPATIENT
Start: 2021-06-08 | End: 2022-04-28 | Stop reason: SDUPTHER

## 2021-06-08 RX ORDER — AMLODIPINE BESYLATE 5 MG/1
5 TABLET ORAL DAILY
Qty: 90 TABLET | Refills: 3 | Status: SHIPPED | OUTPATIENT
Start: 2021-06-08 | End: 2022-04-28 | Stop reason: SDUPTHER

## 2021-06-08 ASSESSMENT — ENCOUNTER SYMPTOMS
DIARRHEA: 0
BLOOD IN STOOL: 0
COUGH: 1
SHORTNESS OF BREATH: 1
RHINORRHEA: 0
WHEEZING: 0
ABDOMINAL PAIN: 0
CONSTIPATION: 0
EYE REDNESS: 0
TROUBLE SWALLOWING: 0
SORE THROAT: 0
EYE DISCHARGE: 0

## 2021-06-08 NOTE — PROGRESS NOTES
Arelis Mathews (:  1945) is a 77 y.o. female,Established patient, here for evaluation of the following chief complaint(s):  Sleep Apnea (needs a re cert )      ASSESSMENT/PLAN:    ICD-10-CM    1. JUVE (obstructive sleep apnea)  G47.33 DME Order for CPAP as OP   2. Gastroesophageal reflux disease, unspecified whether esophagitis present  K21.9 pantoprazole (PROTONIX) 40 MG tablet   3. Essential hypertension  I10 metoprolol (LOPRESSOR) 100 MG tablet     valsartan (DIOVAN) 160 MG tablet   4. Bradycardia  R00.1 metoprolol (LOPRESSOR) 100 MG tablet   5. Stage 3 chronic kidney disease, unspecified whether stage 3a or 3b CKD ([facility])  N18.30 valsartan (DIOVAN) 160 MG tablet   6. COPD with hypoxia ([facility])  J44.9 albuterol sulfate HFA (VENTOLIN HFA) 108 (90 Base) MCG/ACT inhaler    R09.02 fluticasone-umeclidin-vilant (TRELEGY ELLIPTA) 100-62.5-25 MCG/INH AEPB     DME Order for CPAP as OP     ME PORTABLE OXYGEN CONCENTRATOR     DME Order for Home Oxygen as OP     6 Minute Walk Test     6 minute walk test results: Distance to dyspnea was 30, during walking maximal heart rate was 66, during walking lowest oxygen saturation was 88%, and oxygen for recovery was required and the liter flow was 4L and oxygenation responded to 97 %. Based on this data and my clinical observations the patient does need supplemental oxygen. [14182 - performed in officeand interpreted by myself]    Return in about 3 months (around 2021). SUBJECTIVE/OBJECTIVE:  HPI  She is needing re cert on her cpap. She wears this nightly for at least 6 Hours. Uses at home     Copd  She has COPD and she is needing a refill on her inhalers as well as needing portable home oxygen with a concentrator. She did have a walk study in the office see note and plan about that. Hypertension  Blood pressure is well controlled on current regimen she is needing refills sent to her mail-in pharmacy.     Review of Systems   Constitutional: Negative for appetite change and unexpected weight change. HENT: Negative for congestion, rhinorrhea, sore throat and trouble swallowing. Eyes: Negative for discharge and redness. Respiratory: Positive for cough and shortness of breath. Negative for wheezing. Cardiovascular: Negative for chest pain. Gastrointestinal: Negative for abdominal pain, blood in stool, constipation and diarrhea. Genitourinary: Negative for dysuria. Musculoskeletal: Positive for arthralgias. Skin: Negative for rash. Neurological: Negative for weakness. Hematological: Negative for adenopathy. /80 (Site: Right Upper Arm, Position: Sitting, Cuff Size: Large Adult)   Pulse 80   Temp 98 °F (36.7 °C) (Temporal)   Ht 5' 8.5\" (1.74 m)   Wt 203 lb (92.1 kg)   SpO2 97%   BMI 30.42 kg/m²    Physical Exam  Vitals reviewed. Constitutional:       General: She is not in acute distress. Appearance: She is well-developed. She is obese. She is not toxic-appearing. HENT:      Head: Normocephalic and atraumatic. Right Ear: Hearing, tympanic membrane, ear canal and external ear normal.      Left Ear: Hearing, tympanic membrane, ear canal and external ear normal.      Nose: Nose normal.      Mouth/Throat:      Mouth: Mucous membranes are moist.      Pharynx: Oropharynx is clear. Eyes:      General: Lids are normal.      Extraocular Movements: Extraocular movements intact. Conjunctiva/sclera: Conjunctivae normal.      Pupils: Pupils are equal, round, and reactive to light. Neck:      Thyroid: No thyromegaly. Vascular: No carotid bruit or JVD. Trachea: Phonation normal.   Cardiovascular:      Rate and Rhythm: Regular rhythm. No extrasystoles are present. Chest Wall: PMI is not displaced. Pulses: Normal pulses. Heart sounds: Normal heart sounds. No murmur heard. No friction rub. No gallop.        Comments: Stasis changes bilaterally  Pulmonary:      Effort: Pulmonary effort is normal. No respiratory distress. Breath sounds: Normal breath sounds. Decreased air movement (throughout) present. No wheezing, rhonchi or rales. Abdominal:      General: Bowel sounds are normal. There is no distension. Palpations: Abdomen is soft. There is no mass. Tenderness: There is no abdominal tenderness. Genitourinary:     Comments: Examination deferred  Musculoskeletal:         General: Normal range of motion. Cervical back: Neck supple. Right lower le+ Edema present. Left lower le+ Edema present. Comments: Joint examination reveals no acute arthritis or synovitis. Lymphadenopathy:      Cervical: No cervical adenopathy. Skin:     General: Skin is warm and dry. Capillary Refill: Capillary refill takes less than 2 seconds. Findings: No rash. Comments: She has stasis changes in bilateral lower extremities   Neurological:      General: No focal deficit present. Mental Status: She is alert and oriented to person, place, and time. Cranial Nerves: No cranial nerve deficit (by gross examination). Sensory: No sensory deficit. Motor: No tremor or atrophy. Gait: Gait normal.   Psychiatric:         Mood and Affect: Mood normal.         Speech: Speech normal.         Behavior: Behavior normal. Behavior is cooperative. An electronic signature was used to authenticate this note. --GÉNESIS Medina   On the basis of positive falls risk screening, assessment and plan is as follows: home safety tips provided.

## 2021-08-16 ENCOUNTER — TELEPHONE (OUTPATIENT)
Dept: ADMINISTRATIVE | Age: 76
End: 2021-08-16

## 2021-08-16 DIAGNOSIS — J44.9 CHRONIC OBSTRUCTIVE PULMONARY DISEASE, UNSPECIFIED COPD TYPE (HCC): Primary | ICD-10-CM

## 2021-09-29 ENCOUNTER — HOSPITAL ENCOUNTER (OUTPATIENT)
Dept: GENERAL RADIOLOGY | Age: 76
Discharge: HOME | End: 2021-09-29
Payer: MEDICARE

## 2021-09-29 DIAGNOSIS — Z87.891 HISTORY OF TOBACCO USE: ICD-10-CM

## 2021-09-29 PROCEDURE — 71271 CT THORAX LUNG CANCER SCR C-: CPT

## 2021-12-20 ENCOUNTER — HOSPITAL ENCOUNTER (OUTPATIENT)
Dept: NUCLEAR MEDICINE | Age: 76
Discharge: HOME | End: 2021-12-22
Payer: MEDICARE

## 2021-12-20 DIAGNOSIS — D38.1 NEOPLASM OF UNCERTAIN BEHAVIOR OF LUNG: ICD-10-CM

## 2021-12-20 LAB
GLUCOSE BLD-MCNC: 81 MG/DL (ref 70–99)
PERFORMED ON: NORMAL

## 2021-12-20 PROCEDURE — 82947 ASSAY GLUCOSE BLOOD QUANT: CPT

## 2021-12-20 PROCEDURE — A9552 F18 FDG: HCPCS | Performed by: INTERNAL MEDICINE

## 2021-12-20 PROCEDURE — 78815 PET IMAGE W/CT SKULL-THIGH: CPT

## 2021-12-20 PROCEDURE — 3430000000 HC RX DIAGNOSTIC RADIOPHARMACEUTICAL: Performed by: INTERNAL MEDICINE

## 2021-12-20 RX ORDER — FLUDEOXYGLUCOSE F 18 200 MCI/ML
10 INJECTION, SOLUTION INTRAVENOUS
Status: COMPLETED | OUTPATIENT
Start: 2021-12-20 | End: 2021-12-20

## 2021-12-20 RX ADMIN — FLUDEOXYGLUCOSE F 18 10 MILLICURIE: 200 INJECTION, SOLUTION INTRAVENOUS at 13:22

## 2022-03-03 ENCOUNTER — TELEPHONE (OUTPATIENT)
Dept: PRIMARY CARE CLINIC | Age: 77
End: 2022-03-03

## 2022-03-03 DIAGNOSIS — I10 ESSENTIAL HYPERTENSION: ICD-10-CM

## 2022-03-03 DIAGNOSIS — N18.30 STAGE 3 CHRONIC KIDNEY DISEASE, UNSPECIFIED WHETHER STAGE 3A OR 3B CKD (HCC): ICD-10-CM

## 2022-03-03 DIAGNOSIS — Z00.00 ROUTINE GENERAL MEDICAL EXAMINATION AT A HEALTH CARE FACILITY: ICD-10-CM

## 2022-03-03 DIAGNOSIS — E78.2 MIXED HYPERLIPIDEMIA: Primary | ICD-10-CM

## 2022-03-03 NOTE — TELEPHONE ENCOUNTER
----- Message from [facility] sent at 3/3/2022  9:51 AM CST -----  Subject: Message to Provider    QUESTIONS  Information for Provider? Pt wants to know if she should have lab work   before her AWV on 04/21/2022  ---------------------------------------------------------------------------  --------------  [address]  What is the best way for the office to contact you? OK to leave message on   voicemail  Preferred Call Back Phone Number? [phone]  ---------------------------------------------------------------------------  --------------  SCRIPT ANSWERS  Relationship to Patient?  Self

## 2022-03-28 ENCOUNTER — TELEPHONE (OUTPATIENT)
Dept: PRIMARY CARE CLINIC | Age: 77
End: 2022-03-28

## 2022-03-28 DIAGNOSIS — G47.33 OSA (OBSTRUCTIVE SLEEP APNEA): Primary | ICD-10-CM

## 2022-03-28 NOTE — TELEPHONE ENCOUNTER
----- Message from Frances Britney sent at 3/25/2022  4:25 PM CDT -----  Subject: Message to Provider    QUESTIONS  Information for Provider? pt. needs to have a new Cpap machine, hers is   almost gone. Home Medical says she needs to have referral for one from   [facility] before they can give her one. Please call pt. and advise.  ---------------------------------------------------------------------------  --------------  CALL BACK INFO  What is the best way for the office to contact you? OK to leave message on   voicemail  Preferred Call Back Phone Number? [phone]  ---------------------------------------------------------------------------  --------------  SCRIPT ANSWERS  Relationship to Patient?  Self

## 2022-03-30 DIAGNOSIS — N18.30 STAGE 3 CHRONIC KIDNEY DISEASE, UNSPECIFIED WHETHER STAGE 3A OR 3B CKD (HCC): ICD-10-CM

## 2022-03-30 DIAGNOSIS — E78.2 MIXED HYPERLIPIDEMIA: ICD-10-CM

## 2022-03-30 DIAGNOSIS — Z00.00 ROUTINE GENERAL MEDICAL EXAMINATION AT A HEALTH CARE FACILITY: ICD-10-CM

## 2022-03-30 DIAGNOSIS — I10 ESSENTIAL HYPERTENSION: ICD-10-CM

## 2022-03-30 LAB
ALBUMIN SERPL-MCNC: 4.3 G/DL (ref 3.5–5.2)
ALP BLD-CCNC: 119 U/L (ref 35–104)
ALT SERPL-CCNC: 20 U/L (ref 5–33)
ANION GAP SERPL CALCULATED.3IONS-SCNC: 15 MMOL/L (ref 7–19)
AST SERPL-CCNC: 20 U/L (ref 5–32)
BASOPHILS ABSOLUTE: 0 K/UL (ref 0–0.2)
BASOPHILS RELATIVE PERCENT: 0.4 % (ref 0–1)
BILIRUB SERPL-MCNC: 0.6 MG/DL (ref 0.2–1.2)
BUN BLDV-MCNC: 22 MG/DL (ref 8–23)
CALCIUM SERPL-MCNC: 9.5 MG/DL (ref 8.8–10.2)
CHLORIDE BLD-SCNC: 104 MMOL/L (ref 98–111)
CHOLESTEROL, FASTING: 193 MG/DL (ref 160–199)
CO2: 23 MMOL/L (ref 22–29)
CREAT SERPL-MCNC: 1.2 MG/DL (ref 0.5–0.9)
EOSINOPHILS ABSOLUTE: 0.2 K/UL (ref 0–0.6)
EOSINOPHILS RELATIVE PERCENT: 2.2 % (ref 0–5)
GFR AFRICAN AMERICAN: 53
GFR NON-AFRICAN AMERICAN: 44
GLUCOSE BLD-MCNC: 94 MG/DL (ref 74–109)
HCT VFR BLD CALC: 41.5 % (ref 37–47)
HDLC SERPL-MCNC: 76 MG/DL (ref 65–121)
HEMOGLOBIN: 12.8 G/DL (ref 12–16)
IMMATURE GRANULOCYTES #: 0.1 K/UL
LDL CHOLESTEROL CALCULATED: 95 MG/DL
LYMPHOCYTES ABSOLUTE: 1.7 K/UL (ref 1.1–4.5)
LYMPHOCYTES RELATIVE PERCENT: 23.5 % (ref 20–40)
MCH RBC QN AUTO: 29.8 PG (ref 27–31)
MCHC RBC AUTO-ENTMCNC: 30.8 G/DL (ref 33–37)
MCV RBC AUTO: 96.7 FL (ref 81–99)
MONOCYTES ABSOLUTE: 0.7 K/UL (ref 0–0.9)
MONOCYTES RELATIVE PERCENT: 9 % (ref 0–10)
NEUTROPHILS ABSOLUTE: 4.7 K/UL (ref 1.5–7.5)
NEUTROPHILS RELATIVE PERCENT: 64.2 % (ref 50–65)
PDW BLD-RTO: 13.9 % (ref 11.5–14.5)
PLATELET # BLD: 209 K/UL (ref 130–400)
PMV BLD AUTO: 10.8 FL (ref 9.4–12.3)
POTASSIUM SERPL-SCNC: 4.7 MMOL/L (ref 3.5–5)
RBC # BLD: 4.29 M/UL (ref 4.2–5.4)
SODIUM BLD-SCNC: 142 MMOL/L (ref 136–145)
TOTAL PROTEIN: 7 G/DL (ref 6.6–8.7)
TRIGLYCERIDE, FASTING: 108 MG/DL (ref 0–149)
TSH SERPL DL<=0.05 MIU/L-ACNC: 2.4 UIU/ML (ref 0.27–4.2)
WBC # BLD: 7.3 K/UL (ref 4.8–10.8)

## 2022-04-04 ENCOUNTER — OFFICE VISIT (OUTPATIENT)
Dept: PRIMARY CARE CLINIC | Age: 77
End: 2022-04-04
Payer: MEDICARE

## 2022-04-04 VITALS
HEIGHT: 69 IN | HEART RATE: 72 BPM | BODY MASS INDEX: 31.46 KG/M2 | WEIGHT: 212.4 LBS | OXYGEN SATURATION: 93 % | TEMPERATURE: 97 F

## 2022-04-04 DIAGNOSIS — Z12.31 ENCOUNTER FOR SCREENING MAMMOGRAM FOR MALIGNANT NEOPLASM OF BREAST: ICD-10-CM

## 2022-04-04 DIAGNOSIS — R19.01 RIGHT UPPER QUADRANT ABDOMINAL MASS: ICD-10-CM

## 2022-04-04 DIAGNOSIS — G47.33 OSA (OBSTRUCTIVE SLEEP APNEA): ICD-10-CM

## 2022-04-04 DIAGNOSIS — Z00.00 MEDICARE ANNUAL WELLNESS VISIT, SUBSEQUENT: Primary | ICD-10-CM

## 2022-04-04 DIAGNOSIS — Z78.0 POST-MENOPAUSAL: ICD-10-CM

## 2022-04-04 PROCEDURE — 3017F COLORECTAL CA SCREEN DOC REV: CPT | Performed by: NURSE PRACTITIONER

## 2022-04-04 PROCEDURE — G0439 PPPS, SUBSEQ VISIT: HCPCS | Performed by: NURSE PRACTITIONER

## 2022-04-04 PROCEDURE — 1123F ACP DISCUSS/DSCN MKR DOCD: CPT | Performed by: NURSE PRACTITIONER

## 2022-04-04 PROCEDURE — 4040F PNEUMOC VAC/ADMIN/RCVD: CPT | Performed by: NURSE PRACTITIONER

## 2022-04-04 ASSESSMENT — PATIENT HEALTH QUESTIONNAIRE - PHQ9
SUM OF ALL RESPONSES TO PHQ QUESTIONS 1-9: 0
SUM OF ALL RESPONSES TO PHQ QUESTIONS 1-9: 0
SUM OF ALL RESPONSES TO PHQ9 QUESTIONS 1 & 2: 0
SUM OF ALL RESPONSES TO PHQ QUESTIONS 1-9: 0
SUM OF ALL RESPONSES TO PHQ QUESTIONS 1-9: 0
1. LITTLE INTEREST OR PLEASURE IN DOING THINGS: 0
2. FEELING DOWN, DEPRESSED OR HOPELESS: 0

## 2022-04-04 ASSESSMENT — LIFESTYLE VARIABLES
HOW OFTEN DURING THE LAST YEAR HAVE YOU HAD A FEELING OF GUILT OR REMORSE AFTER DRINKING: 0
HOW MANY STANDARD DRINKS CONTAINING ALCOHOL DO YOU HAVE ON A TYPICAL DAY: 3 OR 4
HOW OFTEN DURING THE LAST YEAR HAVE YOU FAILED TO DO WHAT WAS NORMALLY EXPECTED FROM YOU BECAUSE OF DRINKING: 0
HAS A RELATIVE, FRIEND, DOCTOR, OR ANOTHER HEALTH PROFESSIONAL EXPRESSED CONCERN ABOUT YOUR DRINKING OR SUGGESTED YOU CUT DOWN: 0
HAVE YOU OR SOMEONE ELSE BEEN INJURED AS A RESULT OF YOUR DRINKING: 0
HOW OFTEN DO YOU HAVE A DRINK CONTAINING ALCOHOL: 4 OR MORE TIMES A WEEK
HOW OFTEN DURING THE LAST YEAR HAVE YOU FOUND THAT YOU WERE NOT ABLE TO STOP DRINKING ONCE YOU HAD STARTED: 0
HOW OFTEN DURING THE LAST YEAR HAVE YOU BEEN UNABLE TO REMEMBER WHAT HAPPENED THE NIGHT BEFORE BECAUSE YOU HAD BEEN DRINKING: 0
HOW OFTEN DURING THE LAST YEAR HAVE YOU NEEDED AN ALCOHOLIC DRINK FIRST THING IN THE MORNING TO GET YOURSELF GOING AFTER A NIGHT OF HEAVY DRINKING: 0

## 2022-04-04 NOTE — PROGRESS NOTES
Medicare Annual Wellness Visit    Joseph España is here for Medicare AWV and Mass (Right underneath her bra area )    Assessment & Plan   Medicare annual wellness visit, subsequent  Encounter for screening mammogram for malignant neoplasm of breast  -     MAR DIGITAL SCREEN W OR WO CAD BILATERAL; Future  Right upper quadrant abdominal mass  -     US SOFT TISSUE LIMITED AREA; Future  Post-menopausal  -     DEXA BONE DENSITY AXIAL SKELETON; Future  JUVE (obstructive sleep apnea)  -     DME Order for CPAP as OP      Recommendations for Preventive Services Due: see orders and patient instructions/AVS.  Recommended screening schedule for the next 5-10 years is provided to the patient in written form: see Patient Instructions/AVS.     Return for Medicare Annual Wellness Visit in 1 year. Subjective   The following acute and/or chronic problems were also addressed today:  JUVE and oxygen dependant. She wears this every night. She does not sleep with out it. This helps her rest and wake up more restful. At home medical. She is needing a new machine. Palpable mass of the right upper quad of the abdomen    Patient's complete Health Risk Assessment and screening values have been reviewed and are found in Flowsheets. The following problems were reviewed today and where indicated follow up appointments were made and/or referrals ordered. Positive Risk Factor Screenings with Interventions:    Fall Risk:  Do you feel unsteady or are you worried about falling? : (!) yes  2 or more falls in past year?: no  Fall with injury in past year?: no     Fall Risk Interventions:    · Home safety tips provided  · declines referral to PT       Alcohol Screening:  AUDIT Total Score: 5    A score of 8 or more is associated with harmful or hazardous drinking. A score of 13 or more in women, and 15 or more in men, is likely to indicate alcohol dependence.     Substance Abuse - Alcohol Interventions:  educational materials provided Health Habits/Nutrition:     Physical Activity:     Days of Exercise per Week: Not on file    Minutes of Exercise per Session: Not on file        Body mass index: (!) 31.82       Health Habits/Nutrition Interventions:  · Inadequate physical activity:  educational materials provided to promote increased physical activity  · Nutritional issues:  educational materials for healthy, well-balanced diet provided             Objective   Vitals:    04/04/22 1132   Pulse: 72   Temp: 97 °F (36.1 °C)   SpO2: 93%   Weight: 212 lb 6.4 oz (96.3 kg)   Height: 5' 8.5\" (1.74 m)      Body mass index is 31.83 kg/m². Allergies   Allergen Reactions    Lyrica [Pregabalin] Other (See Comments)     \"jitters\"    Sulfa Antibiotics     Bactrim [Sulfamethoxazole-Trimethoprim] Rash    Keflex [Cephalexin] Rash     Prior to Visit Medications    Medication Sig Taking?  Authorizing Provider   pantoprazole (PROTONIX) 40 MG tablet Take 1 tablet by mouth every morning (before breakfast) Yes GÉNESIS Ann   amLODIPine (NORVASC) 5 MG tablet Take 1 tablet by mouth daily Yes GÉNESIS Ann   valsartan (DIOVAN) 160 MG tablet Take 1 tablet by mouth daily Yes GÉNESIS Hogan   tolterodine (DETROL) 2 MG tablet Take 1 tablet by mouth 2 times daily Yes GÉNESIS Ann   atorvastatin (LIPITOR) 40 MG tablet Take 1 tablet by mouth daily Yes GÉNESIS Ann   albuterol sulfate HFA (VENTOLIN HFA) 108 (90 Base) MCG/ACT inhaler Inhale 2 puffs into the lungs 4 times daily as needed for Wheezing Yes Maren Jean APRN   fluticasone-umeclidin-vilant (TRELEGY ELLIPTA) 100-62.5-25 MCG/INH AEPB Inhale 1 puff into the lungs daily Yes GÉNESIS Ann   folic acid (FOLVITE) 1 MG tablet Take 1 tablet by mouth daily Yes B Haile Dessert, DO   Multiple Vitamins-Minerals (OCUVITE-LUTEIN) CAPS multivitamin Take 1 capsule by mouth daily Yes Cary Rangel MD   metoprolol (LOPRESSOR) 100 MG tablet Take 0.5 tablets by mouth 2 times daily  GÉNESIS Ponce (Including outside providers/suppliers regularly involved in providing care):   Patient Care Team:  [address], DO as PCP - General (Pediatrics)  [address], DO as PCP - REHABILITATION [facility] Empaneled Provider    Reviewed and updated this visit:  Tobacco  Allergies  Meds  Problems  Med Hx  Surg Hx  Soc Hx  Fam Hx

## 2022-04-04 NOTE — PATIENT INSTRUCTIONS
Personalized Preventive Plan for Dwayne Salmon - 4/4/2022  Medicare offers a range of preventive health benefits. Some of the tests and screenings are paid in full while other may be subject to a deductible, co-insurance, and/or copay. Some of these benefits include a comprehensive review of your medical history including lifestyle, illnesses that may run in your family, and various assessments and screenings as appropriate. After reviewing your medical record and screening and assessments performed today your provider may have ordered immunizations, labs, imaging, and/or referrals for you. A list of these orders (if applicable) as well as your Preventive Care list are included within your After Visit Summary for your review. Other Preventive Recommendations:    · A preventive eye exam performed by an eye specialist is recommended every 1-2 years to screen for glaucoma; cataracts, macular degeneration, and other eye disorders. · A preventive dental visit is recommended every 6 months. · Try to get at least 150 minutes of exercise per week or 10,000 steps per day on a pedometer . · Order or download the FREE \"Exercise & Physical Activity: Your Everyday Guide\" from [employer]. Call 4-487.699.3406 or search [employer] online. · You need 3470-2043 mg of calcium and 9236-0642 IU of vitamin D per day. It is possible to meet your calcium requirement with diet alone, but a vitamin D supplement is usually necessary to meet this goal.  · When exposed to the sun, use a sunscreen that protects against both UVA and UVB radiation with an SPF of 30 or greater. Reapply every 2 to 3 hours or after sweating, drying off with a towel, or swimming. · Always wear a seat belt when traveling in a car. Always wear a helmet when riding a bicycle or motorcycle.

## 2022-04-11 ENCOUNTER — HOSPITAL ENCOUNTER (OUTPATIENT)
Dept: GENERAL RADIOLOGY | Age: 77
Discharge: HOME | End: 2022-04-11
Payer: MEDICARE

## 2022-04-11 DIAGNOSIS — R19.01 RIGHT UPPER QUADRANT ABDOMINAL MASS: ICD-10-CM

## 2022-04-11 DIAGNOSIS — D38.1 NEOPLASM OF UNCERTAIN BEHAVIOR OF LUNG: ICD-10-CM

## 2022-04-11 PROCEDURE — 71250 CT THORAX DX C-: CPT

## 2022-04-11 PROCEDURE — 76705 ECHO EXAM OF ABDOMEN: CPT

## 2022-04-12 ENCOUNTER — TELEPHONE (OUTPATIENT)
Dept: PRIMARY CARE CLINIC | Age: 77
End: 2022-04-12

## 2022-04-12 DIAGNOSIS — Z79.899 ON STATIN THERAPY: ICD-10-CM

## 2022-04-12 DIAGNOSIS — E55.9 VITAMIN D DEFICIENCY: ICD-10-CM

## 2022-04-12 DIAGNOSIS — E78.2 MIXED HYPERLIPIDEMIA: Primary | ICD-10-CM

## 2022-04-12 NOTE — TELEPHONE ENCOUNTER
Called patient, spoke with: Patient regarding the results of the patients most recent US. I advised Patient of [facility] recommendations.    Patient did voice understanding

## 2022-04-12 NOTE — TELEPHONE ENCOUNTER
----- Message from GÉNESIS Monsalve sent at 4/11/2022  5:03 PM CDT -----  Please call patient and let them know results. Right upper quadrant ultrasound shows a lipoma. There is also a sebaceous cyst noted.   Follow-up if symptoms persist or concern

## 2022-04-13 NOTE — TELEPHONE ENCOUNTER
----- Message from GÉNESIS Soliman sent at 3/31/2022 10:20 AM CDT -----  Thyroid wnl  Lipids goal LD 70 was 73 now 95 are you taking lipitor nightly  If not suggest restart and take at bedtime  Recheck lipids in 6 weeks  Cmp electrolytes liver and kidneys stable  Alk phos elevated if no vitamin d ordered please add  CBC normal no anemia or concerns

## 2022-04-14 NOTE — TELEPHONE ENCOUNTER
Called pt with lab results. Pt states these were not discussed with her at her apt on 4/4/22. Pt states she is taking her Lipitor every day but is taking it in the morning. I will send this back to provider to see if this needs to be increased or if she just needs to try taking it at night. Also, it is to late to add a vit d to labs. Can this wait until she rechecks lipid level in 6 weeks or does she need to do it now.

## 2022-04-15 NOTE — TELEPHONE ENCOUNTER
Recommend changing to rosuvastatin 40 mg at bedtime. Recheck lipids and CMP in 4 to 6 weeks. Would also recommend supplementing vitamin D 3, 2000 international units daily. This is over-the-counter. We can check vitamin D level at next blood draw in 4 to 6 weeks.

## 2022-04-18 RX ORDER — ROSUVASTATIN CALCIUM 40 MG/1
40 TABLET, COATED ORAL DAILY
Qty: 90 TABLET | Refills: 3 | Status: SHIPPED | OUTPATIENT
Start: 2022-04-18 | End: 2022-07-13 | Stop reason: SDUPTHER

## 2022-04-18 NOTE — TELEPHONE ENCOUNTER
Patient voiced understanding  Repeat labs ordered  Patient did report she is already taking an OTC Vitamin D, do we need to do a stronger dose?   Requested Prescriptions     Signed Prescriptions Disp Refills    rosuvastatin (CRESTOR) 40 MG tablet 90 tablet 3     Sig: Take 1 tablet by mouth daily     Authorizing Provider: Markie Woods User: Toyin Abdullahi

## 2022-04-18 NOTE — TELEPHONE ENCOUNTER
Can do a booster of vitamin d  Your vitamin d level is low  Suggest start vitamin d 77964 units 1 po weekly for 8 weeks #4 1 refill  Then recheck level  Cont the daily dose

## 2022-04-19 ENCOUNTER — TELEPHONE (OUTPATIENT)
Dept: PRIMARY CARE CLINIC | Age: 77
End: 2022-04-19

## 2022-04-19 RX ORDER — ERGOCALCIFEROL 1.25 MG/1
50000 CAPSULE ORAL WEEKLY
Qty: 12 CAPSULE | Refills: 0 | Status: SHIPPED | OUTPATIENT
Start: 2022-04-19

## 2022-04-19 NOTE — TELEPHONE ENCOUNTER
----- Message from GÉNESIS Yuan sent at 4/19/2022  3:29 PM CDT -----  Mammogram is normal  Repeat in a year

## 2022-04-28 DIAGNOSIS — J44.9 COPD WITH HYPOXIA (HCC): ICD-10-CM

## 2022-04-28 DIAGNOSIS — R09.02 COPD WITH HYPOXIA (HCC): ICD-10-CM

## 2022-04-28 DIAGNOSIS — I10 ESSENTIAL HYPERTENSION: ICD-10-CM

## 2022-04-28 DIAGNOSIS — N18.30 STAGE 3 CHRONIC KIDNEY DISEASE, UNSPECIFIED WHETHER STAGE 3A OR 3B CKD (HCC): ICD-10-CM

## 2022-04-28 DIAGNOSIS — K21.9 GASTROESOPHAGEAL REFLUX DISEASE, UNSPECIFIED WHETHER ESOPHAGITIS PRESENT: ICD-10-CM

## 2022-04-28 DIAGNOSIS — R00.1 BRADYCARDIA: ICD-10-CM

## 2022-04-28 RX ORDER — PANTOPRAZOLE SODIUM 40 MG/1
40 TABLET, DELAYED RELEASE ORAL
Qty: 90 TABLET | Refills: 3 | Status: SHIPPED | OUTPATIENT
Start: 2022-04-28 | End: 2022-05-05 | Stop reason: SDUPTHER

## 2022-04-28 RX ORDER — FLUTICASONE FUROATE, UMECLIDINIUM BROMIDE AND VILANTEROL TRIFENATATE 100; 62.5; 25 UG/1; UG/1; UG/1
1 POWDER RESPIRATORY (INHALATION) DAILY
Qty: 3 EACH | Refills: 3 | Status: SHIPPED | OUTPATIENT
Start: 2022-04-28 | End: 2022-11-04 | Stop reason: ALTCHOICE

## 2022-04-28 RX ORDER — AMLODIPINE BESYLATE 5 MG/1
5 TABLET ORAL DAILY
Qty: 90 TABLET | Refills: 3 | Status: SHIPPED | OUTPATIENT
Start: 2022-04-28 | End: 2022-05-05 | Stop reason: SDUPTHER

## 2022-04-28 RX ORDER — TOLTERODINE TARTRATE 2 MG/1
2 TABLET, EXTENDED RELEASE ORAL 2 TIMES DAILY
Qty: 180 TABLET | Refills: 3 | Status: SHIPPED | OUTPATIENT
Start: 2022-04-28 | End: 2022-05-05 | Stop reason: SDUPTHER

## 2022-04-28 RX ORDER — FOLIC ACID 1 MG/1
1 TABLET ORAL DAILY
Qty: 90 TABLET | Refills: 3 | Status: SHIPPED | OUTPATIENT
Start: 2022-04-28

## 2022-04-28 RX ORDER — VALSARTAN 160 MG/1
160 TABLET ORAL DAILY
Qty: 90 TABLET | Refills: 3 | Status: SHIPPED | OUTPATIENT
Start: 2022-04-28 | End: 2022-05-05 | Stop reason: SDUPTHER

## 2022-04-28 RX ORDER — METOPROLOL TARTRATE 100 MG/1
50 TABLET ORAL 2 TIMES DAILY
Qty: 90 TABLET | Refills: 3 | Status: SHIPPED | OUTPATIENT
Start: 2022-04-28 | End: 2022-05-05 | Stop reason: SDUPTHER

## 2022-04-28 RX ORDER — ALBUTEROL SULFATE 90 UG/1
2 AEROSOL, METERED RESPIRATORY (INHALATION) 4 TIMES DAILY PRN
Qty: 3 EACH | Refills: 3 | Status: SHIPPED | OUTPATIENT
Start: 2022-04-28

## 2022-04-28 NOTE — TELEPHONE ENCOUNTER
Received fax from pharmacy requesting refill on pts medication(s). Pt was last seen in office on 4/4/2022  and has a follow up scheduled for Visit date not found. Will send request to  Jennifer Gardner  for authorization.      Requested Prescriptions     Pending Prescriptions Disp Refills    pantoprazole (PROTONIX) 40 MG tablet 90 tablet 3     Sig: Take 1 tablet by mouth every morning (before breakfast)    amLODIPine (NORVASC) 5 MG tablet 90 tablet 3     Sig: Take 1 tablet by mouth daily    metoprolol (LOPRESSOR) 100 MG tablet 90 tablet 3     Sig: Take 0.5 tablets by mouth 2 times daily    valsartan (DIOVAN) 160 MG tablet 90 tablet 3     Sig: Take 1 tablet by mouth daily    tolterodine (DETROL) 2 MG tablet 180 tablet 3     Sig: Take 1 tablet by mouth 2 times daily    albuterol sulfate HFA (VENTOLIN HFA) 108 (90 Base) MCG/ACT inhaler 3 each 3     Sig: Inhale 2 puffs into the lungs 4 times daily as needed for Wheezing    fluticasone-umeclidin-vilant (TRELEGY ELLIPTA) 100-62.5-25 MCG/INH AEPB 3 each 3     Sig: Inhale 1 puff into the lungs daily    folic acid (FOLVITE) 1 MG tablet 90 tablet 3     Sig: Take 1 tablet by mouth daily

## 2022-05-05 DIAGNOSIS — N18.30 STAGE 3 CHRONIC KIDNEY DISEASE, UNSPECIFIED WHETHER STAGE 3A OR 3B CKD (HCC): ICD-10-CM

## 2022-05-05 DIAGNOSIS — K21.9 GASTROESOPHAGEAL REFLUX DISEASE, UNSPECIFIED WHETHER ESOPHAGITIS PRESENT: ICD-10-CM

## 2022-05-05 DIAGNOSIS — I10 ESSENTIAL HYPERTENSION: ICD-10-CM

## 2022-05-05 DIAGNOSIS — R00.1 BRADYCARDIA: ICD-10-CM

## 2022-05-05 RX ORDER — VALSARTAN 160 MG/1
160 TABLET ORAL DAILY
Qty: 90 TABLET | Refills: 3 | Status: SHIPPED | OUTPATIENT
Start: 2022-05-05 | End: 2022-07-13 | Stop reason: SDUPTHER

## 2022-05-05 RX ORDER — METOPROLOL TARTRATE 100 MG/1
50 TABLET ORAL 2 TIMES DAILY
Qty: 90 TABLET | Refills: 3 | Status: SHIPPED | OUTPATIENT
Start: 2022-05-05 | End: 2022-08-03

## 2022-05-05 RX ORDER — TOLTERODINE TARTRATE 2 MG/1
2 TABLET, EXTENDED RELEASE ORAL 2 TIMES DAILY
Qty: 180 TABLET | Refills: 3 | Status: SHIPPED | OUTPATIENT
Start: 2022-05-05 | End: 2022-07-13 | Stop reason: SDUPTHER

## 2022-05-05 RX ORDER — PANTOPRAZOLE SODIUM 40 MG/1
40 TABLET, DELAYED RELEASE ORAL
Qty: 90 TABLET | Refills: 3 | Status: SHIPPED | OUTPATIENT
Start: 2022-05-05

## 2022-05-05 RX ORDER — AMLODIPINE BESYLATE 5 MG/1
5 TABLET ORAL DAILY
Qty: 90 TABLET | Refills: 3 | Status: SHIPPED | OUTPATIENT
Start: 2022-05-05

## 2022-05-05 NOTE — TELEPHONE ENCOUNTER
Received fax from mail order pharmacy requesting refill on pts medication(s). Pt was last seen in office on 4/4/2022  and has a follow up scheduled for Visit date not found. Will send request to  [facility]  for authorization.      Requested Prescriptions     Pending Prescriptions Disp Refills    amLODIPine (NORVASC) 5 MG tablet 90 tablet 3     Sig: Take 1 tablet by mouth daily    metoprolol (LOPRESSOR) 100 MG tablet 90 tablet 3     Sig: Take 0.5 tablets by mouth 2 times daily    tolterodine (DETROL) 2 MG tablet 180 tablet 3     Sig: Take 1 tablet by mouth 2 times daily    valsartan (DIOVAN) 160 MG tablet 90 tablet 3     Sig: Take 1 tablet by mouth daily    pantoprazole (PROTONIX) 40 MG tablet 90 tablet 3     Sig: Take 1 tablet by mouth every morning (before breakfast)

## 2022-05-26 ENCOUNTER — OFFICE VISIT (OUTPATIENT)
Dept: PRIMARY CARE CLINIC | Age: 77
End: 2022-05-26
Payer: MEDICARE

## 2022-05-26 ENCOUNTER — HOSPITAL ENCOUNTER (OUTPATIENT)
Dept: GENERAL RADIOLOGY | Age: 77
Discharge: HOME | End: 2022-05-26
Payer: MEDICARE

## 2022-05-26 VITALS
BODY MASS INDEX: 31.02 KG/M2 | WEIGHT: 207 LBS | TEMPERATURE: 97 F | HEART RATE: 67 BPM | OXYGEN SATURATION: 95 % | SYSTOLIC BLOOD PRESSURE: 120 MMHG | DIASTOLIC BLOOD PRESSURE: 68 MMHG

## 2022-05-26 DIAGNOSIS — E55.9 VITAMIN D DEFICIENCY: ICD-10-CM

## 2022-05-26 DIAGNOSIS — Z79.899 ON STATIN THERAPY: ICD-10-CM

## 2022-05-26 DIAGNOSIS — E78.2 MIXED HYPERLIPIDEMIA: ICD-10-CM

## 2022-05-26 DIAGNOSIS — R05.9 COUGH: ICD-10-CM

## 2022-05-26 DIAGNOSIS — J44.1 COPD WITH ACUTE EXACERBATION (HCC): Primary | ICD-10-CM

## 2022-05-26 DIAGNOSIS — J44.1 COPD WITH ACUTE EXACERBATION (HCC): ICD-10-CM

## 2022-05-26 DIAGNOSIS — N18.30 STAGE 3 CHRONIC KIDNEY DISEASE, UNSPECIFIED WHETHER STAGE 3A OR 3B CKD (HCC): ICD-10-CM

## 2022-05-26 DIAGNOSIS — N18.31 STAGE 3A CHRONIC KIDNEY DISEASE (HCC): ICD-10-CM

## 2022-05-26 LAB
ALBUMIN SERPL-MCNC: 4.1 G/DL (ref 3.5–5.2)
ALP BLD-CCNC: 106 U/L (ref 35–104)
ALT SERPL-CCNC: 26 U/L (ref 5–33)
ANION GAP SERPL CALCULATED.3IONS-SCNC: 15 MMOL/L (ref 7–19)
AST SERPL-CCNC: 31 U/L (ref 5–32)
BILIRUB SERPL-MCNC: 0.5 MG/DL (ref 0.2–1.2)
BUN BLDV-MCNC: 27 MG/DL (ref 8–23)
CALCIUM SERPL-MCNC: 9.8 MG/DL (ref 8.8–10.2)
CHLORIDE BLD-SCNC: 102 MMOL/L (ref 98–111)
CHOLESTEROL, TOTAL: 166 MG/DL (ref 160–199)
CO2: 24 MMOL/L (ref 22–29)
CREAT SERPL-MCNC: 1.4 MG/DL (ref 0.5–0.9)
GFR AFRICAN AMERICAN: 44
GFR NON-AFRICAN AMERICAN: 37
GLUCOSE BLD-MCNC: 81 MG/DL (ref 74–109)
HDLC SERPL-MCNC: 74 MG/DL (ref 65–121)
LDL CHOLESTEROL CALCULATED: 77 MG/DL
POTASSIUM SERPL-SCNC: 4.5 MMOL/L (ref 3.5–5)
SODIUM BLD-SCNC: 141 MMOL/L (ref 136–145)
TOTAL PROTEIN: 6.9 G/DL (ref 6.6–8.7)
TRIGL SERPL-MCNC: 73 MG/DL (ref 0–149)
VITAMIN D 25-HYDROXY: 74.7 NG/ML

## 2022-05-26 PROCEDURE — 1123F ACP DISCUSS/DSCN MKR DOCD: CPT | Performed by: NURSE PRACTITIONER

## 2022-05-26 PROCEDURE — 1090F PRES/ABSN URINE INCON ASSESS: CPT | Performed by: NURSE PRACTITIONER

## 2022-05-26 PROCEDURE — G8427 DOCREV CUR MEDS BY ELIG CLIN: HCPCS | Performed by: NURSE PRACTITIONER

## 2022-05-26 PROCEDURE — 71046 X-RAY EXAM CHEST 2 VIEWS: CPT

## 2022-05-26 PROCEDURE — G8400 PT W/DXA NO RESULTS DOC: HCPCS | Performed by: NURSE PRACTITIONER

## 2022-05-26 PROCEDURE — 3017F COLORECTAL CA SCREEN DOC REV: CPT | Performed by: NURSE PRACTITIONER

## 2022-05-26 PROCEDURE — 3023F SPIROM DOC REV: CPT | Performed by: NURSE PRACTITIONER

## 2022-05-26 PROCEDURE — G8417 CALC BMI ABV UP PARAM F/U: HCPCS | Performed by: NURSE PRACTITIONER

## 2022-05-26 PROCEDURE — 96372 THER/PROPH/DIAG INJ SC/IM: CPT | Performed by: NURSE PRACTITIONER

## 2022-05-26 PROCEDURE — 1036F TOBACCO NON-USER: CPT | Performed by: NURSE PRACTITIONER

## 2022-05-26 PROCEDURE — 99214 OFFICE O/P EST MOD 30 MIN: CPT | Performed by: NURSE PRACTITIONER

## 2022-05-26 RX ORDER — GUAIFENESIN AND DEXTROMETHORPHAN HYDROBROMIDE 1200; 60 MG/1; MG/1
1 TABLET, EXTENDED RELEASE ORAL 2 TIMES DAILY
Qty: 28 TABLET | Refills: 1 | Status: SHIPPED | OUTPATIENT
Start: 2022-05-26 | End: 2022-07-11

## 2022-05-26 RX ORDER — TRIAMCINOLONE ACETONIDE 40 MG/ML
60 INJECTION, SUSPENSION INTRA-ARTICULAR; INTRAMUSCULAR ONCE
Status: COMPLETED | OUTPATIENT
Start: 2022-05-26 | End: 2022-05-26

## 2022-05-26 RX ORDER — METHYLPREDNISOLONE 4 MG/1
TABLET ORAL
Qty: 1 KIT | Refills: 0 | Status: SHIPPED | OUTPATIENT
Start: 2022-05-26 | End: 2022-07-11

## 2022-05-26 RX ORDER — DOXYCYCLINE HYCLATE 100 MG
100 TABLET ORAL 2 TIMES DAILY
Qty: 20 TABLET | Refills: 0 | Status: SHIPPED | OUTPATIENT
Start: 2022-05-26 | End: 2022-06-05

## 2022-05-26 RX ADMIN — TRIAMCINOLONE ACETONIDE 60 MG: 40 INJECTION, SUSPENSION INTRA-ARTICULAR; INTRAMUSCULAR at 16:25

## 2022-05-26 ASSESSMENT — ENCOUNTER SYMPTOMS
TROUBLE SWALLOWING: 0
DIARRHEA: 0
WHEEZING: 1
COUGH: 1
SORE THROAT: 0
CONSTIPATION: 0
RHINORRHEA: 1
ABDOMINAL PAIN: 0
BLOOD IN STOOL: 0
EYE REDNESS: 0
SHORTNESS OF BREATH: 1
EYE DISCHARGE: 0

## 2022-05-26 NOTE — PROGRESS NOTES
Jon Fraire (:  1945) is a 77 y.o. female,Established patient, here for evaluation of the following chief complaint(s):  Cough (Patient presents today with cough and congestion. This started two weeks ago. Patient is currently on 4L of oxygen and is running around 95. Patient is SOB in office. )      ASSESSMENT/PLAN:    ICD-10-CM    1. COPD with acute exacerbation (HCC)  J44.1 XR CHEST (2 VW)     Dextromethorphan-guaiFENesin (MUCINEX DM MAXIMUM STRENGTH)  MG TB12     doxycycline hyclate (VIBRA-TABS) 100 MG tablet  Medrol dose pack   2. Stage 3 chronic kidney disease, unspecified whether stage 3a or 3b CKD (HCC)  N18.30 The current medical regimen is effective;  continue present plan and medications. 3. Stage 3a chronic kidney disease (HCC)  N18.31    4. Cough  R05.9 Dextromethorphan-guaiFENesin (MUCINEX DM MAXIMUM STRENGTH)  MG TB12       Return in about 1 week (around 2022). SUBJECTIVE/OBJECTIVE:  HPI  Cough (Patient presents today with cough and congestion. This started two weeks ago. Patient is currently on 4L of oxygen and is running around 95. Patient is SOB in office. )  Cough is worse in the morning. Has a clear to yellow productive cough. Dr. Corona Cardenas is her pulmonologist. See Cherelle Dennis at that office. On trelegy and albuterol. Review of Systems   Constitutional: Negative for appetite change and unexpected weight change. HENT: Positive for congestion, postnasal drip and rhinorrhea. Negative for sore throat and trouble swallowing. Eyes: Negative for discharge and redness. Respiratory: Positive for cough, shortness of breath and wheezing. Cardiovascular: Negative for chest pain. Gastrointestinal: Negative for abdominal pain, blood in stool, constipation and diarrhea. Genitourinary: Negative for dysuria. Skin: Negative for rash. Neurological: Negative for weakness. Hematological: Negative for adenopathy.        /68 (Site: Left Upper Arm, Position: Sitting, Cuff Size: Large Adult)   Pulse 67   Temp 97 °F (36.1 °C) (Temporal)   Wt 207 lb (93.9 kg)   SpO2 95%   BMI 31.02 kg/m²    Physical Exam  Vitals reviewed. Constitutional:       Appearance: She is well-developed. HENT:      Head: Normocephalic. Eyes:      Conjunctiva/sclera: Conjunctivae normal.   Cardiovascular:      Rate and Rhythm: Normal rate and regular rhythm. Heart sounds: Normal heart sounds. No murmur heard. Pulmonary:      Effort: Pulmonary effort is normal.      Breath sounds: Rales (rll) present. Abdominal:      General: Bowel sounds are normal.      Palpations: Abdomen is soft. Tenderness: There is no abdominal tenderness. Musculoskeletal:         General: Normal range of motion. Cervical back: Normal range of motion and neck supple. Skin:     General: Skin is warm and dry. Neurological:      Mental Status: She is alert and oriented to person, place, and time. Psychiatric:         Behavior: Behavior normal.                 An electronic signature was used to authenticate this note.     --GÉNESIS Morfin

## 2022-05-26 NOTE — ASSESSMENT & PLAN NOTE
Asymptomatic, continue current medications, medication adherence emphasized, and lifestyle modifications recommended

## 2022-05-26 NOTE — PROGRESS NOTES
After obtaining consent, gave patient 60mg of Kenalog injection in Left upper quad. gluteus, patient tolerated well. Medication was not supplied by the patient.

## 2022-05-27 ENCOUNTER — TELEPHONE (OUTPATIENT)
Dept: PRIMARY CARE CLINIC | Age: 77
End: 2022-05-27

## 2022-05-27 NOTE — TELEPHONE ENCOUNTER
----- Message from [address] [facility],[address], DO sent at 5/26/2022  6:57 PM CDT -----  Vitamin D level is normal.Lipids are controlled. Metabolic profile shows that you are slightly dehydrated. This has resulted in a slight decline in your kidney function. Recommend increasing your water consumption to improve this. Recheck CMP in 1 month

## 2022-07-11 ENCOUNTER — OFFICE VISIT (OUTPATIENT)
Dept: PRIMARY CARE CLINIC | Age: 77
End: 2022-07-11
Payer: MEDICARE

## 2022-07-11 VITALS
OXYGEN SATURATION: 94 % | BODY MASS INDEX: 31.92 KG/M2 | DIASTOLIC BLOOD PRESSURE: 80 MMHG | HEART RATE: 61 BPM | SYSTOLIC BLOOD PRESSURE: 125 MMHG | TEMPERATURE: 97.4 F | WEIGHT: 213 LBS

## 2022-07-11 DIAGNOSIS — R09.02 COPD WITH HYPOXIA (HCC): Primary | ICD-10-CM

## 2022-07-11 DIAGNOSIS — J44.9 COPD WITH HYPOXIA (HCC): Primary | ICD-10-CM

## 2022-07-11 DIAGNOSIS — G47.33 OSA (OBSTRUCTIVE SLEEP APNEA): ICD-10-CM

## 2022-07-11 PROCEDURE — 3023F SPIROM DOC REV: CPT | Performed by: NURSE PRACTITIONER

## 2022-07-11 PROCEDURE — 1036F TOBACCO NON-USER: CPT | Performed by: NURSE PRACTITIONER

## 2022-07-11 PROCEDURE — G8427 DOCREV CUR MEDS BY ELIG CLIN: HCPCS | Performed by: NURSE PRACTITIONER

## 2022-07-11 PROCEDURE — 3017F COLORECTAL CA SCREEN DOC REV: CPT | Performed by: NURSE PRACTITIONER

## 2022-07-11 PROCEDURE — G8400 PT W/DXA NO RESULTS DOC: HCPCS | Performed by: NURSE PRACTITIONER

## 2022-07-11 PROCEDURE — 1123F ACP DISCUSS/DSCN MKR DOCD: CPT | Performed by: NURSE PRACTITIONER

## 2022-07-11 PROCEDURE — 99213 OFFICE O/P EST LOW 20 MIN: CPT | Performed by: NURSE PRACTITIONER

## 2022-07-11 PROCEDURE — G8417 CALC BMI ABV UP PARAM F/U: HCPCS | Performed by: NURSE PRACTITIONER

## 2022-07-11 PROCEDURE — 1090F PRES/ABSN URINE INCON ASSESS: CPT | Performed by: NURSE PRACTITIONER

## 2022-07-11 ASSESSMENT — ENCOUNTER SYMPTOMS
COUGH: 1
RHINORRHEA: 0
TROUBLE SWALLOWING: 0
EYE DISCHARGE: 0
WHEEZING: 0
SORE THROAT: 0
CONSTIPATION: 0
BLOOD IN STOOL: 0
DIARRHEA: 0
EYE REDNESS: 0
ABDOMINAL PAIN: 0

## 2022-07-11 NOTE — PROGRESS NOTES
Karlee Rogers (:  1945) is a 77 y.o. female,Established patient, here for evaluation of the following chief complaint(s):  Shortness of Breath (Patient presents today for an appt regarding her cpap machine. She is needing a new maching. Goes through [facility]. )      ASSESSMENT/PLAN:    ICD-10-CM    1. COPD with hypoxia ([facility]  J44.9     R09.02    2. JUVE (obstructive sleep apnea)  G47.33      Will send [facility] to [facility]     Return in about 6 months (around 2023) for copd. SUBJECTIVE/OBJECTIVE:  HPI  JUVE  Patient presents today for an appt regarding her cpap machine. She is needing a new maching. Goes through [facility]. She uses this every night. Doing well on this. She needs to continue her cpap use and her o2 use at night. She is unable to sleep without these. Review of Systems   Constitutional: Negative for appetite change and unexpected weight change. HENT: Negative for congestion, rhinorrhea, sore throat and trouble swallowing. Eyes: Negative for discharge and redness. Respiratory: Positive for cough (chronic). Negative for wheezing. Cardiovascular: Negative for chest pain. Gastrointestinal: Negative for abdominal pain, blood in stool, constipation and diarrhea. Genitourinary: Negative for dysuria. Skin: Negative for rash. Neurological: Negative for weakness. Hematological: Negative for adenopathy. /80 (Site: Left Upper Arm, Position: Sitting, Cuff Size: Small Adult)   Pulse 61   Temp 97.4 °F (36.3 °C) (Temporal)   Wt 213 lb (96.6 kg)   SpO2 94%   BMI 31.92 kg/m²    Physical Exam  Vitals reviewed. Constitutional:       Appearance: She is well-developed. HENT:      Head: Normocephalic. Eyes:      Conjunctiva/sclera: Conjunctivae normal.   Cardiovascular:      Rate and Rhythm: Normal rate and regular rhythm. Heart sounds: Normal heart sounds.    Pulmonary:      Effort: Pulmonary effort is normal.      Breath sounds: Normal breath sounds. Abdominal:      General: Bowel sounds are normal.      Palpations: Abdomen is soft. Tenderness: There is no abdominal tenderness. Musculoskeletal:         General: Normal range of motion. Cervical back: Normal range of motion and neck supple. Skin:     General: Skin is warm and dry. Neurological:      Mental Status: She is alert and oriented to person, place, and time. Psychiatric:         Behavior: Behavior normal.                 An electronic signature was used to authenticate this note.     --Delphine Grayson APRN

## 2022-07-13 DIAGNOSIS — I10 ESSENTIAL HYPERTENSION: ICD-10-CM

## 2022-07-13 DIAGNOSIS — E78.2 MIXED HYPERLIPIDEMIA: ICD-10-CM

## 2022-07-13 DIAGNOSIS — N18.30 STAGE 3 CHRONIC KIDNEY DISEASE, UNSPECIFIED WHETHER STAGE 3A OR 3B CKD (HCC): ICD-10-CM

## 2022-07-13 RX ORDER — ROSUVASTATIN CALCIUM 40 MG/1
40 TABLET, COATED ORAL DAILY
Qty: 90 TABLET | Refills: 3 | Status: SHIPPED | OUTPATIENT
Start: 2022-07-13

## 2022-07-13 RX ORDER — VALSARTAN 160 MG/1
160 TABLET ORAL DAILY
Qty: 90 TABLET | Refills: 3 | Status: SHIPPED | OUTPATIENT
Start: 2022-07-13

## 2022-07-13 RX ORDER — TOLTERODINE TARTRATE 2 MG/1
2 TABLET, EXTENDED RELEASE ORAL 2 TIMES DAILY
Qty: 180 TABLET | Refills: 3 | Status: SHIPPED | OUTPATIENT
Start: 2022-07-13

## 2022-08-03 ENCOUNTER — OFFICE VISIT (OUTPATIENT)
Dept: CARDIOLOGY | Facility: CLINIC | Age: 77
End: 2022-08-03

## 2022-08-03 VITALS
HEART RATE: 63 BPM | SYSTOLIC BLOOD PRESSURE: 127 MMHG | WEIGHT: 211 LBS | DIASTOLIC BLOOD PRESSURE: 73 MMHG | HEIGHT: 68 IN | BODY MASS INDEX: 31.98 KG/M2

## 2022-08-03 DIAGNOSIS — J43.1 PANLOBULAR EMPHYSEMA: ICD-10-CM

## 2022-08-03 DIAGNOSIS — R06.09 DOE (DYSPNEA ON EXERTION): Primary | ICD-10-CM

## 2022-08-03 PROBLEM — Z72.0 TOBACCO ABUSE: Status: RESOLVED | Noted: 2017-03-02 | Resolved: 2022-08-03

## 2022-08-03 PROBLEM — J43.2 CENTRILOBULAR EMPHYSEMA: Status: ACTIVE | Noted: 2022-08-03

## 2022-08-03 PROCEDURE — 99204 OFFICE O/P NEW MOD 45 MIN: CPT | Performed by: INTERNAL MEDICINE

## 2022-08-03 PROCEDURE — 93000 ELECTROCARDIOGRAM COMPLETE: CPT | Performed by: INTERNAL MEDICINE

## 2022-08-03 RX ORDER — ALBUTEROL SULFATE 90 UG/1
2 AEROSOL, METERED RESPIRATORY (INHALATION) EVERY 4 HOURS PRN
COMMUNITY

## 2022-08-03 RX ORDER — ASPIRIN 81 MG/1
81 TABLET ORAL DAILY
COMMUNITY

## 2022-08-03 RX ORDER — TOLTERODINE TARTRATE 2 MG/1
2 TABLET, EXTENDED RELEASE ORAL 2 TIMES DAILY
COMMUNITY

## 2022-08-03 RX ORDER — SENNA PLUS 8.6 MG/1
1 TABLET ORAL DAILY
COMMUNITY
End: 2023-02-17

## 2022-08-03 RX ORDER — ROSUVASTATIN CALCIUM 40 MG/1
40 TABLET, COATED ORAL DAILY
COMMUNITY
Start: 2022-06-24 | End: 2023-02-17

## 2022-08-03 RX ORDER — FOLIC ACID 1 MG/1
1 TABLET ORAL DAILY
COMMUNITY
End: 2023-02-17

## 2022-08-03 RX ORDER — VALSARTAN 160 MG/1
160 TABLET ORAL DAILY
COMMUNITY

## 2022-08-03 RX ORDER — METOPROLOL TARTRATE 100 MG/1
100 TABLET ORAL 2 TIMES DAILY
COMMUNITY

## 2022-08-03 NOTE — PROGRESS NOTES
Subjective    Edie Hinton is a 76 y.o. female. Referred by pcp for soa    History of Present Illness     VILLA:   She has known COPD and is seeing Dr Correia. In spite of quitting smoking 4 yrs ago, she has progressive soa and is now with home O2 and nocturnal O2. A recent ECHO at [facility] report shows normal LVEF with LVDD-1 which was also noted on ECHO '15. She has no cp and no edema. She does have orthopnea. She uses a CPAP for sleep. EKG today is nsr, wayne, abr with no serials. She has never been dx with heart disease.    HTN:  meds are stable and clinic checks are good.        The following portions of the patient's history were reviewed and updated as appropriate: allergies, current medications, past family history, past medical history, past social history, past surgical history and problem list.    Patient Active Problem List   Diagnosis   • ADAL on CPAP   • VILLA (dyspnea on exertion)   • Panlobular emphysema (HCC)       Allergies   Allergen Reactions   • Bactrim [Sulfamethoxazole-Trimethoprim]    • Keflex [Cephalexin]    • Lyrica [Pregabalin]    • Sulfa Antibiotics Rash       Family History   Problem Relation Age of Onset   • Heart attack Mother    • No Known Problems Father        Social History     Socioeconomic History   • Marital status:    Tobacco Use   • Smoking status: Former Smoker     Packs/day: 0.50     Types: Cigarettes     Quit date: 2018     Years since quittin.5   • Smokeless tobacco: Never Used   Substance and Sexual Activity   • Alcohol use: Yes     Comment: glass of whiskey & sprite daily   • Drug use: No         Current Outpatient Medications:   •  albuterol sulfate  (90 Base) MCG/ACT inhaler, Inhale 2 puffs Every 4 (Four) Hours As Needed for Wheezing., Disp: , Rfl:   •  amLODIPine (NORVASC) 5 MG tablet, Take 5 mg by mouth Daily., Disp: , Rfl:   •  aspirin 81 MG EC tablet, Take 81 mg by mouth Daily., Disp: , Rfl:   •  Fluticasone-Umeclidin-Vilant (Trelegy Ellipta) 100-62.5-25 MCG/INH  "inhaler, Inhale 1 puff Daily., Disp: , Rfl:   •  folic acid (FOLVITE) 1 MG tablet, Take 1 mg by mouth Daily., Disp: , Rfl:   •  metoprolol tartrate (LOPRESSOR) 50 MG tablet, Take 50 mg by mouth 2 (Two) Times a Day., Disp: , Rfl:   •  rosuvastatin (CRESTOR) 40 MG tablet, Take 40 mg by mouth Daily., Disp: , Rfl:   •  senna (senna) 8.6 MG tablet, Take 1 tablet by mouth Daily., Disp: , Rfl:   •  tolterodine (DETROL) 2 MG tablet, Take 2 mg by mouth 2 (Two) Times a Day., Disp: , Rfl:   •  valsartan (DIOVAN) 160 MG tablet, Take 160 mg by mouth Daily., Disp: , Rfl:   •  vitamin D (ERGOCALCIFEROL) 10386 UNITS capsule capsule, Take 50,000 Units by mouth 1 (One) Time Per Week., Disp: , Rfl:     Past Surgical History:   Procedure Laterality Date   • APPENDECTOMY     • BACK SURGERY     • BREAST SURGERY     • CATARACT EXTRACTION     • CERVICAL SPINE SURGERY     • HAND SURGERY     • HYSTERECTOMY     • KNEE SURGERY Bilateral    • TONSILLECTOMY         Review of Systems   Constitutional: Positive for activity change and fatigue. Negative for appetite change.   Respiratory: Positive for cough and shortness of breath. Negative for wheezing.    Cardiovascular: Negative for chest pain, palpitations and leg swelling.   Gastrointestinal: Negative for abdominal pain and blood in stool.   Genitourinary: Negative for difficulty urinating and hematuria.   Musculoskeletal: Positive for arthralgias, back pain and neck pain.       /73   Pulse 63   Ht 172.7 cm (68\")   Wt 95.7 kg (211 lb)   BMI 32.08 kg/m²   Procedures    Objective   Physical Exam  Constitutional:       Appearance: She is obese.   Cardiovascular:      Rate and Rhythm: Normal rate and regular rhythm.      Heart sounds: Heart sounds are distant. No murmur heard.    No friction rub. No gallop.   Pulmonary:      Effort: Tachypnea present. No respiratory distress.      Breath sounds: Normal breath sounds and air entry.   Abdominal:      General: Bowel sounds are normal.      " Tenderness: There is no abdominal tenderness.   Musculoskeletal:      Right lower leg: No edema.      Left lower leg: No edema.   Skin:     General: Skin is warm.   Neurological:      General: No focal deficit present.   Psychiatric:         Mood and Affect: Mood normal.         Assessment & Plan   Diagnoses and all orders for this visit:    1. VILLA (dyspnea on exertion) (Primary)  Comments:  check stress test  Orders:  -     ECG 12 Lead  -     Adult Stress Echo W/ Cont or Stress Agent if Necessary Per Protocol    2. Panlobular emphysema (HCC)  Comments:  fu with Pulm             Return if symptoms worsen or fail to improve.  Orders Placed This Encounter   Procedures   • ECG 12 Lead     Order Specific Question:   Reason for Exam:     Answer:   SOB     Order Specific Question:   Release to patient     Answer:   Immediate   • Adult Stress Echo W/ Cont or Stress Agent if Necessary Per Protocol     Order Specific Question:   What stress agent will be used?     Answer:   Dobutamine     Order Specific Question:   Difficulty walking criteria?     Answer:   Severe COPD O2 dependence, CHF, Dyspnea     Order Specific Question:   Reason for exam?     Answer:   Dyspnea

## 2022-08-17 ENCOUNTER — APPOINTMENT (OUTPATIENT)
Dept: CARDIOLOGY | Facility: HOSPITAL | Age: 77
End: 2022-08-17

## 2022-08-19 ENCOUNTER — HOSPITAL ENCOUNTER (OUTPATIENT)
Dept: CARDIOLOGY | Facility: HOSPITAL | Age: 77
Discharge: HOME | End: 2022-08-19
Admitting: INTERNAL MEDICINE

## 2022-08-19 VITALS
SYSTOLIC BLOOD PRESSURE: 149 MMHG | DIASTOLIC BLOOD PRESSURE: 77 MMHG | HEART RATE: 62 BPM | WEIGHT: 210.98 LBS | HEIGHT: 68 IN | BODY MASS INDEX: 31.98 KG/M2

## 2022-08-19 PROCEDURE — 93017 CV STRESS TEST TRACING ONLY: CPT

## 2022-08-19 PROCEDURE — 25010000002 ATROPINE SULFATE: Performed by: INTERNAL MEDICINE

## 2022-08-19 PROCEDURE — 93352 ADMIN ECG CONTRAST AGENT: CPT | Performed by: INTERNAL MEDICINE

## 2022-08-19 PROCEDURE — 93350 STRESS TTE ONLY: CPT | Performed by: INTERNAL MEDICINE

## 2022-08-19 PROCEDURE — 25010000002 PERFLUTREN 6.52 MG/ML SUSPENSION: Performed by: INTERNAL MEDICINE

## 2022-08-19 PROCEDURE — 0 DOBUTAMINE PER 250 MG: Performed by: INTERNAL MEDICINE

## 2022-08-19 PROCEDURE — 93350 STRESS TTE ONLY: CPT

## 2022-08-19 PROCEDURE — 93018 CV STRESS TEST I&R ONLY: CPT | Performed by: INTERNAL MEDICINE

## 2022-08-19 RX ORDER — DOBUTAMINE HYDROCHLORIDE 100 MG/100ML
10 INJECTION INTRAVENOUS CONTINUOUS
Status: DISCONTINUED | OUTPATIENT
Start: 2022-08-19 | End: 2022-08-19

## 2022-08-19 RX ADMIN — ATROPINE SULFATE 0.6 MG: 0.1 INJECTION INTRAVENOUS at 11:03

## 2022-08-19 RX ADMIN — DOBUTAMINE HYDROCHLORIDE 10 MCG/KG/MIN: 100 INJECTION INTRAVENOUS at 10:31

## 2022-08-19 RX ADMIN — PERFLUTREN 8.48 MG: 6.52 INJECTION, SUSPENSION INTRAVENOUS at 10:30

## 2022-08-21 LAB
BH CV STRESS BP STAGE 1: NORMAL
BH CV STRESS BP STAGE 2: NORMAL
BH CV STRESS BP STAGE 3: NORMAL
BH CV STRESS DOB - ATROPINE STAGE 3: 0.6
BH CV STRESS DOSE DOBUTAMINE STAGE 1: 10
BH CV STRESS DOSE DOBUTAMINE STAGE 2: 20
BH CV STRESS DOSE DOBUTAMINE STAGE 3: 30
BH CV STRESS DURATION MIN STAGE 1: 3
BH CV STRESS DURATION MIN STAGE 2: 3
BH CV STRESS DURATION MIN STAGE 3: 2
BH CV STRESS DURATION SEC STAGE 1: 0
BH CV STRESS DURATION SEC STAGE 2: 0
BH CV STRESS DURATION SEC STAGE 3: 44
BH CV STRESS HR STAGE 1: 64
BH CV STRESS HR STAGE 2: 80
BH CV STRESS HR STAGE 3: 123
BH CV STRESS PROTOCOL 1: NORMAL
BH CV STRESS RECOVERY BP: NORMAL MMHG
BH CV STRESS RECOVERY HR: 94 BPM
BH CV STRESS STAGE 1: 1
BH CV STRESS STAGE 2: 2
BH CV STRESS STAGE 3: 3
MAXIMAL PREDICTED HEART RATE: 145 BPM
PERCENT MAX PREDICTED HR: 84.83 %
STRESS BASELINE BP: NORMAL MMHG
STRESS BASELINE HR: 64 BPM
STRESS PERCENT HR: 100 %
STRESS POST EXERCISE DUR MIN: 8 MIN
STRESS POST EXERCISE DUR SEC: 44 SEC
STRESS POST PEAK BP: NORMAL MMHG
STRESS POST PEAK HR: 123 BPM
STRESS TARGET HR: 123 BPM

## 2022-09-08 ENCOUNTER — HOSPITAL ENCOUNTER (OUTPATIENT)
Dept: GENERAL RADIOLOGY | Age: 77
Discharge: HOME | End: 2022-09-08
Payer: MEDICARE

## 2022-09-08 ENCOUNTER — OFFICE VISIT (OUTPATIENT)
Dept: PRIMARY CARE CLINIC | Age: 77
End: 2022-09-08
Payer: MEDICARE

## 2022-09-08 VITALS
BODY MASS INDEX: 32.7 KG/M2 | SYSTOLIC BLOOD PRESSURE: 120 MMHG | WEIGHT: 218.25 LBS | HEART RATE: 56 BPM | TEMPERATURE: 97.8 F | OXYGEN SATURATION: 95 % | DIASTOLIC BLOOD PRESSURE: 80 MMHG

## 2022-09-08 DIAGNOSIS — K59.04 CHRONIC IDIOPATHIC CONSTIPATION: Primary | ICD-10-CM

## 2022-09-08 DIAGNOSIS — R10.84 GENERALIZED ABDOMINAL PAIN: ICD-10-CM

## 2022-09-08 DIAGNOSIS — K59.04 CHRONIC IDIOPATHIC CONSTIPATION: ICD-10-CM

## 2022-09-08 PROCEDURE — 99213 OFFICE O/P EST LOW 20 MIN: CPT | Performed by: NURSE PRACTITIONER

## 2022-09-08 PROCEDURE — 3017F COLORECTAL CA SCREEN DOC REV: CPT | Performed by: NURSE PRACTITIONER

## 2022-09-08 PROCEDURE — 74018 RADEX ABDOMEN 1 VIEW: CPT | Performed by: RADIOLOGY

## 2022-09-08 PROCEDURE — 1123F ACP DISCUSS/DSCN MKR DOCD: CPT | Performed by: NURSE PRACTITIONER

## 2022-09-08 PROCEDURE — G8417 CALC BMI ABV UP PARAM F/U: HCPCS | Performed by: NURSE PRACTITIONER

## 2022-09-08 PROCEDURE — 1036F TOBACCO NON-USER: CPT | Performed by: NURSE PRACTITIONER

## 2022-09-08 PROCEDURE — 1090F PRES/ABSN URINE INCON ASSESS: CPT | Performed by: NURSE PRACTITIONER

## 2022-09-08 PROCEDURE — G8400 PT W/DXA NO RESULTS DOC: HCPCS | Performed by: NURSE PRACTITIONER

## 2022-09-08 PROCEDURE — G8427 DOCREV CUR MEDS BY ELIG CLIN: HCPCS | Performed by: NURSE PRACTITIONER

## 2022-09-08 PROCEDURE — 74018 RADEX ABDOMEN 1 VIEW: CPT

## 2022-09-08 RX ORDER — ASPIRIN 81 MG/1
81 TABLET ORAL DAILY
COMMUNITY

## 2022-09-08 RX ORDER — LUBIPROSTONE 24 UG/1
24 CAPSULE, GELATIN COATED ORAL 2 TIMES DAILY WITH MEALS
Qty: 60 CAPSULE | Refills: 3 | Status: SHIPPED | OUTPATIENT
Start: 2022-09-08 | End: 2022-11-04

## 2022-09-08 RX ORDER — SENNA AND DOCUSATE SODIUM 50; 8.6 MG/1; MG/1
1 TABLET, FILM COATED ORAL DAILY
COMMUNITY

## 2022-09-08 SDOH — ECONOMIC STABILITY: FOOD INSECURITY: WITHIN THE PAST 12 MONTHS, THE FOOD YOU BOUGHT JUST DIDN'T LAST AND YOU DIDN'T HAVE MONEY TO GET MORE.: NEVER TRUE

## 2022-09-08 SDOH — ECONOMIC STABILITY: FOOD INSECURITY: WITHIN THE PAST 12 MONTHS, YOU WORRIED THAT YOUR FOOD WOULD RUN OUT BEFORE YOU GOT MONEY TO BUY MORE.: NEVER TRUE

## 2022-09-08 ASSESSMENT — ENCOUNTER SYMPTOMS
ABDOMINAL PAIN: 1
EYE DISCHARGE: 0
TROUBLE SWALLOWING: 0
COUGH: 0
WHEEZING: 0
RHINORRHEA: 0
SORE THROAT: 0
EYE REDNESS: 0
DIARRHEA: 0
BLOOD IN STOOL: 0
CONSTIPATION: 1

## 2022-09-08 ASSESSMENT — PATIENT HEALTH QUESTIONNAIRE - PHQ9
1. LITTLE INTEREST OR PLEASURE IN DOING THINGS: 0
SUM OF ALL RESPONSES TO PHQ QUESTIONS 1-9: 0
2. FEELING DOWN, DEPRESSED OR HOPELESS: 0
SUM OF ALL RESPONSES TO PHQ9 QUESTIONS 1 & 2: 0
SUM OF ALL RESPONSES TO PHQ QUESTIONS 1-9: 0

## 2022-09-08 ASSESSMENT — SOCIAL DETERMINANTS OF HEALTH (SDOH): HOW HARD IS IT FOR YOU TO PAY FOR THE VERY BASICS LIKE FOOD, HOUSING, MEDICAL CARE, AND HEATING?: NOT HARD AT ALL

## 2022-09-08 NOTE — PROGRESS NOTES
Sherren Gula (:  1945) is a 77 y.o. female,Established patient, here for evaluation of the following chief complaint(s):  Abdominal Pain (No pain today. Pain has felt like soreness. All over abdominal pain. Has been going on for 3+ weeks.) and Constipation (Went to ER 3 weeks ago [place]) for constipation. Takes a laxative and stool softener for constipation but it is not working. )      ASSESSMENT/PLAN:    ICD-10-CM    1. Chronic idiopathic constipation  K59.04 lubiprostone (AMITIZA) 24 MCG capsule     XR ABDOMEN (KUB) (SINGLE AP VIEW)     magnesium citrate solution      2. Generalized abdominal pain  R10.84 XR ABDOMEN (KUB) (SINGLE AP VIEW)          Return if symptoms worsen or fail to improve. SUBJECTIVE/OBJECTIVE:  HPI  Abdominal Pain (No pain today. Pain has felt like soreness. All over abdominal pain. Has been going on for 3+ weeks.) and Constipation (Went to ER 3 weeks ago [place]) for constipation. Takes a laxative and stool softener for constipation but it is not working. )ER gave her an enema and she went a little and they sent her home with a suppository and miralax. Has had a few hard small balls since then. She has been taking miralax, and senokot. She is passing gas. Review of Systems   Constitutional:  Negative for appetite change and unexpected weight change. HENT:  Negative for congestion, rhinorrhea, sore throat and trouble swallowing. Eyes:  Negative for discharge and redness. Respiratory:  Negative for cough and wheezing. Cardiovascular:  Negative for chest pain. Gastrointestinal:  Positive for abdominal pain (generalized) and constipation. Negative for blood in stool and diarrhea. Genitourinary:  Negative for dysuria. Skin:  Negative for rash. Neurological:  Negative for weakness. Hematological:  Negative for adenopathy.      /80 (Site: Left Upper Arm, Position: Sitting, Cuff Size: Large Adult)   Pulse 56   Temp 97.8 °F (36.6 °C) (Temporal)   Wt 218 lb 4 oz (99 kg)   SpO2 95%   BMI 32.70 kg/m²    Physical Exam  Vitals reviewed. Constitutional:       Appearance: She is well-developed. HENT:      Head: Normocephalic. Eyes:      Conjunctiva/sclera: Conjunctivae normal.   Cardiovascular:      Rate and Rhythm: Normal rate. Heart sounds: Normal heart sounds. Pulmonary:      Effort: Pulmonary effort is normal.      Breath sounds: Decreased breath sounds present. Abdominal:      General: Bowel sounds are normal.      Palpations: Abdomen is soft. Tenderness: There is no abdominal tenderness. There is no guarding or rebound. Musculoskeletal:         General: Normal range of motion. Cervical back: Normal range of motion and neck supple. Skin:     General: Skin is warm and dry. Neurological:      Mental Status: She is alert and oriented to person, place, and time. Psychiatric:         Behavior: Behavior normal.               An electronic signature was used to authenticate this note.     --GÉNESIS Meza

## 2022-09-12 ENCOUNTER — TELEPHONE (OUTPATIENT)
Dept: PRIMARY CARE CLINIC | Age: 77
End: 2022-09-12

## 2022-09-12 NOTE — TELEPHONE ENCOUNTER
Called pt and went over results from Sarika Azul. She voiced understanding and will continue to take meds as prescribed.

## 2022-09-12 NOTE — TELEPHONE ENCOUNTER
Called pt to go over recommendations. She said that she took the [place] this am and has not been off the toilet hardly at all today. She has appts all week. She said she takes it BID, so I told her to hold tonights dose until she hears back from us.

## 2022-09-12 NOTE — TELEPHONE ENCOUNTER
Patient stated that the Miralax did not work, the other medication you sent in she said was on recall. She said the Amitiza has worked and she went several times today.

## 2022-09-12 NOTE — TELEPHONE ENCOUNTER
I did explain to her that she will have a clean out phase when she first starts this and will need to continue to take it bid. She will clean out and then continue as a maintenance to prevent this from happening again. She can stop it but she will get backed up again and the whole process will start over again.

## 2022-10-27 ENCOUNTER — TELEPHONE (OUTPATIENT)
Dept: PRIMARY CARE CLINIC | Age: 77
End: 2022-10-27

## 2022-10-27 NOTE — TELEPHONE ENCOUNTER
Transferred [facility] pulmonology to [employer], APRN

## 2022-10-28 ENCOUNTER — OFFICE VISIT (OUTPATIENT)
Dept: PRIMARY CARE CLINIC | Age: 77
End: 2022-10-28
Payer: MEDICARE

## 2022-10-28 VITALS
DIASTOLIC BLOOD PRESSURE: 84 MMHG | OXYGEN SATURATION: 94 % | TEMPERATURE: 97.5 F | BODY MASS INDEX: 32.66 KG/M2 | SYSTOLIC BLOOD PRESSURE: 136 MMHG | HEART RATE: 77 BPM | WEIGHT: 218 LBS

## 2022-10-28 DIAGNOSIS — R79.89 ABNORMAL SERUM CREATININE LEVEL: ICD-10-CM

## 2022-10-28 DIAGNOSIS — D64.9 ANEMIA, UNSPECIFIED TYPE: ICD-10-CM

## 2022-10-28 DIAGNOSIS — R79.89 ABNORMAL SERUM CREATININE LEVEL: Primary | ICD-10-CM

## 2022-10-28 DIAGNOSIS — J44.9 CHRONIC OBSTRUCTIVE PULMONARY DISEASE, UNSPECIFIED COPD TYPE (HCC): ICD-10-CM

## 2022-10-28 DIAGNOSIS — R06.00 DYSPNEA, UNSPECIFIED TYPE: ICD-10-CM

## 2022-10-28 DIAGNOSIS — K62.5 BRBPR (BRIGHT RED BLOOD PER RECTUM): ICD-10-CM

## 2022-10-28 DIAGNOSIS — K59.00 CONSTIPATION, UNSPECIFIED CONSTIPATION TYPE: ICD-10-CM

## 2022-10-28 DIAGNOSIS — R79.89 ELEVATED BRAIN NATRIURETIC PEPTIDE (BNP) LEVEL: ICD-10-CM

## 2022-10-28 LAB
ALBUMIN SERPL-MCNC: 4.2 G/DL (ref 3.5–5.2)
ALP BLD-CCNC: 61 U/L (ref 35–104)
ALT SERPL-CCNC: 18 U/L (ref 5–33)
ANION GAP SERPL CALCULATED.3IONS-SCNC: 14 MMOL/L (ref 7–19)
AST SERPL-CCNC: 18 U/L (ref 5–32)
BASOPHILS ABSOLUTE: 0 K/UL (ref 0–0.2)
BASOPHILS RELATIVE PERCENT: 0.3 % (ref 0–1)
BILIRUB SERPL-MCNC: 0.3 MG/DL (ref 0.2–1.2)
BUN BLDV-MCNC: 33 MG/DL (ref 8–23)
CALCIUM SERPL-MCNC: 10 MG/DL (ref 8.8–10.2)
CHLORIDE BLD-SCNC: 105 MMOL/L (ref 98–111)
CO2: 23 MMOL/L (ref 22–29)
CREAT SERPL-MCNC: 2.4 MG/DL (ref 0.5–0.9)
EOSINOPHILS ABSOLUTE: 0 K/UL (ref 0–0.6)
EOSINOPHILS RELATIVE PERCENT: 0.5 % (ref 0–5)
FOLATE: >20 NG/ML (ref 4.8–37.3)
GFR SERPL CREATININE-BSD FRML MDRD: 20 ML/MIN/{1.73_M2}
GLUCOSE BLD-MCNC: 90 MG/DL (ref 74–109)
HCT VFR BLD CALC: 36.9 % (ref 37–47)
HEMOGLOBIN: 11.2 G/DL (ref 12–16)
IMMATURE GRANULOCYTES #: 0 K/UL
LYMPHOCYTES ABSOLUTE: 1.6 K/UL (ref 1.1–4.5)
LYMPHOCYTES RELATIVE PERCENT: 21.2 % (ref 20–40)
MCH RBC QN AUTO: 31.9 PG (ref 27–31)
MCHC RBC AUTO-ENTMCNC: 30.4 G/DL (ref 33–37)
MCV RBC AUTO: 105.1 FL (ref 81–99)
MONOCYTES ABSOLUTE: 0.5 K/UL (ref 0–0.9)
MONOCYTES RELATIVE PERCENT: 6.8 % (ref 0–10)
NEUTROPHILS ABSOLUTE: 5.2 K/UL (ref 1.5–7.5)
NEUTROPHILS RELATIVE PERCENT: 70.8 % (ref 50–65)
PDW BLD-RTO: 14.8 % (ref 11.5–14.5)
PLATELET # BLD: 168 K/UL (ref 130–400)
PMV BLD AUTO: 9.9 FL (ref 9.4–12.3)
POTASSIUM SERPL-SCNC: 4.4 MMOL/L (ref 3.5–5)
PRO-BNP: 492 PG/ML (ref 0–1800)
RBC # BLD: 3.51 M/UL (ref 4.2–5.4)
SODIUM BLD-SCNC: 142 MMOL/L (ref 136–145)
TOTAL PROTEIN: 6.9 G/DL (ref 6.6–8.7)
VITAMIN B-12: 731 PG/ML (ref 211–946)
WBC # BLD: 7.4 K/UL (ref 4.8–10.8)

## 2022-10-28 PROCEDURE — G8417 CALC BMI ABV UP PARAM F/U: HCPCS | Performed by: NURSE PRACTITIONER

## 2022-10-28 PROCEDURE — G8427 DOCREV CUR MEDS BY ELIG CLIN: HCPCS | Performed by: NURSE PRACTITIONER

## 2022-10-28 PROCEDURE — 3023F SPIROM DOC REV: CPT | Performed by: NURSE PRACTITIONER

## 2022-10-28 PROCEDURE — 1123F ACP DISCUSS/DSCN MKR DOCD: CPT | Performed by: NURSE PRACTITIONER

## 2022-10-28 PROCEDURE — 3017F COLORECTAL CA SCREEN DOC REV: CPT | Performed by: NURSE PRACTITIONER

## 2022-10-28 PROCEDURE — 3074F SYST BP LT 130 MM HG: CPT | Performed by: NURSE PRACTITIONER

## 2022-10-28 PROCEDURE — 3078F DIAST BP <80 MM HG: CPT | Performed by: NURSE PRACTITIONER

## 2022-10-28 PROCEDURE — G8400 PT W/DXA NO RESULTS DOC: HCPCS | Performed by: NURSE PRACTITIONER

## 2022-10-28 PROCEDURE — 1090F PRES/ABSN URINE INCON ASSESS: CPT | Performed by: NURSE PRACTITIONER

## 2022-10-28 PROCEDURE — 1036F TOBACCO NON-USER: CPT | Performed by: NURSE PRACTITIONER

## 2022-10-28 PROCEDURE — G8484 FLU IMMUNIZE NO ADMIN: HCPCS | Performed by: NURSE PRACTITIONER

## 2022-10-28 PROCEDURE — 99214 OFFICE O/P EST MOD 30 MIN: CPT | Performed by: NURSE PRACTITIONER

## 2022-10-28 ASSESSMENT — ENCOUNTER SYMPTOMS
VOMITING: 0
SHORTNESS OF BREATH: 1
CONSTIPATION: 0
SINUS PRESSURE: 0
ABDOMINAL PAIN: 1
COUGH: 0
NAUSEA: 0
DIARRHEA: 0
RHINORRHEA: 0
SORE THROAT: 0
TROUBLE SWALLOWING: 0

## 2022-10-28 NOTE — PROGRESS NOTES
Dina Shields (:  1945) is a 77 y.o. female,Established patient, here for evaluation of the following chief complaint(s):  Results (Was told to follow up with pcp by dr Herminio Gross in [place]. Declines flu shot )      ASSESSMENT/PLAN:    ICD-10-CM    1. Abnormal serum creatinine level  R79.89 CBC with Auto Differential     Comprehensive Metabolic Panel     Brain Natriuretic Peptide     Vitamin B12     Folate    Patient has a possible acute decline in kidney function, repeating labs and advised that we may send her to the hospital if this has worsened especially with her complaints. Her baseline creatinine runs around 1.2-1.4 on labs from 10/26/2022 her creatinine was 2.5. She has not on any diuretics and her blood pressure is controlled. 2. Chronic obstructive pulmonary disease, unspecified COPD type ([facility]  J44.9 CBC with Auto Differential     Comprehensive Metabolic Panel     Brain Natriuretic Peptide     Vitamin B12     Folate      3. Anemia, unspecified type  D64.9 CBC with Auto Differential     Comprehensive Metabolic Panel     Brain Natriuretic Peptide     Vitamin B12     Folate    She reports she has rectal bleeding but she had a normal colonoscopy done last month per Dr. Aylin Lester.  She has an appointment with GI Dr. Jess Chaparro on Monday. She has never seen him before. Her rectal bleeding sounds to be from passing hard stools/constipation issues. 4. Elevated brain natriuretic peptide (BNP) level  R79.89 CBC with Auto Differential     Comprehensive Metabolic Panel     Brain Natriuretic Peptide     Vitamin B12     Folate    I do not appreciate any fluid overload on exam.  I advised her to do daily weights. I think her BNP may be elevated due to her renal function and chronic lung disease.   Especially where they are having a normal stress echo with an ejection fraction of 66 to 70% done in August.      5. Dyspnea, unspecified type  R06.00 CBC with Auto Differential     Comprehensive Metabolic Panel Brain Natriuretic Peptide     Vitamin B12     Folate    It is difficult to ascertain if her shortness of breath has acutely worsened or if this is a chronic problem. 6. Constipation, unspecified constipation type  K59.00 CBC with Auto Differential     Comprehensive Metabolic Panel     Brain Natriuretic Peptide     Vitamin B12     Folate      7. BRBPR (bright red blood per rectum)  K62.5 CBC with Auto Differential     Comprehensive Metabolic Panel     Brain Natriuretic Peptide     Vitamin B12     Folate   Due to her multiple complaints I advised her for possible admission to hospital given her kidney function, shortness of breath and ongoing abdominal complaints. We are going to repeat labs and if her kidney function has worsened not improved I am going to advise her to go to the hospital for evaluation. She reports she has seen Dr. Alcira Overton in the past for kidney issues. Return in about 1 week (around 11/4/2022). SUBJECTIVE/OBJECTIVE:  HPI  Here for follow up   Followed by Dr Griffin Dowell, saw Lev Gottlieb with Dr Griffin Dowell yesterday in [place]. Can barely make bed without oxygen. They did xray and labs. Wednesday was told something something was wrong with kidney and they couldn't do the xray. They told me I have congestive heart failure and was breathing issues was not my lungs. They advised to follow up with PCP. She had chemical stress test with Dr Christ Rajput 2 months ago normal per patient spouse. ·   08/19/2022 Stress echo  Stress ECG rhythm of sinus tachycardia noted. Non-specific ST-T wave changes noted during stress. There was no ST segment deviation noted during stress. There were no arrhythmias during stress. There were no significant arrhythmias noted during stress. Left ventricular ejection fraction normal 66 - 70%. Left ventricular systolic function is normal.      She admits to having some increased swelling. No hx of blood clots. Have appt with Dr Jess Chaparro on Monday.    On oxygen at night with CPAP otherwise uses prn depending on activity. Denies any chest pain. Bowels move very little. She drinks metamucil in the morning and miralax. Bowels move every couple of days. Dont feel totally empty. She relates she has some bright red blood from her bowels at times and that her stools are painful when passing. She had colonoscopy last month with Dr Deshaun Yoo and everything was good. He said might need to see a gastro. She has amitiza but not sure if she is taking it. Been having some bowel issues. \"Something going on in my gut\"    She reports that since 2019 she fell and laid in floor for about 15 hours. Since then have had progressive decline. Started going down hill after that. Difficult to ascertain if there has been an acute change in her breathing. According to pulmonology note from 10/26/2022 she has LLL pulmonary nodule being followed last CT 04/11/2022. Pet scan 1/2022 negative for malignancy. She has COPD on trelegy  PFT 04/2022 stable     Labs from [facility] ordered by Dr. Ava Woodward pulmonologist dated 10/26/2022  Glucose 104, BUN 31, creatinine 2.5 (baseline 1.2-1.4), sodium 138, potassium 3.9 with normal transaminases  WBC 7.9, hemoglobin 10.5, hematocrit 32.5 (down from CBC 12.8/41.5 in March of this year) platelet count 749    /84   Pulse 77   Temp 97.5 °F (36.4 °C) (Temporal)   Wt 218 lb (98.9 kg)   SpO2 94%   BMI 32.66 kg/m²     Review of Systems   Constitutional:  Negative for activity change, appetite change, fatigue, fever and unexpected weight change. HENT:  Negative for congestion, hearing loss, rhinorrhea, sinus pressure, sore throat and trouble swallowing. Eyes:  Negative for visual disturbance. Respiratory:  Positive for shortness of breath. Negative for cough. Cardiovascular:  Negative for chest pain, palpitations and leg swelling. Gastrointestinal:  Positive for abdominal pain.  Negative for constipation, diarrhea, nausea and vomiting. Endocrine: Negative for cold intolerance and heat intolerance. Genitourinary:  Negative for flank pain, menstrual problem, pelvic pain, urgency and vaginal discharge. Musculoskeletal:  Negative for arthralgias. Skin:  Negative for rash. Neurological:  Negative for headaches. Psychiatric/Behavioral:  Negative for dysphoric mood and sleep disturbance. The patient is not nervous/anxious. Physical Exam  Vitals reviewed. Constitutional:       Appearance: Normal appearance. She is obese. HENT:      Head: Normocephalic and atraumatic. Eyes:      Conjunctiva/sclera: Conjunctivae normal.   Cardiovascular:      Rate and Rhythm: Normal rate and regular rhythm. Pulses: Normal pulses. Heart sounds: Normal heart sounds. Pulmonary:      Effort: Pulmonary effort is normal.      Breath sounds: Wheezing (faint) present. Abdominal:      General: Bowel sounds are normal.      Palpations: Abdomen is soft. Tenderness: There is abdominal tenderness (generalized). Musculoskeletal:      Cervical back: Normal range of motion and neck supple. Right lower leg: Edema (1+) present. Left lower leg: Edema (1+) present. Skin:     General: Skin is warm. Neurological:      Mental Status: She is alert and oriented to person, place, and time. An electronic signature was used to authenticate this note.     --Celedonio Riedel, APRN

## 2022-10-28 NOTE — PROGRESS NOTES
Call patient with lab results. She states that she has already had a bowel movement since taking the Amitiza since she got home.      She is going to keep her follow-up with GI on Monday as scheduled  Increase her fluids and repeat BMP to reassess kidney function on Monday with follow-up with me at the end of next week

## 2022-10-31 DIAGNOSIS — R79.89 ABNORMAL SERUM CREATININE LEVEL: ICD-10-CM

## 2022-10-31 LAB
ANION GAP SERPL CALCULATED.3IONS-SCNC: 13 MMOL/L (ref 7–19)
BUN BLDV-MCNC: 20 MG/DL (ref 8–23)
CALCIUM SERPL-MCNC: 9.9 MG/DL (ref 8.8–10.2)
CHLORIDE BLD-SCNC: 105 MMOL/L (ref 98–111)
CO2: 20 MMOL/L (ref 22–29)
CREAT SERPL-MCNC: 1.8 MG/DL (ref 0.5–0.9)
GFR SERPL CREATININE-BSD FRML MDRD: 29 ML/MIN/{1.73_M2}
GLUCOSE BLD-MCNC: 112 MG/DL (ref 74–109)
POTASSIUM SERPL-SCNC: 3.4 MMOL/L (ref 3.5–5)
SODIUM BLD-SCNC: 138 MMOL/L (ref 136–145)

## 2022-11-01 ENCOUNTER — TELEPHONE (OUTPATIENT)
Dept: PRIMARY CARE CLINIC | Age: 77
End: 2022-11-01

## 2022-11-01 NOTE — TELEPHONE ENCOUNTER
----- Message from GÉNESIS Caro sent at 10/31/2022  3:53 PM CDT -----  Please call patient and let them know results. Kidney function has improved.   Continue to ensure good hydration

## 2022-11-04 ENCOUNTER — OFFICE VISIT (OUTPATIENT)
Dept: PRIMARY CARE CLINIC | Age: 77
End: 2022-11-04
Payer: MEDICARE

## 2022-11-04 VITALS
OXYGEN SATURATION: 93 % | WEIGHT: 220 LBS | TEMPERATURE: 98.5 F | BODY MASS INDEX: 32.96 KG/M2 | SYSTOLIC BLOOD PRESSURE: 144 MMHG | HEART RATE: 67 BPM | DIASTOLIC BLOOD PRESSURE: 90 MMHG

## 2022-11-04 DIAGNOSIS — R06.09 DYSPNEA ON EXERTION: ICD-10-CM

## 2022-11-04 DIAGNOSIS — J44.9 CHRONIC OBSTRUCTIVE PULMONARY DISEASE, UNSPECIFIED COPD TYPE (HCC): ICD-10-CM

## 2022-11-04 DIAGNOSIS — R79.89 ELEVATED BRAIN NATRIURETIC PEPTIDE (BNP) LEVEL: ICD-10-CM

## 2022-11-04 DIAGNOSIS — N18.31 STAGE 3A CHRONIC KIDNEY DISEASE (HCC): ICD-10-CM

## 2022-11-04 DIAGNOSIS — K59.00 CONSTIPATION, UNSPECIFIED CONSTIPATION TYPE: ICD-10-CM

## 2022-11-04 DIAGNOSIS — R79.89 ABNORMAL SERUM CREATININE LEVEL: ICD-10-CM

## 2022-11-04 DIAGNOSIS — J44.9 CHRONIC OBSTRUCTIVE PULMONARY DISEASE, UNSPECIFIED COPD TYPE (HCC): Primary | ICD-10-CM

## 2022-11-04 LAB
ANION GAP SERPL CALCULATED.3IONS-SCNC: 14 MMOL/L (ref 7–19)
BUN BLDV-MCNC: 25 MG/DL (ref 8–23)
CALCIUM SERPL-MCNC: 10.4 MG/DL (ref 8.8–10.2)
CHLORIDE BLD-SCNC: 100 MMOL/L (ref 98–111)
CO2: 22 MMOL/L (ref 22–29)
CREAT SERPL-MCNC: 1.7 MG/DL (ref 0.5–0.9)
GFR SERPL CREATININE-BSD FRML MDRD: 31 ML/MIN/{1.73_M2}
GLUCOSE BLD-MCNC: 74 MG/DL (ref 74–109)
POTASSIUM SERPL-SCNC: 4.3 MMOL/L (ref 3.5–5)
PRO-BNP: 879 PG/ML (ref 0–1800)
SODIUM BLD-SCNC: 136 MMOL/L (ref 136–145)

## 2022-11-04 PROCEDURE — 1090F PRES/ABSN URINE INCON ASSESS: CPT | Performed by: NURSE PRACTITIONER

## 2022-11-04 PROCEDURE — 3074F SYST BP LT 130 MM HG: CPT | Performed by: NURSE PRACTITIONER

## 2022-11-04 PROCEDURE — 3017F COLORECTAL CA SCREEN DOC REV: CPT | Performed by: NURSE PRACTITIONER

## 2022-11-04 PROCEDURE — 1123F ACP DISCUSS/DSCN MKR DOCD: CPT | Performed by: NURSE PRACTITIONER

## 2022-11-04 PROCEDURE — 1036F TOBACCO NON-USER: CPT | Performed by: NURSE PRACTITIONER

## 2022-11-04 PROCEDURE — 99214 OFFICE O/P EST MOD 30 MIN: CPT | Performed by: NURSE PRACTITIONER

## 2022-11-04 PROCEDURE — G8428 CUR MEDS NOT DOCUMENT: HCPCS | Performed by: NURSE PRACTITIONER

## 2022-11-04 PROCEDURE — G8484 FLU IMMUNIZE NO ADMIN: HCPCS | Performed by: NURSE PRACTITIONER

## 2022-11-04 PROCEDURE — G8417 CALC BMI ABV UP PARAM F/U: HCPCS | Performed by: NURSE PRACTITIONER

## 2022-11-04 PROCEDURE — 3078F DIAST BP <80 MM HG: CPT | Performed by: NURSE PRACTITIONER

## 2022-11-04 PROCEDURE — 3023F SPIROM DOC REV: CPT | Performed by: NURSE PRACTITIONER

## 2022-11-04 PROCEDURE — G8400 PT W/DXA NO RESULTS DOC: HCPCS | Performed by: NURSE PRACTITIONER

## 2022-11-04 RX ORDER — BUDESONIDE, GLYCOPYRROLATE, AND FORMOTEROL FUMARATE 160; 9; 4.8 UG/1; UG/1; UG/1
2 AEROSOL, METERED RESPIRATORY (INHALATION) 2 TIMES DAILY
Qty: 1 EACH | Refills: 11 | Status: SHIPPED | OUTPATIENT
Start: 2022-11-04

## 2022-11-04 ASSESSMENT — ENCOUNTER SYMPTOMS
VOMITING: 0
SORE THROAT: 0
SINUS PRESSURE: 0
COUGH: 0
CONSTIPATION: 1
ABDOMINAL PAIN: 0
RHINORRHEA: 0
NAUSEA: 0
TROUBLE SWALLOWING: 0
SHORTNESS OF BREATH: 1
DIARRHEA: 0

## 2022-11-04 NOTE — PATIENT INSTRUCTIONS
Stop trulance  Stop trelegy (inhaler)  Start breztri 2 puffs twice a day  Use your oxygen as needed  Continue albuterol inhaler as needed    Restart amitiza  Stay hydrated  No nsaids ok to take tylenol

## 2022-11-04 NOTE — PROGRESS NOTES
Flory Novoa (:  1945) is a 77 y.o. female,Established patient, here for evaluation of the following chief complaint(s):  Follow-up      ASSESSMENT/PLAN:    ICD-10-CM    1. Chronic obstructive pulmonary disease, unspecified COPD type ([facility]  J44.9 Budeson-Glycopyrrol-Formoterol (BREZTRI AEROSPHERE) 160-9-4.8 MCG/ACT AERO     Basic Metabolic Panel     Brain Natriuretic Peptide     am going to repeat kidney function testing and BNP today. She cannot get CT scan with contrast as ordered by pulmonology with her current kidney function. I have low suspicion for her having a pulmonary embolism as she has had CT scans of her chest with her complaints of shortness of breath and did not show such. I have low suspicion for her heart causing her increased shortness of breath that she has had a normal cardiac work-up including chemical stress test and echocardiogram.    I attempted to reach out to her pulmonologist but their office is closed. I will try again when I get back from vacation. For the meantime I have changed her inhaler to LessonFace. 2. Abnormal serum creatinine level  S89.24 Basic Metabolic Panel      3. Elevated brain natriuretic peptide (BNP) level  R79.89 Brain Natriuretic Peptide      4. Dyspnea on exertion   R06.09 Budeson-Glycopyrrol-Formoterol (BREZTRI AEROSPHERE) 160-9-4.8 MCG/ACT AERO     Brain Natriuretic Peptide    Again changing her inhaler. I do not think her shortness of breath is coming from her heart I believe it is lung related despite what her pulmonologist provider is telling her. 5. Constipation, unspecified constipation type  K59.00 I advised her to go back to Amitiza and discontinue Trulance as the Amitiza was reportedly working. I attempted to call Dr. Bijan Christine office to discuss this with the provider but they were closed.         6. Stage 3a chronic kidney disease (HCC)  N18.31 Advised to avoid NSAIDs and ensure good hydration          Return in about 4 weeks (around 12/2/2022). SUBJECTIVE/OBJECTIVE:  HPI  Here for follow up   Seen 10/28/2022 with increased shortness of breath, abnormal kidney function, elevated BNP  She came after being advised by pulmonology to follow up with PCP due to congestive heart failure and decreased kidney function. Labs were repeated 10/28/2022-   Creatinine had improved to 1.8 from 2.5  BNP repeated and was 492 (normal <1800) previously 2093  Hgb 11.5 previously 10.5, hct  36.9 previously 32.5  Lab Results   Component Value Date    CREATININE 1.8 (H) 10/31/2022     Lab Results   Component Value Date    WBC 7.4 10/28/2022    HGB 11.2 (L) 10/28/2022    HCT 36.9 (L) 10/28/2022    .1 (H) 10/28/2022     10/28/2022     She had reported some constipation, painful stools and rectal bleeding and saw Dr Handy Barreto on Monday 10/31/2022. I advised her to take her amitiza and that helped her pass stools. Dr Debby Garcia nurse practitioner changed to trulance. Since changing to trulance have not had bowel movement except for small rabbit sized stools. She is suppose to follow up with them after taking all the samples. Echocardiogram dated 4/26/2022 done at [facility]  1. Technically difficult study due to body habitus with some images suboptimal  2. Normal left ventricular size and preserved systolic function, ejection fraction 55 to 60%  3. Left ventricular wall thickness probably upper normal with mild grade 1 diastolic dysfunction  4. Normal left atrial size  5. Mild thickening of a poorly visualized aortic valve precluding leaflet determination but with apparent adequate cusps separation and no evidence of significant stenosis or insufficiency  6. Mitral annular calcification with mild thickening of a normally mobile mitral valve with trace regurgitation. 7.  Trace pulmonic insufficiency  8. As measured mild right atrial enlargement with normal right ventricular size and systolic function  9.   Mild tricuspid regurgitation with right ventricular systolic pressure estimate of 42 mmHg  10. IVC dimension and inspiratory motion are normal consistent with normal right atrial filling pressures  11. Upper normal aortic root measurement at the level of the sinus of the Valsalva at 3.4 cm with the ascending portion measuring within normal limits  12. Pericardial fat pad noted but no evidence of significant effusion  13. Definity contrast utilized to better define endocardial borders      BP (!) 144/90   Pulse 67   Temp 98.5 °F (36.9 °C) (Temporal)   Wt 220 lb (99.8 kg)   SpO2 93%   BMI 32.96 kg/m²     Review of Systems   Constitutional:  Negative for activity change, appetite change, fatigue, fever and unexpected weight change. HENT:  Negative for congestion, hearing loss, rhinorrhea, sinus pressure, sore throat and trouble swallowing. Eyes:  Negative for visual disturbance. Respiratory:  Positive for shortness of breath. Negative for cough. Cardiovascular:  Negative for chest pain, palpitations and leg swelling. Gastrointestinal:  Positive for constipation. Negative for abdominal pain, diarrhea, nausea and vomiting. Endocrine: Negative for cold intolerance and heat intolerance. Genitourinary:  Negative for flank pain, menstrual problem, pelvic pain, urgency and vaginal discharge. Musculoskeletal:  Negative for arthralgias. Skin:  Negative for rash. Neurological:  Negative for headaches. Psychiatric/Behavioral:  Negative for dysphoric mood and sleep disturbance. The patient is not nervous/anxious. Physical Exam            An electronic signature was used to authenticate this note.     --Marylee Sandifer, APRN

## 2022-11-07 ENCOUNTER — TELEPHONE (OUTPATIENT)
Dept: PRIMARY CARE CLINIC | Age: 77
End: 2022-11-07

## 2022-11-07 DIAGNOSIS — N28.9 FUNCTION KIDNEY DECREASED: Primary | ICD-10-CM

## 2022-11-07 NOTE — TELEPHONE ENCOUNTER
----- Message from GÉNESIS Min sent at 11/4/2022  8:20 PM CDT -----  BMP: Normal sodium and potassium. Renal fxn has slightly improved.   Avoid NSAID's   Recheck in 1 week    GÉNESIS Aleman  P [facility] [place] [address] [place] [address] Clinical Staff  Cmp noted cont elevated creatinine over baseline   Increase fluids avoid nsaids   Recheck in 1 week

## 2022-11-07 NOTE — TELEPHONE ENCOUNTER
Called patient, spoke with: Patient regarding the results of the patients most recent labs. I advised Patient of Tiffany Almaguer recommendations.    Patient did voice understanding

## 2022-11-08 ENCOUNTER — TELEPHONE (OUTPATIENT)
Dept: PRIMARY CARE CLINIC | Age: 77
End: 2022-11-08

## 2022-11-08 NOTE — TELEPHONE ENCOUNTER
Called and left message with dr Rosemary Todd in [place] trying to get patient in with dr Gabby Huerta (ONLY) possibly at the [facility] location . faxing over most recent OV to [phone]. Awaiting a call back. Spoke with patient to give her update. She is aware of appt with dr Jakob Pierce on 11/30 @ 1115am, she states that med that dr Jakob Pierce gave and the meds Elena Diaz gave are not helping at all. I told her to contact dr servin office due to Elena Diaz being out of office and give them update. That way they can either change her medication to something else or work her in sooner. Dr hairston's office called and patient can be seen at [facility] on 11/09/11 @ 300pm. Patient aware and can make appt .

## 2022-11-11 DIAGNOSIS — N28.9 FUNCTION KIDNEY DECREASED: ICD-10-CM

## 2022-11-11 LAB
ANION GAP SERPL CALCULATED.3IONS-SCNC: 13 MMOL/L (ref 7–19)
BUN BLDV-MCNC: 23 MG/DL (ref 8–23)
CALCIUM SERPL-MCNC: 9.9 MG/DL (ref 8.8–10.2)
CHLORIDE BLD-SCNC: 101 MMOL/L (ref 98–111)
CO2: 22 MMOL/L (ref 22–29)
CREAT SERPL-MCNC: 1.6 MG/DL (ref 0.5–0.9)
GFR SERPL CREATININE-BSD FRML MDRD: 33 ML/MIN/{1.73_M2}
GLUCOSE BLD-MCNC: 86 MG/DL (ref 74–109)
POTASSIUM SERPL-SCNC: 4.5 MMOL/L (ref 3.5–5)
SODIUM BLD-SCNC: 136 MMOL/L (ref 136–145)

## 2022-11-14 ENCOUNTER — TELEPHONE (OUTPATIENT)
Dept: FAMILY MEDICINE CLINIC | Age: 77
End: 2022-11-14

## 2022-11-14 NOTE — TELEPHONE ENCOUNTER
----- Message from GÉNESIS Real sent at 11/14/2022  8:09 AM CST -----  Please inform patient results show  Kidney function is returning to baseline  Continue to drink water

## 2022-11-14 NOTE — TELEPHONE ENCOUNTER
Pt made aware, would like results faxed to Dr. Kalyani Gtz office in [facility].

## 2022-12-01 NOTE — PROGRESS NOTES
Nilsa Perez (:  1945) is a 69 y.o. female,Established patient, here for evaluation of the following chief complaint(s):  1 Month Follow-Up (Breathing has improved since starting neb )      ASSESSMENT/PLAN:    ICD-10-CM    1. Generalized abdominal pain  R10.84 CT ABDOMEN PELVIS WO CONTRAST Additional Contrast? Oral    Going to get CT of the abdomen and pelvis with oral contrast due to CKD. She has to follow-up with GI. As I get the results of her CT I will forward them to GI. 2. Change in bowel habits  R19.4 CT ABDOMEN PELVIS WO CONTRAST Additional Contrast? Oral      3. Stage 3a chronic kidney disease (HCC)  N18.31       4. Chronic obstructive pulmonary disease, unspecified COPD type ([facility]  J44.9 Symptoms have improved since starting nebulizer treatments by pulmonology          Return in about 6 weeks (around 2023). SUBJECTIVE/OBJECTIVE:  HPI  Follow up on health issues    COPD  Followed by Dr Konrad Roche at [facility]  Last visit here I changed her inhaler to breztri. She is feeling better. She last saw Dr Wolf Prater on 2022  Can breathe and make the bed without the oxygen    Constipation  Last visit I advised her to go back to [address] [place] [address]. She is seeing Dr Aidan Garcia at [facility]  Symptoms have not improved. 2 days ago started new regimen of linzess and metamucil in the morning and miralax at bedtime. Colonoscopy in Sept with Dr Schrader Beat   Stool patterns changed 6 months ago. Bowels are still rabbit turds, occasionally will be peeing and have a bowel movement and didn't know I needed to. Bloat after every meal. No particular foods. CKD  Lab Results   Component Value Date    CREATININE 1.6 (H) 2022         /80   Pulse 52   Temp 98.6 °F (37 °C) (Temporal)   Wt 223 lb (101.2 kg)   SpO2 93%   BMI 33.41 kg/m²     Review of Systems   Constitutional:  Negative for activity change, appetite change, fatigue, fever and unexpected weight change.    HENT:  Negative for congestion, hearing loss, rhinorrhea, sinus pressure, sore throat and trouble swallowing. Eyes:  Negative for visual disturbance. Respiratory:  Negative for cough and shortness of breath. Cardiovascular:  Negative for chest pain, palpitations and leg swelling. Gastrointestinal:  Positive for constipation. Negative for abdominal pain, diarrhea, nausea and vomiting. Endocrine: Negative for cold intolerance and heat intolerance. Genitourinary:  Negative for flank pain, menstrual problem, pelvic pain, urgency and vaginal discharge. Musculoskeletal:  Negative for arthralgias. Skin:  Negative for rash. Neurological:  Negative for headaches. Psychiatric/Behavioral:  Negative for dysphoric mood and sleep disturbance. The patient is not nervous/anxious. Physical Exam  Vitals reviewed. Constitutional:       Appearance: Normal appearance. HENT:      Head: Normocephalic and atraumatic. Eyes:      Conjunctiva/sclera: Conjunctivae normal.   Cardiovascular:      Rate and Rhythm: Normal rate and regular rhythm. Pulses: Normal pulses. Heart sounds: Normal heart sounds. Pulmonary:      Effort: Pulmonary effort is normal.      Breath sounds: Normal breath sounds. Abdominal:      General: Bowel sounds are normal. There is distension. Palpations: Abdomen is soft. Tenderness: There is abdominal tenderness (mild). There is no guarding. Musculoskeletal:      Cervical back: Normal range of motion and neck supple. Skin:     General: Skin is warm. Neurological:      Mental Status: She is alert and oriented to person, place, and time. An electronic signature was used to authenticate this note.     --Marylee Sandifer, APRN

## 2022-12-02 ENCOUNTER — OFFICE VISIT (OUTPATIENT)
Dept: PRIMARY CARE CLINIC | Age: 77
End: 2022-12-02

## 2022-12-02 VITALS
TEMPERATURE: 98.6 F | HEART RATE: 52 BPM | WEIGHT: 223 LBS | OXYGEN SATURATION: 93 % | DIASTOLIC BLOOD PRESSURE: 80 MMHG | BODY MASS INDEX: 33.41 KG/M2 | SYSTOLIC BLOOD PRESSURE: 136 MMHG

## 2022-12-02 DIAGNOSIS — J44.9 CHRONIC OBSTRUCTIVE PULMONARY DISEASE, UNSPECIFIED COPD TYPE (HCC): ICD-10-CM

## 2022-12-02 DIAGNOSIS — R19.4 CHANGE IN BOWEL HABITS: ICD-10-CM

## 2022-12-02 DIAGNOSIS — R10.84 GENERALIZED ABDOMINAL PAIN: Primary | ICD-10-CM

## 2022-12-02 DIAGNOSIS — N18.31 STAGE 3A CHRONIC KIDNEY DISEASE (HCC): ICD-10-CM

## 2022-12-02 RX ORDER — REVEFENACIN 175 UG/3ML
SOLUTION RESPIRATORY (INHALATION)
COMMUNITY

## 2022-12-02 RX ORDER — LINACLOTIDE 290 UG/1
290 CAPSULE, GELATIN COATED ORAL
COMMUNITY

## 2022-12-02 ASSESSMENT — ENCOUNTER SYMPTOMS
NAUSEA: 0
SINUS PRESSURE: 0
SORE THROAT: 0
TROUBLE SWALLOWING: 0
RHINORRHEA: 0
COUGH: 0
CONSTIPATION: 1
VOMITING: 0
DIARRHEA: 0
ABDOMINAL PAIN: 0
SHORTNESS OF BREATH: 0

## 2022-12-06 ENCOUNTER — HOSPITAL ENCOUNTER (OUTPATIENT)
Dept: GENERAL RADIOLOGY | Age: 77
Discharge: HOME | End: 2022-12-06
Payer: MEDICARE

## 2022-12-06 DIAGNOSIS — R19.4 CHANGE IN BOWEL HABITS: ICD-10-CM

## 2022-12-06 DIAGNOSIS — R10.84 GENERALIZED ABDOMINAL PAIN: ICD-10-CM

## 2022-12-06 PROCEDURE — 74176 CT ABD & PELVIS W/O CONTRAST: CPT

## 2022-12-06 PROCEDURE — A4641 RADIOPHARM DX AGENT NOC: HCPCS | Performed by: NURSE PRACTITIONER

## 2022-12-06 PROCEDURE — 6360000004 HC RX CONTRAST MEDICATION: Performed by: NURSE PRACTITIONER

## 2022-12-06 RX ADMIN — BARIUM SULFATE 450 ML: 21 SUSPENSION ORAL at 10:28

## 2022-12-13 ENCOUNTER — TELEPHONE (OUTPATIENT)
Dept: PRIMARY CARE CLINIC | Age: 77
End: 2022-12-13

## 2022-12-13 NOTE — TELEPHONE ENCOUNTER
----- Message from GÉNESIS Castle sent at 12/13/2022 10:42 AM CST -----  Please call patient and let them know results.    CT of the abdomen and pelvis is normal.  Please forward copy of this to Dr. Leslie Coy

## 2023-01-13 PROBLEM — J44.9 CHRONIC OBSTRUCTIVE PULMONARY DISEASE, UNSPECIFIED COPD TYPE (HCC): Status: ACTIVE | Noted: 2023-01-13

## 2023-01-13 NOTE — PROGRESS NOTES
Marya Cheek (:  1945) is a 69 y.o. female,Established patient, here for evaluation of the following chief complaint(s):  Follow-up (Continues to have abd pain. Has stopped some meds and has improved. Has stopped taking crestor , lubprostone and glycopyrrolate )      ASSESSMENT/PLAN:    ICD-10-CM    1. Essential hypertension  I10 The current medical regimen is effective;  continue present plan and medications. 2. Chronic obstructive pulmonary disease, unspecified COPD type ([facility]  J44.9       3. Stage 3a chronic kidney disease (HCC)  N18.31       4. Mixed hyperlipidemia  E78.2 atorvastatin (LIPITOR) 40 MG tablet    Adding lipitor, patient thinks crestor is contributing to constipation. 5. Chronic idiopathic constipation  K59.04 polyethylene glycol (GLYCOLAX) 17 GM/SCOOP powder    Take miralax daily. Return in about 3 months (around 2023) for awv. SUBJECTIVE/OBJECTIVE:  HPI  Follow up on health issues    Hypertension:    Medications:   Valsartan, amlodipine and metoprolol  Home blood pressure monitoring: No. Patient denies chest pain, shortness of breath, and headache. Antihypertensive medication side effects: no medication side effects noted. Use of agents associated with hypertension: none. Sodium (mmol/L)   Date Value   2022 136    BUN (mg/dL)   Date Value   2022 23    Glucose (mg/dL)   Date Value   2022 86      Potassium (mmol/L)   Date Value   2022 4.5    Creatinine (mg/dL)   Date Value   2022 1.6 (H)         Hyperlipidemia:    Medications:   Crestor - have not been taking it because thinks it was causing the constipation.    Lab Results   Component Value Date    CHOL 166 2022    TRIG 73 2022    HDL 74 2022    LDLCALC 77 2022     Lab Results   Component Value Date    ALT 18 10/28/2022    AST 18 10/28/2022         COPD  Medications:   Arformoterol, yupelri and albuterol  Followed by Dr William Hanks at [facility]  She is feeling better. Can breathe and make the bed without the oxygen  Still using oxygen at night and prn. Constipation  Medications:   Quit taking all medicine prescribed by Dr Marry Dickerson  Last visit obtained CT due to ongoing abdominal pain. She is seeing Dr Marry Dickerson at [facility]. She is not happy with dr Marry Dickerson  Symptoms have improved since stopping crestor. Colonoscopy in Sept with Dr Velma Cason   Stool pattern changed after having cholesterol medicine changed about 6 months. Bowels are still rabbit turds, occasionally will be peeing and have a bowel movement and didn't know I needed to. Bloat after every meal. No particular foods. CT abdomen and pelvis without contrast 12/02/2022  Impression   Postsurgical changes of the left hip and the lumbosacral spine. Atherosclerosis. Nonvisualization of the uterus may be secondary to prior hysterectomy. All CT scans at this facility utilize dose modulation, iterative reconstruction, and/or weight based dosing when appropriate to reduce radiation dose to as low as reasonably achievable. Dictated and Electronically Signed by Nallely Roman MD at 13-Dec-2022 09:41:31 AM              CKD  Lab Results   Component Value Date     CREATININE 1.6 (H) 11/11/2022     Right breast abscess  Dr Velma Cason performed I&D on 01/04/2023  Bx was benign. /80   Pulse 67   Temp 97.6 °F (36.4 °C) (Temporal)   Wt 214 lb (97.1 kg)   SpO2 91%   BMI 32.07 kg/m²     Review of Systems   Constitutional:  Negative for activity change, appetite change, fatigue, fever and unexpected weight change. HENT:  Negative for congestion, hearing loss, rhinorrhea, sinus pressure, sore throat and trouble swallowing. Eyes:  Negative for visual disturbance. Respiratory:  Negative for cough and shortness of breath. Cardiovascular:  Negative for chest pain, palpitations and leg swelling. Gastrointestinal:  Positive for constipation.  Negative for abdominal pain, diarrhea, nausea and vomiting. Endocrine: Negative for cold intolerance and heat intolerance. Genitourinary:  Negative for flank pain, menstrual problem, pelvic pain, urgency and vaginal discharge. Musculoskeletal:  Negative for arthralgias. Skin:  Negative for rash. Neurological:  Negative for headaches. Psychiatric/Behavioral:  Negative for dysphoric mood and sleep disturbance. The patient is not nervous/anxious. Physical Exam  Vitals reviewed. Constitutional:       Appearance: Normal appearance. HENT:      Head: Normocephalic and atraumatic. Eyes:      Conjunctiva/sclera: Conjunctivae normal.   Cardiovascular:      Rate and Rhythm: Normal rate and regular rhythm. Pulses: Normal pulses. Heart sounds: Normal heart sounds. Pulmonary:      Effort: Pulmonary effort is normal.      Breath sounds: Normal breath sounds. Abdominal:      General: Bowel sounds are normal. There is no distension. Palpations: Abdomen is soft. Tenderness: There is no abdominal tenderness. There is no guarding. Musculoskeletal:      Cervical back: Normal range of motion and neck supple. Skin:     General: Skin is warm. Neurological:      Mental Status: She is alert and oriented to person, place, and time. An electronic signature was used to authenticate this note.     --GÉNESIS Dill

## 2023-01-16 ENCOUNTER — OFFICE VISIT (OUTPATIENT)
Dept: FAMILY MEDICINE CLINIC | Age: 78
End: 2023-01-16
Payer: MEDICARE

## 2023-01-16 VITALS
OXYGEN SATURATION: 91 % | SYSTOLIC BLOOD PRESSURE: 138 MMHG | HEART RATE: 67 BPM | DIASTOLIC BLOOD PRESSURE: 80 MMHG | BODY MASS INDEX: 32.07 KG/M2 | WEIGHT: 214 LBS | TEMPERATURE: 97.6 F

## 2023-01-16 DIAGNOSIS — I10 ESSENTIAL HYPERTENSION: Primary | ICD-10-CM

## 2023-01-16 DIAGNOSIS — K59.04 CHRONIC IDIOPATHIC CONSTIPATION: ICD-10-CM

## 2023-01-16 DIAGNOSIS — E78.2 MIXED HYPERLIPIDEMIA: ICD-10-CM

## 2023-01-16 DIAGNOSIS — N18.31 STAGE 3A CHRONIC KIDNEY DISEASE (HCC): ICD-10-CM

## 2023-01-16 DIAGNOSIS — J44.9 CHRONIC OBSTRUCTIVE PULMONARY DISEASE, UNSPECIFIED COPD TYPE (HCC): ICD-10-CM

## 2023-01-16 PROCEDURE — 1090F PRES/ABSN URINE INCON ASSESS: CPT | Performed by: NURSE PRACTITIONER

## 2023-01-16 PROCEDURE — 99214 OFFICE O/P EST MOD 30 MIN: CPT | Performed by: NURSE PRACTITIONER

## 2023-01-16 PROCEDURE — 1036F TOBACCO NON-USER: CPT | Performed by: NURSE PRACTITIONER

## 2023-01-16 PROCEDURE — 1123F ACP DISCUSS/DSCN MKR DOCD: CPT | Performed by: NURSE PRACTITIONER

## 2023-01-16 PROCEDURE — 3079F DIAST BP 80-89 MM HG: CPT | Performed by: NURSE PRACTITIONER

## 2023-01-16 PROCEDURE — G8428 CUR MEDS NOT DOCUMENT: HCPCS | Performed by: NURSE PRACTITIONER

## 2023-01-16 PROCEDURE — 3023F SPIROM DOC REV: CPT | Performed by: NURSE PRACTITIONER

## 2023-01-16 PROCEDURE — 3075F SYST BP GE 130 - 139MM HG: CPT | Performed by: NURSE PRACTITIONER

## 2023-01-16 PROCEDURE — G8417 CALC BMI ABV UP PARAM F/U: HCPCS | Performed by: NURSE PRACTITIONER

## 2023-01-16 PROCEDURE — G8400 PT W/DXA NO RESULTS DOC: HCPCS | Performed by: NURSE PRACTITIONER

## 2023-01-16 PROCEDURE — G8484 FLU IMMUNIZE NO ADMIN: HCPCS | Performed by: NURSE PRACTITIONER

## 2023-01-16 RX ORDER — POLYETHYLENE GLYCOL 3350 17 G/17G
17 POWDER, FOR SOLUTION ORAL DAILY
Qty: 510 G | Refills: 0 | Status: SHIPPED | OUTPATIENT
Start: 2023-01-16 | End: 2023-02-15

## 2023-01-16 RX ORDER — ATORVASTATIN CALCIUM 40 MG/1
40 TABLET, FILM COATED ORAL DAILY
Qty: 30 TABLET | Refills: 11 | Status: SHIPPED | OUTPATIENT
Start: 2023-01-16

## 2023-01-16 ASSESSMENT — PATIENT HEALTH QUESTIONNAIRE - PHQ9
4. FEELING TIRED OR HAVING LITTLE ENERGY: 1
SUM OF ALL RESPONSES TO PHQ QUESTIONS 1-9: 2
9. THOUGHTS THAT YOU WOULD BE BETTER OFF DEAD, OR OF HURTING YOURSELF: 0
7. TROUBLE CONCENTRATING ON THINGS, SUCH AS READING THE NEWSPAPER OR WATCHING TELEVISION: 0
2. FEELING DOWN, DEPRESSED OR HOPELESS: 0
5. POOR APPETITE OR OVEREATING: 0
SUM OF ALL RESPONSES TO PHQ QUESTIONS 1-9: 2
SUM OF ALL RESPONSES TO PHQ QUESTIONS 1-9: 2
SUM OF ALL RESPONSES TO PHQ9 QUESTIONS 1 & 2: 0
SUM OF ALL RESPONSES TO PHQ QUESTIONS 1-9: 2
3. TROUBLE FALLING OR STAYING ASLEEP: 1
1. LITTLE INTEREST OR PLEASURE IN DOING THINGS: 0
10. IF YOU CHECKED OFF ANY PROBLEMS, HOW DIFFICULT HAVE THESE PROBLEMS MADE IT FOR YOU TO DO YOUR WORK, TAKE CARE OF THINGS AT HOME, OR GET ALONG WITH OTHER PEOPLE: 0
6. FEELING BAD ABOUT YOURSELF - OR THAT YOU ARE A FAILURE OR HAVE LET YOURSELF OR YOUR FAMILY DOWN: 0
8. MOVING OR SPEAKING SO SLOWLY THAT OTHER PEOPLE COULD HAVE NOTICED. OR THE OPPOSITE, BEING SO FIGETY OR RESTLESS THAT YOU HAVE BEEN MOVING AROUND A LOT MORE THAN USUAL: 0

## 2023-01-16 ASSESSMENT — ENCOUNTER SYMPTOMS
RHINORRHEA: 0
VOMITING: 0
NAUSEA: 0
COUGH: 0
ABDOMINAL PAIN: 0
DIARRHEA: 0
SORE THROAT: 0
SINUS PRESSURE: 0
CONSTIPATION: 1
SHORTNESS OF BREATH: 0
TROUBLE SWALLOWING: 0

## 2023-01-16 NOTE — PATIENT INSTRUCTIONS
Stop crestor and go back to lipitor  Increase water intake    Start miralax daily  Keep follow up with Dr Susan Oneil.

## 2023-02-09 ENCOUNTER — TELEPHONE (OUTPATIENT)
Dept: FAMILY MEDICINE CLINIC | Age: 78
End: 2023-02-09

## 2023-02-09 NOTE — TELEPHONE ENCOUNTER
Patient is requesting something for pain. She made an appointment with Rommel Cota on Monday 2/13/23 for leg swelling but would like something to help for now.

## 2023-02-10 ENCOUNTER — APPOINTMENT (OUTPATIENT)
Dept: GENERAL RADIOLOGY | Facility: HOSPITAL | Age: 78
End: 2023-02-10
Payer: MEDICARE

## 2023-02-10 ENCOUNTER — HOSPITAL ENCOUNTER (EMERGENCY)
Facility: HOSPITAL | Age: 78
Discharge: HOME | End: 2023-02-11
Attending: FAMILY MEDICINE | Admitting: FAMILY MEDICINE
Payer: MEDICARE

## 2023-02-10 ENCOUNTER — APPOINTMENT (OUTPATIENT)
Dept: ULTRASOUND IMAGING | Facility: HOSPITAL | Age: 78
End: 2023-02-10
Payer: MEDICARE

## 2023-02-10 DIAGNOSIS — R07.9 CHEST PAIN AT REST: Primary | ICD-10-CM

## 2023-02-10 DIAGNOSIS — R79.89 ELEVATED BRAIN NATRIURETIC PEPTIDE (BNP) LEVEL: ICD-10-CM

## 2023-02-10 LAB
ALBUMIN SERPL-MCNC: 4 G/DL (ref 3.5–5.2)
ALBUMIN/GLOB SERPL: 1.5 G/DL
ALP SERPL-CCNC: 86 U/L (ref 39–117)
ALT SERPL W P-5'-P-CCNC: 16 U/L (ref 1–33)
ANION GAP SERPL CALCULATED.3IONS-SCNC: 11 MMOL/L (ref 5–15)
AST SERPL-CCNC: 18 U/L (ref 1–32)
BASOPHILS # BLD AUTO: 0.03 10*3/MM3 (ref 0–0.2)
BASOPHILS NFR BLD AUTO: 0.4 % (ref 0–1.5)
BILIRUB SERPL-MCNC: 0.2 MG/DL (ref 0–1.2)
BUN SERPL-MCNC: 14 MG/DL (ref 8–23)
BUN/CREAT SERPL: 12.4 (ref 7–25)
CALCIUM SPEC-SCNC: 9.5 MG/DL (ref 8.6–10.5)
CHLORIDE SERPL-SCNC: 107 MMOL/L (ref 98–107)
CO2 SERPL-SCNC: 22 MMOL/L (ref 22–29)
CREAT SERPL-MCNC: 1.13 MG/DL (ref 0.57–1)
D DIMER PPP FEU-MCNC: 1.58 MCGFEU/ML (ref 0–0.76)
DEPRECATED RDW RBC AUTO: 54.2 FL (ref 37–54)
EGFRCR SERPLBLD CKD-EPI 2021: 50.5 ML/MIN/1.73
EOSINOPHIL # BLD AUTO: 0.14 10*3/MM3 (ref 0–0.4)
EOSINOPHIL NFR BLD AUTO: 1.8 % (ref 0.3–6.2)
ERYTHROCYTE [DISTWIDTH] IN BLOOD BY AUTOMATED COUNT: 14.4 % (ref 12.3–15.4)
GEN 5 2HR TROPONIN T REFLEX: 22 NG/L
GLOBULIN UR ELPH-MCNC: 2.7 GM/DL
GLUCOSE SERPL-MCNC: 107 MG/DL (ref 65–99)
HCT VFR BLD AUTO: 36.3 % (ref 34–46.6)
HGB BLD-MCNC: 11 G/DL (ref 12–15.9)
HOLD SPECIMEN: NORMAL
HOLD SPECIMEN: NORMAL
IMM GRANULOCYTES # BLD AUTO: 0.04 10*3/MM3 (ref 0–0.05)
IMM GRANULOCYTES NFR BLD AUTO: 0.5 % (ref 0–0.5)
LYMPHOCYTES # BLD AUTO: 2.18 10*3/MM3 (ref 0.7–3.1)
LYMPHOCYTES NFR BLD AUTO: 27.3 % (ref 19.6–45.3)
MCH RBC QN AUTO: 31 PG (ref 26.6–33)
MCHC RBC AUTO-ENTMCNC: 30.3 G/DL (ref 31.5–35.7)
MCV RBC AUTO: 102.3 FL (ref 79–97)
MONOCYTES # BLD AUTO: 0.67 10*3/MM3 (ref 0.1–0.9)
MONOCYTES NFR BLD AUTO: 8.4 % (ref 5–12)
NEUTROPHILS NFR BLD AUTO: 4.94 10*3/MM3 (ref 1.7–7)
NEUTROPHILS NFR BLD AUTO: 61.6 % (ref 42.7–76)
NRBC BLD AUTO-RTO: 0 /100 WBC (ref 0–0.2)
NT-PROBNP SERPL-MCNC: 2698 PG/ML (ref 0–1800)
PLATELET # BLD AUTO: 234 10*3/MM3 (ref 140–450)
PMV BLD AUTO: 9.1 FL (ref 6–12)
POTASSIUM SERPL-SCNC: 3.8 MMOL/L (ref 3.5–5.2)
PROT SERPL-MCNC: 6.7 G/DL (ref 6–8.5)
RBC # BLD AUTO: 3.55 10*6/MM3 (ref 3.77–5.28)
SODIUM SERPL-SCNC: 140 MMOL/L (ref 136–145)
TROPONIN T DELTA: 1 NG/L
TROPONIN T SERPL HS-MCNC: 21 NG/L
WBC NRBC COR # BLD: 8 10*3/MM3 (ref 3.4–10.8)
WHOLE BLOOD HOLD COAG: NORMAL
WHOLE BLOOD HOLD SPECIMEN: NORMAL

## 2023-02-10 PROCEDURE — 25010000002 FENTANYL CITRATE (PF) 50 MCG/ML SOLUTION: Performed by: FAMILY MEDICINE

## 2023-02-10 PROCEDURE — 93005 ELECTROCARDIOGRAM TRACING: CPT | Performed by: FAMILY MEDICINE

## 2023-02-10 PROCEDURE — 71046 X-RAY EXAM CHEST 2 VIEWS: CPT

## 2023-02-10 PROCEDURE — 93971 EXTREMITY STUDY: CPT

## 2023-02-10 PROCEDURE — 25010000002 FUROSEMIDE PER 20 MG: Performed by: FAMILY MEDICINE

## 2023-02-10 PROCEDURE — 84484 ASSAY OF TROPONIN QUANT: CPT

## 2023-02-10 PROCEDURE — 96375 TX/PRO/DX INJ NEW DRUG ADDON: CPT

## 2023-02-10 PROCEDURE — 94640 AIRWAY INHALATION TREATMENT: CPT

## 2023-02-10 PROCEDURE — 94664 DEMO&/EVAL PT USE INHALER: CPT

## 2023-02-10 PROCEDURE — 93971 EXTREMITY STUDY: CPT | Performed by: SURGERY

## 2023-02-10 PROCEDURE — 85025 COMPLETE CBC W/AUTO DIFF WBC: CPT

## 2023-02-10 PROCEDURE — 96374 THER/PROPH/DIAG INJ IV PUSH: CPT

## 2023-02-10 PROCEDURE — 83880 ASSAY OF NATRIURETIC PEPTIDE: CPT

## 2023-02-10 PROCEDURE — 84484 ASSAY OF TROPONIN QUANT: CPT | Performed by: FAMILY MEDICINE

## 2023-02-10 PROCEDURE — 94799 UNLISTED PULMONARY SVC/PX: CPT

## 2023-02-10 PROCEDURE — 36415 COLL VENOUS BLD VENIPUNCTURE: CPT

## 2023-02-10 PROCEDURE — 85379 FIBRIN DEGRADATION QUANT: CPT | Performed by: FAMILY MEDICINE

## 2023-02-10 PROCEDURE — 99284 EMERGENCY DEPT VISIT MOD MDM: CPT

## 2023-02-10 PROCEDURE — 80053 COMPREHEN METABOLIC PANEL: CPT

## 2023-02-10 PROCEDURE — 93010 ELECTROCARDIOGRAM REPORT: CPT | Performed by: INTERNAL MEDICINE

## 2023-02-10 RX ORDER — SODIUM CHLORIDE 0.9 % (FLUSH) 0.9 %
10 SYRINGE (ML) INJECTION AS NEEDED
Status: DISCONTINUED | OUTPATIENT
Start: 2023-02-10 | End: 2023-02-11 | Stop reason: HOSPADM

## 2023-02-10 RX ORDER — PANTOPRAZOLE SODIUM 40 MG/1
40 TABLET, DELAYED RELEASE ORAL DAILY
COMMUNITY

## 2023-02-10 RX ORDER — FENTANYL CITRATE 50 UG/ML
50 INJECTION, SOLUTION INTRAMUSCULAR; INTRAVENOUS ONCE
Status: COMPLETED | OUTPATIENT
Start: 2023-02-10 | End: 2023-02-10

## 2023-02-10 RX ORDER — IPRATROPIUM BROMIDE AND ALBUTEROL SULFATE 2.5; .5 MG/3ML; MG/3ML
3 SOLUTION RESPIRATORY (INHALATION) ONCE
Status: COMPLETED | OUTPATIENT
Start: 2023-02-10 | End: 2023-02-10

## 2023-02-10 RX ORDER — FUROSEMIDE 10 MG/ML
80 INJECTION INTRAMUSCULAR; INTRAVENOUS ONCE
Status: COMPLETED | OUTPATIENT
Start: 2023-02-10 | End: 2023-02-10

## 2023-02-10 RX ORDER — ATORVASTATIN CALCIUM 40 MG/1
40 TABLET, FILM COATED ORAL NIGHTLY
COMMUNITY

## 2023-02-10 RX ADMIN — FUROSEMIDE 80 MG: 10 INJECTION, SOLUTION INTRAVENOUS at 22:35

## 2023-02-10 RX ADMIN — FENTANYL CITRATE 50 MCG: 50 INJECTION INTRAMUSCULAR; INTRAVENOUS at 22:33

## 2023-02-10 RX ADMIN — IPRATROPIUM BROMIDE AND ALBUTEROL SULFATE 3 ML: 2.5; .5 SOLUTION RESPIRATORY (INHALATION) at 22:46

## 2023-02-11 ENCOUNTER — APPOINTMENT (OUTPATIENT)
Dept: CT IMAGING | Facility: HOSPITAL | Age: 78
End: 2023-02-11
Payer: MEDICARE

## 2023-02-11 VITALS
WEIGHT: 214 LBS | TEMPERATURE: 98 F | RESPIRATION RATE: 20 BRPM | OXYGEN SATURATION: 99 % | HEART RATE: 80 BPM | DIASTOLIC BLOOD PRESSURE: 68 MMHG | SYSTOLIC BLOOD PRESSURE: 144 MMHG | BODY MASS INDEX: 32.43 KG/M2 | HEIGHT: 68 IN

## 2023-02-11 PROCEDURE — 0 IOPAMIDOL PER 1 ML: Performed by: FAMILY MEDICINE

## 2023-02-11 PROCEDURE — 71275 CT ANGIOGRAPHY CHEST: CPT

## 2023-02-11 RX ORDER — FUROSEMIDE 20 MG/1
20 TABLET ORAL DAILY
Qty: 5 TABLET | Refills: 0 | Status: SHIPPED | OUTPATIENT
Start: 2023-02-11 | End: 2023-03-21

## 2023-02-11 RX ORDER — PREDNISONE 20 MG/1
20 TABLET ORAL DAILY
Qty: 5 TABLET | Refills: 0 | Status: SHIPPED | OUTPATIENT
Start: 2023-02-11 | End: 2023-02-16

## 2023-02-11 RX ADMIN — IOPAMIDOL 70 ML: 755 INJECTION, SOLUTION INTRAVENOUS at 01:15

## 2023-02-11 NOTE — ED PROVIDER NOTES
Subjective   History of Present Illness  Patient is a 77-year-old female who has a history of emphysema, hypertension, sleep apnea the presents to the emergency room with diffuse pain.  Patient is worried about a blood clot in her left lower extremity.  She is supposed to be on oxygen however has not been taking oxygen due to her being placed on a nebulizer.  Patient is a poor historian.         Review of Systems   All other systems reviewed and are negative.      Past Medical History:   Diagnosis Date   • Sleep apnea        Allergies   Allergen Reactions   • Bactrim [Sulfamethoxazole-Trimethoprim]    • Keflex [Cephalexin]    • Lyrica [Pregabalin]    • Sulfa Antibiotics Rash       Past Surgical History:   Procedure Laterality Date   • APPENDECTOMY     • BACK SURGERY     • BREAST SURGERY     • CATARACT EXTRACTION     • CERVICAL SPINE SURGERY     • HAND SURGERY     • HYSTERECTOMY     • KNEE SURGERY Bilateral    • TONSILLECTOMY         Family History   Problem Relation Age of Onset   • Heart attack Mother    • No Known Problems Father        Social History     Socioeconomic History   • Marital status:    Tobacco Use   • Smoking status: Former     Packs/day: 0.50     Types: Cigarettes     Quit date:      Years since quittin.1   • Smokeless tobacco: Never   Substance and Sexual Activity   • Alcohol use: Yes     Comment: daily   • Drug use: No           Objective   Physical Exam  Vitals reviewed.   Constitutional:       General: She is in acute distress.      Appearance: She is normal weight. She is not ill-appearing or toxic-appearing.   HENT:      Head: Normocephalic and atraumatic.   Cardiovascular:      Rate and Rhythm: Normal rate and regular rhythm.   Pulmonary:      Effort: Pulmonary effort is normal.      Breath sounds: Normal breath sounds.   Abdominal:      General: Bowel sounds are normal.      Palpations: Abdomen is soft.   Skin:     General: Skin is warm and dry.      Capillary Refill:  Capillary refill takes less than 2 seconds.      Comments: Edema   Neurological:      General: No focal deficit present.      Mental Status: She is alert and oriented to person, place, and time.   Psychiatric:         Mood and Affect: Mood normal.         Procedures           ED Course  ED Course as of 02/11/23 0222   Fri Feb 10, 2023   2219 proBNP(!): 2,698.0 [MA]   2219 HS Troponin T(!): 21 [MA]   Sat Feb 11, 2023 0042 Discussed with patient, she may have some mild fluid overload.  We will be discharging patient on oral Lasix [MA]   0044 She is reminded will add a CTA chest for patient to rule out a pulmonary embolism. [MA]      ED Course User Index  [MA] Rohan Mittal MD                                           Medical Decision Making  Differential diagnosis for chest pain includes ACS, PE, AAA, Unstable angina, Aortic Dissection, GERD, Gastritis, Trauma, Viral Infection, MSK Pain, Costochondritis, Chest wall pain, Myalgia, COPD, CHF, Pleurisy, CHF, Biliary Disease. We will pursue an EKG immediately, and obtain a Chest X-ray. Will also obtain labs including CBC, CMP, Cardiac Troponin, Dimer, and BNP. Patient will be given aspirin, while we evaluate acute threats of life, that include ACS.     Patient's BNP was elevated over 2500, we will obtain a ultrasound of the lower extremity along with a D-dimer.  Patient will be given Lasix 80 mg and a DuoNeb.    Patient informed that she has not had an echocardiogram the end of last year.  It was normal. Patient's ultrasound of the lower extremity was negative for any acute abnormalities.  Upon further examination however initially thinking of discharging patient home on oral Lasix and mild steroids.  Decided to obtain a CTA chest.  Patient CTA was negative.  Patient will be discharged home to follow-up with her primary care provider.    Amount and/or Complexity of Data Reviewed  Labs: ordered. Decision-making details documented in ED Course.  Radiology:  ordered.  ECG/medicine tests: ordered.      Risk  Prescription drug management.  Decision regarding hospitalization.    Risk Details: I considered admission for chest tightness  fortunately, her/his symptoms improved with our treatment efforts and discussed with patient/family and were offered observation status. Questions were addressed, and they were comfortable and stable for discharge home.           Final diagnoses:   Chest pain at rest   Elevated brain natriuretic peptide (BNP) level       ED Disposition  ED Disposition     ED Disposition   Discharge    Condition   Stable    Comment   --             Provider, No Known  [facility] [id number] [phone]      Please call for follow up with Cardiologist         Medication List      New Prescriptions    furosemide 20 MG tablet  Commonly known as: LASIX  Take 1 tablet by mouth Daily for 5 days.     predniSONE 20 MG tablet  Commonly known as: DELTASONE  Take 1 tablet by mouth Daily for 5 days.           Where to Get Your Medications      These medications were sent to [facility]/pharmacy #[id number] - [place], [place] - [address] [place] [address] - [phone]  - [phone] [address] [address]    Phone: [phone]   · furosemide 20 MG tablet  · predniSONE 20 MG tablet          Rohan Mittal MD  02/11/23 0833

## 2023-02-12 LAB
QT INTERVAL: 436 MS
QTC INTERVAL: 453 MS

## 2023-02-13 ENCOUNTER — OFFICE VISIT (OUTPATIENT)
Dept: FAMILY MEDICINE CLINIC | Age: 78
End: 2023-02-13
Payer: MEDICARE

## 2023-02-13 ENCOUNTER — TRANSCRIBE ORDERS (OUTPATIENT)
Dept: ADMINISTRATIVE | Facility: HOSPITAL | Age: 78
End: 2023-02-13
Payer: MEDICARE

## 2023-02-13 VITALS
TEMPERATURE: 97.7 F | HEART RATE: 68 BPM | DIASTOLIC BLOOD PRESSURE: 80 MMHG | WEIGHT: 208 LBS | OXYGEN SATURATION: 91 % | BODY MASS INDEX: 31.17 KG/M2 | SYSTOLIC BLOOD PRESSURE: 130 MMHG

## 2023-02-13 DIAGNOSIS — E04.1 THYROID NODULE: ICD-10-CM

## 2023-02-13 DIAGNOSIS — R77.8 ELEVATED TROPONIN LEVEL: ICD-10-CM

## 2023-02-13 DIAGNOSIS — Z79.899 MEDICATION MANAGEMENT: ICD-10-CM

## 2023-02-13 DIAGNOSIS — R06.09 DOE (DYSPNEA ON EXERTION): Primary | ICD-10-CM

## 2023-02-13 DIAGNOSIS — Z87.891 HISTORY OF TOBACCO ABUSE: ICD-10-CM

## 2023-02-13 DIAGNOSIS — M79.89 SWELLING OF LOWER EXTREMITY: Primary | ICD-10-CM

## 2023-02-13 DIAGNOSIS — I73.9 INTERMITTENT CLAUDICATION: ICD-10-CM

## 2023-02-13 DIAGNOSIS — I73.9 INTERMITTENT CLAUDICATION (HCC): ICD-10-CM

## 2023-02-13 PROCEDURE — 1123F ACP DISCUSS/DSCN MKR DOCD: CPT | Performed by: NURSE PRACTITIONER

## 2023-02-13 PROCEDURE — G8484 FLU IMMUNIZE NO ADMIN: HCPCS | Performed by: NURSE PRACTITIONER

## 2023-02-13 PROCEDURE — 1090F PRES/ABSN URINE INCON ASSESS: CPT | Performed by: NURSE PRACTITIONER

## 2023-02-13 PROCEDURE — 3075F SYST BP GE 130 - 139MM HG: CPT | Performed by: NURSE PRACTITIONER

## 2023-02-13 PROCEDURE — G8417 CALC BMI ABV UP PARAM F/U: HCPCS | Performed by: NURSE PRACTITIONER

## 2023-02-13 PROCEDURE — 99214 OFFICE O/P EST MOD 30 MIN: CPT | Performed by: NURSE PRACTITIONER

## 2023-02-13 PROCEDURE — 1036F TOBACCO NON-USER: CPT | Performed by: NURSE PRACTITIONER

## 2023-02-13 PROCEDURE — G8427 DOCREV CUR MEDS BY ELIG CLIN: HCPCS | Performed by: NURSE PRACTITIONER

## 2023-02-13 PROCEDURE — 3079F DIAST BP 80-89 MM HG: CPT | Performed by: NURSE PRACTITIONER

## 2023-02-13 PROCEDURE — G8400 PT W/DXA NO RESULTS DOC: HCPCS | Performed by: NURSE PRACTITIONER

## 2023-02-13 RX ORDER — POTASSIUM CHLORIDE 20 MEQ/1
20 TABLET, EXTENDED RELEASE ORAL DAILY
Qty: 30 TABLET | Refills: 3 | Status: SHIPPED | OUTPATIENT
Start: 2023-02-13

## 2023-02-13 RX ORDER — FUROSEMIDE 20 MG/1
20 TABLET ORAL DAILY
Qty: 30 TABLET | Refills: 3 | Status: SHIPPED | OUTPATIENT
Start: 2023-02-13

## 2023-02-13 RX ORDER — MULTIVIT-MIN/IRON/FOLIC ACID/K 18-600-40
CAPSULE ORAL DAILY
COMMUNITY

## 2023-02-13 SDOH — ECONOMIC STABILITY: HOUSING INSECURITY
IN THE LAST 12 MONTHS, WAS THERE A TIME WHEN YOU DID NOT HAVE A STEADY PLACE TO SLEEP OR SLEPT IN A SHELTER (INCLUDING NOW)?: NO

## 2023-02-13 SDOH — ECONOMIC STABILITY: FOOD INSECURITY: WITHIN THE PAST 12 MONTHS, THE FOOD YOU BOUGHT JUST DIDN'T LAST AND YOU DIDN'T HAVE MONEY TO GET MORE.: NEVER TRUE

## 2023-02-13 SDOH — ECONOMIC STABILITY: INCOME INSECURITY: HOW HARD IS IT FOR YOU TO PAY FOR THE VERY BASICS LIKE FOOD, HOUSING, MEDICAL CARE, AND HEATING?: NOT HARD AT ALL

## 2023-02-13 SDOH — ECONOMIC STABILITY: FOOD INSECURITY: WITHIN THE PAST 12 MONTHS, YOU WORRIED THAT YOUR FOOD WOULD RUN OUT BEFORE YOU GOT MONEY TO BUY MORE.: NEVER TRUE

## 2023-02-13 ASSESSMENT — ENCOUNTER SYMPTOMS
CONSTIPATION: 0
RHINORRHEA: 0
SINUS PRESSURE: 0
SORE THROAT: 0
SHORTNESS OF BREATH: 0
TROUBLE SWALLOWING: 0
VOMITING: 0
ABDOMINAL PAIN: 0
DIARRHEA: 0
NAUSEA: 0
COUGH: 0

## 2023-02-13 NOTE — PROGRESS NOTES
Ryanne Pan (:  1945) is a 69 y.o. female,Established patient, here for evaluation of the following chief complaint(s):  Leg Swelling (Was at [facility] er Friday night. Left leg swelling. Neg for blood clot. On lasix, causing dry mouth but not urinating as much as she though. Used compression , but when got up to walk, couldn't hardly walk. )      ASSESSMENT/PLAN:    ICD-10-CM    1. Swelling of lower extremity  M79.89 furosemide (LASIX) 20 MG tablet     potassium chloride (KLOR-CON M) 20 MEQ extended release tablet     Basic Metabolic Panel     VL LOWER EXTREMITY ARTERIAL SEGMENTAL PRESSURES W PPG           2. History of tobacco abuse  Z87.891 VL LOWER EXTREMITY ARTERIAL SEGMENTAL PRESSURES W PPG     CANCELED: VL LOWER EXTREMITY ARTERIAL SEGMENTAL PRESSURES W PPG      3. Intermittent claudication (HCC)  I73.9 VL LOWER EXTREMITY ARTERIAL SEGMENTAL PRESSURES W PPG           4. Medication management  K76.524 Basic Metabolic Panel      5. Thyroid nodule  E04.1 US THYROID      6. Elevated troponin level from ER visit 02/10/2023 R77.8 Denies chest pain. Called and discussed results with Negin Houston NP with [facility] cardiology. She is going to see patient 2023 at 9am. Patient aware. Paige Myers said that HS troponin with change <4 at 2 hour repeat is ok for patient to go home and follow up. Return in about 4 weeks (around 3/13/2023). SUBJECTIVE/OBJECTIVE:  HPI  Here for left swelling   Swelled up on Monday - got worse on Thursday. Went to [facility] on Friday. Gave lasix is swelling is better. Still sore to touch. Before left void 1.5L urine. Denies CP    Review of [facility] record:  Venous Doppler ultrasound left lower extremity results pending. Patient reports negative    CT chest PE protocol 2023  1. No evidence of pulmonary embolus  2. Bibasilar atelectasis  3. Moderate emphysema  4. Hepatic steatosis  5.   Of 1.5 cm peripherally calcified thyroid nodule further characterization with a thyroid ultrasound. Chest x-ray shows questionable basilar atelectasis otherwise negative    D-dimer 1.58, WBC 8, hemoglobin 11, hematocrit 36.3, platelets 908, BNP 0620, glucose 107, BUN 14, creatinine 1.13, sodium 140, potassium 3.8, HS troponin was elevated. Stress Echocardiogram 08/19/2022  This result has an attachment that is not available. · Left ventricular ejection fraction appears to be 66 - 70%. Left   ventricular systolic function is normal.    Stress ECG rhythm of sinus tachycardia noted. Non-specific ST-T wave changes noted during stress. There was no ST segment deviation noted during stress. There were no arrhythmias during stress. There were no significant arrhythmias noted during stress. /80   Pulse 68   Temp 97.7 °F (36.5 °C) (Temporal)   Wt 208 lb (94.3 kg)   SpO2 91%   BMI 31.17 kg/m²     Review of Systems   Constitutional:  Negative for activity change, appetite change, fatigue, fever and unexpected weight change. HENT:  Negative for congestion, hearing loss, rhinorrhea, sinus pressure, sore throat and trouble swallowing. Eyes:  Negative for visual disturbance. Respiratory:  Negative for cough and shortness of breath. Cardiovascular:  Positive for leg swelling. Negative for chest pain and palpitations. Gastrointestinal:  Negative for abdominal pain, constipation, diarrhea, nausea and vomiting. Endocrine: Negative for cold intolerance and heat intolerance. Genitourinary:  Negative for flank pain, menstrual problem, pelvic pain, urgency and vaginal discharge. Musculoskeletal:  Negative for arthralgias. Skin:  Negative for rash. Neurological:  Negative for headaches. Psychiatric/Behavioral:  Negative for dysphoric mood and sleep disturbance. The patient is not nervous/anxious. Physical Exam  Vitals reviewed. Constitutional:       Appearance: Normal appearance. HENT:      Head: Normocephalic and atraumatic. Eyes:      Conjunctiva/sclera: Conjunctivae normal.   Cardiovascular:      Rate and Rhythm: Normal rate and regular rhythm. Pulses:           Dorsalis pedis pulses are 1+ on the right side and detected w/ Doppler on the left side. Posterior tibial pulses are 1+ on the right side and detected w/ Doppler on the left side. Heart sounds: Normal heart sounds. Pulmonary:      Effort: Pulmonary effort is normal.      Breath sounds: Normal breath sounds. Abdominal:      General: There is no distension. Palpations: Abdomen is soft. Tenderness: There is no abdominal tenderness. There is no guarding. Musculoskeletal:         General: Normal range of motion. Cervical back: Normal range of motion and neck supple. Right lower le+ Edema present. Left lower le+ Edema present. Skin:     General: Skin is warm. Neurological:      Mental Status: She is alert and oriented to person, place, and time. An electronic signature was used to authenticate this note.     --GÉNESIS De Jesus

## 2023-02-14 ENCOUNTER — TELEPHONE (OUTPATIENT)
Dept: FAMILY MEDICINE CLINIC | Age: 78
End: 2023-02-14

## 2023-02-14 NOTE — TELEPHONE ENCOUNTER
----- Message from GÉNESIS Kessler sent at 2/13/2023  5:10 PM CST -----  Please call patient and let them know results.    Kidney function is stable  Normal potassium

## 2023-02-17 ENCOUNTER — OFFICE VISIT (OUTPATIENT)
Dept: CARDIOLOGY | Facility: CLINIC | Age: 78
End: 2023-02-17
Payer: MEDICARE

## 2023-02-17 VITALS
HEART RATE: 74 BPM | BODY MASS INDEX: 31.98 KG/M2 | OXYGEN SATURATION: 96 % | WEIGHT: 211 LBS | HEIGHT: 68 IN | SYSTOLIC BLOOD PRESSURE: 147 MMHG | RESPIRATION RATE: 18 BRPM | DIASTOLIC BLOOD PRESSURE: 65 MMHG

## 2023-02-17 DIAGNOSIS — M79.89 LEG SWELLING: ICD-10-CM

## 2023-02-17 DIAGNOSIS — I25.10 CORONARY ARTERY CALCIFICATION SEEN ON CAT SCAN: ICD-10-CM

## 2023-02-17 DIAGNOSIS — I27.20 PULMONARY HYPERTENSION: ICD-10-CM

## 2023-02-17 DIAGNOSIS — I73.9 CLAUDICATION: ICD-10-CM

## 2023-02-17 DIAGNOSIS — G47.33 OSA ON CPAP: ICD-10-CM

## 2023-02-17 DIAGNOSIS — Z99.89 OSA ON CPAP: ICD-10-CM

## 2023-02-17 DIAGNOSIS — I50.32 CHRONIC DIASTOLIC CONGESTIVE HEART FAILURE: Primary | ICD-10-CM

## 2023-02-17 DIAGNOSIS — E78.5 HYPERLIPIDEMIA LDL GOAL <70: ICD-10-CM

## 2023-02-17 DIAGNOSIS — J43.1 PANLOBULAR EMPHYSEMA: ICD-10-CM

## 2023-02-17 PROCEDURE — 99215 OFFICE O/P EST HI 40 MIN: CPT | Performed by: NURSE PRACTITIONER

## 2023-02-17 RX ORDER — METOCLOPRAMIDE 10 MG/1
10 TABLET ORAL
COMMUNITY

## 2023-02-17 RX ORDER — POTASSIUM CHLORIDE 20 MEQ/1
20 TABLET, EXTENDED RELEASE ORAL DAILY
COMMUNITY

## 2023-02-17 RX ORDER — FUROSEMIDE 20 MG/1
20 TABLET ORAL DAILY
COMMUNITY

## 2023-02-17 NOTE — PROGRESS NOTES
"Subjective:     Encounter Date:02/17/2023      Patient ID: Edie Hinton is a 77 y.o. female     Chief Complaint:  History of Present Illness  Patient presents today for follow up after recent ER visit. Patient went to the ER with acute swelling of her legs. She notes she had severe swelling of both legs though she reports her Left Leg was considerably more swollen than her right. She reports no chest pain. She does have chronic shortness of breath and notes her breathing was no worse when she went to the ER- which is consistent with the ER provider note. She notes her leg swelling has improved but continues to complain of pain of bilateral legs. She notes pain when she walks and a burning sensation of both legs. Denies palpitations or chest pain. She reports her shortness of breath is at baseline. She was started on Lasix and a steroid pack and notes that her leg swelling is better- though the pain is still there. Her primary care provider has ordered testing of her lower legs. Patient was referred to Dr. Marina in August for shortness of breath on exertion. She had a stress test checked which was low risk. Se did have moderate coronary calcifications noted on CT of chest. She is on Aspirin and Lipitor. She notes her PCP tried her on Crestor but she had GI side effects and stopped it. She is now back on Lipitor. Former heavy smoker. Patient has COPD and follows with  \"Masood\" with pulmonary in [place]. She has been prescribed oxygen in the past but does not always where it. She has hypertension. She had an echo in 2015 which showed mild pulmonary hypertension, diastolic dysfunction and hypertensive heart disease. She follows with Negrito Lawson as her PCP.     The following portions of the patient's history were reviewed and updated as appropriate: allergies, current medications, past medical history, past social history, past and problem list.    Allergies   Allergen Reactions   • Bactrim " STROKE SERVICE PROGRESS NOTE       Name  Aubree Schuster  MRN [id number]   1957  Date 11/10/2017    CC: Right side weakness, slurred speech, bilateral leg weakness    SUMMARY:  Aubree Schuster is a 60 year old right handed  male with a past medical history of Cerebral infarction (2017); CHF; COPD; CAD s/p CABG; hypertension; MI; NSTEMI (10/19/2014); Schizophrenia; and Seizures (possibly at time of MI but not on AED) who presented to the ED at [facility] on 17 with complaints of chest pain and bilateral leg pain.  In the ED, pt's troponin was 44.  Cardiology was consulted and pt was taken for a cardiac cath where he was found to have occluded graft to OM.  No interventions were done.  Patient had tachycardia and hypotension during the cath.  Following, cardiac cath, pt was admitted to CICU and his sister reported that pt's has declined neurologically since his first stroke.  Stat team was called.  Initial NIHSS = 5 and the patient failed the bedside dysphagia screen. The last known well time was 17.  CT of the head done negative for acute IC abnormality, showed  sequela of prior ischemic injury involving the central jamey and left lateral cerebellum.  The patient was not given IV Alteplase, as the last known well time was greater than 4.5 hours.  The patient was not a candidate for intervention due to LKWT of 1 week ago.   mg was given in ED.  EKG in ED showed sinus arrhythmia with occasional PVC's, some T wave abnormality, no acute ST changes.    Patient is a poor historian, most of the information was provided by his sister, Tika.  She reports that pt returned home a week ago from [facility] after his previous stroke in September.  For this past week, Tika reports she noticed that he has been very unsteady, holding on to the walls and having difficulty ambulating with a walker.   She reports that he has poor appetite and has been having difficulty swallowing solids.  She also  [Sulfamethoxazole-Trimethoprim]    • Keflex [Cephalexin]    • Lyrica [Pregabalin]    • Sulfa Antibiotics Rash       Current Outpatient Medications:   •  albuterol sulfate  (90 Base) MCG/ACT inhaler, Inhale 2 puffs Every 4 (Four) Hours As Needed for Wheezing., Disp: , Rfl:   •  amLODIPine (NORVASC) 5 MG tablet, Take 5 mg by mouth Daily., Disp: , Rfl:   •  aspirin 81 MG EC tablet, Take 81 mg by mouth Daily., Disp: , Rfl:   •  atorvastatin (LIPITOR) 40 MG tablet, Take 40 mg by mouth Every Night., Disp: , Rfl:   •  furosemide (LASIX) 20 MG tablet, Take 20 mg by mouth Daily., Disp: , Rfl:   •  metoclopramide (REGLAN) 10 MG tablet, Take 10 mg by mouth 4 (Four) Times a Day Before Meals & at Bedtime., Disp: , Rfl:   •  metoprolol tartrate (LOPRESSOR) 100 MG tablet, Take 100 mg by mouth 2 (Two) Times a Day., Disp: , Rfl:   •  pantoprazole (PROTONIX) 40 MG EC tablet, Take 40 mg by mouth Daily., Disp: , Rfl:   •  potassium chloride (K-DUR,KLOR-CON) 20 MEQ CR tablet, Take 20 mEq by mouth Daily., Disp: , Rfl:   •  revefenacin (YUPELRI) 175 MCG/3ML nebulizer solution, Take  by nebulization Daily., Disp: , Rfl:   •  tolterodine (DETROL) 2 MG tablet, Take 2 mg by mouth 2 (Two) Times a Day., Disp: , Rfl:   •  valsartan (DIOVAN) 160 MG tablet, Take 160 mg by mouth Daily., Disp: , Rfl:   •  vitamin D (ERGOCALCIFEROL) 59503 UNITS capsule capsule, Take 50,000 Units by mouth 1 (One) Time Per Week., Disp: , Rfl:   •  furosemide (LASIX) 20 MG tablet, Take 1 tablet by mouth Daily for 5 days., Disp: 5 tablet, Rfl: 0    Social History     Socioeconomic History   • Marital status:    Tobacco Use   • Smoking status: Former     Packs/day: 0.50     Types: Cigarettes     Quit date: 2018     Years since quittin.1   • Smokeless tobacco: Never   Substance and Sexual Activity   • Alcohol use: Yes     Comment: daily   • Drug use: No       Review of Systems   Constitutional: Positive for malaise/fatigue. Negative for chills, decreased  noticed that he has been having intermittent right side shaking and has been dropping things.  Tika states that on Friday and Saturday (11/4) pt could not get up by himself.  Today, he could not swallow and she could not understand him.  She states his symptoms progressively got worse in the last two days.  Today, she states he had whole body was shaking and he could not sit in his wheelchair without falling over to the side or forward.      INTERVAL HISTORY:  No acute events.     SUBJECTIVE: On assessment today, pt working with PT standing at side of bed with sit to stand. Niece at bedside visiting. No specific complaints except for that he is hungry and wants to eat. Continues to have right side weakness.He is following commands appropriately. Speech continues to be very dysarthric. Pt denies headache, word finding difficulties, vision changes, numbness and tingling.      IMPRESSION AND PLAN:    #Acute posterior circulation infarct ( left cerebellum, left cerebral peduncle and small foci within the medial right temporal and occipital lobes).  #Hx of Ischemic Stroke- multiple areas of ischemia on MRI posterior circulation strokes-jamey, midbrain, b/l cerebellum and right thalamus   -Initial NIHSS = 5 for baseline deficits since stroke.  -Now with new RUE/RLE hemiparesis, right facial droop and right gaze restriction. Has left eye ptosis and CN III palsy (baseline from prior stroke)  -Etiology: felt to be Athero-embolic d/t severe intracranial atherosclerotic disease (large artery athero)  -MRI Brain demonstrating new left cerebellar and left [place] stroke   -Continue stroke orders   -Antiplatelet: (on ASA 81 mg and Plavix 75 mg PTA), resumed on admission  -Hgb A1C: in process (no hx of DM)  -Telemetry monitoring   -DVT prophylaxis: SCDs   -PT/OT following  -ST following; failed speech swallow, NG placed. Diet advanced today to dysphagia   -Patient and family provided with stroke education.  -Dispo pending JORGE ALBERTO  appetite, fever, weight gain and weight loss.   HENT: Negative for nosebleeds.    Eyes: Negative for visual disturbance.   Cardiovascular: Positive for claudication, dyspnea on exertion and leg swelling. Negative for chest pain, near-syncope, orthopnea, palpitations, paroxysmal nocturnal dyspnea and syncope.   Respiratory: Positive for cough, shortness of breath and wheezing. Negative for hemoptysis and snoring.    Endocrine: Negative for cold intolerance and heat intolerance.   Hematologic/Lymphatic: Negative for bleeding problem. Does not bruise/bleed easily.   Skin: Negative for rash.   Musculoskeletal: Negative for back pain and falls.   Gastrointestinal: Negative for abdominal pain, constipation, diarrhea, heartburn, melena, nausea and vomiting.   Genitourinary: Negative for hematuria.   Neurological: Negative for dizziness, headaches and light-headedness.   Psychiatric/Behavioral: Negative for altered mental status.   Allergic/Immunologic: Negative for persistent infections.              Objective:     Constitutional:       Appearance: Not in distress. Frail. Chronically ill-appearing.   Eyes:      Pupils: Pupils are equal, round, and reactive to light.   HENT:      Head: Normocephalic and atraumatic.      Nose: Nose normal.    Mouth/Throat:      Dentition: Normal.   Pulmonary:      Effort: Pulmonary effort is normal.      Breath sounds: Wheezing present.   Chest:      Chest wall: Not tender to palpatation.   Cardiovascular:      PMI at left midclavicular line. Normal rate. Regular rhythm.      Murmurs: There is no murmur.   Pulses:     Intact distal pulses.   Edema:     Peripheral edema present.     Ankle: trace pitting edema of the left ankle and 1+ pitting edema of the right ankle.  Abdominal:      General: Bowel sounds are normal.      Palpations: Abdomen is soft.   Musculoskeletal: Normal range of motion.      Cervical back: Normal range of motion. Skin:     General: Skin is warm and dry.   Neurological:  placement   -Pt to f/u in stroke clinic after discharge      #Hyperlipidemia  -LDL: 107.  LDL goal <70 for secondary stroke prevention.  (on Lipitor 80 mg PTA)  - Atorvastatin 80mg     #Hypertension  -Permissive HTN.    -On Lisinopril and coreg PTA, resumed     #CAD s/p CABG  -was on DAPT PTA, resumed     NSTEMI  -Cardology following  -Troponin peaked at 62     MEDICATIONS:  Current Facility-Administered Medications   Medication   • potassium chloride (K-DUR,KLOR-CON) CR tablet 20 mEq   • potassium chloride (K-DUR,KLOR-CON) CR tablet 40 mEq   • albuterol inhaler 2 puff   • albuterol-ipratropium (COMBIVENT RESPIMAT) inhaler 1 puff   • clopidogrel (PLAVIX) tablet 75 mg   • QUEtiapine (SEROquel) tablet 50 mg   • sodium chloride (NORMAL SALINE) 0.9 % bolus 300 mL   • atropine injection 0.5 mg   • lidocaine 1 % injection 10 mg   • morphine injection 2 mg   • nitroGLYcerin (NITROSTAT) sublingual tablet 0.4 mg   • MIDAZolam (VERSED) injection 1 mg   • acetaminophen (TYLENOL) tablet 650 mg   • HYDROcodone-acetaminophen (NORCO) 5-325 MG per tablet 1-2 tablet   • cloNIDine (CATAPRES) tablet 0.1 mg   • hydrALAZINE (APRESOLINE) injection 10 mg   • sodium chloride (NORMAL SALINE) 0.9 % bolus 500 mL   • chlorhexidine gluconate (PERIDEX) 0.12 % solution 15 mL    And   • chlorhexidine gluconate (PERIDEX) 0.12 % solution 15 mL   • sodium chloride 0.9% infusion   • sodium chloride 0.9% infusion   • aspirin chewable 81 mg   • pantoprazole (PROTONIX) EC tablet 40 mg   • carvedilol (COREG) tablet 12.5 mg   • heparin (porcine) injection 5,000 Units   • atorvastatin (LIPITOR) tablet 80 mg   • lisinopril (ZESTRIL) tablet 5 mg     VITAL SIGNS  Visit Vitals  /70 (BP Location: [facility], Patient Position: Semi[facility])   Pulse 74   Temp 98.2 °F (36.8 °C) (Oral)   Resp 20   Ht 5' 5\" (1.651 m)   Wt 74.1 kg   SpO2 94%   BMI 27.18 kg/m²     Labs:  WBC (K/mcL)   Date Value   11/10/2017 5.1     RBC (mil/mcL)   Date Value   11/10/2017 4.57     HCT  "     Mental Status: Alert, oriented to person, place, and time and oriented to person, place and time.   Psychiatric:         Attention and Perception: Attention normal.         Mood and Affect: Mood normal.           Procedures  /65   Pulse 74   Resp 18   Ht 172.7 cm (68\")   Wt 95.7 kg (211 lb)   SpO2 96%   BMI 32.08 kg/m²   Lab Review:   I have reviewed       Lab Results   Component Value Date    CHLPL 166 05/26/2022    TRIG 73 05/26/2022    HDL 74 05/26/2022    LDL 77 05/26/2022      Results for orders placed in visit on 08/03/22    Adult Stress Echo W/ Cont or Stress Agent if Necessary Per Protocol    Interpretation Summary  · Left ventricular ejection fraction appears to be 66 - 70%. Left ventricular systolic function is normal.     Assessment:          Diagnosis Plan   1. Chronic diastolic congestive heart failure (HCC)  Basic Metabolic Panel      2. Pulmonary hypertension (HCC)        3. Leg swelling        4. Claudication (HCC)        5. Coronary artery calcification seen on CAT scan        6. Hyperlipidemia LDL goal <70        7. Panlobular emphysema (HCC)        8. ADAL on CPAP        9. BMI 32.0-32.9,adult               Plan:       1. Chronic Diastolic Congestive Heart Failure- reviewed echo in 2015 with diastolic dysfunction, hypertensive heart disease and pulmonary hypertension. Patient does note she likes salt. On Lasix. Increase in Creatinine on Lasix. Overall appears euvolemic and I suspect leg swelling likely some component of vascular cause. Dietary changes. Recheck BMP in 1-2 weeks. Follow up after echo in 1 month.   2. Pulmonary Hypertension- Mild on echo in 2015. Echo scheduled next month. Follows with  \"Masood\" in [place].   3. Leg swelling- improved with Lasix. Low Salt Diet. Echo pending. BNP elevated at ER visit.   4. Claudication- leg pain on exertion. Also with burning sensation at rest. PCP with work up ongoing. Would consider referral to vascular. On Aspirin and statin. " (%)   Date Value   11/10/2017 37.9 (L)     HGB (g/dL)   Date Value   11/10/2017 12.5 (L)     PLT (K/mcL)   Date Value   11/10/2017 167   04/30/2013 166     Sodium (mmol/L)   Date Value   11/10/2017 137     Potassium (mmol/L)   Date Value   11/10/2017 3.6     Chloride (mmol/L)   Date Value   11/10/2017 105     Glucose (mg/dL)   Date Value   11/10/2017 118 (H)     CALCIUM (mg/dL)   Date Value   11/10/2017 9.3     Carbon Dioxide (mmol/L)   Date Value   11/10/2017 21     BUN (mg/dL)   Date Value   11/10/2017 10     Creatinine (mg/dL)   Date Value   11/10/2017 0.76   .  AST/SGOT (Units/L)   Date Value   11/06/2017 270 (H)     ALT/SGPT (Units/L)   Date Value   11/06/2017 61     No results found for: GGTP  ALK PHOSPHATASE (Units/L)   Date Value   11/06/2017 77     TOTAL BILIRUBIN (mg/dL)   Date Value   11/06/2017 0.7     INR (no units)   Date Value   08/29/2017 1.1     CHOLESTEROL (mg/dL)   Date Value   11/06/2017 164     HDL (mg/dL)   Date Value   11/06/2017 49     CHOL/HDL (no units)   Date Value   11/06/2017 3.3     TRIGLYCERIDE (mg/dL)   Date Value   11/06/2017 42     CALCULATED LDL (mg/dL)   Date Value   11/06/2017 107     Hemoglobin A1C (%)   Date Value   11/08/2017 6.2 (H)         PHYSICAL EXAM:  General :    Alert, cooperative, no distress, appears stated age, mood/affect appropriate    HEENT:    Normocephalic,without obvious abnormality, atraumatic       PERRLA, conjunctiva/corneas clear, EOM's intact   Neck:   Supple, symmetrical, trachea midline.    Heart::    Regular rate and rhythm, no murmur.      NEUROLOGICAL EXAM:  Mental Status:  Alert.  Oriented to person, place, forgetful to time.  Recent and remote memory impaired.  Follows commands.  Normal attention and concentration.  Speech is severely dysarthric.  Language is fluent.    No evidence of neglect on double simultaneous stimulation.   CN:  II:  Right eye visual fields normal when testing with unilateral stimulation in each of   the four quadrants  Former smoker.   5. Coronary Artery Calcification on CT- no angina. Flat troponin elevation likely secondary to volume overload. On Aspirin, statin and beta blocker.  6. Hyperlipidemia- Intolerant to recent change to crestor. Now back on LIpitor for last month. Due for repeat labs next month.   7. COPD- chronic shortness of breath and oxygen use. Follows with Pulmonary in [place]  8. Sleep apnea- with CPAP   9. BMI is >= 30 and <35. (Class 1 Obesity). The following options were offered after discussion;: weight loss educational material (shared in after visit summary), exercise counseling/recommendations, nutrition counseling/recommendations, pharmacological intervention options, referral to a nutritionist, referral to a weight management program and referral to primary care           I spent  43 minutes caring for Edie on this date of service. This time includes time spent by me in the following activities:preparing for the visit, reviewing tests, obtaining and/or reviewing a separately obtained history, performing a medically appropriate examination and/or evaluation , counseling and educating the patient/family/caregiver, ordering medications, tests, or procedures, referring and communicating with other health care professionals , documenting information in the medical record, independently interpreting results and communicating that information with the patient/family/caregiver and care coordination   individually.  Left 3rd nerve palsy.  Pupils 3 mm b/l constricting with appropriate accomodation  III, IV,VI: Left 3rd nerve palsy. PERRLA. No nystagmus.  No ptosis.  V:  Normal facial sensation in the V1,V2 and V3 trigem. facial distribution;  VII:  Right lower facial droop at rest and on excursion   VIII:  Bilateral hearing intact to voice.   IX,X:  Uvula in midline.  Palate elevates symmetrically.  Voice not hoarse.  XI:  Right shoulder shrug absent   XII:  Tongue in midline on protrusion  STRENGTH:  5/5 to LUE(deltoid, triceps, biceps, wrist flexion/extension, and interossei).  There is no drift.    RUE 1/5 with trace movement of fingers to commands  5/5 to LLE (Iliopsoas, quadriceps, hamstrings, and plantar/dorsiflexion).  There is no drift.    RLE 2/5 hip flexors, no distal movement.   TONE:  There is no increased tone, cogwheel rigidity, or fasciculations present.   SENSATION:   Grimaces to pain x4 extremities   REFLEXES:  Bilateral plantar reflexes unequivocal.    COORDINATION:  DOUG RUE d/t motor weakness, ataxia on finger to nose LUE   GAIT/STANCE:   Not assessed d/t patient safety concerns      Depression Screening:  pending    PHQ2/9:  pending                     Mood:  Anxious today     Interventions:  Support patient/family to discuss thoughts, feelings, and concerns       IMAGING:  All previous imaging reviewed from this admission; new images below.       CTA head/neck  8/29/17  Impression:  Very severe atherosclerotic disease affecting numerous vessels. There is  also occlusion of the left vertebral artery and the right internal carotid  artery.     The posterior circulation appear supplied only from the right vertebral  artery. The right vertebral artery is affected by atherosclerotic disease,  especially of the V4 segment, with multilevel severe tandem stenoses with  no visualization of the distal V4 segment at the vertebral basilar  junction. Despite this, there is contrast within the basilar  artery  although there are no significant communicating arteries visualized. Supply  to the posterior circulation appears to be significant risk of failure.     Additionally, there is likely ischemia within the posterior circulation  affecting the left cerebellar hemisphere and possibly the jamey seen as  hypodensity in the CT. MRI could be obtained for characterization if  needed.     The right middle cerebral artery is opacified, but supply is probably only  from the ophthalmic artery without other collaterals visualized.     MRI brain  8/29/17  Impression:  There is posterior circulation ischemia affecting the jamey, cerebellar  hemispheres, and right thalamus. Posterior left temporal abnormality is  probably not acute as there are older blood products present, likely late  subacute. Chronic occipital probable ischemic related changes are also  noted with cortical laminar necrosis.         Current admission    CT head 11/6/17  IMPRESSION:  1.  No acute intracranial abnormality.   2.  Sequela of prior ischemic injury involving the central jamey and left  lateral cerebellum.      MRI Brain -   IMPRESSION:  1.  New posterior circulation infarct again involving the left cerebellum  but also involving the left cerebral peduncle and small foci within the  medial right temporal and occipital lobes.  2.  Interval development of symmetric signal abnormality within bilateral  middle cerebellar peduncles is indeterminant. Nonacute ischemia is possible  but not favored. Other etiologies, although atypical, are possible  including posterior reversible encephalopathy or osmotic demyelination,  liver disease, HIV encephalopathy, or possibly demyelination due to other  reasons including MS. Due to this, recommend MRI follow-up in 3-4 weeks  with and without contrast.       Echo - ORDERED  IMPRESSION:  Left ventricular ejection fraction, 36 %.  Normal right ventricular size and systolic function, RVSP 25.7 mmHg.  No significant  valve abnormalities.  Normal IVC dimension with >50% respiratory change of the inferior vena cava.      Plan of care discussed with Dr. Fagan, RN, and pt      Thank you for allowing us to participate in the care of Aubree Schuster. Stroke team will continue to follow peripherally. Please page with any questions or concerns.     KITA Sheppard  Stroke Nurse Practitioner  Pager: [phone]        The stroke NP listed above is available for questions from [phone].  Please contact the on-call acute stroke neurologist with questions or concerns from [phone].

## 2023-02-21 ENCOUNTER — PATIENT MESSAGE (OUTPATIENT)
Dept: FAMILY MEDICINE CLINIC | Age: 78
End: 2023-02-21

## 2023-02-21 DIAGNOSIS — I73.9 INTERMITTENT CLAUDICATION (HCC): Primary | ICD-10-CM

## 2023-02-22 NOTE — TELEPHONE ENCOUNTER
Ok to refer to vascular. Per JF referral placed. Called dr Maryann Morgan office to see how soon appts were available. The first available is 03/14. Patient has tara test on 03/01.

## 2023-03-01 ENCOUNTER — HOSPITAL ENCOUNTER (OUTPATIENT)
Dept: ULTRASOUND IMAGING | Facility: HOSPITAL | Age: 78
Discharge: HOME | End: 2023-03-01
Payer: MEDICARE

## 2023-03-01 DIAGNOSIS — M79.89 SWELLING OF LOWER EXTREMITY: ICD-10-CM

## 2023-03-01 DIAGNOSIS — E04.1 THYROID NODULE: ICD-10-CM

## 2023-03-01 DIAGNOSIS — I73.9 INTERMITTENT CLAUDICATION (HCC): ICD-10-CM

## 2023-03-01 DIAGNOSIS — I73.9 INTERMITTENT CLAUDICATION: ICD-10-CM

## 2023-03-01 DIAGNOSIS — Z87.891 HISTORY OF TOBACCO ABUSE: ICD-10-CM

## 2023-03-01 PROCEDURE — 76536 US EXAM OF HEAD AND NECK: CPT

## 2023-03-01 PROCEDURE — 93923 UPR/LXTR ART STDY 3+ LVLS: CPT

## 2023-03-01 PROCEDURE — 93923 UPR/LXTR ART STDY 3+ LVLS: CPT | Performed by: SURGERY

## 2023-03-02 ENCOUNTER — TELEPHONE (OUTPATIENT)
Dept: FAMILY MEDICINE CLINIC | Age: 78
End: 2023-03-02

## 2023-03-02 DIAGNOSIS — E04.1 THYROID NODULE: Primary | ICD-10-CM

## 2023-03-02 NOTE — TELEPHONE ENCOUNTER
----- Message from Katya 83, APRN sent at 3/1/2023  4:57 PM CST -----  Please call patient and let them know results.    Normal SKYLAR study bilaterally

## 2023-03-02 NOTE — TELEPHONE ENCOUNTER
----- Message from GÉNESIS Adorno sent at 3/1/2023  4:58 PM CST -----  Please call patient and let them know results. Ultrasound of the thyroid shows thyroid nodule that radiology recommends ENT referral for possible biopsy. Recommend referral to ear nose and throat of choice.   Patient usually uses [facility] so most likely will be [facility]

## 2023-03-02 NOTE — TELEPHONE ENCOUNTER
I spoke with the patient about this. Patient had no preference for ENT.  I have entered the referral.

## 2023-03-13 ENCOUNTER — OFFICE VISIT (OUTPATIENT)
Dept: FAMILY MEDICINE CLINIC | Age: 78
End: 2023-03-13
Payer: MEDICARE

## 2023-03-13 VITALS
WEIGHT: 204 LBS | HEART RATE: 46 BPM | TEMPERATURE: 97.9 F | DIASTOLIC BLOOD PRESSURE: 78 MMHG | OXYGEN SATURATION: 92 % | BODY MASS INDEX: 30.57 KG/M2 | SYSTOLIC BLOOD PRESSURE: 138 MMHG

## 2023-03-13 DIAGNOSIS — Z87.39 HISTORY OF CHRONIC BACK PAIN: ICD-10-CM

## 2023-03-13 DIAGNOSIS — M79.605 BILATERAL LEG PAIN: ICD-10-CM

## 2023-03-13 DIAGNOSIS — M79.604 BILATERAL LEG PAIN: ICD-10-CM

## 2023-03-13 DIAGNOSIS — I50.32 CHRONIC DIASTOLIC HEART FAILURE (HCC): Primary | ICD-10-CM

## 2023-03-13 DIAGNOSIS — E04.1 THYROID NODULE: ICD-10-CM

## 2023-03-13 LAB
ANION GAP SERPL CALCULATED.3IONS-SCNC: 12 MMOL/L (ref 7–19)
BUN BLDV-MCNC: 33 MG/DL (ref 8–23)
CALCIUM SERPL-MCNC: 10 MG/DL (ref 8.8–10.2)
CHLORIDE BLD-SCNC: 103 MMOL/L (ref 98–111)
CO2: 23 MMOL/L (ref 22–29)
CREAT SERPL-MCNC: 1.6 MG/DL (ref 0.5–0.9)
GFR SERPL CREATININE-BSD FRML MDRD: 33 ML/MIN/{1.73_M2}
GLUCOSE BLD-MCNC: 88 MG/DL (ref 74–109)
POTASSIUM SERPL-SCNC: 4.5 MMOL/L (ref 3.5–5)
SODIUM BLD-SCNC: 138 MMOL/L (ref 136–145)

## 2023-03-13 PROCEDURE — 1090F PRES/ABSN URINE INCON ASSESS: CPT | Performed by: NURSE PRACTITIONER

## 2023-03-13 PROCEDURE — 3075F SYST BP GE 130 - 139MM HG: CPT | Performed by: NURSE PRACTITIONER

## 2023-03-13 PROCEDURE — G8400 PT W/DXA NO RESULTS DOC: HCPCS | Performed by: NURSE PRACTITIONER

## 2023-03-13 PROCEDURE — 1036F TOBACCO NON-USER: CPT | Performed by: NURSE PRACTITIONER

## 2023-03-13 PROCEDURE — G8484 FLU IMMUNIZE NO ADMIN: HCPCS | Performed by: NURSE PRACTITIONER

## 2023-03-13 PROCEDURE — 1123F ACP DISCUSS/DSCN MKR DOCD: CPT | Performed by: NURSE PRACTITIONER

## 2023-03-13 PROCEDURE — 3078F DIAST BP <80 MM HG: CPT | Performed by: NURSE PRACTITIONER

## 2023-03-13 PROCEDURE — 99214 OFFICE O/P EST MOD 30 MIN: CPT | Performed by: NURSE PRACTITIONER

## 2023-03-13 PROCEDURE — G8417 CALC BMI ABV UP PARAM F/U: HCPCS | Performed by: NURSE PRACTITIONER

## 2023-03-13 PROCEDURE — G8427 DOCREV CUR MEDS BY ELIG CLIN: HCPCS | Performed by: NURSE PRACTITIONER

## 2023-03-13 RX ORDER — GABAPENTIN 100 MG/1
100 CAPSULE ORAL 2 TIMES DAILY
Qty: 60 CAPSULE | Refills: 1 | Status: SHIPPED | OUTPATIENT
Start: 2023-03-13 | End: 2023-04-12

## 2023-03-13 ASSESSMENT — ENCOUNTER SYMPTOMS
ABDOMINAL PAIN: 0
VOMITING: 0
RHINORRHEA: 0
CONSTIPATION: 0
NAUSEA: 0
SORE THROAT: 0
SINUS PRESSURE: 0
SHORTNESS OF BREATH: 0
COUGH: 0
TROUBLE SWALLOWING: 0
DIARRHEA: 0

## 2023-03-13 NOTE — PROGRESS NOTES
Heidi Baum (:  1945) is a 69 y.o. female,Established patient, here for evaluation of the following chief complaint(s):  Follow-up (4 week follow up. /Wants to discuss test results. /Swelling has been better )      ASSESSMENT/PLAN:    ICD-10-CM    1. Chronic diastolic heart failure (HCC)  I50.32 The current medical regimen is effective;  continue present plan and medications. Continue to monitor weight. 2. Bilateral leg pain  M79.604 gabapentin (NEURONTIN) 100 MG capsule    Going to try gabapentin to see if helps with pain. PDMP - no signs of diversion  [facility] -[id number]- negative    She has appt with Vascular Dr Rhett Metcalf at [facility].     M79.605       3. History of chronic back pain  Z87.39 gabapentin (NEURONTIN) 100 MG capsule      4. Thyroid nodule  E04.1 Keep appt with Dr Lindsay Morales. Return in about 2 months (around 2023) for leg pain. SUBJECTIVE/OBJECTIVE:  HPI  Here for follow up on swelling in lower extremities    Chronic diastolic heart failure  Followed by [facility] cardiology (Dr Kathrine Mckeon)  She had been to [facility] ER 02/10/2023 and was given lasix. She had follow up with cardiology after last visit here. She was advised to wear compression socks and elevated above heart level when here last.   Daily weight shows staying stable around 204  Swelling is better. She is trying to keep to low salt diet. Stress echo 2022  Left Ventricle   Left ventricular ejection fraction appears to be 66 - 70%. Left ventricular systolic function is normal.   Septal wall motion is normal  Stress Echo - Peak Stress Findings   Normal stress echo consistent with a low risk study for myocardial ischemia. Thyroid nodules  CT chest 2023 showed thyroid nodule   Obtained US and recommended referral to ENT for bx. She has appt with Dr Lindsay Morales 2023    Thyroid US 2023  FINDINGS:  RIGHT lobe. Measures 5.4 x 1.9 x 1.7 cm  ISTHMUS. Measures 0.3 cm  LEFT lobe.  Measures 5.3 x 2.5 x 1.7 cm    NODULE 1 -   Located - lower pole right lobe  Size - 1.2 x 1.5 x 1.0 cm   Density - cystic or almost completely cystic (0 points)  Echogenicity - anechoic (0 points)  Shape - wider than tall (0 points)  Margins - smooth (0 points)  Calcifications - none or large comet tail artifacts (0 points)     ACR TI-RADS 2017 risk category: TR1 (0 points)  Benign. No FNA. NODULE 2 -   Located - mid pole right lobe  Size - 4 x 3 x 4 mm   Density - cystic or almost completely cystic (0 points)  Echogenicity - anechoic (0 points)  Shape - wider than tall (0 points)  Margins - smooth (0 points)  Calcifications - none or large comet tail artifacts (0 points)     ACR TI-RADS 2017 risk category: TR1 (0 points)  Benign. No FNA. NODULE 3 -   Located - lower pole left lobe  Size - 1.7 x 1.5 x 1.5 cm   Density - solid or almost completely solid (2 points)  Echogenicity - hyperechoic or isoechoic (1 point)  Shape - wider than tall (0 points)  Margins - smooth (0 points)  Calcifications - peripheral/rim calcifications (2 points)     ACR TI-RADS 2017 risk category: TR4 (4-6 points)  Moderately suspicious. FNA if greater than 1.5 cm. Follow up if greater than 1 cm. IMPRESSION:  1. Benign cyst within the right lobe. There is a TR 4 nodule involving  the lower pole of the left lobe with peripheral calcification. This is  of sufficient size and concern by ACR criteria that FNA would be  suggested. That could be scheduled under ultrasound guidance. This report was finalized on 03/01/2023 14:30 by Dr. Louann Winters MD.    Bilateral leg pain  Patient is continuing to complain of bilateral leg pain feels like pins/needles in legs  Have taken lyrica in the past and could not tolerate it. (Years ago)  She had EDEL study that was normal.   She had varicose veins.      /78 (Site: Right Upper Arm, Position: Sitting, Cuff Size: Large Adult)   Pulse (!) 46   Temp 97.9 °F (36.6 °C) (Temporal)   Wt 204 lb (92.5 kg)   SpO2 92%   BMI 30.57 kg/m²     Review of Systems   Constitutional:  Negative for activity change, appetite change, fatigue, fever and unexpected weight change. HENT:  Negative for congestion, hearing loss, rhinorrhea, sinus pressure, sore throat and trouble swallowing. Eyes:  Negative for visual disturbance. Respiratory:  Negative for cough and shortness of breath. Cardiovascular:  Negative for chest pain, palpitations and leg swelling. Gastrointestinal:  Negative for abdominal pain, constipation, diarrhea, nausea and vomiting. Endocrine: Negative for cold intolerance and heat intolerance. Genitourinary:  Negative for flank pain, menstrual problem, pelvic pain, urgency and vaginal discharge. Musculoskeletal:  Negative for arthralgias. Bilateral leg pain   Skin:  Negative for rash. Neurological:  Negative for headaches. Psychiatric/Behavioral:  Negative for dysphoric mood and sleep disturbance. The patient is not nervous/anxious. Physical Exam  Vitals reviewed. Constitutional:       Appearance: Normal appearance. HENT:      Head: Normocephalic and atraumatic. Eyes:      Conjunctiva/sclera: Conjunctivae normal.   Cardiovascular:      Rate and Rhythm: Normal rate and regular rhythm. Pulses: Normal pulses. Heart sounds: Normal heart sounds. Pulmonary:      Effort: Pulmonary effort is normal.      Breath sounds: Normal breath sounds. Abdominal:      Palpations: Abdomen is soft. Musculoskeletal:      Cervical back: Normal range of motion and neck supple. Comments: Varicose veins bilateral LE   Skin:     General: Skin is warm. Neurological:      Mental Status: She is alert and oriented to person, place, and time. An electronic signature was used to authenticate this note.     --GÉNESIS Jay

## 2023-03-16 DIAGNOSIS — N18.30 STAGE 3 CHRONIC KIDNEY DISEASE, UNSPECIFIED WHETHER STAGE 3A OR 3B CKD (HCC): ICD-10-CM

## 2023-03-16 DIAGNOSIS — I10 ESSENTIAL HYPERTENSION: ICD-10-CM

## 2023-03-16 RX ORDER — VALSARTAN 160 MG/1
160 TABLET ORAL DAILY
Qty: 90 TABLET | Refills: 3 | Status: SHIPPED | OUTPATIENT
Start: 2023-03-16

## 2023-03-16 NOTE — TELEPHONE ENCOUNTER
Received fax from pharmacy requesting refill on pts medication(s). Pt was last seen in office on 3/13/2023  and has a follow up scheduled for 4/18/2023. Will send request to  Dahlia Arcos  for authorization.      Requested Prescriptions     Pending Prescriptions Disp Refills    valsartan (DIOVAN) 160 MG tablet 90 tablet 3     Sig: Take 1 tablet by mouth daily

## 2023-03-17 ENCOUNTER — HOSPITAL ENCOUNTER (OUTPATIENT)
Dept: CARDIOLOGY | Facility: HOSPITAL | Age: 78
Discharge: HOME | End: 2023-03-17
Admitting: INTERNAL MEDICINE
Payer: MEDICARE

## 2023-03-17 VITALS
DIASTOLIC BLOOD PRESSURE: 65 MMHG | SYSTOLIC BLOOD PRESSURE: 147 MMHG | BODY MASS INDEX: 31.98 KG/M2 | WEIGHT: 211 LBS | HEIGHT: 68 IN

## 2023-03-17 LAB
LEFT VENTRICULAR EJECTION FRACTION HIGH VALUE: 65 %
LEFT VENTRICULAR EJECTION FRACTION MODE: NORMAL
LV EF: 61 %

## 2023-03-17 PROCEDURE — 93306 TTE W/DOPPLER COMPLETE: CPT

## 2023-03-17 PROCEDURE — 25510000001 PERFLUTREN 6.52 MG/ML SUSPENSION: Performed by: INTERNAL MEDICINE

## 2023-03-17 PROCEDURE — 93306 TTE W/DOPPLER COMPLETE: CPT | Performed by: INTERNAL MEDICINE

## 2023-03-17 RX ADMIN — PERFLUTREN 13.04 MG: 6.52 INJECTION, SUSPENSION INTRAVENOUS at 09:06

## 2023-03-20 ENCOUNTER — TELEPHONE (OUTPATIENT)
Dept: VASCULAR SURGERY | Facility: CLINIC | Age: 78
End: 2023-03-20
Payer: MEDICARE

## 2023-03-20 LAB
ASCENDING AORTA: 3.7 CM
BH CV ECHO MEAS - AO MAX PG: 7.7 MMHG
BH CV ECHO MEAS - AO MEAN PG: 3.5 MMHG
BH CV ECHO MEAS - AO ROOT DIAM: 3.5 CM
BH CV ECHO MEAS - AO V2 MAX: 139 CM/SEC
BH CV ECHO MEAS - AO V2 VTI: 29.5 CM
BH CV ECHO MEAS - AVA(I,D): 3.2 CM2
BH CV ECHO MEAS - EDV(CUBED): 109.2 ML
BH CV ECHO MEAS - EDV(MOD-SP2): 110 ML
BH CV ECHO MEAS - EDV(MOD-SP4): 120 ML
BH CV ECHO MEAS - EF(MOD-BP): 63.1 %
BH CV ECHO MEAS - EF(MOD-SP2): 65.3 %
BH CV ECHO MEAS - EF(MOD-SP4): 61.3 %
BH CV ECHO MEAS - ESV(CUBED): 36.6 ML
BH CV ECHO MEAS - ESV(MOD-SP2): 38.2 ML
BH CV ECHO MEAS - ESV(MOD-SP4): 46.5 ML
BH CV ECHO MEAS - FS: 30.5 %
BH CV ECHO MEAS - IVS/LVPW: 1.02 CM
BH CV ECHO MEAS - IVSD: 1 CM
BH CV ECHO MEAS - LA DIMENSION: 3.7 CM
BH CV ECHO MEAS - LAT PEAK E' VEL: 7.7 CM/SEC
BH CV ECHO MEAS - LV DIASTOLIC VOL/BSA (35-75): 57.4 CM2
BH CV ECHO MEAS - LV MASS(C)D: 166.8 GRAMS
BH CV ECHO MEAS - LV MAX PG: 4.2 MMHG
BH CV ECHO MEAS - LV MEAN PG: 2 MMHG
BH CV ECHO MEAS - LV SYSTOLIC VOL/BSA (12-30): 22.2 CM2
BH CV ECHO MEAS - LV V1 MAX: 102.3 CM/SEC
BH CV ECHO MEAS - LV V1 VTI: 27.5 CM
BH CV ECHO MEAS - LVIDD: 4.8 CM
BH CV ECHO MEAS - LVIDS: 3.3 CM
BH CV ECHO MEAS - LVOT AREA: 3.5 CM2
BH CV ECHO MEAS - LVOT DIAM: 2.1 CM
BH CV ECHO MEAS - LVPWD: 0.98 CM
BH CV ECHO MEAS - MED PEAK E' VEL: 5.6 CM/SEC
BH CV ECHO MEAS - MV A MAX VEL: 95.4 CM/SEC
BH CV ECHO MEAS - MV DEC SLOPE: 546 CM/SEC2
BH CV ECHO MEAS - MV DEC TIME: 0.16 MSEC
BH CV ECHO MEAS - MV E MAX VEL: 83.2 CM/SEC
BH CV ECHO MEAS - MV E/A: 0.87
BH CV ECHO MEAS - MV MAX PG: 4.7 MMHG
BH CV ECHO MEAS - MV MEAN PG: 1.5 MMHG
BH CV ECHO MEAS - MV V2 VTI: 29.9 CM
BH CV ECHO MEAS - MVA(VTI): 3.2 CM2
BH CV ECHO MEAS - PA V2 MAX: 82.5 CM/SEC
BH CV ECHO MEAS - RAP SYSTOLE: 5 MMHG
BH CV ECHO MEAS - RV MAX PG: 2.14 MMHG
BH CV ECHO MEAS - RV V1 MAX: 73.2 CM/SEC
BH CV ECHO MEAS - RV V1 VTI: 17.2 CM
BH CV ECHO MEAS - RVDD: 3.3 CM
BH CV ECHO MEAS - RVSP: 33.3 MMHG
BH CV ECHO MEAS - SI(MOD-SP2): 34.3 ML/M2
BH CV ECHO MEAS - SI(MOD-SP4): 35.2 ML/M2
BH CV ECHO MEAS - SV(LVOT): 95.2 ML
BH CV ECHO MEAS - SV(MOD-SP2): 71.8 ML
BH CV ECHO MEAS - SV(MOD-SP4): 73.5 ML
BH CV ECHO MEAS - TR MAX PG: 28.3 MMHG
BH CV ECHO MEAS - TR MAX VEL: 266 CM/SEC
BH CV ECHO MEASUREMENTS AVERAGE E/E' RATIO: 12.51
BH CV XLRA - TDI S': 11.1 CM/SEC
LEFT ATRIUM VOLUME INDEX: 41.7 ML/M2
LEFT ATRIUM VOLUME: 87.2 ML
MAXIMAL PREDICTED HEART RATE: 144 BPM
STRESS TARGET HR: 122 BPM

## 2023-03-21 ENCOUNTER — OFFICE VISIT (OUTPATIENT)
Dept: VASCULAR SURGERY | Facility: CLINIC | Age: 78
End: 2023-03-21
Payer: MEDICARE

## 2023-03-21 VITALS
DIASTOLIC BLOOD PRESSURE: 70 MMHG | OXYGEN SATURATION: 98 % | BODY MASS INDEX: 31.83 KG/M2 | WEIGHT: 210 LBS | SYSTOLIC BLOOD PRESSURE: 118 MMHG | HEART RATE: 72 BPM | HEIGHT: 68 IN

## 2023-03-21 DIAGNOSIS — I87.323 CHRONIC VENOUS HYPERTENSION WITH INFLAMMATION INVOLVING BOTH SIDES: Primary | ICD-10-CM

## 2023-03-21 DIAGNOSIS — M79.89 LEG SWELLING: ICD-10-CM

## 2023-03-21 DIAGNOSIS — I50.32 CHRONIC DIASTOLIC (CONGESTIVE) HEART FAILURE: ICD-10-CM

## 2023-03-21 DIAGNOSIS — N18.32 CHRONIC KIDNEY DISEASE (CKD) STAGE G3B/A3, MODERATELY DECREASED GLOMERULAR FILTRATION RATE (GFR) BETWEEN 30-44 ML/MIN/1.73 SQUARE METER AND ALBUMINURIA CREATININE RATIO GREATER THAN 300 MG/G (C*: ICD-10-CM

## 2023-03-21 PROCEDURE — 1159F MED LIST DOCD IN RCRD: CPT | Performed by: SURGERY

## 2023-03-21 PROCEDURE — 1160F RVW MEDS BY RX/DR IN RCRD: CPT | Performed by: SURGERY

## 2023-03-21 PROCEDURE — 99204 OFFICE O/P NEW MOD 45 MIN: CPT | Performed by: SURGERY

## 2023-03-21 RX ORDER — GABAPENTIN 100 MG/1
100 CAPSULE ORAL 2 TIMES DAILY
COMMUNITY
Start: 2023-03-14

## 2023-03-21 NOTE — PROGRESS NOTES
2023      Johnnie Lawson, APRN  [address] [facility] [address]    Edie Hinton  1945    Chief Complaint   Patient presents with   • NEW PATIENT     Referred from Johnnie Lawson for intermittent claudication. Patient complans of swelling/pain  and soreness to the touch of both legs, more left than right. Has tried wearing compressions but they did not seem to help.  to HUBERT done 3/1/23. Right 1.1, Left 1.18.        Dear Johnnie Clemons*    HPI  I had the pleasure of seeing your patient Edie Hinton in the office today.  Thank you kindly for this consultation.  As you recall, Edie Hinton is a 77 y.o.  female who you are currently following for routine health maintenance.  She reports her legs swelled significantly and legs, left greater than right, and are sore to touch.  She had a venous duplex that was negative.  She also had HUBERT which were normal. She is maintained on aspirin and Lipitor. Her main complaint is burning and stinging to her left leg.  She has a history of two previous back surgeries. She does ambulate minimally with a walker.        Past Medical History:   Diagnosis Date   • Arthritis    • Sleep apnea        Past Surgical History:   Procedure Laterality Date   • APPENDECTOMY     • BACK SURGERY     • BREAST SURGERY     • CATARACT EXTRACTION     • CERVICAL SPINE SURGERY     • HAND SURGERY     • HYSTERECTOMY     • KNEE SURGERY Bilateral    • TONSILLECTOMY         Family History   Problem Relation Age of Onset   • Heart attack Mother    • No Known Problems Father        Social History     Socioeconomic History   • Marital status:    Tobacco Use   • Smoking status: Former     Packs/day: 0.50     Types: Cigarettes     Quit date:      Years since quittin.2   • Smokeless tobacco: Never   Substance and Sexual Activity   • Alcohol use: Yes     Comment: daily   • Drug use: No       Allergies   Allergen Reactions   • Bactrim [Sulfamethoxazole-Trimethoprim]     • Keflex [Cephalexin]    • Lyrica [Pregabalin]    • Sulfa Antibiotics Rash         Current Outpatient Medications:   •  albuterol sulfate  (90 Base) MCG/ACT inhaler, Inhale 2 puffs Every 4 (Four) Hours As Needed for Wheezing., Disp: , Rfl:   •  amLODIPine (NORVASC) 5 MG tablet, Take 1 tablet by mouth Daily., Disp: , Rfl:   •  aspirin 81 MG EC tablet, Take 1 tablet by mouth Daily., Disp: , Rfl:   •  atorvastatin (LIPITOR) 40 MG tablet, Take 1 tablet by mouth Every Night., Disp: , Rfl:   •  furosemide (LASIX) 20 MG tablet, Take 1 tablet by mouth Daily., Disp: , Rfl:   •  gabapentin (NEURONTIN) 100 MG capsule, Take 1 capsule by mouth 2 (Two) Times a Day., Disp: , Rfl:   •  metoclopramide (REGLAN) 10 MG tablet, Take 1 tablet by mouth 4 (Four) Times a Day Before Meals & at Bedtime., Disp: , Rfl:   •  metoprolol tartrate (LOPRESSOR) 100 MG tablet, Take 1 tablet by mouth 2 (Two) Times a Day., Disp: , Rfl:   •  pantoprazole (PROTONIX) 40 MG EC tablet, Take 1 tablet by mouth Daily., Disp: , Rfl:   •  potassium chloride (K-DUR,KLOR-CON) 20 MEQ CR tablet, Take 1 tablet by mouth Daily., Disp: , Rfl:   •  revefenacin (YUPELRI) 175 MCG/3ML nebulizer solution, Take  by nebulization Daily., Disp: , Rfl:   •  tolterodine (DETROL) 2 MG tablet, Take 1 tablet by mouth 2 (Two) Times a Day., Disp: , Rfl:   •  valsartan (DIOVAN) 160 MG tablet, Take 1 tablet by mouth Daily., Disp: , Rfl:   •  vitamin D (ERGOCALCIFEROL) 35169 UNITS capsule capsule, Take 1 capsule by mouth 1 (One) Time Per Week., Disp: , Rfl:     Review of Systems   Constitutional: Negative.    HENT: Negative.    Eyes: Negative.    Respiratory: Positive for shortness of breath.    Cardiovascular: Positive for leg swelling.   Gastrointestinal: Negative.    Endocrine: Negative.    Genitourinary: Negative.    Musculoskeletal: Positive for arthralgias, back pain and gait problem.   Skin: Negative.    Allergic/Immunologic: Negative.    Hematological: Negative.   "  Psychiatric/Behavioral: Negative.    All other systems reviewed and are negative.    /70   Pulse 72   Ht 172.7 cm (68\")   Wt 95.3 kg (210 lb)   SpO2 98%   BMI 31.93 kg/m²     Physical Exam  Vitals and nursing note reviewed.   Constitutional:       Appearance: Normal appearance. She is well-developed. She is obese.   HENT:      Head: Normocephalic and atraumatic.   Eyes:      General: No scleral icterus.     Pupils: Pupils are equal, round, and reactive to light.   Neck:      Thyroid: No thyromegaly.      Vascular: No carotid bruit or JVD.   Cardiovascular:      Rate and Rhythm: Normal rate and regular rhythm.      Pulses:           Carotid pulses are 2+ on the right side and 2+ on the left side.       Femoral pulses are 2+ on the right side and 2+ on the left side.       Popliteal pulses are 2+ on the right side and 2+ on the left side.        Dorsalis pedis pulses are 2+ on the right side and 2+ on the left side.        Posterior tibial pulses are 2+ on the right side and 2+ on the left side.      Heart sounds: Normal heart sounds.      Comments: Varicose veins to bilateral lower extremities  Pulmonary:      Effort: Pulmonary effort is normal.      Breath sounds: Normal breath sounds.   Abdominal:      General: Bowel sounds are normal. There is no distension or abdominal bruit.      Palpations: Abdomen is soft. There is no mass.      Tenderness: There is no abdominal tenderness.   Musculoskeletal:         General: Normal range of motion.      Cervical back: Neck supple.      Right lower leg: Edema present.      Left lower leg: Edema present.   Lymphadenopathy:      Cervical: No cervical adenopathy.   Skin:     General: Skin is warm and dry.      Comments: hyperpigmentation   Neurological:      Mental Status: She is alert and oriented to person, place, and time.      Cranial Nerves: No cranial nerve deficit.      Sensory: No sensory deficit.   Psychiatric:         Mood and Affect: Mood normal.         " Behavior: Behavior normal.         Thought Content: Thought content normal.         Judgment: Judgment normal.       Diagnostic Data:    History: Swelling     IMPRESSION:  Impression: There is no evidence of deep venous thrombosis or  superficial thrombophlebitis of right or left lower extremities.     HUBERT:Comments: Bilateral lower extremity venous duplex exam was performed  using color Doppler flow, Doppler waveform analysis, and grayscale  imaging, with and without compression. There is no evidence of deep  venous thrombosis in the common femoral, superficial femoral, popliteal,  peroneal, anterior tibial, and posterior tibial veins bilaterally. No  thrombus is identified in the saphenofemoral junctions and greater  saphenous veins bilaterally.         This report was finalized on 02/14/2023 15:33 by Dr. Kojo Castillo MD.    HUBERT:  History: PAD     Comments: Bilateral lower extremity arterial with multi-level pulse  volume recordings and segmental pressures were performed at rest and  stress.     The right ankle/brachial index is 1.1. The waveforms are triphasic  without dampening.These findings are consistent with no significant  arterial insufficiency of the right lower extremity at rest.  The toe  brachial index is 0.98.     The left ankle/brachial index is 1.18. The waveforms are triphasic  without dampening. These findings are consistent with no significant  arterial insufficiency of the left lower extremity at rest.    The toe  brachial index is 0.97.     IMPRESSION:  Impression:  1. No significant arterial insufficiency of the right lower extremity at  rest.  2. No significant arterial insufficiency of the left lower extremity at  rest.        This report was finalized on 03/01/2023 14:34 by Dr. Kojo Castillo MD.    Patient Active Problem List   Diagnosis   • ADAL on CPAP   • VILLA (dyspnea on exertion)   • Panlobular emphysema (HCC)   • Chronic diastolic (congestive) heart failure (HCC)   • Chronic  kidney disease (CKD) stage G3b/A3, moderately decreased glomerular filtration rate (GFR) between 30-44 mL/min/1.73 square meter and albuminuria creatinine ratio greater than 300 mg/g (C* (HCC)        Diagnosis Plan   1. Chronic venous hypertension with inflammation involving both sides  US venous doppler lower extremity bilateral (duplex)      2. Chronic diastolic (congestive) heart failure (HCC)        3. Leg swelling        4. Chronic kidney disease (CKD) stage G3b/A3, moderately decreased glomerular filtration rate (GFR) between 30-44 mL/min/1.73 square meter and albuminuria creatinine ratio greater than 300 mg/g (C* (HCC)            Plan: After thoroughly evaluating Edie Hinton, I believe the best course of action is to initially remain conservative from a vascular standpoint.  I will give the patient a prescription for compression stockings in the 20-30 mm pressure gradient range.  I did instruct the patient on how to wear these on a daily basis.  I would like her to keep her legs elevated when she is not on them, and keep her legs well moisturized.  We will see the patient back in 2-3 weeks with a venous valvular insufficiency study. If the testing does show significant venous reflux, the patient may be a great candidate for endovenous closure as the patient's symptoms have significantly impacted their activities of daily living. I did review her arterial testing which shows now evidence of arterial insufficiency.  The patient is to continue taking their medications as previously discussed.   This was all discussed in full with complete understanding.  Thank you for allowing me to participate in the care of your patient.  Please do not hesitate to call with any questions or concerns.  We will keep you aware of any further encounters with Edie Hinton.        Sincerely yours,         Kojo Castillo, Johnnie Heller, MAURICE

## 2023-03-24 ENCOUNTER — OFFICE VISIT (OUTPATIENT)
Dept: CARDIOLOGY | Facility: CLINIC | Age: 78
End: 2023-03-24
Payer: MEDICARE

## 2023-03-24 ENCOUNTER — DOCUMENTATION (OUTPATIENT)
Dept: VASCULAR SURGERY | Facility: CLINIC | Age: 78
End: 2023-03-24
Payer: MEDICARE

## 2023-03-24 ENCOUNTER — TELEPHONE (OUTPATIENT)
Dept: VASCULAR SURGERY | Facility: CLINIC | Age: 78
End: 2023-03-24
Payer: MEDICARE

## 2023-03-24 VITALS
HEART RATE: 75 BPM | WEIGHT: 210 LBS | DIASTOLIC BLOOD PRESSURE: 70 MMHG | HEIGHT: 68 IN | RESPIRATION RATE: 18 BRPM | OXYGEN SATURATION: 97 % | SYSTOLIC BLOOD PRESSURE: 120 MMHG | BODY MASS INDEX: 31.83 KG/M2

## 2023-03-24 DIAGNOSIS — M79.89 LEG SWELLING: ICD-10-CM

## 2023-03-24 DIAGNOSIS — I27.20 PULMONARY HYPERTENSION: ICD-10-CM

## 2023-03-24 DIAGNOSIS — N18.32 CHRONIC KIDNEY DISEASE (CKD) STAGE G3B/A3, MODERATELY DECREASED GLOMERULAR FILTRATION RATE (GFR) BETWEEN 30-44 ML/MIN/1.73 SQUARE METER AND ALBUMINURIA CREATININE RATIO GREATER THAN 300 MG/G (C*: ICD-10-CM

## 2023-03-24 DIAGNOSIS — I25.84 CORONARY ARTERY CALCIFICATION: ICD-10-CM

## 2023-03-24 DIAGNOSIS — I50.32 CHRONIC DIASTOLIC (CONGESTIVE) HEART FAILURE: Primary | ICD-10-CM

## 2023-03-24 DIAGNOSIS — J43.1 PANLOBULAR EMPHYSEMA: ICD-10-CM

## 2023-03-24 DIAGNOSIS — I25.10 CORONARY ARTERY CALCIFICATION: ICD-10-CM

## 2023-03-24 DIAGNOSIS — Z99.89 OSA ON CPAP: ICD-10-CM

## 2023-03-24 DIAGNOSIS — G47.33 OSA ON CPAP: ICD-10-CM

## 2023-03-24 DIAGNOSIS — E78.2 MIXED HYPERLIPIDEMIA: ICD-10-CM

## 2023-03-24 PROCEDURE — 1159F MED LIST DOCD IN RCRD: CPT | Performed by: NURSE PRACTITIONER

## 2023-03-24 PROCEDURE — 1160F RVW MEDS BY RX/DR IN RCRD: CPT | Performed by: NURSE PRACTITIONER

## 2023-03-24 PROCEDURE — 99214 OFFICE O/P EST MOD 30 MIN: CPT | Performed by: NURSE PRACTITIONER

## 2023-03-24 NOTE — PROGRESS NOTES
"Subjective:     Encounter Date:03/24/2023      Patient ID: Edie Hinton is a 77 y.o. female     Chief Complaint:  History of Present Illness  Patient presents today for routine follow up. Patient is followed for diastolic congestive heart failure, coronary calcification seen on CT of chest, pulmonary hypertension, and hypertension. Patient recently has been having issues with leg swelling. She went tot the ER for this swelling recently. Left greater than right. She was also complaining of pain/burning of her legs. She was treated with Lasix and a steroid pack in the ER. Her BNP was elevated in the ER. She has had worsening renal function on Lasix. She was also referred to Dr. Castillo- who has prescribed compression stockings. Of note patient was referred to Dr. Marina in August for shortness of breath on exertion. She had a stress test checked which was low risk. She did have moderate coronary calcifications noted on CT of chest for which she is on Aspirin and Lipitor. Former heavy smoker. Patient has COPD and follows with  \"Masood\" with pulmonary in [place]. She follows with Negrito Lawson as her PCP. I did order an echo at her last appointment. Patient has chronic shortness of breath. She has COPD, Pulmonary Hypertension, Coronary Artery Calcification, Diastolic Congestive Heart Failure, Hyperlipidemia, Sleep Apnea, chronic respiratory failure with oxygen use in the past.     The following portions of the patient's history were reviewed and updated as appropriate: allergies, current medications, past medical history, past social history, past and problem list.    Allergies   Allergen Reactions   • Bactrim [Sulfamethoxazole-Trimethoprim]    • Keflex [Cephalexin]    • Lyrica [Pregabalin]    • Sulfa Antibiotics Rash       Current Outpatient Medications:   •  albuterol sulfate  (90 Base) MCG/ACT inhaler, Inhale 2 puffs Every 4 (Four) Hours As Needed for Wheezing., Disp: , Rfl:   •  amLODIPine (NORVASC) 5 " MG tablet, Take 1 tablet by mouth Daily., Disp: , Rfl:   •  aspirin 81 MG EC tablet, Take 1 tablet by mouth Daily., Disp: , Rfl:   •  atorvastatin (LIPITOR) 40 MG tablet, Take 1 tablet by mouth Every Night., Disp: , Rfl:   •  furosemide (LASIX) 20 MG tablet, Take 1 tablet by mouth Daily., Disp: , Rfl:   •  gabapentin (NEURONTIN) 100 MG capsule, Take 1 capsule by mouth 2 (Two) Times a Day., Disp: , Rfl:   •  metoclopramide (REGLAN) 10 MG tablet, Take 1 tablet by mouth 4 (Four) Times a Day Before Meals & at Bedtime., Disp: , Rfl:   •  metoprolol tartrate (LOPRESSOR) 100 MG tablet, Take 1 tablet by mouth 2 (Two) Times a Day., Disp: , Rfl:   •  pantoprazole (PROTONIX) 40 MG EC tablet, Take 1 tablet by mouth Daily., Disp: , Rfl:   •  potassium chloride (K-DUR,KLOR-CON) 20 MEQ CR tablet, Take 1 tablet by mouth Daily., Disp: , Rfl:   •  revefenacin (YUPELRI) 175 MCG/3ML nebulizer solution, Take  by nebulization Daily., Disp: , Rfl:   •  tolterodine (DETROL) 2 MG tablet, Take 1 tablet by mouth 2 (Two) Times a Day., Disp: , Rfl:   •  valsartan (DIOVAN) 160 MG tablet, Take 1 tablet by mouth Daily., Disp: , Rfl:   •  vitamin D (ERGOCALCIFEROL) 94247 UNITS capsule capsule, Take 1 capsule by mouth 1 (One) Time Per Week., Disp: , Rfl:     Social History     Socioeconomic History   • Marital status:    Tobacco Use   • Smoking status: Former     Packs/day: 0.50     Types: Cigarettes     Quit date: 2018     Years since quittin.2   • Smokeless tobacco: Never   Substance and Sexual Activity   • Alcohol use: Yes     Comment: daily   • Drug use: No       Review of Systems   Constitutional: Positive for malaise/fatigue. Negative for chills, decreased appetite, fever, weight gain and weight loss.   HENT: Negative for nosebleeds.    Eyes: Negative for visual disturbance.   Cardiovascular: Positive for leg swelling. Negative for chest pain, dyspnea on exertion, near-syncope, orthopnea, palpitations, paroxysmal nocturnal dyspnea  "and syncope.   Respiratory: Positive for shortness of breath. Negative for cough, hemoptysis and snoring.    Endocrine: Negative for cold intolerance and heat intolerance.   Hematologic/Lymphatic: Negative for bleeding problem. Does not bruise/bleed easily.   Skin: Negative for rash.   Musculoskeletal: Negative for back pain and falls.   Gastrointestinal: Negative for abdominal pain, constipation, diarrhea, heartburn, melena, nausea and vomiting.   Genitourinary: Negative for hematuria.   Neurological: Negative for dizziness, headaches and light-headedness.   Psychiatric/Behavioral: Negative for altered mental status.   Allergic/Immunologic: Negative for persistent infections.              Objective:     Constitutional:       Appearance: Healthy appearance. Not in distress.   Eyes:      Pupils: Pupils are equal, round, and reactive to light.   HENT:      Head: Normocephalic and atraumatic.      Nose: Nose normal.    Mouth/Throat:      Dentition: Normal.   Pulmonary:      Effort: Pulmonary effort is normal.      Breath sounds: Normal breath sounds.   Chest:      Chest wall: Not tender to palpatation.   Cardiovascular:      PMI at left midclavicular line. Normal rate. Regular rhythm.      Murmurs: There is no murmur.      Comments: Compression stockings   Pulses:     Intact distal pulses.   Edema:     Ankle: bilateral non-pitting edema of the ankle.  Abdominal:      General: Bowel sounds are normal.      Palpations: Abdomen is soft.   Musculoskeletal: Normal range of motion.      Cervical back: Normal range of motion. Skin:     General: Skin is warm and dry.   Neurological:      Mental Status: Alert, oriented to person, place, and time and oriented to person, place and time.   Psychiatric:         Attention and Perception: Attention normal.         Mood and Affect: Mood normal.           Procedures  /70   Pulse 75   Resp 18   Ht 172.7 cm (68\")   Wt 95.3 kg (210 lb)   SpO2 97%   BMI 31.93 kg/m²   Lab " Review:   I have reviewed     Lab Results   Component Value Date    GLUCOSE 107 (H) 02/10/2023    CALCIUM 9.5 02/10/2023     02/10/2023    K 3.8 02/10/2023    CO2 22.0 02/10/2023     02/10/2023    BUN 14 02/10/2023    CREATININE 1.13 (H) 02/10/2023    EGFR 50.5 (L) 02/10/2023    BCR 12.4 02/10/2023    ANIONGAP 11.0 02/10/2023         Lab Results   Component Value Date    CHLPL 166 05/26/2022    TRIG 73 05/26/2022    HDL 74 05/26/2022    LDL 77 05/26/2022      Results for orders placed in visit on 02/13/23    Adult Transthoracic Echo Complete w/ Color, Spectral and Contrast if necessary per protocol    Interpretation Summary  •  Left ventricular systolic function is normal. Left ventricular ejection fraction appears to be 61 - 65%.  •  Left ventricular diastolic function is consistent with (grade I) impaired relaxation.  •  Left atrial volume is mildly increased.  •  Estimated right ventricular systolic pressure from tricuspid regurgitation is normal (<35 mmHg).  •  Mild dilation of the proximal aorta is present.     Assessment:          Diagnosis Plan   1. Chronic diastolic (congestive) heart failure (HCC)        2. Pulmonary hypertension (HCC)        3. Leg swelling        4. Coronary artery calcification        5. Mixed hyperlipidemia        6. Panlobular emphysema (HCC)        7. ADAL on CPAP        8. Chronic kidney disease (CKD) stage G3b/A3, moderately decreased glomerular filtration rate (GFR) between 30-44 mL/min/1.73 square meter and albuminuria creatinine ratio greater than 300 mg/g (C* (HCC)               Plan:       1. Chronic Diastolic Congestive Heart Failure - reviewed echo. Euvolemic on exam. GERMAN. Switch Lasix to PRN and follow up with PCP.   2. Pulmonary Hypertension - mild. Stable. Follows with pulmonary. Compliant with CPAP  3. Leg Swelling - prescribed compression stockings by Dr. Castillo. Notes minimal improvement.   4. Coronary Artery Calcification - noted on CT. On Aspirin and  Statin. Recent low risk stress. Former smoker.   5. Hyperlipidemia - On Crestor Goal LDL less than 70. Intolerant to Lipitor  6. COPD - chronic shortness of  Breath   7. Sleep Apnea - compliant with CPAP  8. BMI is >= 30 and <35. (Class 1 Obesity). The following options were offered after discussion;: weight loss educational material (shared in after visit summary), exercise counseling/recommendations and nutrition counseling/recommendations

## 2023-03-24 NOTE — PROGRESS NOTES
March 24, 2023    Hello, may I speak with Edie Hinton?    My name is Julie Mohs        I am  with [facility]  [address], [address]  [place] [address] [phone].    Before we get started may I verify your date of birth? 1945    I am calling to officially welcome you to our practice and ask about your recent visit. Is this a good time to talk? yes    Tell me about your visit with us. What things went well?  Pt stated that it went very well and was glad that he was able to set her up with stocking and testing right away        We're always looking for ways to make our patients' experiences even better. Do you have recommendations on ways we may improve?  no    Overall were you satisfied with your first visit to our practice? yes       I appreciate you taking the time to speak with me today. Is there anything else I can do for you? no      Thank you, and have a great day.

## 2023-04-04 ENCOUNTER — OFFICE VISIT (OUTPATIENT)
Dept: FAMILY MEDICINE CLINIC | Age: 78
End: 2023-04-04
Payer: MEDICARE

## 2023-04-04 VITALS
TEMPERATURE: 98.6 F | OXYGEN SATURATION: 93 % | HEART RATE: 67 BPM | SYSTOLIC BLOOD PRESSURE: 134 MMHG | BODY MASS INDEX: 32.07 KG/M2 | DIASTOLIC BLOOD PRESSURE: 72 MMHG | WEIGHT: 214 LBS

## 2023-04-04 DIAGNOSIS — S16.1XXA STRAIN OF NECK MUSCLE, INITIAL ENCOUNTER: Primary | ICD-10-CM

## 2023-04-04 DIAGNOSIS — I50.32 CHRONIC DIASTOLIC HEART FAILURE (HCC): ICD-10-CM

## 2023-04-04 DIAGNOSIS — N18.32 STAGE 3B CHRONIC KIDNEY DISEASE (HCC): ICD-10-CM

## 2023-04-04 PROCEDURE — 99213 OFFICE O/P EST LOW 20 MIN: CPT | Performed by: NURSE PRACTITIONER

## 2023-04-04 PROCEDURE — G8400 PT W/DXA NO RESULTS DOC: HCPCS | Performed by: NURSE PRACTITIONER

## 2023-04-04 PROCEDURE — G8427 DOCREV CUR MEDS BY ELIG CLIN: HCPCS | Performed by: NURSE PRACTITIONER

## 2023-04-04 PROCEDURE — 1036F TOBACCO NON-USER: CPT | Performed by: NURSE PRACTITIONER

## 2023-04-04 PROCEDURE — 3075F SYST BP GE 130 - 139MM HG: CPT | Performed by: NURSE PRACTITIONER

## 2023-04-04 PROCEDURE — 1123F ACP DISCUSS/DSCN MKR DOCD: CPT | Performed by: NURSE PRACTITIONER

## 2023-04-04 PROCEDURE — 3078F DIAST BP <80 MM HG: CPT | Performed by: NURSE PRACTITIONER

## 2023-04-04 PROCEDURE — 1090F PRES/ABSN URINE INCON ASSESS: CPT | Performed by: NURSE PRACTITIONER

## 2023-04-04 PROCEDURE — G8417 CALC BMI ABV UP PARAM F/U: HCPCS | Performed by: NURSE PRACTITIONER

## 2023-04-04 ASSESSMENT — ENCOUNTER SYMPTOMS
SORE THROAT: 0
ABDOMINAL PAIN: 0
CONSTIPATION: 0
DIARRHEA: 0
SINUS PRESSURE: 0
SHORTNESS OF BREATH: 0
RHINORRHEA: 0
NAUSEA: 0
VOMITING: 0
TROUBLE SWALLOWING: 0
COUGH: 0

## 2023-04-04 NOTE — PROGRESS NOTES
Matti Vela (:  1945) is a 69 y.o. female,Established patient, here for evaluation of the following chief complaint(s):  Neck Pain (Neck and shoulder pain, swelling in area. \" Sometimes it hurts and sometimes it doesnt'' started around 1month ago. Area keeps getting larger. )      ASSESSMENT/PLAN:    ICD-10-CM    1. Strain of neck muscle, initial encounter  S16. 1XXA       2. Stage 3b chronic kidney disease ([facility] [address]  N18.32       3. Chronic diastolic heart failure (HCC)  I50.32           Return in about 8 weeks (around 2023), or if symptoms worsen or fail to improve, for AWV. SUBJECTIVE/OBJECTIVE:  HPI  Here for neck and shoulder pain  Onset 1 month  Patient states it doesn't hurt all the time. Denies any injury  It just hurts at times. Heart doctor advised to only take Lasix as needed due to CKD  Review of labs drawn 3/13/2023 creatinine was 1.6  I discussed she needs to lay off/decrease sodium in her diet  Continue daily weights    /72   Pulse 67   Temp 98.6 °F (37 °C) (Temporal)   Wt 214 lb (97.1 kg)   SpO2 93%   BMI 32.07 kg/m²     Review of Systems   Constitutional:  Negative for activity change, appetite change, fatigue, fever and unexpected weight change. HENT:  Negative for congestion, hearing loss, rhinorrhea, sinus pressure, sore throat and trouble swallowing. Eyes:  Negative for visual disturbance. Respiratory:  Negative for cough and shortness of breath. Cardiovascular:  Negative for chest pain, palpitations and leg swelling. Gastrointestinal:  Negative for abdominal pain, constipation, diarrhea, nausea and vomiting. Endocrine: Negative for cold intolerance and heat intolerance. Genitourinary:  Negative for flank pain, menstrual problem, pelvic pain, urgency and vaginal discharge. Musculoskeletal:  Positive for neck pain. Negative for arthralgias. Skin:  Negative for rash. Neurological:  Negative for headaches.    Psychiatric/Behavioral:  Negative

## 2023-04-04 NOTE — PATIENT INSTRUCTIONS
Decrease salt in diet, less more fresh or frozen and less processed foods. Only take the lasix as needed for 3lb weight gain overnight or 5 lbs in a week. Biofreeze muscle rubs as needed. Ice/heat to neck if bothersome.

## 2023-05-04 ENCOUNTER — OFFICE VISIT (OUTPATIENT)
Dept: ENT CLINIC | Age: 78
End: 2023-05-04
Payer: MEDICARE

## 2023-05-04 VITALS
WEIGHT: 213 LBS | DIASTOLIC BLOOD PRESSURE: 74 MMHG | SYSTOLIC BLOOD PRESSURE: 122 MMHG | BODY MASS INDEX: 31.55 KG/M2 | HEIGHT: 69 IN

## 2023-05-04 DIAGNOSIS — E04.2 MULTINODULAR GOITER (NONTOXIC): Primary | ICD-10-CM

## 2023-05-04 PROCEDURE — G8427 DOCREV CUR MEDS BY ELIG CLIN: HCPCS | Performed by: OTOLARYNGOLOGY

## 2023-05-04 PROCEDURE — G8417 CALC BMI ABV UP PARAM F/U: HCPCS | Performed by: OTOLARYNGOLOGY

## 2023-05-04 PROCEDURE — 99203 OFFICE O/P NEW LOW 30 MIN: CPT | Performed by: OTOLARYNGOLOGY

## 2023-05-04 PROCEDURE — G8400 PT W/DXA NO RESULTS DOC: HCPCS | Performed by: OTOLARYNGOLOGY

## 2023-05-04 PROCEDURE — 1036F TOBACCO NON-USER: CPT | Performed by: OTOLARYNGOLOGY

## 2023-05-04 PROCEDURE — 3078F DIAST BP <80 MM HG: CPT | Performed by: OTOLARYNGOLOGY

## 2023-05-04 PROCEDURE — 3074F SYST BP LT 130 MM HG: CPT | Performed by: OTOLARYNGOLOGY

## 2023-05-04 PROCEDURE — 1090F PRES/ABSN URINE INCON ASSESS: CPT | Performed by: OTOLARYNGOLOGY

## 2023-05-04 PROCEDURE — 1123F ACP DISCUSS/DSCN MKR DOCD: CPT | Performed by: OTOLARYNGOLOGY

## 2023-05-04 ASSESSMENT — ENCOUNTER SYMPTOMS
EYES NEGATIVE: 1
ALLERGIC/IMMUNOLOGIC NEGATIVE: 1
GASTROINTESTINAL NEGATIVE: 1
RESPIRATORY NEGATIVE: 1

## 2023-05-04 NOTE — PROGRESS NOTES
and regular rhythm. Pulmonary:      Effort: Pulmonary effort is normal.      Breath sounds: Normal breath sounds. Musculoskeletal:      Cervical back: Normal range of motion. Skin:     General: Skin is warm and dry. Neurological:      General: No focal deficit present. Mental Status: She is alert and oriented to person, place, and time. Psychiatric:         Mood and Affect: Mood normal.         Behavior: Behavior normal.            ASSESSMENT/PLAN:    1. Multinodular goiter (nontoxic)  -     US THYROID; Future  Given this was her first thyroid ultrasound I think is worthwhile to get a follow-up ultrasound in 6 months for planning of the biopsy. We do not know how long the nodules been there or what the rate of growth is. This 6-month ultrasound shows this and there is no significant change we may continue to watch this from the biopsy. She is not a prickly good surgical candidate. Return in about 6 months (around 11/4/2023). An electronic signature was used to authenticate this note. Castro oNlan MD       Please note that this chart was generated using dragon dictation software. Although every effort was made to ensure the accuracy of this automated transcription, some errors in transcription may have occurred.

## 2023-05-10 ENCOUNTER — TELEPHONE (OUTPATIENT)
Dept: VASCULAR SURGERY | Facility: CLINIC | Age: 78
End: 2023-05-10
Payer: MEDICARE

## 2023-05-10 NOTE — TELEPHONE ENCOUNTER
Spoke with pt reminding them of their appt on 5/11/23 along with prep time. Pt confirmed appt with me.

## 2023-05-11 ENCOUNTER — OFFICE VISIT (OUTPATIENT)
Dept: VASCULAR SURGERY | Facility: CLINIC | Age: 78
End: 2023-05-11

## 2023-05-11 ENCOUNTER — HOSPITAL ENCOUNTER (OUTPATIENT)
Dept: ULTRASOUND IMAGING | Facility: HOSPITAL | Age: 78
Discharge: HOME | End: 2023-05-11
Payer: MEDICARE

## 2023-05-11 VITALS
DIASTOLIC BLOOD PRESSURE: 68 MMHG | HEART RATE: 62 BPM | WEIGHT: 210 LBS | SYSTOLIC BLOOD PRESSURE: 116 MMHG | BODY MASS INDEX: 31.93 KG/M2 | OXYGEN SATURATION: 97 %

## 2023-05-11 DIAGNOSIS — I87.322 CHRONIC VENOUS HYPERTENSION WITH INFLAMMATION INVOLVING LEFT SIDE: Primary | ICD-10-CM

## 2023-05-11 DIAGNOSIS — I87.323 CHRONIC VENOUS HYPERTENSION WITH INFLAMMATION INVOLVING BOTH SIDES: ICD-10-CM

## 2023-05-11 DIAGNOSIS — I86.8 VARICOSE VEINS OF OTHER SPECIFIED SITES: ICD-10-CM

## 2023-05-11 DIAGNOSIS — Z01.818 PREOP TESTING: ICD-10-CM

## 2023-05-11 PROCEDURE — 93970 EXTREMITY STUDY: CPT

## 2023-05-11 NOTE — PROGRESS NOTES
05/11/2023     Johnnie Lawson APRN  [address]  [place] [address]      Edie Hinton  1945    Chief Complaint   Patient presents with    Follow-up     3 week follow up with testing for a VVI. Pt was last seen in office on 3/21/23. Pt denies any changes over the last couple weeks.        Dear Johnnie Lawson APRN   HPI  I had the pleasure of seeing your patient Edie Hinton in the office today.    As you recall, Edie Hinton is a 77 y.o.  female who you are currently following for routine health maintenance.  She reports her legs swelled significantly and legs, left greater than right, and are sore to touch.  She had a venous duplex that was negative.  She also had HUBERT which were normal. She is maintained on aspirin and Lipitor. Her main complaint is burning and stinging to her left leg.  She has a history of two previous back surgeries. She does ambulate minimally with a walker.        Review of Systems   Constitutional: Negative.    HENT: Negative.     Eyes: Negative.    Respiratory:  Positive for shortness of breath.    Cardiovascular:  Positive for leg swelling.   Gastrointestinal: Negative.    Endocrine: Negative.    Genitourinary: Negative.    Musculoskeletal:  Positive for arthralgias, back pain and gait problem.   Skin: Negative.    Allergic/Immunologic: Negative.    Hematological: Negative.    Psychiatric/Behavioral: Negative.     All other systems reviewed and are negative.  /68   Pulse 62   Wt 95.3 kg (210 lb)   SpO2 97%   BMI 31.93 kg/m²     Physical Exam  Vitals and nursing note reviewed.   Constitutional:       Appearance: Normal appearance. She is well-developed. She is obese.   HENT:      Head: Normocephalic and atraumatic.   Eyes:      General: No scleral icterus.     Pupils: Pupils are equal, round, and reactive to light.   Neck:      Thyroid: No thyromegaly.      Vascular: No carotid bruit or JVD.   Cardiovascular:      Rate and Rhythm: Normal rate and  regular rhythm.      Pulses:           Carotid pulses are 2+ on the right side and 2+ on the left side.       Femoral pulses are 2+ on the right side and 2+ on the left side.       Popliteal pulses are 2+ on the right side and 2+ on the left side.        Dorsalis pedis pulses are 2+ on the right side and 2+ on the left side.        Posterior tibial pulses are 2+ on the right side and 2+ on the left side.      Heart sounds: Normal heart sounds.      Comments: Varicose veins to bilateral lower extremities  Pulmonary:      Effort: Pulmonary effort is normal.      Breath sounds: Normal breath sounds.   Abdominal:      General: Bowel sounds are normal. There is no distension or abdominal bruit.      Palpations: Abdomen is soft. There is no mass.      Tenderness: There is no abdominal tenderness.   Musculoskeletal:         General: Normal range of motion.      Cervical back: Neck supple.      Right lower leg: Edema present.      Left lower leg: Edema present.   Lymphadenopathy:      Cervical: No cervical adenopathy.   Skin:     General: Skin is warm and dry.      Comments: hyperpigmentation   Neurological:      Mental Status: She is alert and oriented to person, place, and time.      Cranial Nerves: No cranial nerve deficit.      Sensory: No sensory deficit.   Psychiatric:         Mood and Affect: Mood normal.         Behavior: Behavior normal.         Thought Content: Thought content normal.         Judgment: Judgment normal.     Diagnostic Data:          Patient Active Problem List   Diagnosis    ADAL on CPAP    VILLA (dyspnea on exertion)    Panlobular emphysema    Chronic diastolic (congestive) heart failure    Chronic kidney disease (CKD) stage G3b/A3, moderately decreased glomerular filtration rate (GFR) between 30-44 mL/min/1.73 square meter and albuminuria creatinine ratio greater than 300 mg/g (C*        Diagnosis Plan   1. Chronic venous hypertension with inflammation involving left side  CBC and Differential     Basic metabolic panel    APTT    Protime-INR    Case Request    clindamycin (CLEOCIN) 900 mg in dextrose (D5W) 5 % 100 mL IVPB      2. Preop testing  CBC and Differential    Basic metabolic panel    APTT    Protime-INR    Case Request    clindamycin (CLEOCIN) 900 mg in dextrose (D5W) 5 % 100 mL IVPB      3. Varicose veins of other specified sites  APTT    Protime-INR          Plan: After thoroughly evaluating Edie Hinton, I believe the best course of action is to proceed with left lower extremity radiofrequency ablation.  I did review her testing which shows no venous insufficiency to the right lower extremity but does have noted reflux to her left lower extremity.  Risks of radiofrequency ablation include, but are not limited to, bleeding, infection, vessel damage, nerve damage, DVT, phlebitis, and pulmonary embolus.  The patient understands these risks and wishes to proceed with procedure.  I would like her to continue wearing compression stockings on a daily basis and keep her legs elevated when she is not on them.  The patient is to continue taking their medications as previously discussed.   This was all discussed in full with complete understanding.  Thank you for allowing me to participate in the care of your patient.  Please do not hesitate to call with any questions or concerns.  We will keep you aware of any further encounters with Edie Hinton.        Sincerely yours,         MAURICE Andre, MAURICE Muro

## 2023-05-11 NOTE — LETTER
May 29, 2023       No Recipients    Patient: Edie Hinton   YOB: 1945   Date of Visit: 5/11/2023       Dear Dr. Irizarry Recipients:    Thank you for referring Edie Hinton to me for evaluation. Below are the relevant portions of my assessment and plan of care.    If you have questions, please do not hesitate to call me. I look forward to following Edie along with you.         Sincerely,        MAURICE Andre        CC:   No Recipients    Janeen Alva APRN  05/29/23 2119  Sign when Signing Visit  05/11/2023     Johnnie Lawson APRN  [address]  [place] [address]      Edie Hinton  1945    Chief Complaint   Patient presents with   • Follow-up     3 week follow up with testing for a VVI. Pt was last seen in office on 3/21/23. Pt denies any changes over the last couple weeks.        Dear Johnnie Lawson APRN   HPI  I had the pleasure of seeing your patient Edie Hinton in the office today.    As you recall, Edie Hinton is a 77 y.o.  female who you are currently following for routine health maintenance.  She reports her legs swelled significantly and legs, left greater than right, and are sore to touch.  She had a venous duplex that was negative.  She also had HUBERT which were normal. She is maintained on aspirin and Lipitor. Her main complaint is burning and stinging to her left leg.  She has a history of two previous back surgeries. She does ambulate minimally with a walker.        Review of Systems   Constitutional: Negative.    HENT: Negative.     Eyes: Negative.    Respiratory:  Positive for shortness of breath.    Cardiovascular:  Positive for leg swelling.   Gastrointestinal: Negative.    Endocrine: Negative.    Genitourinary: Negative.    Musculoskeletal:  Positive for arthralgias, back pain and gait problem.   Skin: Negative.    Allergic/Immunologic: Negative.    Hematological: Negative.    Psychiatric/Behavioral: Negative.     All other systems  reviewed and are negative.  /68   Pulse 62   Wt 95.3 kg (210 lb)   SpO2 97%   BMI 31.93 kg/m²     Physical Exam  Vitals and nursing note reviewed.   Constitutional:       Appearance: Normal appearance. She is well-developed. She is obese.   HENT:      Head: Normocephalic and atraumatic.   Eyes:      General: No scleral icterus.     Pupils: Pupils are equal, round, and reactive to light.   Neck:      Thyroid: No thyromegaly.      Vascular: No carotid bruit or JVD.   Cardiovascular:      Rate and Rhythm: Normal rate and regular rhythm.      Pulses:           Carotid pulses are 2+ on the right side and 2+ on the left side.       Femoral pulses are 2+ on the right side and 2+ on the left side.       Popliteal pulses are 2+ on the right side and 2+ on the left side.        Dorsalis pedis pulses are 2+ on the right side and 2+ on the left side.        Posterior tibial pulses are 2+ on the right side and 2+ on the left side.      Heart sounds: Normal heart sounds.      Comments: Varicose veins to bilateral lower extremities  Pulmonary:      Effort: Pulmonary effort is normal.      Breath sounds: Normal breath sounds.   Abdominal:      General: Bowel sounds are normal. There is no distension or abdominal bruit.      Palpations: Abdomen is soft. There is no mass.      Tenderness: There is no abdominal tenderness.   Musculoskeletal:         General: Normal range of motion.      Cervical back: Neck supple.      Right lower leg: Edema present.      Left lower leg: Edema present.   Lymphadenopathy:      Cervical: No cervical adenopathy.   Skin:     General: Skin is warm and dry.      Comments: hyperpigmentation   Neurological:      Mental Status: She is alert and oriented to person, place, and time.      Cranial Nerves: No cranial nerve deficit.      Sensory: No sensory deficit.   Psychiatric:         Mood and Affect: Mood normal.         Behavior: Behavior normal.         Thought Content: Thought content normal.          Judgment: Judgment normal.     Diagnostic Data:          Patient Active Problem List   Diagnosis   • ADAL on CPAP   • VILLA (dyspnea on exertion)   • Panlobular emphysema   • Chronic diastolic (congestive) heart failure   • Chronic kidney disease (CKD) stage G3b/A3, moderately decreased glomerular filtration rate (GFR) between 30-44 mL/min/1.73 square meter and albuminuria creatinine ratio greater than 300 mg/g (C*        Diagnosis Plan   1. Chronic venous hypertension with inflammation involving left side  CBC and Differential    Basic metabolic panel    APTT    Protime-INR    Case Request    clindamycin (CLEOCIN) 900 mg in dextrose (D5W) 5 % 100 mL IVPB      2. Preop testing  CBC and Differential    Basic metabolic panel    APTT    Protime-INR    Case Request    clindamycin (CLEOCIN) 900 mg in dextrose (D5W) 5 % 100 mL IVPB      3. Varicose veins of other specified sites  APTT    Protime-INR          Plan: After thoroughly evaluating Edie Hinton, I believe the best course of action is to proceed with left lower extremity radiofrequency ablation.  I did review her testing which shows no venous insufficiency to the right lower extremity but does have noted reflux to her left lower extremity.  Risks of radiofrequency ablation include, but are not limited to, bleeding, infection, vessel damage, nerve damage, DVT, phlebitis, and pulmonary embolus.  The patient understands these risks and wishes to proceed with procedure.  I would like her to continue wearing compression stockings on a daily basis and keep her legs elevated when she is not on them.  The patient is to continue taking their medications as previously discussed.   This was all discussed in full with complete understanding.  Thank you for allowing me to participate in the care of your patient.  Please do not hesitate to call with any questions or concerns.  We will keep you aware of any further encounters with Edie Hinton.        Sincerely yours,          Janeen Alva, MAURICE Lawson, MAURICE Muro

## 2023-05-12 DIAGNOSIS — Z87.39 HISTORY OF CHRONIC BACK PAIN: ICD-10-CM

## 2023-05-12 DIAGNOSIS — M79.604 BILATERAL LEG PAIN: ICD-10-CM

## 2023-05-12 DIAGNOSIS — M79.605 BILATERAL LEG PAIN: ICD-10-CM

## 2023-05-12 RX ORDER — GABAPENTIN 100 MG/1
CAPSULE ORAL
Qty: 60 CAPSULE | Refills: 0 | Status: SHIPPED | OUTPATIENT
Start: 2023-05-12 | End: 2023-07-11

## 2023-05-30 ENCOUNTER — TELEPHONE (OUTPATIENT)
Dept: VASCULAR SURGERY | Facility: CLINIC | Age: 78
End: 2023-05-30

## 2023-05-30 PROBLEM — Z01.818 PREOP TESTING: Status: ACTIVE | Noted: 2023-05-30

## 2023-05-30 PROBLEM — I87.322 CHRONIC VENOUS HYPERTENSION WITH INFLAMMATION INVOLVING LEFT SIDE: Status: ACTIVE | Noted: 2023-05-30

## 2023-05-30 NOTE — TELEPHONE ENCOUNTER
Pt expressed understanding of prework/surgery time and instruction for procedure scheduled 07/05/23 with Dr. Castillo.  NPO after midnight.

## 2023-05-31 ENCOUNTER — OFFICE VISIT (OUTPATIENT)
Dept: FAMILY MEDICINE CLINIC | Age: 78
End: 2023-05-31
Payer: MEDICARE

## 2023-05-31 VITALS
TEMPERATURE: 97.2 F | SYSTOLIC BLOOD PRESSURE: 126 MMHG | HEART RATE: 68 BPM | OXYGEN SATURATION: 94 % | DIASTOLIC BLOOD PRESSURE: 78 MMHG | WEIGHT: 211 LBS | HEIGHT: 69 IN | BODY MASS INDEX: 31.25 KG/M2

## 2023-05-31 DIAGNOSIS — I10 ESSENTIAL HYPERTENSION: ICD-10-CM

## 2023-05-31 DIAGNOSIS — K21.9 GASTROESOPHAGEAL REFLUX DISEASE, UNSPECIFIED WHETHER ESOPHAGITIS PRESENT: ICD-10-CM

## 2023-05-31 DIAGNOSIS — E78.2 MIXED HYPERLIPIDEMIA: Chronic | ICD-10-CM

## 2023-05-31 DIAGNOSIS — Z00.00 MEDICARE ANNUAL WELLNESS VISIT, SUBSEQUENT: Primary | ICD-10-CM

## 2023-05-31 DIAGNOSIS — Z13.820 OSTEOPOROSIS SCREENING: ICD-10-CM

## 2023-05-31 DIAGNOSIS — Z78.0 MENOPAUSE: ICD-10-CM

## 2023-05-31 DIAGNOSIS — Z12.31 ENCOUNTER FOR SCREENING MAMMOGRAM FOR MALIGNANT NEOPLASM OF BREAST: ICD-10-CM

## 2023-05-31 DIAGNOSIS — N18.30 STAGE 3 CHRONIC KIDNEY DISEASE, UNSPECIFIED WHETHER STAGE 3A OR 3B CKD (HCC): ICD-10-CM

## 2023-05-31 DIAGNOSIS — E55.9 VITAMIN D DEFICIENCY: ICD-10-CM

## 2023-05-31 DIAGNOSIS — J44.9 CHRONIC OBSTRUCTIVE PULMONARY DISEASE, UNSPECIFIED COPD TYPE (HCC): ICD-10-CM

## 2023-05-31 PROCEDURE — 3023F SPIROM DOC REV: CPT | Performed by: NURSE PRACTITIONER

## 2023-05-31 PROCEDURE — G8427 DOCREV CUR MEDS BY ELIG CLIN: HCPCS | Performed by: NURSE PRACTITIONER

## 2023-05-31 PROCEDURE — G8417 CALC BMI ABV UP PARAM F/U: HCPCS | Performed by: NURSE PRACTITIONER

## 2023-05-31 PROCEDURE — 3074F SYST BP LT 130 MM HG: CPT | Performed by: NURSE PRACTITIONER

## 2023-05-31 PROCEDURE — 3078F DIAST BP <80 MM HG: CPT | Performed by: NURSE PRACTITIONER

## 2023-05-31 PROCEDURE — 99213 OFFICE O/P EST LOW 20 MIN: CPT | Performed by: NURSE PRACTITIONER

## 2023-05-31 PROCEDURE — G0439 PPPS, SUBSEQ VISIT: HCPCS | Performed by: NURSE PRACTITIONER

## 2023-05-31 PROCEDURE — G8400 PT W/DXA NO RESULTS DOC: HCPCS | Performed by: NURSE PRACTITIONER

## 2023-05-31 PROCEDURE — 1090F PRES/ABSN URINE INCON ASSESS: CPT | Performed by: NURSE PRACTITIONER

## 2023-05-31 PROCEDURE — 1123F ACP DISCUSS/DSCN MKR DOCD: CPT | Performed by: NURSE PRACTITIONER

## 2023-05-31 PROCEDURE — 1036F TOBACCO NON-USER: CPT | Performed by: NURSE PRACTITIONER

## 2023-05-31 RX ORDER — PANTOPRAZOLE SODIUM 40 MG/1
40 TABLET, DELAYED RELEASE ORAL
Qty: 90 TABLET | Refills: 3 | Status: SHIPPED | OUTPATIENT
Start: 2023-05-31

## 2023-05-31 RX ORDER — METOPROLOL TARTRATE 100 MG/1
50 TABLET ORAL 2 TIMES DAILY
Qty: 90 TABLET | Refills: 3 | Status: SHIPPED | OUTPATIENT
Start: 2023-05-31 | End: 2023-08-29

## 2023-05-31 RX ORDER — ERGOCALCIFEROL 1.25 MG/1
50000 CAPSULE ORAL WEEKLY
Qty: 12 CAPSULE | Refills: 3 | Status: SHIPPED | OUTPATIENT
Start: 2023-05-31

## 2023-05-31 RX ORDER — AMLODIPINE BESYLATE 5 MG/1
5 TABLET ORAL DAILY
Qty: 90 TABLET | Refills: 3 | Status: SHIPPED | OUTPATIENT
Start: 2023-05-31

## 2023-05-31 ASSESSMENT — PATIENT HEALTH QUESTIONNAIRE - PHQ9
5. POOR APPETITE OR OVEREATING: 0
SUM OF ALL RESPONSES TO PHQ QUESTIONS 1-9: 0
9. THOUGHTS THAT YOU WOULD BE BETTER OFF DEAD, OR OF HURTING YOURSELF: 0
SUM OF ALL RESPONSES TO PHQ QUESTIONS 1-9: 0
7. TROUBLE CONCENTRATING ON THINGS, SUCH AS READING THE NEWSPAPER OR WATCHING TELEVISION: 0
6. FEELING BAD ABOUT YOURSELF - OR THAT YOU ARE A FAILURE OR HAVE LET YOURSELF OR YOUR FAMILY DOWN: 0
10. IF YOU CHECKED OFF ANY PROBLEMS, HOW DIFFICULT HAVE THESE PROBLEMS MADE IT FOR YOU TO DO YOUR WORK, TAKE CARE OF THINGS AT HOME, OR GET ALONG WITH OTHER PEOPLE: 0
2. FEELING DOWN, DEPRESSED OR HOPELESS: 0
1. LITTLE INTEREST OR PLEASURE IN DOING THINGS: 0
SUM OF ALL RESPONSES TO PHQ QUESTIONS 1-9: 0
SUM OF ALL RESPONSES TO PHQ9 QUESTIONS 1 & 2: 0
3. TROUBLE FALLING OR STAYING ASLEEP: 0
8. MOVING OR SPEAKING SO SLOWLY THAT OTHER PEOPLE COULD HAVE NOTICED. OR THE OPPOSITE, BEING SO FIGETY OR RESTLESS THAT YOU HAVE BEEN MOVING AROUND A LOT MORE THAN USUAL: 0
4. FEELING TIRED OR HAVING LITTLE ENERGY: 0
SUM OF ALL RESPONSES TO PHQ QUESTIONS 1-9: 0

## 2023-05-31 ASSESSMENT — LIFESTYLE VARIABLES
HAS A RELATIVE, FRIEND, DOCTOR, OR ANOTHER HEALTH PROFESSIONAL EXPRESSED CONCERN ABOUT YOUR DRINKING OR SUGGESTED YOU CUT DOWN: 0
HOW MANY STANDARD DRINKS CONTAINING ALCOHOL DO YOU HAVE ON A TYPICAL DAY: 1 OR 2
HOW OFTEN DO YOU HAVE A DRINK CONTAINING ALCOHOL: 4 OR MORE TIMES A WEEK
HOW OFTEN DURING THE LAST YEAR HAVE YOU BEEN UNABLE TO REMEMBER WHAT HAPPENED THE NIGHT BEFORE BECAUSE YOU HAD BEEN DRINKING: 0
HOW OFTEN DURING THE LAST YEAR HAVE YOU NEEDED AN ALCOHOLIC DRINK FIRST THING IN THE MORNING TO GET YOURSELF GOING AFTER A NIGHT OF HEAVY DRINKING: 0
HOW OFTEN DURING THE LAST YEAR HAVE YOU FOUND THAT YOU WERE NOT ABLE TO STOP DRINKING ONCE YOU HAD STARTED: 0
HOW OFTEN DURING THE LAST YEAR HAVE YOU FAILED TO DO WHAT WAS NORMALLY EXPECTED FROM YOU BECAUSE OF DRINKING: 0
HOW OFTEN DURING THE LAST YEAR HAVE YOU HAD A FEELING OF GUILT OR REMORSE AFTER DRINKING: 0
HAVE YOU OR SOMEONE ELSE BEEN INJURED AS A RESULT OF YOUR DRINKING: 0

## 2023-05-31 NOTE — PROGRESS NOTES
Medicare Annual Wellness Visit    Maria Elena Nava is here for Medicare AWV (C/o dizziness. Started 2 weeks ago. )    Assessment & Plan   Medicare annual wellness visit, subsequent  Stage 3 chronic kidney disease, unspecified whether stage 3a or 3b CKD ([facility]  -     CBC with Auto Differential; Future  -     Comprehensive Metabolic Panel; Future  -     Lipid Panel; Future  -     TSH; Future  -     T4, Free; Future  -     Microalbumin / Creatinine Urine Ratio; Future  Chronic obstructive pulmonary disease, unspecified COPD type ([facility]  -     CBC with Auto Differential; Future  -     Comprehensive Metabolic Panel; Future  -     Lipid Panel; Future  -     TSH; Future  -     T4, Free; Future  -     Microalbumin / Creatinine Urine Ratio; Future  Mixed hyperlipidemia  -     CBC with Auto Differential; Future  -     Comprehensive Metabolic Panel; Future  -     Lipid Panel; Future  -     TSH; Future  -     T4, Free; Future  -     Microalbumin / Creatinine Urine Ratio; Future  Essential hypertension  -     CBC with Auto Differential; Future  -     Comprehensive Metabolic Panel; Future  -     Lipid Panel; Future  -     TSH; Future  -     T4, Free; Future  -     Microalbumin / Creatinine Urine Ratio; Future  -     amLODIPine (NORVASC) 5 MG tablet; Take 1 tablet by mouth daily, Disp-90 tablet, R-3Normal  -     metoprolol (LOPRESSOR) 100 MG tablet; Take 0.5 tablets by mouth 2 times daily, Disp-90 tablet, R-3Normal  Encounter for screening mammogram for malignant neoplasm of breast  -     [facility] DIGITAL SCREEN W OR WO CAD BILATERAL; Future  Menopause  -     DEXA BONE DENSITY AXIAL SKELETON; Future  Osteoporosis screening  -     DEXA BONE DENSITY AXIAL SKELETON; Future  Vitamin D deficiency  -     Vitamin D 25 Hydroxy; Future  -     vitamin D (ERGOCALCIFEROL) 1.25 MG (65294 UT) CAPS capsule;  Take 1 capsule by mouth once a week, Disp-12 capsule, R-3Normal  Gastroesophageal reflux disease, unspecified whether esophagitis present  -

## 2023-06-02 ENCOUNTER — TELEPHONE (OUTPATIENT)
Dept: FAMILY MEDICINE CLINIC | Age: 78
End: 2023-06-02

## 2023-06-02 NOTE — TELEPHONE ENCOUNTER
Pt called stating that she has there mammogram and bone density scheduled at [facility] on June 12th, She just needs the orders faxed over for them. Faxed over for pt.

## 2023-06-05 DIAGNOSIS — J44.9 CHRONIC OBSTRUCTIVE PULMONARY DISEASE, UNSPECIFIED COPD TYPE (HCC): ICD-10-CM

## 2023-06-05 DIAGNOSIS — I10 ESSENTIAL HYPERTENSION: ICD-10-CM

## 2023-06-05 DIAGNOSIS — N18.30 STAGE 3 CHRONIC KIDNEY DISEASE, UNSPECIFIED WHETHER STAGE 3A OR 3B CKD (HCC): ICD-10-CM

## 2023-06-05 DIAGNOSIS — E55.9 VITAMIN D DEFICIENCY: ICD-10-CM

## 2023-06-05 DIAGNOSIS — E78.2 MIXED HYPERLIPIDEMIA: Chronic | ICD-10-CM

## 2023-06-05 LAB
25(OH)D3 SERPL-MCNC: 93.4 NG/ML
ALBUMIN SERPL-MCNC: 3.8 G/DL (ref 3.5–5.2)
ALP SERPL-CCNC: 116 U/L (ref 35–104)
ALT SERPL-CCNC: 18 U/L (ref 5–33)
ANION GAP SERPL CALCULATED.3IONS-SCNC: 15 MMOL/L (ref 7–19)
AST SERPL-CCNC: 20 U/L (ref 5–32)
BASOPHILS # BLD: 0 K/UL (ref 0–0.2)
BASOPHILS NFR BLD: 0.3 % (ref 0–1)
BILIRUB SERPL-MCNC: 0.4 MG/DL (ref 0.2–1.2)
BUN SERPL-MCNC: 16 MG/DL (ref 8–23)
CALCIUM SERPL-MCNC: 10.1 MG/DL (ref 8.8–10.2)
CHLORIDE SERPL-SCNC: 108 MMOL/L (ref 98–111)
CHOLEST SERPL-MCNC: 151 MG/DL (ref 160–199)
CO2 SERPL-SCNC: 22 MMOL/L (ref 22–29)
CREAT SERPL-MCNC: 1.1 MG/DL (ref 0.5–0.9)
CREAT UR-MCNC: 33.3 MG/DL (ref 4.2–622)
EOSINOPHIL # BLD: 0.1 K/UL (ref 0–0.6)
EOSINOPHIL NFR BLD: 1.9 % (ref 0–5)
ERYTHROCYTE [DISTWIDTH] IN BLOOD BY AUTOMATED COUNT: 16 % (ref 11.5–14.5)
GLUCOSE SERPL-MCNC: 100 MG/DL (ref 74–109)
HCT VFR BLD AUTO: 38 % (ref 37–47)
HDLC SERPL-MCNC: 85 MG/DL (ref 65–121)
HGB BLD-MCNC: 11.9 G/DL (ref 12–16)
IMM GRANULOCYTES # BLD: 0 K/UL
LDLC SERPL CALC-MCNC: 47 MG/DL
LYMPHOCYTES # BLD: 1.6 K/UL (ref 1.1–4.5)
LYMPHOCYTES NFR BLD: 27.6 % (ref 20–40)
MCH RBC QN AUTO: 33.1 PG (ref 27–31)
MCHC RBC AUTO-ENTMCNC: 31.3 G/DL (ref 33–37)
MCV RBC AUTO: 105.8 FL (ref 81–99)
MICROALBUMIN UR-MCNC: 3.3 MG/DL (ref 0–19)
MICROALBUMIN/CREAT UR-RTO: 99.1 MG/G
MONOCYTES # BLD: 0.6 K/UL (ref 0–0.9)
MONOCYTES NFR BLD: 10.2 % (ref 0–10)
NEUTROPHILS # BLD: 3.4 K/UL (ref 1.5–7.5)
NEUTS SEG NFR BLD: 59.7 % (ref 50–65)
PLATELET # BLD AUTO: 224 K/UL (ref 130–400)
PMV BLD AUTO: 10.2 FL (ref 9.4–12.3)
POTASSIUM SERPL-SCNC: 4 MMOL/L (ref 3.5–5)
PROT SERPL-MCNC: 6.8 G/DL (ref 6.6–8.7)
RBC # BLD AUTO: 3.59 M/UL (ref 4.2–5.4)
SODIUM SERPL-SCNC: 145 MMOL/L (ref 136–145)
T4 FREE SERPL-MCNC: 1.12 NG/DL (ref 0.93–1.7)
TRIGL SERPL-MCNC: 97 MG/DL (ref 0–149)
TSH SERPL DL<=0.005 MIU/L-ACNC: 1.09 UIU/ML (ref 0.27–4.2)
WBC # BLD AUTO: 5.8 K/UL (ref 4.8–10.8)

## 2023-06-06 ENCOUNTER — TELEPHONE (OUTPATIENT)
Dept: FAMILY MEDICINE CLINIC | Age: 78
End: 2023-06-06

## 2023-06-06 NOTE — TELEPHONE ENCOUNTER
Called patient, spoke with: Patient regarding the results of the patients most recent labs. I advised Patient of Natacha Clay recommendations.    Patient did voice understanding

## 2023-06-06 NOTE — TELEPHONE ENCOUNTER
----- Message from GÉNESIS Jay sent at 6/5/2023 12:56 PM CDT -----  Please call patient and let them know results. Normal cholesterol  Metabolic panel is normal with stable improved kidney function  Normal blood counts  Normal vitamin D  Normal thyroid        No pathologic protein in urine.

## 2023-06-07 DIAGNOSIS — E78.2 MIXED HYPERLIPIDEMIA: ICD-10-CM

## 2023-06-07 RX ORDER — ATORVASTATIN CALCIUM 40 MG/1
40 TABLET, FILM COATED ORAL DAILY
Qty: 90 TABLET | Refills: 3 | OUTPATIENT
Start: 2023-06-07

## 2023-06-07 NOTE — TELEPHONE ENCOUNTER
Received fax from pharmacy requesting a 90 day fill on pts medication(s). Pt was last seen in office on 5/31/2023  and has a follow up scheduled for 11/30/2023. Will send request to  Pharmacy  for patient.      Requested Prescriptions     Signed Prescriptions Disp Refills    atorvastatin (LIPITOR) 40 MG tablet 90 tablet 3     Sig: Take 1 tablet by mouth daily     Authorizing Provider: Jennifer Hodges     Ordering User: Elaine Smith

## 2023-06-13 ENCOUNTER — TELEPHONE (OUTPATIENT)
Dept: VASCULAR SURGERY | Facility: CLINIC | Age: 78
End: 2023-06-13
Payer: MEDICARE

## 2023-06-13 DIAGNOSIS — Z13.820 OSTEOPOROSIS SCREENING: ICD-10-CM

## 2023-06-13 DIAGNOSIS — Z78.0 MENOPAUSE: ICD-10-CM

## 2023-06-13 NOTE — TELEPHONE ENCOUNTER
Pt expressed understanding of moved prework/surgery time and instruction for procedure scheduled 06/16/23 instead of 07/05/23 with Dr. Castillo.

## 2023-06-14 ENCOUNTER — PRE-ADMISSION TESTING (OUTPATIENT)
Dept: PREADMISSION TESTING | Facility: HOSPITAL | Age: 78
End: 2023-06-14
Payer: MEDICARE

## 2023-06-14 VITALS
HEIGHT: 68 IN | HEART RATE: 62 BPM | SYSTOLIC BLOOD PRESSURE: 164 MMHG | RESPIRATION RATE: 20 BRPM | OXYGEN SATURATION: 95 % | DIASTOLIC BLOOD PRESSURE: 72 MMHG | WEIGHT: 205.03 LBS | BODY MASS INDEX: 31.07 KG/M2

## 2023-06-14 DIAGNOSIS — Z01.818 PREOP TESTING: ICD-10-CM

## 2023-06-14 DIAGNOSIS — I86.8 VARICOSE VEINS OF OTHER SPECIFIED SITES: ICD-10-CM

## 2023-06-14 DIAGNOSIS — I87.322 CHRONIC VENOUS HYPERTENSION WITH INFLAMMATION INVOLVING LEFT SIDE: ICD-10-CM

## 2023-06-14 LAB
ANION GAP SERPL CALCULATED.3IONS-SCNC: 11 MMOL/L (ref 5–15)
APTT PPP: 33.9 SECONDS (ref 24.1–35)
BASOPHILS # BLD AUTO: 0.02 10*3/MM3 (ref 0–0.2)
BASOPHILS NFR BLD AUTO: 0.3 % (ref 0–1.5)
BUN SERPL-MCNC: 18 MG/DL (ref 8–23)
BUN/CREAT SERPL: 16.4 (ref 7–25)
CALCIUM SPEC-SCNC: 9.6 MG/DL (ref 8.6–10.5)
CHLORIDE SERPL-SCNC: 109 MMOL/L (ref 98–107)
CO2 SERPL-SCNC: 22 MMOL/L (ref 22–29)
CREAT SERPL-MCNC: 1.1 MG/DL (ref 0.57–1)
DEPRECATED RDW RBC AUTO: 58.1 FL (ref 37–54)
EGFRCR SERPLBLD CKD-EPI 2021: 52.2 ML/MIN/1.73
EOSINOPHIL # BLD AUTO: 0.1 10*3/MM3 (ref 0–0.4)
EOSINOPHIL NFR BLD AUTO: 1.5 % (ref 0.3–6.2)
ERYTHROCYTE [DISTWIDTH] IN BLOOD BY AUTOMATED COUNT: 15.2 % (ref 12.3–15.4)
GLUCOSE SERPL-MCNC: 90 MG/DL (ref 65–99)
HCT VFR BLD AUTO: 36.6 % (ref 34–46.6)
HGB BLD-MCNC: 11.5 G/DL (ref 12–15.9)
IMM GRANULOCYTES # BLD AUTO: 0.03 10*3/MM3 (ref 0–0.05)
IMM GRANULOCYTES NFR BLD AUTO: 0.4 % (ref 0–0.5)
INR PPP: 0.86 (ref 0.91–1.09)
LYMPHOCYTES # BLD AUTO: 2.03 10*3/MM3 (ref 0.7–3.1)
LYMPHOCYTES NFR BLD AUTO: 29.5 % (ref 19.6–45.3)
MCH RBC QN AUTO: 32.2 PG (ref 26.6–33)
MCHC RBC AUTO-ENTMCNC: 31.4 G/DL (ref 31.5–35.7)
MCV RBC AUTO: 102.5 FL (ref 79–97)
MONOCYTES # BLD AUTO: 0.74 10*3/MM3 (ref 0.1–0.9)
MONOCYTES NFR BLD AUTO: 10.8 % (ref 5–12)
NEUTROPHILS NFR BLD AUTO: 3.96 10*3/MM3 (ref 1.7–7)
NEUTROPHILS NFR BLD AUTO: 57.5 % (ref 42.7–76)
NRBC BLD AUTO-RTO: 0 /100 WBC (ref 0–0.2)
PLATELET # BLD AUTO: 215 10*3/MM3 (ref 140–450)
PMV BLD AUTO: 9.3 FL (ref 6–12)
POTASSIUM SERPL-SCNC: 4.3 MMOL/L (ref 3.5–5.2)
PROTHROMBIN TIME: 11.8 SECONDS (ref 11.8–14.8)
RBC # BLD AUTO: 3.57 10*6/MM3 (ref 3.77–5.28)
SODIUM SERPL-SCNC: 142 MMOL/L (ref 136–145)
WBC NRBC COR # BLD: 6.88 10*3/MM3 (ref 3.4–10.8)

## 2023-06-14 PROCEDURE — 36415 COLL VENOUS BLD VENIPUNCTURE: CPT

## 2023-06-14 PROCEDURE — 80048 BASIC METABOLIC PNL TOTAL CA: CPT

## 2023-06-14 PROCEDURE — 85610 PROTHROMBIN TIME: CPT

## 2023-06-14 PROCEDURE — 85730 THROMBOPLASTIN TIME PARTIAL: CPT

## 2023-06-14 PROCEDURE — 85025 COMPLETE CBC W/AUTO DIFF WBC: CPT

## 2023-06-14 NOTE — DISCHARGE INSTRUCTIONS
Before you come to the hospital        Arrival time: AS DIRECTED BY OFFICE     YOU MAY TAKE THE FOLLOWING MEDICATION(S) THE MORNING OF SURGERY WITH A SIP OF WATER: ***  Metoprolol, gabapentin    HOLD VALSARTAN FOR 24 HOURS PRIOR TO SURGERY           ALL OTHER HOME MEDICATION CHECK WITH YOUR PHYSICIAN (especially if   you are taking diabetes medicines or blood thinners)    Do not take any Erectile Dysfunction medications (EX: CIALIS, VIAGRA) 24 hours prior to surgery.      If you were given and instructed to use a germ- killing soap, use as directed the night before surgery and again the morning of surgery or as directed by your surgeon. (Use one-half of the bottle with each shower.)   See attached information for How to Use Chlorhexidine for Bathing if applicable.            Eating and drinking restrictions prior to scheduled arrival time    2 Hours before arrival time STOP   Drinking Clear liquids (water, black coffee-NO CREAM,  apple juice-no pulp)      8 Hours before arrival time STOP   All food, full liquids, and dairy products    (It is extremely important that you follow these guidelines to prevent delay or cancelation of your procedure)     Clear Liquids  Water and flavored water                                                                      Clear Fruit juices, such as cranberry juice and apple juice.  Black coffee (NO cream of any kind, including powdered).  Plain tea  Clear bouillon or broth.  Flavored gelatin.  Soda.  Gatorade or Powerade.  Full liquid examples  Juices that have pulp.  Frozen ice pops that contain fruit pieces.  Coffee with creamer  Milk.  Yogurt.                MANAGING PAIN AFTER SURGERY    We know you are probably wondering what your pain will be like after surgery.  Following surgery it is unrealistic to expect you will not have pain.   Pain is how our bodies let us know that something is wrong or cautions us to be careful.  That said, our goal is to make your pain  tolerable.    Methods we may use to treat your pain include (oral or IV medications, PCAs, epidurals, nerve blocks, etc.)   While some procedures require IV pain medications for a short time after surgery, transitioning to pain medications by mouth allows for better management of pain.   Your nurse will encourage you to take oral pain medications whenever possible.  IV medications work almost immediately, but only last a short while.  Taking medications by mouth allows for a more constant level of medication in your blood stream for a longer period of time.      Once your pain is out of control it is harder to get back under control.  It is important you are aware when your next dose of pain medication is due.  If you are admitted, your nurse may write the time of your next dose on the white board in your room to help you remember.      We are interested in your pain and encourage you to inform us about aggravating factors during your visit.   Many times a simple repositioning every few hours can make a big difference.    If your physician says it is okay, do not let your pain prevent you from getting out of bed. Be sure to call your nurse for assistance prior to getting up so you do not fall.      Before surgery, please decide your tolerable pain goal.  These faces help describe the pain ratings we use on a 0-10 scale.   Be prepared to tell us your goal and whether or not you take pain or anxiety medications at home.          Preparing for Surgery  Preparing for surgery is an important part of your care. It can make things go more smoothly and help you avoid complications. The steps leading up to surgery may vary among hospitals. Follow all instructions given to you by your health care providers. Ask questions if you do not understand something. Talk about any concerns that you have.  Here are some questions to consider asking before your surgery:  If my surgery is not an emergency (is elective), when would be the  best time to have the surgery?  What arrangements do I need to make for work, home, or school?  What will my recovery be like? How long will it be before I can return to normal activities?  Will I need to prepare my home? Will I need to arrange care for me or my children?  Should I expect to have pain after surgery? What are my pain management options? Are there nonmedical options that I can try for pain?  Tell a health care provider about:  Any allergies you have.  All medicines you are taking, including vitamins, herbs, eye drops, creams, and over-the-counter medicines.  Any problems you or family members have had with anesthetic medicines.  Any blood disorders you have.  Any surgeries you have had.  Any medical conditions you have.  Whether you are pregnant or may be pregnant.  What are the risks?  The risks and complications of surgery depend on the specific procedure that you have. Discuss all the risks with your health care providers before your surgery. Ask about common surgical complications, which may include:  Infection.  Bleeding or a need for blood replacement (transfusion).  Allergic reactions to medicines.  Damage to surrounding nerves, tissues, or structures.  A blood clot.  Scarring.  Failure of the surgery to correct the problem.  Follow these instructions before the procedure:  Several days or weeks before your procedure  You may have a physical exam by your primary health care provider to make sure it is safe for you to have surgery.  You may have testing. This may include a chest X-ray, blood and urine tests, electrocardiogram (ECG), or other testing.  Ask your health care provider about:  Changing or stopping your regular medicines. This is especially important if you are taking diabetes medicines or blood thinners.  Taking medicines such as aspirin and ibuprofen. These medicines can thin your blood. Do not take these medicines unless your health care provider tells you to take them.  Taking  over-the-counter medicines, vitamins, herbs, and supplements.  Do not use any products that contain nicotine or tobacco, such as cigarettes and e-cigarettes. If you need help quitting, ask your health care provider.  Avoid alcohol.  Ask your health care provider if there are exercises you can do to prepare for surgery.  Eat a healthy diet.   Plan to have someone 18 years of age or older to take you home from the hospital. We will need to verify your ride on the morning of surgery if you are being discharged home on the same day. Tell your ride to be expecting a call from the hospital prior to your procedure.   Plan to have a responsible adult care for you for at least 24 hours after you leave the hospital or clinic. This is important.  The day before your procedure  You may be given antibiotic medicine to take by mouth to help prevent infection. Take it as told by your health care provider.  You may be asked to shower with a germ-killing soap.  Follow instructions from your health care provider about eating and drinking restrictions. This includes gum, mints and hard candy.  Pack comfortable clothes according to your procedure.   The day of your procedure  You may need to take another shower with a germ-killing soap before you leave home in the morning.  With a small sip of water, take only the medicines that you are told to take.  Remove all jewelry including rings.   Leave anything you consider valuable at home except hearing aids if needed.  You do not need to bring your home medications into the hospital.   Do not wear any makeup, nail polish, powder, deodorant, lotion, hair accessories, or anything on your skin or body except your clothes.  If you will be staying in the hospital, bring a case to hold your glasses, contacts, or dentures. You may also want to bring your robe and non-skid footwear.       (Do not use denture adhesives since you will be asked to remove them during  surgery).   If you wear oxygen at  home, bring it with you the day of surgery.  If instructed by your health care provider, bring your sleep apnea device with you on the day of your surgery (if this applies to you).  You may want to leave your suitcase and sleep apnea device in the car until after surgery.   Arrive at the hospital as scheduled.  Bring a friend or family member with you who can help to answer questions and be present while you meet with your health care provider.  At the hospital  When you arrive at the hospital:  Go to registration located at the main entrance of the hospital. You will be registered and given a beeper and a sticker sheet. Take the stickers to the Outpatient nurses desk and place in the black tray. This is to notify staff that you have arrived. Then return to the lobby to wait.   When your beeper lights up and vibrates proceed through the double doors, under the stairs, and a member of the Outpatient Surgery staff will escort you to your preoperative room.  You may have to wear compression sleeves. These help to prevent blood clots and reduce swelling in your legs.  An IV may be inserted into one of your veins.              In the operating room, you may be given one or more of the following:        A medicine to help you relax (sedative).        A medicine to numb the area (local anesthetic).        A medicine to make you fall asleep (general anesthetic).        A medicine that is injected into an area of your body to numb everything below the                      injection site (regional anesthetic).  You may be given an antibiotic through your IV to help prevent infection.  Your surgical site will be marked or identified.    Contact a health care provider if you:  Develop a fever of more than 100.4°F (38°C) or other feelings of illness during the 48 hours before your surgery.  Have symptoms that get worse.  Have questions or concerns about your surgery.  Summary  Preparing for surgery can make the procedure go more  smoothly and lower your risk of complications.  Before surgery, make a list of questions and concerns to discuss with your surgeon. Ask about the risks and possible complications.  In the days or weeks before your surgery, follow all instructions from your health care provider. You may need to stop smoking, avoid alcohol, follow eating restrictions, and change or stop your regular medicines.  Contact your surgeon if you develop a fever or other signs of illness during the few days before your surgery.  This information is not intended to replace advice given to you by your health care provider. Make sure you discuss any questions you have with your health care provider.  Document Revised: 12/21/2018 Document Reviewed: 10/23/2018  [employer] Patient Education © 2021 [employer]

## 2023-06-15 ENCOUNTER — TELEPHONE (OUTPATIENT)
Dept: VASCULAR SURGERY | Facility: CLINIC | Age: 78
End: 2023-06-15
Payer: MEDICARE

## 2023-06-15 DIAGNOSIS — Z12.31 ENCOUNTER FOR SCREENING MAMMOGRAM FOR MALIGNANT NEOPLASM OF BREAST: ICD-10-CM

## 2023-06-15 NOTE — TELEPHONE ENCOUNTER
Pt expressed understanding of arrival time of 5 am for procedure scheduled with Dr. Castillo on 06/16/23.  Pt advised NPO after midnight. Pt only to take metoprolol and gabapentin in am before procedure.

## 2023-06-16 ENCOUNTER — HOSPITAL ENCOUNTER (OUTPATIENT)
Facility: HOSPITAL | Age: 78
Setting detail: HOSPITAL OUTPATIENT SURGERY
Discharge: HOME | End: 2023-06-16
Attending: SURGERY | Admitting: SURGERY
Payer: MEDICARE

## 2023-06-16 ENCOUNTER — ANESTHESIA (OUTPATIENT)
Dept: PERIOP | Facility: HOSPITAL | Age: 78
End: 2023-06-16
Payer: MEDICARE

## 2023-06-16 ENCOUNTER — ANESTHESIA EVENT (OUTPATIENT)
Dept: PERIOP | Facility: HOSPITAL | Age: 78
End: 2023-06-16
Payer: MEDICARE

## 2023-06-16 ENCOUNTER — APPOINTMENT (OUTPATIENT)
Dept: ULTRASOUND IMAGING | Facility: HOSPITAL | Age: 78
End: 2023-06-16
Payer: MEDICARE

## 2023-06-16 VITALS
DIASTOLIC BLOOD PRESSURE: 88 MMHG | HEART RATE: 63 BPM | OXYGEN SATURATION: 97 % | RESPIRATION RATE: 18 BRPM | TEMPERATURE: 98.7 F | SYSTOLIC BLOOD PRESSURE: 168 MMHG

## 2023-06-16 DIAGNOSIS — I87.322 CHRONIC VENOUS HYPERTENSION WITH INFLAMMATION INVOLVING LEFT SIDE: ICD-10-CM

## 2023-06-16 DIAGNOSIS — Z01.818 PREOP TESTING: ICD-10-CM

## 2023-06-16 PROCEDURE — 25010000002 FENTANYL CITRATE (PF) 50 MCG/ML SOLUTION: Performed by: NURSE ANESTHETIST, CERTIFIED REGISTERED

## 2023-06-16 PROCEDURE — 76937 US GUIDE VASCULAR ACCESS: CPT

## 2023-06-16 PROCEDURE — 25010000002 PROPOFOL 10 MG/ML EMULSION: Performed by: NURSE ANESTHETIST, CERTIFIED REGISTERED

## 2023-06-16 PROCEDURE — C1888 ENDOVAS NON-CARDIAC ABL CATH: HCPCS | Performed by: SURGERY

## 2023-06-16 PROCEDURE — 25010000002 ONDANSETRON PER 1 MG: Performed by: NURSE ANESTHETIST, CERTIFIED REGISTERED

## 2023-06-16 PROCEDURE — C1894 INTRO/SHEATH, NON-LASER: HCPCS | Performed by: SURGERY

## 2023-06-16 RX ORDER — HYDROCODONE BITARTRATE AND ACETAMINOPHEN 5; 325 MG/1; MG/1
1 TABLET ORAL ONCE AS NEEDED
Status: DISCONTINUED | OUTPATIENT
Start: 2023-06-16 | End: 2023-06-16 | Stop reason: HOSPADM

## 2023-06-16 RX ORDER — IBUPROFEN 600 MG/1
600 TABLET ORAL ONCE AS NEEDED
Status: DISCONTINUED | OUTPATIENT
Start: 2023-06-16 | End: 2023-06-16 | Stop reason: HOSPADM

## 2023-06-16 RX ORDER — HYDROCODONE BITARTRATE AND ACETAMINOPHEN 7.5; 325 MG/1; MG/1
1 TABLET ORAL EVERY 4 HOURS PRN
Status: DISCONTINUED | OUTPATIENT
Start: 2023-06-16 | End: 2023-06-16 | Stop reason: HOSPADM

## 2023-06-16 RX ORDER — ONDANSETRON 2 MG/ML
INJECTION INTRAMUSCULAR; INTRAVENOUS AS NEEDED
Status: DISCONTINUED | OUTPATIENT
Start: 2023-06-16 | End: 2023-06-16 | Stop reason: SURG

## 2023-06-16 RX ORDER — MIDAZOLAM HYDROCHLORIDE 1 MG/ML
0.5 INJECTION INTRAMUSCULAR; INTRAVENOUS
Status: DISCONTINUED | OUTPATIENT
Start: 2023-06-16 | End: 2023-06-16 | Stop reason: HOSPADM

## 2023-06-16 RX ORDER — DROPERIDOL 2.5 MG/ML
0.62 INJECTION, SOLUTION INTRAMUSCULAR; INTRAVENOUS ONCE AS NEEDED
Status: DISCONTINUED | OUTPATIENT
Start: 2023-06-16 | End: 2023-06-16 | Stop reason: HOSPADM

## 2023-06-16 RX ORDER — LABETALOL HYDROCHLORIDE 5 MG/ML
5 INJECTION, SOLUTION INTRAVENOUS
Status: DISCONTINUED | OUTPATIENT
Start: 2023-06-16 | End: 2023-06-16 | Stop reason: HOSPADM

## 2023-06-16 RX ORDER — SODIUM CHLORIDE 9 MG/ML
INJECTION, SOLUTION INTRAVENOUS AS NEEDED
Status: DISCONTINUED | OUTPATIENT
Start: 2023-06-16 | End: 2023-06-16 | Stop reason: HOSPADM

## 2023-06-16 RX ORDER — SODIUM CHLORIDE, SODIUM LACTATE, POTASSIUM CHLORIDE, CALCIUM CHLORIDE 600; 310; 30; 20 MG/100ML; MG/100ML; MG/100ML; MG/100ML
1000 INJECTION, SOLUTION INTRAVENOUS CONTINUOUS
Status: DISCONTINUED | OUTPATIENT
Start: 2023-06-16 | End: 2023-06-16 | Stop reason: HOSPADM

## 2023-06-16 RX ORDER — FENTANYL CITRATE 50 UG/ML
INJECTION, SOLUTION INTRAMUSCULAR; INTRAVENOUS AS NEEDED
Status: DISCONTINUED | OUTPATIENT
Start: 2023-06-16 | End: 2023-06-16 | Stop reason: SURG

## 2023-06-16 RX ORDER — TRAMADOL HYDROCHLORIDE 50 MG/1
50 TABLET ORAL EVERY 6 HOURS PRN
Qty: 10 TABLET | Refills: 0 | Status: SHIPPED | OUTPATIENT
Start: 2023-06-16

## 2023-06-16 RX ORDER — SODIUM CHLORIDE 0.9 % (FLUSH) 0.9 %
3-10 SYRINGE (ML) INJECTION AS NEEDED
Status: DISCONTINUED | OUTPATIENT
Start: 2023-06-16 | End: 2023-06-16 | Stop reason: HOSPADM

## 2023-06-16 RX ORDER — NALOXONE HCL 0.4 MG/ML
0.4 VIAL (ML) INJECTION AS NEEDED
Status: DISCONTINUED | OUTPATIENT
Start: 2023-06-16 | End: 2023-06-16 | Stop reason: HOSPADM

## 2023-06-16 RX ORDER — ONDANSETRON 2 MG/ML
4 INJECTION INTRAMUSCULAR; INTRAVENOUS ONCE AS NEEDED
Status: DISCONTINUED | OUTPATIENT
Start: 2023-06-16 | End: 2023-06-16 | Stop reason: HOSPADM

## 2023-06-16 RX ORDER — SODIUM CHLORIDE 9 MG/ML
40 INJECTION, SOLUTION INTRAVENOUS AS NEEDED
Status: DISCONTINUED | OUTPATIENT
Start: 2023-06-16 | End: 2023-06-16 | Stop reason: HOSPADM

## 2023-06-16 RX ORDER — CLINDAMYCIN PHOSPHATE 900 MG/50ML
900 INJECTION, SOLUTION INTRAVENOUS ONCE
Status: COMPLETED | OUTPATIENT
Start: 2023-06-16 | End: 2023-06-16

## 2023-06-16 RX ORDER — LIDOCAINE HYDROCHLORIDE 10 MG/ML
0.5 INJECTION, SOLUTION EPIDURAL; INFILTRATION; INTRACAUDAL; PERINEURAL ONCE AS NEEDED
Status: DISCONTINUED | OUTPATIENT
Start: 2023-06-16 | End: 2023-06-16 | Stop reason: HOSPADM

## 2023-06-16 RX ORDER — FENTANYL CITRATE 50 UG/ML
25 INJECTION, SOLUTION INTRAMUSCULAR; INTRAVENOUS
Status: DISCONTINUED | OUTPATIENT
Start: 2023-06-16 | End: 2023-06-16 | Stop reason: HOSPADM

## 2023-06-16 RX ORDER — SODIUM CHLORIDE 0.9 % (FLUSH) 0.9 %
3 SYRINGE (ML) INJECTION EVERY 12 HOURS SCHEDULED
Status: DISCONTINUED | OUTPATIENT
Start: 2023-06-16 | End: 2023-06-16 | Stop reason: HOSPADM

## 2023-06-16 RX ORDER — FLUMAZENIL 0.1 MG/ML
0.2 INJECTION INTRAVENOUS AS NEEDED
Status: DISCONTINUED | OUTPATIENT
Start: 2023-06-16 | End: 2023-06-16 | Stop reason: HOSPADM

## 2023-06-16 RX ADMIN — PROPOFOL 100 MCG/KG/MIN: 10 INJECTION, EMULSION INTRAVENOUS at 08:15

## 2023-06-16 RX ADMIN — FENTANYL CITRATE 50 MCG: 50 INJECTION, SOLUTION INTRAMUSCULAR; INTRAVENOUS at 08:18

## 2023-06-16 RX ADMIN — CLINDAMYCIN PHOSPHATE 900 MG: 900 INJECTION, SOLUTION INTRAVENOUS at 08:18

## 2023-06-16 RX ADMIN — ONDANSETRON 4 MG: 2 INJECTION INTRAMUSCULAR; INTRAVENOUS at 08:34

## 2023-06-16 RX ADMIN — FENTANYL CITRATE 50 MCG: 50 INJECTION, SOLUTION INTRAMUSCULAR; INTRAVENOUS at 08:11

## 2023-06-16 RX ADMIN — SODIUM CHLORIDE, POTASSIUM CHLORIDE, SODIUM LACTATE AND CALCIUM CHLORIDE: 600; 310; 30; 20 INJECTION, SOLUTION INTRAVENOUS at 08:11

## 2023-06-16 NOTE — ANESTHESIA POSTPROCEDURE EVALUATION
Patient: Edie Hinton    Procedure Summary       Date: 06/16/23 Room / Location: [facility] OR  / [facility] HYBRID OR 12    Anesthesia Start: 0811 Anesthesia Stop: 0849    Procedure: LEFT SAPHENOUS VEIN RADIO FREQUENCY ABLATION (Left: Leg Lower) Diagnosis:       Chronic venous hypertension with inflammation involving left side      Preop testing      (Chronic venous hypertension with inflammation involving left side [I87.322])      (Preop testing [Z01.818])    Surgeons: Kojo Castillo DO Provider: Papito Rosales CRNA    Anesthesia Type: MAC ASA Status: 3            Anesthesia Type: MAC    Vitals  No vitals data found for the desired time range.          Post Anesthesia Care and Evaluation    Patient location during evaluation: PHASE II  Patient participation: complete - patient participated  Level of consciousness: awake and alert  Pain management: adequate    Airway patency: patent  Anesthetic complications: No anesthetic complications    Cardiovascular status: acceptable  Respiratory status: acceptable  Hydration status: acceptable    Comments: Blood pressure 160/73, pulse 60, temperature 98.1 °F (36.7 °C), temperature source Temporal, resp. rate 18, SpO2 98 %, not currently breastfeeding.    Pt discharged from PACU based on savanna score >8

## 2023-06-16 NOTE — H&P
Edie Hinton  [id number]  [id number]  [facility] OR/MAIN OR  BickingKojo, DO  2023    CC: Chronic venous hypertension with inflammation      HPI: I had the pleasure of seeing your patient Edie Hinton in the office today.    As you recall, Edie Hinton is a 77 y.o.  female who you are currently following for routine health maintenance.  She reports her legs swelled significantly and legs, left greater than right, and are sore to touch.  She had a venous duplex that was negative.  She also had HUBERT which were normal. She is maintained on aspirin and Lipitor. Her main complaint is burning and stinging to her left leg.  She has a history of two previous back surgeries. She does ambulate minimally with a walker.       Past Medical History:   Diagnosis Date    Arthritis     CKD (chronic kidney disease)     COPD (chronic obstructive pulmonary disease)     Emphysema lung     GERD (gastroesophageal reflux disease)     Hypertension     Sleep apnea        Past Surgical History:   Procedure Laterality Date    APPENDECTOMY      BACK SURGERY      BREAST SURGERY      CATARACT EXTRACTION      CERVICAL SPINE SURGERY      HAND SURGERY      HYSTERECTOMY      KNEE SURGERY Bilateral     TONSILLECTOMY         Family History   Problem Relation Age of Onset    Heart attack Mother     No Known Problems Father        Social History     Socioeconomic History    Marital status:    Tobacco Use    Smoking status: Former     Packs/day: 0.50     Types: Cigarettes     Quit date: 2018     Years since quittin.4    Smokeless tobacco: Never   Substance and Sexual Activity    Alcohol use: Yes     Comment: daily    Drug use: No       Allergies   Allergen Reactions    Bactrim [Sulfamethoxazole-Trimethoprim] Rash    Keflex [Cephalexin] Rash    Lyrica [Pregabalin] Confusion    Sulfa Antibiotics Rash       Medications Prior to Admission   Medication Sig Dispense Refill Last Dose    albuterol sulfate  (90 Base) MCG/ACT inhaler  Inhale 2 puffs Every 4 (Four) Hours As Needed for Wheezing.   Past Week    amLODIPine (NORVASC) 5 MG tablet Take 1 tablet by mouth Daily.   6/15/2023 at 0615    aspirin 81 MG EC tablet Take 1 tablet by mouth Daily.   6/14/2023    atorvastatin (LIPITOR) 40 MG tablet Take 1 tablet by mouth Every Night.   6/15/2023 at 2030    Cholecalciferol (Vitamin D3) 1.25 MG (03560 UT) tablet Take 1 tablet by mouth Daily.   6/15/2023 at 0615    furosemide (LASIX) 20 MG tablet Take 1 tablet by mouth Daily.   Past Month    gabapentin (NEURONTIN) 100 MG capsule Take 1 capsule by mouth 2 (Two) Times a Day.   6/15/2023 at 0615    metoprolol tartrate (LOPRESSOR) 100 MG tablet Take 1 tablet by mouth 2 (Two) Times a Day.   6/16/2023 at 0400    pantoprazole (PROTONIX) 40 MG EC tablet Take 1 tablet by mouth Daily.   6/16/2023 at 0615    potassium chloride (K-DUR,KLOR-CON) 20 MEQ CR tablet Take 1 tablet by mouth Daily.   Past Month    revefenacin (YUPELRI) 175 MCG/3ML nebulizer solution Take  by nebulization Daily.   6/15/2023 at 2030    tolterodine (DETROL) 2 MG tablet Take 1 tablet by mouth 2 (Two) Times a Day.   6/15/2023 at 2030    valsartan (DIOVAN) 160 MG tablet Take 1 tablet by mouth Daily.   6/15/2023 at 0615    metoclopramide (REGLAN) 10 MG tablet Take 1 tablet by mouth 2 (Two) Times a Day.       vitamin D (ERGOCALCIFEROL) 46444 UNITS capsule capsule Take 1 capsule by mouth 1 (One) Time Per Week.   6/10/2023       Review of Systems   Constitutional: Negative.    HENT: Negative.     Eyes: Negative.    Respiratory: Negative.     Cardiovascular:  Positive for leg swelling.        Leg cramping   Gastrointestinal: Negative.    Endocrine: Negative.    Genitourinary: Negative.    Musculoskeletal:  Positive for gait problem.   Skin: Negative.    Allergic/Immunologic: Negative.    Hematological: Negative.    Psychiatric/Behavioral: Negative.       /73   Pulse 60   Temp 98.1 °F (36.7 °C) (Temporal)   Resp 18   SpO2 98%    Breastfeeding No   Physical Exam  Vitals and nursing note reviewed.   Constitutional:       Appearance: She is well-developed.   HENT:      Head: Normocephalic and atraumatic.   Eyes:      General: No scleral icterus.     Pupils: Pupils are equal, round, and reactive to light.   Neck:      Thyroid: No thyromegaly.      Vascular: No carotid bruit or JVD.   Cardiovascular:      Rate and Rhythm: Normal rate and regular rhythm.      Pulses:           Carotid pulses are 2+ on the right side and 2+ on the left side.       Femoral pulses are 2+ on the right side and 2+ on the left side.       Popliteal pulses are 2+ on the right side and 2+ on the left side.        Dorsalis pedis pulses are 2+ on the right side and 2+ on the left side.        Posterior tibial pulses are 2+ on the right side and 2+ on the left side.      Heart sounds: Normal heart sounds.   Pulmonary:      Effort: Pulmonary effort is normal.      Breath sounds: Normal breath sounds.   Abdominal:      General: Bowel sounds are normal. There is no distension or abdominal bruit.      Palpations: Abdomen is soft. There is no mass.      Tenderness: There is no abdominal tenderness.   Musculoskeletal:         General: Normal range of motion.      Cervical back: Neck supple.   Lymphadenopathy:      Cervical: No cervical adenopathy.   Skin:     General: Skin is warm and dry.   Neurological:      Mental Status: She is alert and oriented to person, place, and time.      Cranial Nerves: No cranial nerve deficit.      Sensory: No sensory deficit.            Impression:  1.  Chronic venous hypertension with inflammation involving the left side  2.  Varicose veins    Plan:After thoroughly evaluating Edie Hinton, I believe the best course of action is to proceed with left lower extremity radiofrequency ablation.  I did review her testing which shows no venous insufficiency to the right lower extremity but does have noted reflux to her left lower extremity.  Risks of  radiofrequency ablation include, but are not limited to, bleeding, infection, vessel damage, nerve damage, DVT, phlebitis, and pulmonary embolus.  The patient understands these risks and wishes to proceed with procedure.  I would like her to continue wearing compression stockings on a daily basis and keep her legs elevated when she is not on them.  The patient is to continue taking their medications as previously discussed.   This was all discussed in full with complete understanding.  Thank you for allowing me to participate in the care of your patient.  Please do not hesitate to call with any questions or concerns.  We will keep you aware of any further encounters with Edie Hinton.    Kojo Castillo DO  6/16/2023  08:10 CDT

## 2023-06-16 NOTE — ANESTHESIA PREPROCEDURE EVALUATION
Anesthesia Evaluation     Patient summary reviewed and Nursing notes reviewed   no history of anesthetic complications:   NPO Solid Status: > 8 hours  NPO Liquid Status: > 8 hours           Airway   Mallampati: I  TM distance: >3 FB  Neck ROM: full  No difficulty expected  Dental    (+) edentulous    Pulmonary    (+) a smoker (quit 2019) Former, COPD,home oxygen, sleep apnea on CPAP  Cardiovascular   Exercise tolerance: poor (<4 METS)    (+) hypertensionCHF Diastolic >=55%    ROS comment: Echo 2/2023  ·  Left ventricular systolic function is normal. Left ventricular ejection fraction appears to be 61 - 65%.  ·  Left ventricular diastolic function is consistent with (grade I) impaired relaxation.  ·  Left atrial volume is mildly increased.  ·  Estimated right ventricular systolic pressure from tricuspid regurgitation is normal (<35 mmHg).  ·  Mild dilation of the proximal aorta is present.         Neuro/Psych  (-) seizures, TIA, CVA  GI/Hepatic/Renal/Endo    (+) GERD, renal disease CRI  (-) diabetes    Musculoskeletal     Abdominal    Substance History      OB/GYN          Other   arthritis,                     Anesthesia Plan    ASA 3     MAC     intravenous induction     Anesthetic plan, risks, benefits, and alternatives have been provided, discussed and informed consent has been obtained with: patient.    CODE STATUS:          [facility]

## 2023-06-16 NOTE — OP NOTE
Edie Hinton  6/16/2023     PREOPERATIVE DIAGNOSIS: Chronic venous hypertension with inflammation involving left side [I87.322]  Preop testing [Z01.818]     POSTOPERATIVE DIAGNOSIS: Post-Op Diagnosis Codes:     * Chronic venous hypertension with inflammation involving left side [I87.322]     * Preop testing [Z01.818]     PROCEDURE PERFORMED:   1.  Ultrasound-guided cannulation of the left lower extremity greater saphenous vein  2.  Radiofrequency ablation of the left lower extremity greater saphenous vein     SURGEON: Kojo Castillo DO      ANESTHESIA: MAC    PREPARATION: Routine.    STAFF: Circulator: Veronica Ann RN  Scrub Person: Gabriella Tejada    Estimated Blood Loss: minimal    SPECIMENS: None    COMPLICATIONS: None    INDICATIONS: Edie Hinton is a 77 y.o. female who reports her legs swelled significantly and legs, left greater than right, and are sore to touch.  She had a venous duplex that was negative.  She also had HUBERT which were normal. She is maintained on aspirin and Lipitor. Her main complaint is burning and stinging to her left leg.  She has a history of two previous back surgeries. She does ambulate minimally with a walker.  The indications, risks, and possible complications of the procedure were explained to the patient, who voiced understanding and wished to proceed with surgery.     PROCEDURE IN DETAIL:   The patient was taken to the operating room and placed on the operating table in a supine position. After MAC anesthesia was obtained, the left lower extremity was prepped and draped in a sterile manner.  Under ultrasound guidance and using a micropuncture technique the left lower extremity greater saphenous vein was cannulated and a microwire was placed.  This was confirmed under ultrasound.  A small stab incision was made with 11 blade and a 7 Welsh sheath was placed.  The patient was placed in Trendelenburg position and the saphenofemoral junction was identified under ultrasound.   The catheter was placed up to the junction and pulled back 3 cm and marked.  Next, tumescent fluid was instilled along the entire length of the vein under ultrasound guidance.  Once sufficient tumescent fluid was placed radiofrequency ablation had commenced.  There was a total of 9 RF cycles for a total treatment length of 52.5 cm for a total treatment time of 3 minutes.  There was an average of 11 W at an average temperature of 120°C.  Upon completion of the ablation the catheter and sheath were removed.  Direct pressure was held for an additional 5-10 minutes to ensure hemostasis.  An Ace wrap was placed from the toes to the groin.  Sterile dressings were applied. The patient tolerated the procedure well. Sponge and needle counts were correct. The patient was then awakened and transferred to the outpatient center in stable condition.  Kojo Castillo,   Date: 6/16/2023 Time: 08:15 CDT    CC:Johnnie Lawson APRN

## 2023-06-27 PROBLEM — Z01.818 PREOP TESTING: Status: RESOLVED | Noted: 2023-05-30 | Resolved: 2023-06-27

## 2023-07-27 DIAGNOSIS — I87.322 CHRONIC VENOUS HYPERTENSION WITH INFLAMMATION INVOLVING LEFT SIDE: ICD-10-CM

## 2023-07-27 RX ORDER — TRAMADOL HYDROCHLORIDE 50 MG/1
TABLET ORAL
Qty: 10 TABLET | Refills: 0 | OUTPATIENT
Start: 2023-07-27

## 2023-08-02 DIAGNOSIS — I87.322 CHRONIC VENOUS HYPERTENSION WITH INFLAMMATION INVOLVING LEFT SIDE: ICD-10-CM

## 2023-08-04 RX ORDER — TRAMADOL HYDROCHLORIDE 50 MG/1
TABLET ORAL
Qty: 10 TABLET | Refills: 0 | OUTPATIENT
Start: 2023-08-04

## 2023-08-22 ENCOUNTER — TELEPHONE (OUTPATIENT)
Dept: FAMILY MEDICINE CLINIC | Age: 78
End: 2023-08-22

## 2023-08-22 NOTE — TELEPHONE ENCOUNTER
Patient called and said that she spoke with Dr. Mauricio White office and they can not fill her tramadol because he is not in the office. They told the patient that she would need to ask PCP Jose Angel Tucker) to fill this for her while bernardo is out of the office. I told her I would ask for pcps recommendations.

## 2023-08-23 ENCOUNTER — OFFICE VISIT (OUTPATIENT)
Dept: FAMILY MEDICINE CLINIC | Age: 78
End: 2023-08-23
Payer: MEDICARE

## 2023-08-23 VITALS
HEART RATE: 69 BPM | SYSTOLIC BLOOD PRESSURE: 158 MMHG | BODY MASS INDEX: 29.67 KG/M2 | TEMPERATURE: 97.9 F | OXYGEN SATURATION: 93 % | DIASTOLIC BLOOD PRESSURE: 94 MMHG | WEIGHT: 198 LBS

## 2023-08-23 DIAGNOSIS — M19.011 PRIMARY OSTEOARTHRITIS OF RIGHT SHOULDER: Primary | ICD-10-CM

## 2023-08-23 DIAGNOSIS — K58.2 IRRITABLE BOWEL SYNDROME WITH BOTH CONSTIPATION AND DIARRHEA: ICD-10-CM

## 2023-08-23 PROCEDURE — 3077F SYST BP >= 140 MM HG: CPT | Performed by: NURSE PRACTITIONER

## 2023-08-23 PROCEDURE — 99213 OFFICE O/P EST LOW 20 MIN: CPT | Performed by: NURSE PRACTITIONER

## 2023-08-23 PROCEDURE — 1090F PRES/ABSN URINE INCON ASSESS: CPT | Performed by: NURSE PRACTITIONER

## 2023-08-23 PROCEDURE — 1036F TOBACCO NON-USER: CPT | Performed by: NURSE PRACTITIONER

## 2023-08-23 PROCEDURE — G8427 DOCREV CUR MEDS BY ELIG CLIN: HCPCS | Performed by: NURSE PRACTITIONER

## 2023-08-23 PROCEDURE — G8399 PT W/DXA RESULTS DOCUMENT: HCPCS | Performed by: NURSE PRACTITIONER

## 2023-08-23 PROCEDURE — 3080F DIAST BP >= 90 MM HG: CPT | Performed by: NURSE PRACTITIONER

## 2023-08-23 PROCEDURE — 1123F ACP DISCUSS/DSCN MKR DOCD: CPT | Performed by: NURSE PRACTITIONER

## 2023-08-23 PROCEDURE — G8417 CALC BMI ABV UP PARAM F/U: HCPCS | Performed by: NURSE PRACTITIONER

## 2023-08-23 RX ORDER — TRAMADOL HYDROCHLORIDE 50 MG/1
50 TABLET ORAL EVERY 6 HOURS PRN
Qty: 28 TABLET | Refills: 0 | Status: SHIPPED | OUTPATIENT
Start: 2023-08-23 | End: 2023-08-30

## 2023-08-23 ASSESSMENT — ENCOUNTER SYMPTOMS
COUGH: 0
CONSTIPATION: 0
SINUS PRESSURE: 0
TROUBLE SWALLOWING: 0
NAUSEA: 0
ABDOMINAL PAIN: 0
VOMITING: 0
SORE THROAT: 0
RHINORRHEA: 0
DIARRHEA: 1
SHORTNESS OF BREATH: 0

## 2023-09-18 ENCOUNTER — TRANSCRIBE ORDERS (OUTPATIENT)
Dept: ADMINISTRATIVE | Facility: HOSPITAL | Age: 78
End: 2023-09-18
Payer: MEDICARE

## 2023-09-18 DIAGNOSIS — G89.29 CHRONIC LEFT SHOULDER PAIN: Primary | ICD-10-CM

## 2023-09-18 DIAGNOSIS — M25.512 CHRONIC LEFT SHOULDER PAIN: Primary | ICD-10-CM

## 2023-09-27 ENCOUNTER — HOSPITAL ENCOUNTER (OUTPATIENT)
Dept: CT IMAGING | Facility: HOSPITAL | Age: 78
Discharge: HOME | End: 2023-09-27
Admitting: ORTHOPAEDIC SURGERY
Payer: MEDICARE

## 2023-09-27 PROCEDURE — 73200 CT UPPER EXTREMITY W/O DYE: CPT

## 2023-11-06 ENCOUNTER — HOSPITAL ENCOUNTER (OUTPATIENT)
Dept: ULTRASOUND IMAGING | Age: 78
Discharge: HOME | End: 2023-11-06
Payer: MEDICARE

## 2023-11-06 DIAGNOSIS — E04.2 MULTINODULAR GOITER (NONTOXIC): ICD-10-CM

## 2023-11-06 PROCEDURE — 76536 US EXAM OF HEAD AND NECK: CPT

## 2023-11-13 ENCOUNTER — OFFICE VISIT (OUTPATIENT)
Dept: ENT CLINIC | Age: 78
End: 2023-11-13
Payer: MEDICARE

## 2023-11-13 VITALS
SYSTOLIC BLOOD PRESSURE: 140 MMHG | BODY MASS INDEX: 29.33 KG/M2 | HEIGHT: 69 IN | WEIGHT: 198 LBS | DIASTOLIC BLOOD PRESSURE: 80 MMHG

## 2023-11-13 DIAGNOSIS — E04.2 MULTINODULAR GOITER (NONTOXIC): Primary | ICD-10-CM

## 2023-11-13 PROCEDURE — 1090F PRES/ABSN URINE INCON ASSESS: CPT | Performed by: OTOLARYNGOLOGY

## 2023-11-13 PROCEDURE — 1036F TOBACCO NON-USER: CPT | Performed by: OTOLARYNGOLOGY

## 2023-11-13 PROCEDURE — G8417 CALC BMI ABV UP PARAM F/U: HCPCS | Performed by: OTOLARYNGOLOGY

## 2023-11-13 PROCEDURE — G8399 PT W/DXA RESULTS DOCUMENT: HCPCS | Performed by: OTOLARYNGOLOGY

## 2023-11-13 PROCEDURE — G8427 DOCREV CUR MEDS BY ELIG CLIN: HCPCS | Performed by: OTOLARYNGOLOGY

## 2023-11-13 PROCEDURE — 99213 OFFICE O/P EST LOW 20 MIN: CPT | Performed by: OTOLARYNGOLOGY

## 2023-11-13 PROCEDURE — G8484 FLU IMMUNIZE NO ADMIN: HCPCS | Performed by: OTOLARYNGOLOGY

## 2023-11-13 PROCEDURE — 3077F SYST BP >= 140 MM HG: CPT | Performed by: OTOLARYNGOLOGY

## 2023-11-13 PROCEDURE — 3079F DIAST BP 80-89 MM HG: CPT | Performed by: OTOLARYNGOLOGY

## 2023-11-13 PROCEDURE — 1123F ACP DISCUSS/DSCN MKR DOCD: CPT | Performed by: OTOLARYNGOLOGY

## 2023-11-13 ASSESSMENT — ENCOUNTER SYMPTOMS
RESPIRATORY NEGATIVE: 1
GASTROINTESTINAL NEGATIVE: 1
EYES NEGATIVE: 1
ALLERGIC/IMMUNOLOGIC NEGATIVE: 1

## 2023-11-13 NOTE — PROGRESS NOTES
2023    Kindra Tapia (:  1945) is a 69 y.o. female, Established patient, here for evaluation of the following chief complaint(s):  Follow-up (Thyroid )      Vitals:    23 1300   BP: (!) 140/80   Weight: 89.8 kg (198 lb)   Height: 1.74 m (5' 8.5\")       Wt Readings from Last 3 Encounters:   23 89.8 kg (198 lb)   23 89.8 kg (198 lb)   23 95.7 kg (211 lb)       BP Readings from Last 3 Encounters:   23 (!) 140/80   23 (!) 158/94   23 126/78         SUBJECTIVE/OBJECTIVE:    Patient seen today for her thyroid. She had follow-up ultrasound which showed a slight decrease in size of her thyroid nodule which is TI-RADS 4. She currently has no symptoms is not concerned about this. Review of Systems   Constitutional: Negative. HENT: Negative. Eyes: Negative. Respiratory: Negative. Cardiovascular: Negative. Gastrointestinal: Negative. Endocrine: Negative. Musculoskeletal: Negative. Skin: Negative. Allergic/Immunologic: Negative. Neurological: Negative. Hematological: Negative. Psychiatric/Behavioral: Negative. Physical Exam  Vitals reviewed. Constitutional:       Appearance: Normal appearance. She is normal weight. HENT:      Head: Normocephalic and atraumatic. Right Ear: Tympanic membrane, ear canal and external ear normal.      Left Ear: Tympanic membrane, ear canal and external ear normal.      Nose: Nose normal.      Mouth/Throat:      Mouth: Mucous membranes are moist.      Pharynx: Oropharynx is clear. Eyes:      Extraocular Movements: Extraocular movements intact. Pupils: Pupils are equal, round, and reactive to light. Cardiovascular:      Rate and Rhythm: Normal rate and regular rhythm. Pulmonary:      Effort: Pulmonary effort is normal.      Breath sounds: Normal breath sounds. Musculoskeletal:      Cervical back: Normal range of motion. Skin:     General: Skin is warm and dry.

## 2023-12-08 ENCOUNTER — TELEPHONE (OUTPATIENT)
Dept: VASCULAR SURGERY | Facility: CLINIC | Age: 78
End: 2023-12-08
Payer: MEDICARE

## 2023-12-08 NOTE — TELEPHONE ENCOUNTER
Called patient to ask about changing appt to 12/20/23. Patient stated that she is not having issues at all right now and would rather just cancel. Will call back if any issues arise.

## 2024-02-06 ENCOUNTER — OFFICE VISIT (OUTPATIENT)
Dept: FAMILY MEDICINE CLINIC | Age: 79
End: 2024-02-06
Payer: MEDICARE

## 2024-02-06 VITALS
WEIGHT: 186 LBS | SYSTOLIC BLOOD PRESSURE: 138 MMHG | TEMPERATURE: 97.9 F | BODY MASS INDEX: 27.87 KG/M2 | HEART RATE: 71 BPM | OXYGEN SATURATION: 98 % | DIASTOLIC BLOOD PRESSURE: 86 MMHG

## 2024-02-06 DIAGNOSIS — N30.90 CYSTITIS: Primary | ICD-10-CM

## 2024-02-06 DIAGNOSIS — R42 VERTIGO: ICD-10-CM

## 2024-02-06 DIAGNOSIS — I50.32 CHRONIC DIASTOLIC HEART FAILURE (HCC): ICD-10-CM

## 2024-02-06 DIAGNOSIS — R35.0 URINARY FREQUENCY: ICD-10-CM

## 2024-02-06 DIAGNOSIS — N18.30 STAGE 3 CHRONIC KIDNEY DISEASE, UNSPECIFIED WHETHER STAGE 3A OR 3B CKD (HCC): ICD-10-CM

## 2024-02-06 DIAGNOSIS — J44.9 CHRONIC OBSTRUCTIVE PULMONARY DISEASE, UNSPECIFIED COPD TYPE (HCC): ICD-10-CM

## 2024-02-06 DIAGNOSIS — I73.9 INTERMITTENT CLAUDICATION (HCC): ICD-10-CM

## 2024-02-06 PROBLEM — N18.32 STAGE 3B CHRONIC KIDNEY DISEASE (HCC): Status: ACTIVE | Noted: 2022-05-26

## 2024-02-06 PROBLEM — N18.32 STAGE 3B CHRONIC KIDNEY DISEASE (HCC): Status: RESOLVED | Noted: 2022-05-26 | Resolved: 2024-02-06

## 2024-02-06 LAB
APPEARANCE FLUID: NORMAL
BILIRUBIN, POC: NORMAL
BLOOD URINE, POC: NORMAL
CLARITY, POC: CLEAR
COLOR, POC: YELLOW
GLUCOSE URINE, POC: NORMAL
KETONES, POC: NORMAL
LEUKOCYTE EST, POC: NORMAL
NITRITE, POC: NORMAL
PH, POC: 6
PROTEIN, POC: NORMAL
SPECIFIC GRAVITY, POC: 1.01
UROBILINOGEN, POC: 0.2

## 2024-02-06 PROCEDURE — 1123F ACP DISCUSS/DSCN MKR DOCD: CPT | Performed by: NURSE PRACTITIONER

## 2024-02-06 PROCEDURE — 1090F PRES/ABSN URINE INCON ASSESS: CPT | Performed by: NURSE PRACTITIONER

## 2024-02-06 PROCEDURE — 4004F PT TOBACCO SCREEN RCVD TLK: CPT | Performed by: NURSE PRACTITIONER

## 2024-02-06 PROCEDURE — G8417 CALC BMI ABV UP PARAM F/U: HCPCS | Performed by: NURSE PRACTITIONER

## 2024-02-06 PROCEDURE — 3079F DIAST BP 80-89 MM HG: CPT | Performed by: NURSE PRACTITIONER

## 2024-02-06 PROCEDURE — 81002 URINALYSIS NONAUTO W/O SCOPE: CPT | Performed by: NURSE PRACTITIONER

## 2024-02-06 PROCEDURE — 99214 OFFICE O/P EST MOD 30 MIN: CPT | Performed by: NURSE PRACTITIONER

## 2024-02-06 PROCEDURE — 3023F SPIROM DOC REV: CPT | Performed by: NURSE PRACTITIONER

## 2024-02-06 PROCEDURE — G8484 FLU IMMUNIZE NO ADMIN: HCPCS | Performed by: NURSE PRACTITIONER

## 2024-02-06 PROCEDURE — G8427 DOCREV CUR MEDS BY ELIG CLIN: HCPCS | Performed by: NURSE PRACTITIONER

## 2024-02-06 PROCEDURE — 3075F SYST BP GE 130 - 139MM HG: CPT | Performed by: NURSE PRACTITIONER

## 2024-02-06 PROCEDURE — G8399 PT W/DXA RESULTS DOCUMENT: HCPCS | Performed by: NURSE PRACTITIONER

## 2024-02-06 RX ORDER — POTASSIUM CHLORIDE 1.5 G/1.58G
20 POWDER, FOR SOLUTION ORAL PRN
COMMUNITY

## 2024-02-06 RX ORDER — FUROSEMIDE 20 MG/1
20 TABLET ORAL DAILY PRN
COMMUNITY

## 2024-02-06 RX ORDER — NITROFURANTOIN 25; 75 MG/1; MG/1
100 CAPSULE ORAL 2 TIMES DAILY
Qty: 20 CAPSULE | Refills: 0 | Status: SHIPPED | OUTPATIENT
Start: 2024-02-06 | End: 2024-02-16

## 2024-02-06 ASSESSMENT — ENCOUNTER SYMPTOMS
CONSTIPATION: 0
DIARRHEA: 0
SORE THROAT: 0
SHORTNESS OF BREATH: 0
ABDOMINAL PAIN: 0
TROUBLE SWALLOWING: 0
NAUSEA: 0
SINUS PRESSURE: 0
VOMITING: 0
COUGH: 0
RHINORRHEA: 0

## 2024-02-06 ASSESSMENT — PATIENT HEALTH QUESTIONNAIRE - PHQ9
1. LITTLE INTEREST OR PLEASURE IN DOING THINGS: 1
5. POOR APPETITE OR OVEREATING: 2
SUM OF ALL RESPONSES TO PHQ QUESTIONS 1-9: 7
8. MOVING OR SPEAKING SO SLOWLY THAT OTHER PEOPLE COULD HAVE NOTICED. OR THE OPPOSITE, BEING SO FIGETY OR RESTLESS THAT YOU HAVE BEEN MOVING AROUND A LOT MORE THAN USUAL: 0
2. FEELING DOWN, DEPRESSED OR HOPELESS: 2
3. TROUBLE FALLING OR STAYING ASLEEP: 0
SUM OF ALL RESPONSES TO PHQ QUESTIONS 1-9: 7
SUM OF ALL RESPONSES TO PHQ QUESTIONS 1-9: 7
9. THOUGHTS THAT YOU WOULD BE BETTER OFF DEAD, OR OF HURTING YOURSELF: 0
7. TROUBLE CONCENTRATING ON THINGS, SUCH AS READING THE NEWSPAPER OR WATCHING TELEVISION: 0
4. FEELING TIRED OR HAVING LITTLE ENERGY: 2
SUM OF ALL RESPONSES TO PHQ QUESTIONS 1-9: 7
SUM OF ALL RESPONSES TO PHQ9 QUESTIONS 1 & 2: 3
10. IF YOU CHECKED OFF ANY PROBLEMS, HOW DIFFICULT HAVE THESE PROBLEMS MADE IT FOR YOU TO DO YOUR WORK, TAKE CARE OF THINGS AT HOME, OR GET ALONG WITH OTHER PEOPLE: 1
6. FEELING BAD ABOUT YOURSELF - OR THAT YOU ARE A FAILURE OR HAVE LET YOURSELF OR YOUR FAMILY DOWN: 0

## 2024-02-06 NOTE — PROGRESS NOTES
138/86   Pulse 71   Temp 97.9 °F (36.6 °C) (Temporal)   Wt 84.4 kg (186 lb)   SpO2 98%   BMI 27.87 kg/m²     Review of Systems   Constitutional:  Negative for activity change, appetite change, fatigue, fever and unexpected weight change.   HENT:  Negative for congestion, hearing loss, rhinorrhea, sinus pressure, sore throat and trouble swallowing.    Eyes:  Negative for visual disturbance.   Respiratory:  Negative for cough and shortness of breath.    Cardiovascular:  Negative for chest pain, palpitations and leg swelling.   Gastrointestinal:  Negative for abdominal pain, constipation, diarrhea, nausea and vomiting.   Endocrine: Negative for cold intolerance and heat intolerance.   Genitourinary:  Positive for frequency. Negative for difficulty urinating, dysuria, flank pain, menstrual problem, pelvic pain, urgency and vaginal discharge.   Musculoskeletal:  Negative for arthralgias.   Skin:  Negative for rash.   Neurological:  Negative for headaches.   Psychiatric/Behavioral:  Negative for dysphoric mood and sleep disturbance. The patient is not nervous/anxious.        Physical Exam  Vitals reviewed.   Constitutional:       Appearance: Normal appearance.   HENT:      Head: Normocephalic and atraumatic.   Eyes:      Conjunctiva/sclera: Conjunctivae normal.   Cardiovascular:      Rate and Rhythm: Normal rate and regular rhythm.      Pulses: Normal pulses.      Heart sounds: Normal heart sounds.   Pulmonary:      Effort: Pulmonary effort is normal.      Breath sounds: Normal breath sounds.   Abdominal:      General: Bowel sounds are normal. There is no distension.      Palpations: Abdomen is soft.      Tenderness: There is no abdominal tenderness. There is no right CVA tenderness, left CVA tenderness or guarding.   Musculoskeletal:      Cervical back: Normal range of motion and neck supple.   Skin:     General: Skin is warm.   Neurological:      General: No focal deficit present.      Mental Status: She is alert.

## 2024-02-06 NOTE — PATIENT INSTRUCTIONS
Keep appointment for Annual wellness visit in June.     Follow up if any concerns or worsening.     If dizziness returns may try meclizine over the counter.

## 2024-02-12 DIAGNOSIS — K21.9 GASTROESOPHAGEAL REFLUX DISEASE, UNSPECIFIED WHETHER ESOPHAGITIS PRESENT: ICD-10-CM

## 2024-02-12 DIAGNOSIS — N18.30 STAGE 3 CHRONIC KIDNEY DISEASE, UNSPECIFIED WHETHER STAGE 3A OR 3B CKD (HCC): ICD-10-CM

## 2024-02-12 DIAGNOSIS — E78.2 MIXED HYPERLIPIDEMIA: ICD-10-CM

## 2024-02-12 DIAGNOSIS — I10 ESSENTIAL HYPERTENSION: ICD-10-CM

## 2024-02-12 DIAGNOSIS — E55.9 VITAMIN D DEFICIENCY: ICD-10-CM

## 2024-02-12 RX ORDER — METOPROLOL TARTRATE 100 MG/1
50 TABLET ORAL 2 TIMES DAILY
Qty: 90 TABLET | Refills: 3 | Status: SHIPPED | OUTPATIENT
Start: 2024-02-12

## 2024-02-12 RX ORDER — FUROSEMIDE 20 MG/1
20 TABLET ORAL DAILY PRN
Qty: 90 TABLET | Refills: 3 | Status: SHIPPED | OUTPATIENT
Start: 2024-02-12

## 2024-02-12 RX ORDER — VALSARTAN 160 MG/1
160 TABLET ORAL DAILY
Qty: 90 TABLET | Refills: 3 | Status: SHIPPED | OUTPATIENT
Start: 2024-02-12

## 2024-02-12 RX ORDER — PANTOPRAZOLE SODIUM 40 MG/1
40 TABLET, DELAYED RELEASE ORAL
Qty: 90 TABLET | Refills: 3 | Status: SHIPPED | OUTPATIENT
Start: 2024-02-12

## 2024-02-12 RX ORDER — AMLODIPINE BESYLATE 5 MG/1
5 TABLET ORAL DAILY
Qty: 90 TABLET | Refills: 3 | Status: SHIPPED | OUTPATIENT
Start: 2024-02-12

## 2024-02-12 RX ORDER — ATORVASTATIN CALCIUM 40 MG/1
40 TABLET, FILM COATED ORAL DAILY
Qty: 90 TABLET | Refills: 3 | Status: SHIPPED | OUTPATIENT
Start: 2024-02-12

## 2024-02-12 RX ORDER — ERGOCALCIFEROL 1.25 MG/1
50000 CAPSULE ORAL WEEKLY
Qty: 12 CAPSULE | Refills: 3 | Status: SHIPPED | OUTPATIENT
Start: 2024-02-12

## 2024-02-12 NOTE — TELEPHONE ENCOUNTER
Received call/My Chart Message from patient requesting refill on medication(s). Pt was last seen in office on 2/6/2024  and has a follow up scheduled for 6/4/2024. Will send request to provider for authorization.     Requested Prescriptions     Pending Prescriptions Disp Refills    amLODIPine (NORVASC) 5 MG tablet 90 tablet 3     Sig: Take 1 tablet by mouth daily    metoprolol (LOPRESSOR) 100 MG tablet 90 tablet 3     Sig: Take 0.5 tablets by mouth 2 times daily    pantoprazole (PROTONIX) 40 MG tablet 90 tablet 3     Sig: Take 1 tablet by mouth every morning (before breakfast)    valsartan (DIOVAN) 160 MG tablet 90 tablet 3     Sig: Take 1 tablet by mouth daily    vitamin D (ERGOCALCIFEROL) 1.25 MG (84089 UT) CAPS capsule 12 capsule 3     Sig: Take 1 capsule by mouth once a week

## 2024-04-11 ENCOUNTER — OFFICE VISIT (OUTPATIENT)
Dept: FAMILY MEDICINE CLINIC | Age: 79
End: 2024-04-11

## 2024-04-11 VITALS
DIASTOLIC BLOOD PRESSURE: 84 MMHG | SYSTOLIC BLOOD PRESSURE: 138 MMHG | OXYGEN SATURATION: 97 % | BODY MASS INDEX: 28.17 KG/M2 | HEART RATE: 79 BPM | TEMPERATURE: 98 F | WEIGHT: 188 LBS

## 2024-04-11 DIAGNOSIS — J44.9 CHRONIC OBSTRUCTIVE PULMONARY DISEASE, UNSPECIFIED COPD TYPE (HCC): ICD-10-CM

## 2024-04-11 DIAGNOSIS — Z72.0 TOBACCO ABUSE: ICD-10-CM

## 2024-04-11 DIAGNOSIS — N32.81 OAB (OVERACTIVE BLADDER): Primary | ICD-10-CM

## 2024-04-11 DIAGNOSIS — Z87.891 PERSONAL HISTORY OF NICOTINE DEPENDENCE: ICD-10-CM

## 2024-04-11 LAB
APPEARANCE FLUID: CLEAR
BILIRUBIN, POC: NORMAL
BLOOD URINE, POC: NORMAL
CLARITY, POC: CLEAR
COLOR, POC: YELLOW
GLUCOSE URINE, POC: NORMAL
KETONES, POC: NORMAL
LEUKOCYTE EST, POC: NORMAL
NITRITE, POC: NORMAL
PH, POC: 6
PROTEIN, POC: NORMAL
SPECIFIC GRAVITY, POC: 1.02
UROBILINOGEN, POC: 0.2

## 2024-04-11 RX ORDER — VARENICLINE TARTRATE 0.5 MG/1
.5-1 TABLET, FILM COATED ORAL SEE ADMIN INSTRUCTIONS
Qty: 57 TABLET | Refills: 0 | Status: SHIPPED | OUTPATIENT
Start: 2024-04-11

## 2024-04-11 RX ORDER — OXYBUTYNIN CHLORIDE 5 MG/1
5 TABLET, EXTENDED RELEASE ORAL DAILY
Qty: 30 TABLET | Refills: 3 | Status: SHIPPED | OUTPATIENT
Start: 2024-04-11

## 2024-04-11 RX ORDER — ALBUTEROL SULFATE 90 UG/1
2 AEROSOL, METERED RESPIRATORY (INHALATION) 4 TIMES DAILY PRN
Qty: 3 EACH | Refills: 3 | Status: SHIPPED | OUTPATIENT
Start: 2024-04-11

## 2024-04-11 RX ORDER — VARENICLINE TARTRATE 1 MG/1
1 TABLET, FILM COATED ORAL 2 TIMES DAILY
Qty: 60 TABLET | Refills: 0 | Status: SHIPPED | OUTPATIENT
Start: 2024-04-11

## 2024-04-11 SDOH — ECONOMIC STABILITY: INCOME INSECURITY: HOW HARD IS IT FOR YOU TO PAY FOR THE VERY BASICS LIKE FOOD, HOUSING, MEDICAL CARE, AND HEATING?: NOT HARD AT ALL

## 2024-04-11 SDOH — ECONOMIC STABILITY: FOOD INSECURITY: WITHIN THE PAST 12 MONTHS, THE FOOD YOU BOUGHT JUST DIDN'T LAST AND YOU DIDN'T HAVE MONEY TO GET MORE.: NEVER TRUE

## 2024-04-11 SDOH — ECONOMIC STABILITY: FOOD INSECURITY: WITHIN THE PAST 12 MONTHS, YOU WORRIED THAT YOUR FOOD WOULD RUN OUT BEFORE YOU GOT MONEY TO BUY MORE.: NEVER TRUE

## 2024-04-11 ASSESSMENT — ENCOUNTER SYMPTOMS
DIARRHEA: 0
NAUSEA: 0
RHINORRHEA: 0
SHORTNESS OF BREATH: 0
CONSTIPATION: 0
ABDOMINAL PAIN: 0
TROUBLE SWALLOWING: 0
SORE THROAT: 0
SINUS PRESSURE: 0
COUGH: 0
VOMITING: 0

## 2024-04-11 ASSESSMENT — PATIENT HEALTH QUESTIONNAIRE - PHQ9
10. IF YOU CHECKED OFF ANY PROBLEMS, HOW DIFFICULT HAVE THESE PROBLEMS MADE IT FOR YOU TO DO YOUR WORK, TAKE CARE OF THINGS AT HOME, OR GET ALONG WITH OTHER PEOPLE: SOMEWHAT DIFFICULT
8. MOVING OR SPEAKING SO SLOWLY THAT OTHER PEOPLE COULD HAVE NOTICED. OR THE OPPOSITE, BEING SO FIGETY OR RESTLESS THAT YOU HAVE BEEN MOVING AROUND A LOT MORE THAN USUAL: NOT AT ALL
2. FEELING DOWN, DEPRESSED OR HOPELESS: MORE THAN HALF THE DAYS
SUM OF ALL RESPONSES TO PHQ QUESTIONS 1-9: 6
6. FEELING BAD ABOUT YOURSELF - OR THAT YOU ARE A FAILURE OR HAVE LET YOURSELF OR YOUR FAMILY DOWN: NOT AT ALL
SUM OF ALL RESPONSES TO PHQ QUESTIONS 1-9: 6
SUM OF ALL RESPONSES TO PHQ QUESTIONS 1-9: 6
SUM OF ALL RESPONSES TO PHQ9 QUESTIONS 1 & 2: 3
9. THOUGHTS THAT YOU WOULD BE BETTER OFF DEAD, OR OF HURTING YOURSELF: NOT AT ALL
5. POOR APPETITE OR OVEREATING: NOT AT ALL
1. LITTLE INTEREST OR PLEASURE IN DOING THINGS: SEVERAL DAYS
7. TROUBLE CONCENTRATING ON THINGS, SUCH AS READING THE NEWSPAPER OR WATCHING TELEVISION: NOT AT ALL
SUM OF ALL RESPONSES TO PHQ QUESTIONS 1-9: 6
3. TROUBLE FALLING OR STAYING ASLEEP: MORE THAN HALF THE DAYS
4. FEELING TIRED OR HAVING LITTLE ENERGY: SEVERAL DAYS

## 2024-04-11 NOTE — PROGRESS NOTES
Ignacia Hatch (:  1945) is a 78 y.o. female,Established patient, here for evaluation of the following chief complaint(s):  Urinary Frequency (No pain, but doesn't feel like she is emptying. Ongoing since feb. Last med given caused diarrhea, nausea, vomiting and tingling in fingers. )      ASSESSMENT/PLAN:    ICD-10-CM    1. OAB (overactive bladder)  N32.81 POCT Urinalysis no Micro     oxyBUTYnin (DITROPAN XL) 5 MG extended release tablet     Saroj Linder MD, Urology, [place]    Starting on ditropan and referring to urology.       2. Tobacco abuse  Z72.0 varenicline (CHANTIX) 0.5 MG tablet     varenicline (CHANTIX) 1 MG tablet     NM TOBACCO USE CESSATION INTERMEDIATE 3-10 MINUTES    Patient smokes cigarettes on a chronic basis. Approximately 3 minutes of education was provided about quit smoking and the harms of tobacco.  Patient does show understanding.  Patient has  the desire to quit smoking in the near future.          3. Chronic obstructive pulmonary disease, unspecified COPD type ([facility])  J44.9 albuterol sulfate HFA (VENTOLIN HFA) 108 (90 Base) MCG/ACT inhaler     varenicline (CHANTIX) 0.5 MG tablet     varenicline (CHANTIX) 1 MG tablet     NM TOBACCO USE CESSATION INTERMEDIATE 3-10 MINUTES      4. Personal history of nicotine dependence  Z87.891 NM TOBACCO USE CESSATION INTERMEDIATE 3-10 MINUTES              Return in about 4 weeks (around 2024) for smoking and bladder .    SUBJECTIVE/OBJECTIVE:  HPI  Here for urinary frequency and urgency  No pain  Patient states this is ongoing since Feb.   Used to be on detol years ago but quit taking.   I just never feel like I go very much.     In February, treated for cystits but symptoms did not improve.     COPD    Albuterol and yupelri  She is followed by Dr Jhonny Luu  She is still smoking.   She is interested in chantix. It worked for her back in 2019.       /84   Pulse 79   Temp 98 °F (36.7 °C)   Wt 85.3 kg (188 lb)   SpO2

## 2024-04-22 ENCOUNTER — OFFICE VISIT (OUTPATIENT)
Dept: UROLOGY | Age: 79
End: 2024-04-22
Payer: MEDICARE

## 2024-04-22 VITALS — BODY MASS INDEX: 28.4 KG/M2 | WEIGHT: 187.4 LBS | TEMPERATURE: 98 F | HEIGHT: 68 IN

## 2024-04-22 DIAGNOSIS — N39.46 MIXED STRESS AND URGE URINARY INCONTINENCE: ICD-10-CM

## 2024-04-22 DIAGNOSIS — N32.81 OAB (OVERACTIVE BLADDER): Primary | ICD-10-CM

## 2024-04-22 LAB — POST VOID RESIDUAL (PVR): NORMAL ML

## 2024-04-22 PROCEDURE — 0509F URINE INCON PLAN DOCD: CPT | Performed by: NURSE PRACTITIONER

## 2024-04-22 PROCEDURE — 99203 OFFICE O/P NEW LOW 30 MIN: CPT | Performed by: NURSE PRACTITIONER

## 2024-04-22 PROCEDURE — G8399 PT W/DXA RESULTS DOCUMENT: HCPCS | Performed by: NURSE PRACTITIONER

## 2024-04-22 PROCEDURE — 1090F PRES/ABSN URINE INCON ASSESS: CPT | Performed by: NURSE PRACTITIONER

## 2024-04-22 PROCEDURE — G8427 DOCREV CUR MEDS BY ELIG CLIN: HCPCS | Performed by: NURSE PRACTITIONER

## 2024-04-22 PROCEDURE — G8417 CALC BMI ABV UP PARAM F/U: HCPCS | Performed by: NURSE PRACTITIONER

## 2024-04-22 PROCEDURE — 1123F ACP DISCUSS/DSCN MKR DOCD: CPT | Performed by: NURSE PRACTITIONER

## 2024-04-22 PROCEDURE — 4004F PT TOBACCO SCREEN RCVD TLK: CPT | Performed by: NURSE PRACTITIONER

## 2024-04-22 PROCEDURE — 51798 US URINE CAPACITY MEASURE: CPT | Performed by: NURSE PRACTITIONER

## 2024-04-22 RX ORDER — VALSARTAN AND HYDROCHLOROTHIAZIDE 320; 25 MG/1; MG/1
1 TABLET, FILM COATED ORAL
COMMUNITY

## 2024-04-22 ASSESSMENT — ENCOUNTER SYMPTOMS
ABDOMINAL DISTENTION: 0
NAUSEA: 0
ABDOMINAL PAIN: 0
VOMITING: 0

## 2024-04-22 NOTE — PROGRESS NOTES
erroneous or, at times, nonsensical words or phrases may be inadvertently transcribed. Although I have reviewed the document for such errors, some may still exist.

## 2024-04-30 ENCOUNTER — NURSE ONLY (OUTPATIENT)
Dept: FAMILY MEDICINE CLINIC | Age: 79
End: 2024-04-30

## 2024-04-30 SDOH — ECONOMIC STABILITY: FOOD INSECURITY: WITHIN THE PAST 12 MONTHS, YOU WORRIED THAT YOUR FOOD WOULD RUN OUT BEFORE YOU GOT MONEY TO BUY MORE.: NEVER TRUE

## 2024-04-30 SDOH — ECONOMIC STABILITY: FOOD INSECURITY: WITHIN THE PAST 12 MONTHS, THE FOOD YOU BOUGHT JUST DIDN'T LAST AND YOU DIDN'T HAVE MONEY TO GET MORE.: NEVER TRUE

## 2024-04-30 SDOH — ECONOMIC STABILITY: INCOME INSECURITY: HOW HARD IS IT FOR YOU TO PAY FOR THE VERY BASICS LIKE FOOD, HOUSING, MEDICAL CARE, AND HEATING?: NOT HARD AT ALL

## 2024-04-30 NOTE — PROGRESS NOTES
Question 4/26/2024 10:00 AM CDT - Filed by Patient   How hard is it for you to pay for the very basics like food, housing, medical care, and heating? Not very hard   Within the past 12 months, you worried that your food would run out before you got the money to buy more. Never true   Within the past 12 months, the food you bought just didn’t last and you didn’t have money to get more. Never true   In the past 12 months, has lack of transportation kept you from meetings, work, or from getting things needed for daily living? No   In the last 12 months, was there a time when you did not have a steady place to sleep or slept in a shelter (including now)? No   Would you like resources for any of these topics? No, thank you

## 2024-05-09 ENCOUNTER — OFFICE VISIT (OUTPATIENT)
Dept: FAMILY MEDICINE CLINIC | Age: 79
End: 2024-05-09
Payer: MEDICARE

## 2024-05-09 VITALS
WEIGHT: 187 LBS | OXYGEN SATURATION: 96 % | HEART RATE: 68 BPM | BODY MASS INDEX: 28.43 KG/M2 | TEMPERATURE: 97.1 F | DIASTOLIC BLOOD PRESSURE: 76 MMHG | SYSTOLIC BLOOD PRESSURE: 126 MMHG

## 2024-05-09 DIAGNOSIS — N32.81 OAB (OVERACTIVE BLADDER): Primary | ICD-10-CM

## 2024-05-09 DIAGNOSIS — Z87.891 PERSONAL HISTORY OF NICOTINE DEPENDENCE: ICD-10-CM

## 2024-05-09 PROCEDURE — G8399 PT W/DXA RESULTS DOCUMENT: HCPCS | Performed by: NURSE PRACTITIONER

## 2024-05-09 PROCEDURE — 3074F SYST BP LT 130 MM HG: CPT | Performed by: NURSE PRACTITIONER

## 2024-05-09 PROCEDURE — 1090F PRES/ABSN URINE INCON ASSESS: CPT | Performed by: NURSE PRACTITIONER

## 2024-05-09 PROCEDURE — 4004F PT TOBACCO SCREEN RCVD TLK: CPT | Performed by: NURSE PRACTITIONER

## 2024-05-09 PROCEDURE — 99213 OFFICE O/P EST LOW 20 MIN: CPT | Performed by: NURSE PRACTITIONER

## 2024-05-09 PROCEDURE — 99406 BEHAV CHNG SMOKING 3-10 MIN: CPT | Performed by: NURSE PRACTITIONER

## 2024-05-09 PROCEDURE — 1123F ACP DISCUSS/DSCN MKR DOCD: CPT | Performed by: NURSE PRACTITIONER

## 2024-05-09 PROCEDURE — G8427 DOCREV CUR MEDS BY ELIG CLIN: HCPCS | Performed by: NURSE PRACTITIONER

## 2024-05-09 PROCEDURE — 3078F DIAST BP <80 MM HG: CPT | Performed by: NURSE PRACTITIONER

## 2024-05-09 PROCEDURE — G8417 CALC BMI ABV UP PARAM F/U: HCPCS | Performed by: NURSE PRACTITIONER

## 2024-05-09 SDOH — ECONOMIC STABILITY: FOOD INSECURITY: WITHIN THE PAST 12 MONTHS, THE FOOD YOU BOUGHT JUST DIDN'T LAST AND YOU DIDN'T HAVE MONEY TO GET MORE.: NEVER TRUE

## 2024-05-09 SDOH — ECONOMIC STABILITY: FOOD INSECURITY: WITHIN THE PAST 12 MONTHS, YOU WORRIED THAT YOUR FOOD WOULD RUN OUT BEFORE YOU GOT MONEY TO BUY MORE.: NEVER TRUE

## 2024-05-09 SDOH — ECONOMIC STABILITY: INCOME INSECURITY: HOW HARD IS IT FOR YOU TO PAY FOR THE VERY BASICS LIKE FOOD, HOUSING, MEDICAL CARE, AND HEATING?: NOT HARD AT ALL

## 2024-05-09 ASSESSMENT — ENCOUNTER SYMPTOMS
VOMITING: 0
COUGH: 0
SORE THROAT: 0
DIARRHEA: 0
NAUSEA: 0
ABDOMINAL PAIN: 0
SHORTNESS OF BREATH: 0
SINUS PRESSURE: 0
TROUBLE SWALLOWING: 0
CONSTIPATION: 0
RHINORRHEA: 0

## 2024-05-09 ASSESSMENT — PATIENT HEALTH QUESTIONNAIRE - PHQ9
SUM OF ALL RESPONSES TO PHQ QUESTIONS 1-9: 1
SUM OF ALL RESPONSES TO PHQ QUESTIONS 1-9: 1
SUM OF ALL RESPONSES TO PHQ9 QUESTIONS 1 & 2: 0
SUM OF ALL RESPONSES TO PHQ QUESTIONS 1-9: 1
8. MOVING OR SPEAKING SO SLOWLY THAT OTHER PEOPLE COULD HAVE NOTICED. OR THE OPPOSITE, BEING SO FIGETY OR RESTLESS THAT YOU HAVE BEEN MOVING AROUND A LOT MORE THAN USUAL: NOT AT ALL
10. IF YOU CHECKED OFF ANY PROBLEMS, HOW DIFFICULT HAVE THESE PROBLEMS MADE IT FOR YOU TO DO YOUR WORK, TAKE CARE OF THINGS AT HOME, OR GET ALONG WITH OTHER PEOPLE: NOT DIFFICULT AT ALL
3. TROUBLE FALLING OR STAYING ASLEEP: NOT AT ALL
6. FEELING BAD ABOUT YOURSELF - OR THAT YOU ARE A FAILURE OR HAVE LET YOURSELF OR YOUR FAMILY DOWN: NOT AT ALL
1. LITTLE INTEREST OR PLEASURE IN DOING THINGS: NOT AT ALL
2. FEELING DOWN, DEPRESSED OR HOPELESS: NOT AT ALL
4. FEELING TIRED OR HAVING LITTLE ENERGY: NOT AT ALL
7. TROUBLE CONCENTRATING ON THINGS, SUCH AS READING THE NEWSPAPER OR WATCHING TELEVISION: NOT AT ALL
9. THOUGHTS THAT YOU WOULD BE BETTER OFF DEAD, OR OF HURTING YOURSELF: NOT AT ALL
5. POOR APPETITE OR OVEREATING: SEVERAL DAYS
SUM OF ALL RESPONSES TO PHQ QUESTIONS 1-9: 1

## 2024-05-09 NOTE — PROGRESS NOTES
Ignacia Hatch (:  1945) is a 78 y.o. female,Established patient, here for evaluation of the following chief complaint(s):  1 Month Follow-Up (Medication for bladder has helped. Went to see specialist and said she was good. )      ASSESSMENT/PLAN:    ICD-10-CM    1. OAB (overactive bladder)  N32.81 The current medical regimen is effective;  continue present plan and medications.        2. Personal history of nicotine dependence  Z87.891 MS TOBACCO USE CESSATION INTERMEDIATE 3-10 MINUTES    Patient smokes cigarettes on a chronic basis. Approximately 3 minutes of education was provided about quit smoking and the harms of tobacco.  Patient does show understanding.  Patient has  the desire to quit smoking in the near future.              Return if symptoms worsen or fail to improve.    SUBJECTIVE/OBJECTIVE:  HPI  Here for follow up on health issue  Last visit started on ditropan.   Not getting up at night or going as frequently  Now emptying bladder and feeling better  Saw urology and since symptoms had improved didn't change anything and just going to follow up    Tobacco abuse  I did not fill the prescription for chantix. [facility] wanted $400  I am down to a pack a week.     /76   Pulse 68   Temp 97.1 °F (36.2 °C) (Temporal)   Wt 84.8 kg (187 lb)   SpO2 96%   BMI 28.43 kg/m²     Review of Systems   Constitutional:  Negative for activity change, appetite change, fatigue, fever and unexpected weight change.   HENT:  Negative for congestion, hearing loss, rhinorrhea, sinus pressure, sore throat and trouble swallowing.    Eyes:  Negative for visual disturbance.   Respiratory:  Negative for cough and shortness of breath.    Cardiovascular:  Negative for chest pain, palpitations and leg swelling.   Gastrointestinal:  Negative for abdominal pain, constipation, diarrhea, nausea and vomiting.   Endocrine: Negative for cold intolerance and heat intolerance.   Genitourinary:  Negative for flank pain, menstrual

## 2024-05-14 ENCOUNTER — OFFICE VISIT (OUTPATIENT)
Dept: FAMILY MEDICINE CLINIC | Age: 79
End: 2024-05-14
Payer: MEDICARE

## 2024-05-14 VITALS
WEIGHT: 183 LBS | DIASTOLIC BLOOD PRESSURE: 70 MMHG | SYSTOLIC BLOOD PRESSURE: 132 MMHG | TEMPERATURE: 97.7 F | HEART RATE: 70 BPM | BODY MASS INDEX: 27.83 KG/M2 | OXYGEN SATURATION: 97 %

## 2024-05-14 DIAGNOSIS — H66.001 ACUTE SUPPURATIVE OTITIS MEDIA OF RIGHT EAR WITHOUT SPONTANEOUS RUPTURE OF TYMPANIC MEMBRANE, RECURRENCE NOT SPECIFIED: Primary | ICD-10-CM

## 2024-05-14 PROCEDURE — 3078F DIAST BP <80 MM HG: CPT | Performed by: NURSE PRACTITIONER

## 2024-05-14 PROCEDURE — 1090F PRES/ABSN URINE INCON ASSESS: CPT | Performed by: NURSE PRACTITIONER

## 2024-05-14 PROCEDURE — G8417 CALC BMI ABV UP PARAM F/U: HCPCS | Performed by: NURSE PRACTITIONER

## 2024-05-14 PROCEDURE — 4004F PT TOBACCO SCREEN RCVD TLK: CPT | Performed by: NURSE PRACTITIONER

## 2024-05-14 PROCEDURE — G8427 DOCREV CUR MEDS BY ELIG CLIN: HCPCS | Performed by: NURSE PRACTITIONER

## 2024-05-14 PROCEDURE — 1123F ACP DISCUSS/DSCN MKR DOCD: CPT | Performed by: NURSE PRACTITIONER

## 2024-05-14 PROCEDURE — 3075F SYST BP GE 130 - 139MM HG: CPT | Performed by: NURSE PRACTITIONER

## 2024-05-14 PROCEDURE — 99213 OFFICE O/P EST LOW 20 MIN: CPT | Performed by: NURSE PRACTITIONER

## 2024-05-14 PROCEDURE — G8399 PT W/DXA RESULTS DOCUMENT: HCPCS | Performed by: NURSE PRACTITIONER

## 2024-05-14 RX ORDER — AMOXICILLIN AND CLAVULANATE POTASSIUM 875; 125 MG/1; MG/1
1 TABLET, FILM COATED ORAL 2 TIMES DAILY
Qty: 20 TABLET | Refills: 0 | Status: SHIPPED | OUTPATIENT
Start: 2024-05-14 | End: 2024-05-24

## 2024-05-14 SDOH — ECONOMIC STABILITY: FOOD INSECURITY: WITHIN THE PAST 12 MONTHS, YOU WORRIED THAT YOUR FOOD WOULD RUN OUT BEFORE YOU GOT MONEY TO BUY MORE.: NEVER TRUE

## 2024-05-14 SDOH — ECONOMIC STABILITY: FOOD INSECURITY: WITHIN THE PAST 12 MONTHS, THE FOOD YOU BOUGHT JUST DIDN'T LAST AND YOU DIDN'T HAVE MONEY TO GET MORE.: NEVER TRUE

## 2024-05-14 SDOH — ECONOMIC STABILITY: INCOME INSECURITY: HOW HARD IS IT FOR YOU TO PAY FOR THE VERY BASICS LIKE FOOD, HOUSING, MEDICAL CARE, AND HEATING?: NOT HARD AT ALL

## 2024-05-14 ASSESSMENT — PATIENT HEALTH QUESTIONNAIRE - PHQ9
1. LITTLE INTEREST OR PLEASURE IN DOING THINGS: NOT AT ALL
SUM OF ALL RESPONSES TO PHQ QUESTIONS 1-9: 0
9. THOUGHTS THAT YOU WOULD BE BETTER OFF DEAD, OR OF HURTING YOURSELF: NOT AT ALL
SUM OF ALL RESPONSES TO PHQ9 QUESTIONS 1 & 2: 0
10. IF YOU CHECKED OFF ANY PROBLEMS, HOW DIFFICULT HAVE THESE PROBLEMS MADE IT FOR YOU TO DO YOUR WORK, TAKE CARE OF THINGS AT HOME, OR GET ALONG WITH OTHER PEOPLE: NOT DIFFICULT AT ALL
5. POOR APPETITE OR OVEREATING: NOT AT ALL
SUM OF ALL RESPONSES TO PHQ QUESTIONS 1-9: 0
SUM OF ALL RESPONSES TO PHQ QUESTIONS 1-9: 0
7. TROUBLE CONCENTRATING ON THINGS, SUCH AS READING THE NEWSPAPER OR WATCHING TELEVISION: NOT AT ALL
3. TROUBLE FALLING OR STAYING ASLEEP: NOT AT ALL
2. FEELING DOWN, DEPRESSED OR HOPELESS: NOT AT ALL
SUM OF ALL RESPONSES TO PHQ QUESTIONS 1-9: 0
4. FEELING TIRED OR HAVING LITTLE ENERGY: NOT AT ALL
6. FEELING BAD ABOUT YOURSELF - OR THAT YOU ARE A FAILURE OR HAVE LET YOURSELF OR YOUR FAMILY DOWN: NOT AT ALL
8. MOVING OR SPEAKING SO SLOWLY THAT OTHER PEOPLE COULD HAVE NOTICED. OR THE OPPOSITE, BEING SO FIGETY OR RESTLESS THAT YOU HAVE BEEN MOVING AROUND A LOT MORE THAN USUAL: NOT AT ALL

## 2024-05-14 ASSESSMENT — ENCOUNTER SYMPTOMS
COUGH: 0
DIARRHEA: 0
TROUBLE SWALLOWING: 0
CONSTIPATION: 0
RHINORRHEA: 0
SORE THROAT: 0
SHORTNESS OF BREATH: 0
SINUS PRESSURE: 0
VOMITING: 0

## 2024-05-14 NOTE — PROGRESS NOTES
Ignacia Hatch (:  1945) is a 78 y.o. female,Established patient, here for evaluation of the following chief complaint(s):  Ear Problem (Started  morning with headache. Body aches. )      ASSESSMENT/PLAN:    ICD-10-CM    1. Acute suppurative otitis media of right ear without spontaneous rupture of tympanic membrane, recurrence not specified  H66.001 amoxicillin-clavulanate (AUGMENTIN) 875-125 MG per tablet          Return if symptoms worsen or fail to improve.    SUBJECTIVE/OBJECTIVE:  HPI  Here for right ear pain  Onset  morning   This is 3rd earache in her life.   Went to [facility] aid place and they said it was completely full of white wax  Been using lidocaine drops in the ear.   No fever    /70   Pulse 70   Temp 97.7 °F (36.5 °C)   Wt 83 kg (183 lb)   SpO2 97%   BMI 27.83 kg/m²     Review of Systems   Constitutional:  Negative for activity change, appetite change, fatigue, fever and unexpected weight change.   HENT:  Positive for ear pain. Negative for congestion, hearing loss, rhinorrhea, sinus pressure, sore throat and trouble swallowing.    Eyes:  Negative for visual disturbance.   Respiratory:  Negative for cough and shortness of breath.    Cardiovascular:  Negative for chest pain, palpitations and leg swelling.   Gastrointestinal:  Negative for abdominal pain, constipation, diarrhea, nausea and vomiting.   Endocrine: Negative for cold intolerance and heat intolerance.   Genitourinary:  Negative for flank pain, menstrual problem, pelvic pain, urgency and vaginal discharge.   Musculoskeletal:  Negative for arthralgias.   Skin:  Negative for rash.   Neurological:  Negative for headaches.   Psychiatric/Behavioral:  Negative for dysphoric mood and sleep disturbance. The patient is not nervous/anxious.        Physical Exam  Vitals reviewed.   Constitutional:       Appearance: She is normal weight.   HENT:      Head: Normocephalic.      Right Ear: There is impacted cerumen. Tympanic

## 2024-05-21 ENCOUNTER — TELEPHONE (OUTPATIENT)
Dept: FAMILY MEDICINE CLINIC | Age: 79
End: 2024-05-21

## 2024-05-21 RX ORDER — FLUCONAZOLE 150 MG/1
150 TABLET ORAL ONCE
Qty: 1 TABLET | Refills: 0 | Status: SHIPPED | OUTPATIENT
Start: 2024-05-21 | End: 2024-05-21

## 2024-05-21 NOTE — TELEPHONE ENCOUNTER
Patient called stating she was given and antibiotic for ear infection at [facility] 5/14/2024- this has not helped at all pt would like to see Dr Villarreal due to ear infection I let her know she is still established with him if she wanted to call and schedule an appt with him she would like us to do this     She also states she has developed a yeast infection from the antibiotic and would like something called in for this

## 2024-05-22 ENCOUNTER — OFFICE VISIT (OUTPATIENT)
Dept: FAMILY MEDICINE CLINIC | Age: 79
End: 2024-05-22
Payer: MEDICARE

## 2024-05-22 VITALS
OXYGEN SATURATION: 93 % | HEIGHT: 68 IN | HEART RATE: 93 BPM | BODY MASS INDEX: 27.25 KG/M2 | TEMPERATURE: 97.9 F | WEIGHT: 179.8 LBS | DIASTOLIC BLOOD PRESSURE: 58 MMHG | SYSTOLIC BLOOD PRESSURE: 118 MMHG

## 2024-05-22 DIAGNOSIS — M26.623 BILATERAL TEMPOROMANDIBULAR JOINT PAIN: ICD-10-CM

## 2024-05-22 DIAGNOSIS — H66.001 ACUTE SUPPURATIVE OTITIS MEDIA OF RIGHT EAR WITHOUT SPONTANEOUS RUPTURE OF TYMPANIC MEMBRANE, RECURRENCE NOT SPECIFIED: Primary | ICD-10-CM

## 2024-05-22 PROCEDURE — G8417 CALC BMI ABV UP PARAM F/U: HCPCS | Performed by: NURSE PRACTITIONER

## 2024-05-22 PROCEDURE — G8427 DOCREV CUR MEDS BY ELIG CLIN: HCPCS | Performed by: NURSE PRACTITIONER

## 2024-05-22 PROCEDURE — 99213 OFFICE O/P EST LOW 20 MIN: CPT | Performed by: NURSE PRACTITIONER

## 2024-05-22 PROCEDURE — 3078F DIAST BP <80 MM HG: CPT | Performed by: NURSE PRACTITIONER

## 2024-05-22 PROCEDURE — 1090F PRES/ABSN URINE INCON ASSESS: CPT | Performed by: NURSE PRACTITIONER

## 2024-05-22 PROCEDURE — G8399 PT W/DXA RESULTS DOCUMENT: HCPCS | Performed by: NURSE PRACTITIONER

## 2024-05-22 PROCEDURE — 4004F PT TOBACCO SCREEN RCVD TLK: CPT | Performed by: NURSE PRACTITIONER

## 2024-05-22 PROCEDURE — 3074F SYST BP LT 130 MM HG: CPT | Performed by: NURSE PRACTITIONER

## 2024-05-22 PROCEDURE — 1123F ACP DISCUSS/DSCN MKR DOCD: CPT | Performed by: NURSE PRACTITIONER

## 2024-05-22 RX ORDER — CLARITHROMYCIN 500 MG/1
500 TABLET, COATED ORAL 2 TIMES DAILY
Qty: 20 TABLET | Refills: 0 | Status: SHIPPED | OUTPATIENT
Start: 2024-05-22 | End: 2024-06-01

## 2024-05-22 RX ORDER — FLUTICASONE PROPIONATE 50 MCG
2 SPRAY, SUSPENSION (ML) NASAL DAILY
Qty: 16 G | Refills: 0 | Status: SHIPPED | OUTPATIENT
Start: 2024-05-22

## 2024-05-22 RX ORDER — METHYLPREDNISOLONE 4 MG/1
TABLET ORAL
Qty: 1 KIT | Refills: 0 | Status: SHIPPED | OUTPATIENT
Start: 2024-05-22 | End: 2024-05-28

## 2024-05-22 RX ORDER — CIPROFLOXACIN AND DEXAMETHASONE 3; 1 MG/ML; MG/ML
4 SUSPENSION/ DROPS AURICULAR (OTIC) 2 TIMES DAILY
Qty: 1 EACH | Refills: 0 | Status: SHIPPED | OUTPATIENT
Start: 2024-05-22 | End: 2024-05-23 | Stop reason: ALTCHOICE

## 2024-05-22 SDOH — ECONOMIC STABILITY: FOOD INSECURITY: WITHIN THE PAST 12 MONTHS, THE FOOD YOU BOUGHT JUST DIDN'T LAST AND YOU DIDN'T HAVE MONEY TO GET MORE.: NEVER TRUE

## 2024-05-22 SDOH — ECONOMIC STABILITY: FOOD INSECURITY: WITHIN THE PAST 12 MONTHS, YOU WORRIED THAT YOUR FOOD WOULD RUN OUT BEFORE YOU GOT MONEY TO BUY MORE.: NEVER TRUE

## 2024-05-22 SDOH — ECONOMIC STABILITY: INCOME INSECURITY: HOW HARD IS IT FOR YOU TO PAY FOR THE VERY BASICS LIKE FOOD, HOUSING, MEDICAL CARE, AND HEATING?: NOT HARD AT ALL

## 2024-05-22 ASSESSMENT — ENCOUNTER SYMPTOMS
COUGH: 0
EYES NEGATIVE: 1
VOMITING: 1
SINUS PAIN: 0
DIARRHEA: 1
SHORTNESS OF BREATH: 0
WHEEZING: 0
ABDOMINAL PAIN: 0
SORE THROAT: 0
NAUSEA: 1

## 2024-05-22 ASSESSMENT — PATIENT HEALTH QUESTIONNAIRE - PHQ9
SUM OF ALL RESPONSES TO PHQ QUESTIONS 1-9: 0
SUM OF ALL RESPONSES TO PHQ QUESTIONS 1-9: 0
SUM OF ALL RESPONSES TO PHQ9 QUESTIONS 1 & 2: 0
SUM OF ALL RESPONSES TO PHQ QUESTIONS 1-9: 0
1. LITTLE INTEREST OR PLEASURE IN DOING THINGS: NOT AT ALL
2. FEELING DOWN, DEPRESSED OR HOPELESS: NOT AT ALL
SUM OF ALL RESPONSES TO PHQ QUESTIONS 1-9: 0

## 2024-05-22 NOTE — PROGRESS NOTES
Ignacia Hatch (:  1945) is a 78 y.o. female,Established patient, here for evaluation of the following chief complaint(s):  Otalgia (Right ear pain started on 24. Was seen here in office on 24 for ear pain, prescribed antibiotic. Patient states she is on day 8/10 of antibiotic and has not had any relief. )      ASSESSMENT/PLAN:    ICD-10-CM    1. Acute suppurative otitis media of right ear without spontaneous rupture of tympanic membrane, recurrence not specified  H66.001 ciprofloxacin-dexAMETHasone (CIPRODEX) 0.3-0.1 % otic suspension     fluticasone (FLONASE) 50 MCG/ACT nasal spray     Corbin Mendez MD, Otolaryngology, [place]     clarithromycin (BIAXIN) 500 MG tablet      2. Bilateral temporomandibular joint pain  M26.623 methylPREDNISolone (MEDROL DOSEPACK) 4 MG tablet          No follow-ups on file.    SUBJECTIVE/OBJECTIVE:    Here for right ear pain   Started on 2024  Seen\"It is not getting worse but not getting better\"  Taking three 500 mg tylenol four times a day and uses heating pad for pain relief. \"Eases pain\"  Talking and eating aggravates the pain  on 2024, prescribed Augmentin. Still have two days worth of medication  Denies fever   States that she can not hear out of that ear even with her hearing aid.  She has had TMJ pain before.   She has not been wearing dentures.     Admits to the development of a yeast infection after antibiotic, having vaginal burning and itching.   Took diflucan this morning    Admits to nausea, vomiting and diarrhea  \"Took two bites of a hot dog and it came back up yesterday\"  Episodes of diarrhea are frequent, every time she urinates   Took pepto    Started on Wednesday   Weight is down 4 lbs. From last week.       BP (!) 118/58 (Site: Right Upper Arm, Position: Sitting, Cuff Size: Large Adult)   Pulse 93   Temp 97.9 °F (36.6 °C) (Temporal)   Ht 1.727 m (5' 8\")   Wt 81.6 kg (179 lb 12.8 oz)   SpO2 93%   BMI 27.34 kg/m²

## 2024-05-22 NOTE — PATIENT INSTRUCTIONS
Steroids with food  Wear dentures at night.   Stop augmentin  Do not take lipitor (atorvastatin) while taking biaxin.     Temporomandibular Joint Syndrome    TMJ is generally caused by the clenching or grinding of the teeth at night, but can happen during the day also.     Symptoms of TMJ:  Pain or tenderness in or around ear, jaw, or neck area  Popping/clicking from jaw joint  Tension-type headaches  Stiffness/tightening of joint in jaw    Here are some things to avoid that can cause aggravation of your TMJ:  Do not chew gum.  Do not chew on hard candy.   Avoid eating hard to chew foods.  Decrease stress.     You will need to go on soft food diet for two (2) weeks, especially when you are symptomatic of TMJ.

## 2024-05-23 ENCOUNTER — PATIENT MESSAGE (OUTPATIENT)
Dept: FAMILY MEDICINE CLINIC | Age: 79
End: 2024-05-23

## 2024-06-04 ENCOUNTER — OFFICE VISIT (OUTPATIENT)
Dept: FAMILY MEDICINE CLINIC | Age: 79
End: 2024-06-04
Payer: MEDICARE

## 2024-06-04 VITALS
TEMPERATURE: 98.7 F | OXYGEN SATURATION: 95 % | WEIGHT: 181 LBS | DIASTOLIC BLOOD PRESSURE: 64 MMHG | SYSTOLIC BLOOD PRESSURE: 130 MMHG | HEART RATE: 64 BPM | HEIGHT: 67 IN | BODY MASS INDEX: 28.41 KG/M2

## 2024-06-04 DIAGNOSIS — Z11.59 NEED FOR HEPATITIS C SCREENING TEST: ICD-10-CM

## 2024-06-04 DIAGNOSIS — E55.9 VITAMIN D DEFICIENCY: ICD-10-CM

## 2024-06-04 DIAGNOSIS — N18.30 STAGE 3 CHRONIC KIDNEY DISEASE, UNSPECIFIED WHETHER STAGE 3A OR 3B CKD (HCC): ICD-10-CM

## 2024-06-04 DIAGNOSIS — Z00.00 MEDICARE ANNUAL WELLNESS VISIT, SUBSEQUENT: Primary | ICD-10-CM

## 2024-06-04 DIAGNOSIS — Z87.891 PERSONAL HISTORY OF NICOTINE DEPENDENCE: ICD-10-CM

## 2024-06-04 DIAGNOSIS — Z23 NEED FOR VACCINATION FOR STREP PNEUMONIAE: ICD-10-CM

## 2024-06-04 DIAGNOSIS — E78.2 MIXED HYPERLIPIDEMIA: ICD-10-CM

## 2024-06-04 DIAGNOSIS — I10 ESSENTIAL HYPERTENSION: ICD-10-CM

## 2024-06-04 DIAGNOSIS — J44.9 CHRONIC OBSTRUCTIVE PULMONARY DISEASE, UNSPECIFIED COPD TYPE (HCC): ICD-10-CM

## 2024-06-04 DIAGNOSIS — N76.0 ACUTE VAGINITIS: ICD-10-CM

## 2024-06-04 DIAGNOSIS — R74.8 ELEVATED ALKALINE PHOSPHATASE LEVEL: ICD-10-CM

## 2024-06-04 PROCEDURE — 99213 OFFICE O/P EST LOW 20 MIN: CPT | Performed by: NURSE PRACTITIONER

## 2024-06-04 PROCEDURE — 3075F SYST BP GE 130 - 139MM HG: CPT | Performed by: NURSE PRACTITIONER

## 2024-06-04 PROCEDURE — G0439 PPPS, SUBSEQ VISIT: HCPCS | Performed by: NURSE PRACTITIONER

## 2024-06-04 PROCEDURE — 90677 PCV20 VACCINE IM: CPT | Performed by: NURSE PRACTITIONER

## 2024-06-04 PROCEDURE — 1123F ACP DISCUSS/DSCN MKR DOCD: CPT | Performed by: NURSE PRACTITIONER

## 2024-06-04 PROCEDURE — 3078F DIAST BP <80 MM HG: CPT | Performed by: NURSE PRACTITIONER

## 2024-06-04 PROCEDURE — G0009 ADMIN PNEUMOCOCCAL VACCINE: HCPCS | Performed by: NURSE PRACTITIONER

## 2024-06-04 RX ORDER — FLUCONAZOLE 150 MG/1
150 TABLET ORAL ONCE
Qty: 1 TABLET | Refills: 0 | Status: SHIPPED | OUTPATIENT
Start: 2024-06-04 | End: 2024-06-04

## 2024-06-04 ASSESSMENT — PATIENT HEALTH QUESTIONNAIRE - PHQ9
2. FEELING DOWN, DEPRESSED OR HOPELESS: NOT AT ALL
SUM OF ALL RESPONSES TO PHQ QUESTIONS 1-9: 0
1. LITTLE INTEREST OR PLEASURE IN DOING THINGS: NOT AT ALL
SUM OF ALL RESPONSES TO PHQ QUESTIONS 1-9: 0
SUM OF ALL RESPONSES TO PHQ9 QUESTIONS 1 & 2: 0
SUM OF ALL RESPONSES TO PHQ QUESTIONS 1-9: 0
SUM OF ALL RESPONSES TO PHQ QUESTIONS 1-9: 0

## 2024-06-04 ASSESSMENT — LIFESTYLE VARIABLES
HOW MANY STANDARD DRINKS CONTAINING ALCOHOL DO YOU HAVE ON A TYPICAL DAY: 1 OR 2
HAVE YOU OR SOMEONE ELSE BEEN INJURED AS A RESULT OF YOUR DRINKING: NO
HOW OFTEN DURING THE LAST YEAR HAVE YOU FAILED TO DO WHAT WAS NORMALLY EXPECTED FROM YOU BECAUSE OF DRINKING: NEVER
HOW OFTEN DURING THE LAST YEAR HAVE YOU HAD A FEELING OF GUILT OR REMORSE AFTER DRINKING: NEVER
HOW OFTEN DO YOU HAVE A DRINK CONTAINING ALCOHOL: 4 OR MORE TIMES A WEEK
HAS A RELATIVE, FRIEND, DOCTOR, OR ANOTHER HEALTH PROFESSIONAL EXPRESSED CONCERN ABOUT YOUR DRINKING OR SUGGESTED YOU CUT DOWN: NO
HOW OFTEN DURING THE LAST YEAR HAVE YOU FOUND THAT YOU WERE NOT ABLE TO STOP DRINKING ONCE YOU HAD STARTED: NEVER
HOW OFTEN DURING THE LAST YEAR HAVE YOU BEEN UNABLE TO REMEMBER WHAT HAPPENED THE NIGHT BEFORE BECAUSE YOU HAD BEEN DRINKING: NEVER
HOW OFTEN DURING THE LAST YEAR HAVE YOU NEEDED AN ALCOHOLIC DRINK FIRST THING IN THE MORNING TO GET YOURSELF GOING AFTER A NIGHT OF HEAVY DRINKING: NEVER

## 2024-06-04 NOTE — PROGRESS NOTES
After obtaining consent and per order of TOMER CAPPS, gave patient prevnar 20 injection in Right deltoid, patient tolerated well.  Medication was not supplied by the patient.  
Physician (Otolaryngology)  Parth Bains DO (Vascular Surgery)  Enrrique Luu MD Ellison, Patrick K, MD as Consulting Physician (Urology)     Reviewed and updated this visit:  Tobacco  Allergies  Meds  Problems  Med Hx  Surg Hx  Soc Hx  Fam Hx

## 2024-06-04 NOTE — PATIENT INSTRUCTIONS
enter P600 to learn more about \"Learning About Being Active as an Older Adult.\"  Current as of: June 5, 2023               Content Version: 14.0  © 2006-2024 [employer].   Care instructions adapted under license by [employer]. If you have questions about a medical condition or this instruction, always ask your healthcare professional. [employer] disclaims any warranty or liability for your use of this information.           Hearing Loss: Care Instructions  Overview     Hearing loss is a sudden or slow decrease in how well you hear. It can range from slight to profound. Permanent hearing loss can occur with aging. It also can happen when you are exposed long-term to loud noise. Examples include listening to loud music, riding motorcycles, or being around other loud machines.  Hearing loss can affect your work and home life. It can make you feel lonely or depressed. You may feel that you have lost your independence. But hearing aids and other devices can help you hear better and feel connected to others.  Follow-up care is a key part of your treatment and safety. Be sure to make and go to all appointments, and call your doctor if you are having problems. It's also a good idea to know your test results and keep a list of the medicines you take.  How can you care for yourself at home?  Avoid loud noises whenever possible. This helps keep your hearing from getting worse.  Always wear hearing protection around loud noises.  Wear a hearing aid as directed.  A professional can help you pick a hearing aid that will work best for you.  You can also get hearing aids over the counter for mild to moderate hearing loss.  Have hearing tests as your doctor suggests. They can show whether your hearing has changed. Your hearing aid may need to be adjusted.  Use other devices as needed. These may include:  Telephone amplifiers and hearing aids that can connect to a television, stereo, radio, or

## 2024-06-05 ENCOUNTER — OFFICE VISIT (OUTPATIENT)
Dept: FAMILY MEDICINE CLINIC | Age: 79
End: 2024-06-05
Payer: MEDICARE

## 2024-06-05 ENCOUNTER — TELEPHONE (OUTPATIENT)
Dept: FAMILY MEDICINE CLINIC | Age: 79
End: 2024-06-05

## 2024-06-05 VITALS — DIASTOLIC BLOOD PRESSURE: 78 MMHG | HEART RATE: 59 BPM | OXYGEN SATURATION: 93 % | SYSTOLIC BLOOD PRESSURE: 118 MMHG

## 2024-06-05 DIAGNOSIS — R74.8 ELEVATED SERUM GGT LEVEL: Primary | ICD-10-CM

## 2024-06-05 DIAGNOSIS — S61.214A LACERATION OF RIGHT RING FINGER WITHOUT FOREIGN BODY WITHOUT DAMAGE TO NAIL, INITIAL ENCOUNTER: Primary | ICD-10-CM

## 2024-06-05 DIAGNOSIS — R74.8 ELEVATED ALKALINE PHOSPHATASE LEVEL: ICD-10-CM

## 2024-06-05 DIAGNOSIS — Z11.59 NEED FOR HEPATITIS C SCREENING TEST: ICD-10-CM

## 2024-06-05 DIAGNOSIS — E55.9 VITAMIN D DEFICIENCY: ICD-10-CM

## 2024-06-05 DIAGNOSIS — N18.30 STAGE 3 CHRONIC KIDNEY DISEASE, UNSPECIFIED WHETHER STAGE 3A OR 3B CKD (HCC): ICD-10-CM

## 2024-06-05 DIAGNOSIS — E78.2 MIXED HYPERLIPIDEMIA: ICD-10-CM

## 2024-06-05 DIAGNOSIS — I10 ESSENTIAL HYPERTENSION: ICD-10-CM

## 2024-06-05 DIAGNOSIS — J44.9 CHRONIC OBSTRUCTIVE PULMONARY DISEASE, UNSPECIFIED COPD TYPE (HCC): ICD-10-CM

## 2024-06-05 LAB
25(OH)D3 SERPL-MCNC: 103.3 NG/ML
ALBUMIN SERPL-MCNC: 3.6 G/DL (ref 3.5–5.2)
ALP SERPL-CCNC: 185 U/L (ref 35–104)
ALT SERPL-CCNC: 15 U/L (ref 5–33)
ANION GAP SERPL CALCULATED.3IONS-SCNC: 14 MMOL/L (ref 7–19)
AST SERPL-CCNC: 14 U/L (ref 5–32)
BASOPHILS # BLD: 0 K/UL (ref 0–0.2)
BASOPHILS NFR BLD: 0.1 % (ref 0–1)
BILIRUB SERPL-MCNC: 0.3 MG/DL (ref 0.2–1.2)
BUN SERPL-MCNC: 16 MG/DL (ref 8–23)
CALCIUM SERPL-MCNC: 9.7 MG/DL (ref 8.8–10.2)
CHLORIDE SERPL-SCNC: 96 MMOL/L (ref 98–111)
CHOLEST SERPL-MCNC: 203 MG/DL (ref 160–199)
CO2 SERPL-SCNC: 22 MMOL/L (ref 22–29)
CREAT SERPL-MCNC: 1.1 MG/DL (ref 0.5–0.9)
EOSINOPHIL # BLD: 0.1 K/UL (ref 0–0.6)
EOSINOPHIL NFR BLD: 1.1 % (ref 0–5)
ERYTHROCYTE [DISTWIDTH] IN BLOOD BY AUTOMATED COUNT: 14.5 % (ref 11.5–14.5)
GAMMA GLUTAMYL TRANSFERASE: 208 U/L (ref 7–54)
GLUCOSE SERPL-MCNC: 79 MG/DL (ref 74–109)
HCT VFR BLD AUTO: 39.4 % (ref 37–47)
HCV AB SERPL QL IA: NORMAL
HDLC SERPL-MCNC: 73 MG/DL (ref 65–121)
HGB BLD-MCNC: 12.3 G/DL (ref 12–16)
IMM GRANULOCYTES # BLD: 0.1 K/UL
LDLC SERPL CALC-MCNC: 108 MG/DL
LYMPHOCYTES # BLD: 1.8 K/UL (ref 1.1–4.5)
LYMPHOCYTES NFR BLD: 23.9 % (ref 20–40)
MCH RBC QN AUTO: 34.2 PG (ref 27–31)
MCHC RBC AUTO-ENTMCNC: 31.2 G/DL (ref 33–37)
MCV RBC AUTO: 109.4 FL (ref 81–99)
MONOCYTES # BLD: 0.8 K/UL (ref 0–0.9)
MONOCYTES NFR BLD: 10.4 % (ref 0–10)
NEUTROPHILS # BLD: 4.7 K/UL (ref 1.5–7.5)
NEUTS SEG NFR BLD: 63.7 % (ref 50–65)
PLATELET # BLD AUTO: 160 K/UL (ref 130–400)
PMV BLD AUTO: 11.2 FL (ref 9.4–12.3)
POTASSIUM SERPL-SCNC: 4.8 MMOL/L (ref 3.5–5)
PROT SERPL-MCNC: 6.3 G/DL (ref 6.6–8.7)
RBC # BLD AUTO: 3.6 M/UL (ref 4.2–5.4)
SODIUM SERPL-SCNC: 132 MMOL/L (ref 136–145)
T4 FREE SERPL-MCNC: 1.32 NG/DL (ref 0.93–1.7)
TRIGL SERPL-MCNC: 108 MG/DL (ref 0–149)
TSH SERPL DL<=0.005 MIU/L-ACNC: 1.85 UIU/ML (ref 0.27–4.2)
WBC # BLD AUTO: 7.3 K/UL (ref 4.8–10.8)

## 2024-06-05 PROCEDURE — 12011 RPR F/E/E/N/L/M 2.5 CM/<: CPT | Performed by: NURSE PRACTITIONER

## 2024-06-05 ASSESSMENT — ENCOUNTER SYMPTOMS
RHINORRHEA: 0
DIARRHEA: 0
CONSTIPATION: 0
COUGH: 0
ABDOMINAL PAIN: 0
NAUSEA: 0
TROUBLE SWALLOWING: 0
SORE THROAT: 0
VOMITING: 0
SHORTNESS OF BREATH: 0
SINUS PRESSURE: 0

## 2024-06-05 NOTE — PROGRESS NOTES
Ignacia Hatch (:  1945) is a 78 y.o. female,Established patient, here for evaluation of the following chief complaint(s):  Hand Injury (Right hand injury. Happened at approx 725am. Hand got shut in lab door entering our building today. Brought upstairs for eval and sutures. Had tetanus shot in )      ASSESSMENT/PLAN:  No diagnosis found.    No follow-ups on file.    Procedures      SUBJECTIVE/OBJECTIVE:  HPI  Patient coming in office to get labs this morning try to keep door from slamming and it closed on her hand.  She has a laceration to her ring finger on her right hand.  This was cleaned with chlorhexidine by staff.  When I came in to office they had me examine patient.  Last tetanus was October  She denies any other injuries    /78   Pulse 59   SpO2 93%     Review of Systems   Constitutional:  Negative for activity change, appetite change, fatigue, fever and unexpected weight change.   HENT:  Negative for congestion, hearing loss, rhinorrhea, sinus pressure, sore throat and trouble swallowing.    Eyes:  Negative for visual disturbance.   Respiratory:  Negative for cough and shortness of breath.    Cardiovascular:  Negative for chest pain, palpitations and leg swelling.   Gastrointestinal:  Negative for abdominal pain, constipation, diarrhea, nausea and vomiting.   Endocrine: Negative for cold intolerance and heat intolerance.   Genitourinary:  Negative for flank pain, menstrual problem, pelvic pain, urgency and vaginal discharge.   Musculoskeletal:  Negative for arthralgias.   Skin:  Positive for wound (right hand ring finger). Negative for rash.   Neurological:  Negative for headaches.   Psychiatric/Behavioral:  Negative for dysphoric mood and sleep disturbance. The patient is not nervous/anxious.        Physical Exam  Vitals reviewed.   Constitutional:       Appearance: Normal appearance.   HENT:      Head: Normocephalic and atraumatic.   Cardiovascular:      Rate and Rhythm: Normal

## 2024-06-05 NOTE — PROGRESS NOTES
Subjective:      Ignacia Hatch is an 78 y.o. female who presents for evaluation of a laceration to the right hand. Injury occurred 30 minutes ago. The mechanism of the wound was door. The patient reports no pain. There was not other injuries. Patient denies head injury and loss of consciousness. The tetanus status is up to date (October 2023)    Objective:      /78   Pulse 59   SpO2 93%   There is a curved laceration measuring approximately 2 cm in length on the right ring finger. Examination of the wound for foreign bodies and devitalized tissue showed none.  Examination of the surrounding area for neural or vascular damage showed none.    Assessment/Plan:   Laceration as described above.  Tetanus immunization indicated: yes  Suture removal in 12 days.  Patient had 2 cm laceration to ring finger on right hand at the DIP joint.  Digital block anesthesia was obtained using 1% plain lidocaine. Once anesthesia was obtained wound was cleaned again with chlorhexidine.  3 intermittent 4.0 nylon sutures were used for wound closure.  Bacitracin Band-Aid dressing was used for dressing.  Patient was instructed return for any signs or symptoms of infection.  She was instructed on wound care and suture removal in 12 days.

## 2024-06-05 NOTE — TELEPHONE ENCOUNTER
Patient aware of results. She stated she was not taking her cholesterol meds for 10 days due to being on antibiotic for yeast infection. Patient has since resumed her cholesterol meds once antibiotic was finished.

## 2024-06-05 NOTE — TELEPHONE ENCOUNTER
----- Message from GÉNESIS Pearson sent at 6/5/2024  4:19 PM CDT -----  Please call patient and let them know results.   GGT is elevated.  This means that alk phos elevation is most likely from liver.  Recommend getting right upper quadrant ultrasound and an AMA blood test.

## 2024-06-05 NOTE — TELEPHONE ENCOUNTER
----- Message from GÉNESIS Pearson sent at 6/5/2024  1:07 PM CDT -----  Please call patient and let them know results.   Cholesterol is elevated.  Is gone up significantly from previous check.  Question whether or not patient is taking cholesterol medicine as prescribed  Metabolic panel shows stable kidney function, alk phos is elevated. Adding GGt.   Normal blood counts

## 2024-06-06 ENCOUNTER — OFFICE VISIT (OUTPATIENT)
Dept: ENT CLINIC | Age: 79
End: 2024-06-06
Payer: MEDICARE

## 2024-06-06 VITALS
SYSTOLIC BLOOD PRESSURE: 124 MMHG | WEIGHT: 183 LBS | BODY MASS INDEX: 28.72 KG/M2 | DIASTOLIC BLOOD PRESSURE: 80 MMHG | HEIGHT: 67 IN

## 2024-06-06 DIAGNOSIS — H65.91 MEE (MIDDLE EAR EFFUSION), RIGHT: Primary | ICD-10-CM

## 2024-06-06 DIAGNOSIS — H60.311 ACUTE DIFFUSE OTITIS EXTERNA OF RIGHT EAR: ICD-10-CM

## 2024-06-06 PROCEDURE — 1090F PRES/ABSN URINE INCON ASSESS: CPT | Performed by: PHYSICIAN ASSISTANT

## 2024-06-06 PROCEDURE — 99203 OFFICE O/P NEW LOW 30 MIN: CPT | Performed by: PHYSICIAN ASSISTANT

## 2024-06-06 PROCEDURE — 3079F DIAST BP 80-89 MM HG: CPT | Performed by: PHYSICIAN ASSISTANT

## 2024-06-06 PROCEDURE — G8417 CALC BMI ABV UP PARAM F/U: HCPCS | Performed by: PHYSICIAN ASSISTANT

## 2024-06-06 PROCEDURE — G8427 DOCREV CUR MEDS BY ELIG CLIN: HCPCS | Performed by: PHYSICIAN ASSISTANT

## 2024-06-06 PROCEDURE — 3074F SYST BP LT 130 MM HG: CPT | Performed by: PHYSICIAN ASSISTANT

## 2024-06-06 PROCEDURE — 1123F ACP DISCUSS/DSCN MKR DOCD: CPT | Performed by: PHYSICIAN ASSISTANT

## 2024-06-06 PROCEDURE — 4130F TOPICAL PREP RX AOE: CPT | Performed by: PHYSICIAN ASSISTANT

## 2024-06-06 PROCEDURE — G8399 PT W/DXA RESULTS DOCUMENT: HCPCS | Performed by: PHYSICIAN ASSISTANT

## 2024-06-06 PROCEDURE — 4004F PT TOBACCO SCREEN RCVD TLK: CPT | Performed by: PHYSICIAN ASSISTANT

## 2024-06-06 RX ORDER — PREDNISONE 20 MG/1
TABLET ORAL
Qty: 20 TABLET | Refills: 0 | Status: SHIPPED | OUTPATIENT
Start: 2024-06-06

## 2024-06-06 RX ORDER — CLINDAMYCIN HYDROCHLORIDE 300 MG/1
300 CAPSULE ORAL 3 TIMES DAILY
Qty: 30 CAPSULE | Refills: 0 | Status: SHIPPED | OUTPATIENT
Start: 2024-06-06 | End: 2024-06-16

## 2024-06-06 ASSESSMENT — ENCOUNTER SYMPTOMS
RHINORRHEA: 0
EYE PAIN: 0
TROUBLE SWALLOWING: 0
FACIAL SWELLING: 0
SINUS PRESSURE: 0
SORE THROAT: 0
SINUS PAIN: 0
VOICE CHANGE: 0
EYE DISCHARGE: 0

## 2024-06-06 NOTE — ASSESSMENT & PLAN NOTE
Right middle ear effusion with secondary otitis externa of the canal  Plan: I will place the patient on clindamycin, prednisone, and Cortisporin drops.  Patient is to follow-up in 2 weeks for reevaluation.  She was reminded to call if she has any questions or concerns.

## 2024-06-06 NOTE — PROGRESS NOTES
[facility] OTOLARYNGOLOGY/ENT  Ms. Hatch is a pleasant 78-year-old  female that was referred by Yuri Hancock due to a persistent right ear infection.  Patient was formally treated with Augmentin and Ciprodex with no success.  She reports that she has muffled hearing that has been present for about a month now.  She does admit to some drainage to the canal with a wet canal.  Also reports of soreness to the canal.        Allergies: Cephalexin, Pregabalin, Sulfamethoxazole-trimethoprim, Macrobid [nitrofurantoin], and Sulfa antibiotics      Current Outpatient Medications   Medication Sig Dispense Refill    neomycin-polymyxin-hydrocortisone (CORTISPORIN) 3.5-38223-8 otic solution Place 4 drops into the right ear 3 times daily for 10 days Instill into right Ear 10 mL 0    clindamycin (CLEOCIN) 300 MG capsule Take 1 capsule by mouth 3 times daily for 10 days 30 capsule 0    predniSONE (DELTASONE) 20 MG tablet Take 2 tabs po q d x 6 days, decrease to 1 tab po q d x 4 days, then decrease to half tablet po q d x 2 days. 20 tablet 0    fluticasone (FLONASE) 50 MCG/ACT nasal spray 2 sprays by Each Nostril route daily 16 g 0    albuterol sulfate HFA (VENTOLIN HFA) 108 (90 Base) MCG/ACT inhaler Inhale 2 puffs into the lungs 4 times daily as needed for Wheezing 3 each 3    oxyBUTYnin (DITROPAN XL) 5 MG extended release tablet Take 1 tablet by mouth daily 30 tablet 3    amLODIPine (NORVASC) 5 MG tablet Take 1 tablet by mouth daily 90 tablet 3    metoprolol (LOPRESSOR) 100 MG tablet Take 0.5 tablets by mouth 2 times daily 90 tablet 3    pantoprazole (PROTONIX) 40 MG tablet Take 1 tablet by mouth every morning (before breakfast) 90 tablet 3    valsartan (DIOVAN) 160 MG tablet Take 1 tablet by mouth daily 90 tablet 3    vitamin D (ERGOCALCIFEROL) 1.25 MG (01465 UT) CAPS capsule Take 1 capsule by mouth once a week 12 capsule 3    furosemide (LASIX) 20 MG tablet Take 1 tablet by mouth daily as needed (edema) (Patient taking

## 2024-06-06 NOTE — TELEPHONE ENCOUNTER
Called patient, spoke with: Patient regarding the results of the patients most recent labs.  I advised Patient of Yuri Hancock recommendations.   Patient did voice understanding

## 2024-06-13 DIAGNOSIS — H66.001 ACUTE SUPPURATIVE OTITIS MEDIA OF RIGHT EAR WITHOUT SPONTANEOUS RUPTURE OF TYMPANIC MEMBRANE, RECURRENCE NOT SPECIFIED: ICD-10-CM

## 2024-06-13 RX ORDER — FLUTICASONE PROPIONATE 50 MCG
2 SPRAY, SUSPENSION (ML) NASAL DAILY
Qty: 3 EACH | Refills: 3 | Status: SHIPPED | OUTPATIENT
Start: 2024-06-13

## 2024-06-13 NOTE — TELEPHONE ENCOUNTER
Received fax from pharmacy requesting refill on pts medication(s).   Pt was last seen in office on 6/5/2024  and has a follow up scheduled for 9/10/2024.    Requested Prescriptions     Pending Prescriptions Disp Refills    fluticasone (FLONASE) 50 MCG/ACT nasal spray [Pharmacy Med Name: FLUTICASONE PROP 50 MCG SPRAY]  1     Sig: SPRAY 2 SPRAYS INTO EACH NOSTRIL EVERY DAY

## 2024-06-14 ENCOUNTER — HOSPITAL ENCOUNTER (OUTPATIENT)
Dept: ULTRASOUND IMAGING | Age: 79
Discharge: HOME | End: 2024-06-14
Payer: MEDICARE

## 2024-06-14 DIAGNOSIS — R74.8 ELEVATED ALKALINE PHOSPHATASE LEVEL: ICD-10-CM

## 2024-06-14 DIAGNOSIS — R74.8 ELEVATED SERUM GGT LEVEL: ICD-10-CM

## 2024-06-14 PROCEDURE — 76705 ECHO EXAM OF ABDOMEN: CPT

## 2024-06-17 ENCOUNTER — OFFICE VISIT (OUTPATIENT)
Dept: ENT CLINIC | Age: 79
End: 2024-06-17
Payer: MEDICARE

## 2024-06-17 VITALS
BODY MASS INDEX: 27.74 KG/M2 | DIASTOLIC BLOOD PRESSURE: 75 MMHG | SYSTOLIC BLOOD PRESSURE: 130 MMHG | HEIGHT: 68 IN | WEIGHT: 183 LBS

## 2024-06-17 DIAGNOSIS — H65.91 MEE (MIDDLE EAR EFFUSION), RIGHT: Primary | ICD-10-CM

## 2024-06-17 DIAGNOSIS — B37.0 ORAL THRUSH: ICD-10-CM

## 2024-06-17 PROCEDURE — G8399 PT W/DXA RESULTS DOCUMENT: HCPCS | Performed by: PHYSICIAN ASSISTANT

## 2024-06-17 PROCEDURE — G8427 DOCREV CUR MEDS BY ELIG CLIN: HCPCS | Performed by: PHYSICIAN ASSISTANT

## 2024-06-17 PROCEDURE — 1090F PRES/ABSN URINE INCON ASSESS: CPT | Performed by: PHYSICIAN ASSISTANT

## 2024-06-17 PROCEDURE — G8417 CALC BMI ABV UP PARAM F/U: HCPCS | Performed by: PHYSICIAN ASSISTANT

## 2024-06-17 PROCEDURE — 4004F PT TOBACCO SCREEN RCVD TLK: CPT | Performed by: PHYSICIAN ASSISTANT

## 2024-06-17 PROCEDURE — 3078F DIAST BP <80 MM HG: CPT | Performed by: PHYSICIAN ASSISTANT

## 2024-06-17 PROCEDURE — 3075F SYST BP GE 130 - 139MM HG: CPT | Performed by: PHYSICIAN ASSISTANT

## 2024-06-17 PROCEDURE — 99213 OFFICE O/P EST LOW 20 MIN: CPT | Performed by: PHYSICIAN ASSISTANT

## 2024-06-17 PROCEDURE — 1123F ACP DISCUSS/DSCN MKR DOCD: CPT | Performed by: PHYSICIAN ASSISTANT

## 2024-06-17 RX ORDER — OXYMETAZOLINE HYDROCHLORIDE 0.05 G/100ML
SPRAY NASAL
Qty: 30 ML | Refills: 0 | Status: SHIPPED | OUTPATIENT
Start: 2024-06-17

## 2024-06-17 RX ORDER — FLUCONAZOLE 150 MG/1
TABLET ORAL
COMMUNITY
Start: 2024-06-04

## 2024-06-17 NOTE — PROGRESS NOTES
Ms. Hatch is a pleasant 78-year-old  female that presents for a 2-week follow-up after treatment for right middle ear effusion.  Patient reports that she completed the medication and has noticed no major changes to the right ear.  She reports that the pain is better but she continues with muffled hearing and a constant pressure sensation.  She also complains of having a burning/sore tongue that is worse when she eats spicy foods.  This seems to be worse by the end of the day.  She has noted a distorted taste with food. Denies any dysphagia.      Physical examination revealed the patient to have a normal TM canal to the left side.  Right side demonstrated evidence of a persistent middle ear effusion with TM bulging with no evidence of infection.  The canal demonstrated a low-grade otitis externa that is improved but not totally resolved.  The canal was somewhat edematous.  Neck exam demonstrated no lymphadenopathy or thyromegaly.  She was noted with no remarkable tenderness to the postauricular region.  Oral exam demonstrated no masses to the posterior pharynx.  The tongue surface did demonstrate evidence what was felt to be early thrush that is more appreciated to the posterior aspects.      Impression: Persistent right middle ear effusion, oral thrush likely from the steroids and antibiotics    Plan: I will place the patient on Mycostatin swish and swallow for 10 days.  Will also have her to try Afrin nasal spray and Flonase twice a day.  If she continues to have the middle ear effusion, we will have her to see Dr. Villarreal for possible myringotomy in the office.  Patient was reminded to call if she has any questions or concerns.      Electronically signed by JOANNA NASH PA-C on 6/17/24 at 10:46 AM [facility]

## 2024-06-18 RX ORDER — CLOTRIMAZOLE 10 MG/1
10 LOZENGE ORAL; TOPICAL 3 TIMES DAILY
Qty: 30 TABLET | Refills: 0 | Status: SHIPPED | OUTPATIENT
Start: 2024-06-18 | End: 2024-06-28

## 2024-06-19 ENCOUNTER — OFFICE VISIT (OUTPATIENT)
Dept: FAMILY MEDICINE CLINIC | Age: 79
End: 2024-06-19
Payer: MEDICARE

## 2024-06-19 ENCOUNTER — TELEPHONE (OUTPATIENT)
Dept: FAMILY MEDICINE CLINIC | Age: 79
End: 2024-06-19

## 2024-06-19 VITALS
SYSTOLIC BLOOD PRESSURE: 130 MMHG | TEMPERATURE: 97.6 F | OXYGEN SATURATION: 98 % | BODY MASS INDEX: 27.58 KG/M2 | WEIGHT: 181.38 LBS | HEART RATE: 63 BPM | DIASTOLIC BLOOD PRESSURE: 80 MMHG

## 2024-06-19 DIAGNOSIS — R74.8 ELEVATED SERUM GGT LEVEL: ICD-10-CM

## 2024-06-19 DIAGNOSIS — R10.11 RUQ PAIN: ICD-10-CM

## 2024-06-19 DIAGNOSIS — R10.11 RUQ PAIN: Primary | ICD-10-CM

## 2024-06-19 DIAGNOSIS — E78.2 MIXED HYPERLIPIDEMIA: ICD-10-CM

## 2024-06-19 DIAGNOSIS — Z48.02 ENCOUNTER FOR REMOVAL OF SUTURES: ICD-10-CM

## 2024-06-19 DIAGNOSIS — R74.8 ELEVATED ALKALINE PHOSPHATASE LEVEL: Primary | ICD-10-CM

## 2024-06-19 PROCEDURE — 4004F PT TOBACCO SCREEN RCVD TLK: CPT | Performed by: NURSE PRACTITIONER

## 2024-06-19 PROCEDURE — G8399 PT W/DXA RESULTS DOCUMENT: HCPCS | Performed by: NURSE PRACTITIONER

## 2024-06-19 PROCEDURE — G8417 CALC BMI ABV UP PARAM F/U: HCPCS | Performed by: NURSE PRACTITIONER

## 2024-06-19 PROCEDURE — 99213 OFFICE O/P EST LOW 20 MIN: CPT | Performed by: NURSE PRACTITIONER

## 2024-06-19 PROCEDURE — 1090F PRES/ABSN URINE INCON ASSESS: CPT | Performed by: NURSE PRACTITIONER

## 2024-06-19 PROCEDURE — 3075F SYST BP GE 130 - 139MM HG: CPT | Performed by: NURSE PRACTITIONER

## 2024-06-19 PROCEDURE — 3079F DIAST BP 80-89 MM HG: CPT | Performed by: NURSE PRACTITIONER

## 2024-06-19 PROCEDURE — 1123F ACP DISCUSS/DSCN MKR DOCD: CPT | Performed by: NURSE PRACTITIONER

## 2024-06-19 PROCEDURE — G8427 DOCREV CUR MEDS BY ELIG CLIN: HCPCS | Performed by: NURSE PRACTITIONER

## 2024-06-19 SDOH — ECONOMIC STABILITY: FOOD INSECURITY: WITHIN THE PAST 12 MONTHS, YOU WORRIED THAT YOUR FOOD WOULD RUN OUT BEFORE YOU GOT MONEY TO BUY MORE.: NEVER TRUE

## 2024-06-19 SDOH — ECONOMIC STABILITY: FOOD INSECURITY: WITHIN THE PAST 12 MONTHS, THE FOOD YOU BOUGHT JUST DIDN'T LAST AND YOU DIDN'T HAVE MONEY TO GET MORE.: NEVER TRUE

## 2024-06-19 SDOH — ECONOMIC STABILITY: INCOME INSECURITY: HOW HARD IS IT FOR YOU TO PAY FOR THE VERY BASICS LIKE FOOD, HOUSING, MEDICAL CARE, AND HEATING?: NOT HARD AT ALL

## 2024-06-19 ASSESSMENT — PATIENT HEALTH QUESTIONNAIRE - PHQ9
10. IF YOU CHECKED OFF ANY PROBLEMS, HOW DIFFICULT HAVE THESE PROBLEMS MADE IT FOR YOU TO DO YOUR WORK, TAKE CARE OF THINGS AT HOME, OR GET ALONG WITH OTHER PEOPLE: NOT DIFFICULT AT ALL
SUM OF ALL RESPONSES TO PHQ QUESTIONS 1-9: 3
7. TROUBLE CONCENTRATING ON THINGS, SUCH AS READING THE NEWSPAPER OR WATCHING TELEVISION: NOT AT ALL
SUM OF ALL RESPONSES TO PHQ QUESTIONS 1-9: 3
2. FEELING DOWN, DEPRESSED OR HOPELESS: NOT AT ALL
SUM OF ALL RESPONSES TO PHQ9 QUESTIONS 1 & 2: 1
5. POOR APPETITE OR OVEREATING: NOT AT ALL
3. TROUBLE FALLING OR STAYING ASLEEP: NOT AT ALL
4. FEELING TIRED OR HAVING LITTLE ENERGY: SEVERAL DAYS
8. MOVING OR SPEAKING SO SLOWLY THAT OTHER PEOPLE COULD HAVE NOTICED. OR THE OPPOSITE, BEING SO FIGETY OR RESTLESS THAT YOU HAVE BEEN MOVING AROUND A LOT MORE THAN USUAL: SEVERAL DAYS
1. LITTLE INTEREST OR PLEASURE IN DOING THINGS: SEVERAL DAYS
SUM OF ALL RESPONSES TO PHQ QUESTIONS 1-9: 3
9. THOUGHTS THAT YOU WOULD BE BETTER OFF DEAD, OR OF HURTING YOURSELF: NOT AT ALL
6. FEELING BAD ABOUT YOURSELF - OR THAT YOU ARE A FAILURE OR HAVE LET YOURSELF OR YOUR FAMILY DOWN: NOT AT ALL
SUM OF ALL RESPONSES TO PHQ QUESTIONS 1-9: 3

## 2024-06-19 ASSESSMENT — ENCOUNTER SYMPTOMS
SINUS PAIN: 0
EYES NEGATIVE: 1
VOMITING: 1
WHEEZING: 0
SORE THROAT: 0
SHORTNESS OF BREATH: 0
COUGH: 0
ABDOMINAL PAIN: 1
DIARRHEA: 0
NAUSEA: 0

## 2024-06-19 NOTE — TELEPHONE ENCOUNTER
Called patient, spoke with: Patient regarding the results of the patients most recent US .  I advised Patient of Yuri Hancock recommendations.   Patient did voice understanding

## 2024-06-19 NOTE — TELEPHONE ENCOUNTER
----- Message from GÉNESIS Pearson sent at 6/19/2024  1:00 PM CDT -----  Please call patient and let them know results.   Gallbladder ultrasound is essentially negative.  Recommend HIDA scan

## 2024-06-19 NOTE — PROGRESS NOTES
Ignacia Hatch (:  1945) is a 78 y.o. female,Established patient, here for evaluation of the following chief complaint(s):  Other (Wants results from gallbladder ultrasound from , go over blood work results, and stitches removed. )      ASSESSMENT/PLAN:    ICD-10-CM    1. Elevated alkaline phosphatase level  R74.8       2. Encounter for removal of sutures  Z48.02 Sutures removed       3. Mixed hyperlipidemia  E78.2       4. Elevated serum GGT level  R74.8       5. RUQ pain  R10.11 If US negative needs HIDA scan.         Return if symptoms worsen or fail to improve.    SUBJECTIVE/OBJECTIVE:  HPI  Here for suture removal and wants to discuss lab results    Smashed finger in door two weeks ago  Patient states it is sore but hit it against the counter top yesterday  Reports no signs or symptoms of infection  Washing it with dial antibacterial soap and place neosporin on it. Placed the neosporin on it for about a week and then left open to air.    Mixed hyperlipidemia   Lipitor  Patient states that she was off of her Lipitor for 10 days due to taking diflucan.   She is currently back on medication     Lab Results   Component Value Date    CHOL 151 (L) 2023    CHOL 166 2022    CHOL 163 2020     Lab Results   Component Value Date    TRIG 97 2023    TRIG 73 2022    TRIG 104 2020     Lab Results   Component Value Date    HDL 73 2024    HDL 85 2023    HDL 74 2022     Lab Results   Component Value Date     2024    LDL 47 2023    LDL 77 2022     Elevated alkaline phosphate and gamma GT on lab work from 2024  Recommended RUQ gallbladder US, this was obtained on 2024  Has not resulted yet    RUQ pain:  Pain ongoing for two weeks  Reports pain worse on Thursday  Started vomiting on Thursday afternoon, only comfortable when standing up and laying down. \"I almost calld 911\". Vomited off and on for about an hour. Started after

## 2024-07-03 ENCOUNTER — OFFICE VISIT (OUTPATIENT)
Dept: ENT CLINIC | Age: 79
End: 2024-07-03
Payer: MEDICARE

## 2024-07-03 VITALS
SYSTOLIC BLOOD PRESSURE: 128 MMHG | HEIGHT: 68 IN | WEIGHT: 181 LBS | BODY MASS INDEX: 27.43 KG/M2 | DIASTOLIC BLOOD PRESSURE: 72 MMHG

## 2024-07-03 DIAGNOSIS — M26.622 ARTHRALGIA OF LEFT TEMPOROMANDIBULAR JOINT: Primary | ICD-10-CM

## 2024-07-03 DIAGNOSIS — B37.0 ORAL THRUSH: ICD-10-CM

## 2024-07-03 DIAGNOSIS — H65.91 MEE (MIDDLE EAR EFFUSION), RIGHT: ICD-10-CM

## 2024-07-03 PROCEDURE — 4004F PT TOBACCO SCREEN RCVD TLK: CPT | Performed by: PHYSICIAN ASSISTANT

## 2024-07-03 PROCEDURE — 99213 OFFICE O/P EST LOW 20 MIN: CPT | Performed by: PHYSICIAN ASSISTANT

## 2024-07-03 PROCEDURE — 3078F DIAST BP <80 MM HG: CPT | Performed by: PHYSICIAN ASSISTANT

## 2024-07-03 PROCEDURE — 1123F ACP DISCUSS/DSCN MKR DOCD: CPT | Performed by: PHYSICIAN ASSISTANT

## 2024-07-03 PROCEDURE — 1090F PRES/ABSN URINE INCON ASSESS: CPT | Performed by: PHYSICIAN ASSISTANT

## 2024-07-03 PROCEDURE — G8417 CALC BMI ABV UP PARAM F/U: HCPCS | Performed by: PHYSICIAN ASSISTANT

## 2024-07-03 PROCEDURE — G8399 PT W/DXA RESULTS DOCUMENT: HCPCS | Performed by: PHYSICIAN ASSISTANT

## 2024-07-03 PROCEDURE — G8427 DOCREV CUR MEDS BY ELIG CLIN: HCPCS | Performed by: PHYSICIAN ASSISTANT

## 2024-07-03 PROCEDURE — 3074F SYST BP LT 130 MM HG: CPT | Performed by: PHYSICIAN ASSISTANT

## 2024-07-03 RX ORDER — CYCLOBENZAPRINE HCL 10 MG
10 TABLET ORAL NIGHTLY
Qty: 30 TABLET | Refills: 0 | Status: SHIPPED | OUTPATIENT
Start: 2024-07-03 | End: 2024-08-02

## 2024-07-03 NOTE — PROGRESS NOTES
Ms. Hatch is a pleasant 78-year-old  female that presents for 2-week follow-up after treatment for right middle ear effusion.  Patient reports that she is no longer having any muffled hearing and believes that the ear has improved.  Now she complains of pain to the left preauricular region that is worse in the mornings when she tries to eat breakfast.  She reports a popping sounds and feels like her jaw is getting ready to lock when she tries to eat.  She denies any history of dislocation.  She does have a history of TMJ several years ago that improved after getting new dentures.  She admits to not wearing her lower dentures at night when she sleeps due to her CPAP machine.      Physical examination with microscope revealed the patient to have a mildly retracted right TM with no evidence of fluid or infection.  Left ear demonstrated normal TM and canal.  Oral exam demonstrated upper and lower dentures to be present with the patient noted to have positive bilateral TMJ tenderness with the left greater than right.  She was noted with a left-sided click with no evidence of dislocation.  Patient was noted with no masses to the posterior pharynx with the thrush noted to be resolved to the surface of the tongue.  Neck exam demonstrated no lymphadenopathy or thyromegaly.      Impression: Resolved right middle ear effusion, left TMJ arthralgia with no evidence of dislocation, resolved oral thrush-likely from antibiotics and steroid usage    Plan: I recommended a trial of Flexeril nightly.  I also recommended her to start using her lower dentures for support that this is likely the reason for the TMJ that is most notable in the mornings.  She was also advised to see if her dentist can make an oral bite block to where she can remove her dentures at night for comfort measures.  Will also place her on Voltaren gel for the discomfort.  She has a history of stage III kidney disease prohibiting oral nonsteroidal

## 2024-07-08 ENCOUNTER — HOSPITAL ENCOUNTER (OUTPATIENT)
Dept: NUCLEAR MEDICINE | Age: 79
Discharge: HOME | End: 2024-07-10
Payer: MEDICARE

## 2024-07-08 ENCOUNTER — TELEPHONE (OUTPATIENT)
Dept: FAMILY MEDICINE CLINIC | Age: 79
End: 2024-07-08

## 2024-07-08 DIAGNOSIS — R10.11 RUQ PAIN: ICD-10-CM

## 2024-07-08 PROCEDURE — A9537 TC99M MEBROFENIN: HCPCS | Performed by: NURSE PRACTITIONER

## 2024-07-08 PROCEDURE — 3430000000 HC RX DIAGNOSTIC RADIOPHARMACEUTICAL: Performed by: NURSE PRACTITIONER

## 2024-07-08 PROCEDURE — 78227 HEPATOBIL SYST IMAGE W/DRUG: CPT

## 2024-07-08 RX ADMIN — Medication 5 MILLICURIE: at 13:27

## 2024-07-08 NOTE — TELEPHONE ENCOUNTER
----- Message from GÉNESIS Pearson sent at 7/8/2024  3:03 PM CDT -----  Please call patient and let them know results.   Normal gallbladder HIDA scan with ejection fraction of 89.1

## 2024-07-09 DIAGNOSIS — N32.81 OAB (OVERACTIVE BLADDER): ICD-10-CM

## 2024-07-09 RX ORDER — OXYBUTYNIN CHLORIDE 5 MG/1
5 TABLET, EXTENDED RELEASE ORAL DAILY
Qty: 90 TABLET | Refills: 1 | Status: SHIPPED | OUTPATIENT
Start: 2024-07-09

## 2024-07-09 NOTE — TELEPHONE ENCOUNTER
Received fax from pharmacy requesting refill on pts medication(s). Pt was last seen in office on 6/19/2024  and has a follow up scheduled for 9/10/2024. Will send request to  Yuri Hancock  for authorization.     Requested Prescriptions     Pending Prescriptions Disp Refills    oxyBUTYnin (DITROPAN-XL) 5 MG extended release tablet [Pharmacy Med Name: OXYBUTYNIN CL ER 5 MG TABLET] 90 tablet 1     Sig: TAKE 1 TABLET BY MOUTH EVERY DAY

## 2024-07-23 ENCOUNTER — TELEPHONE (OUTPATIENT)
Dept: ENT CLINIC | Age: 79
End: 2024-07-23

## 2024-07-23 RX ORDER — CLOTRIMAZOLE 10 MG/1
10 LOZENGE ORAL; TOPICAL 3 TIMES DAILY
Qty: 30 TROCHE | Refills: 0 | Status: SHIPPED | OUTPATIENT
Start: 2024-07-23 | End: 2024-08-02

## 2024-07-31 ENCOUNTER — OFFICE VISIT (OUTPATIENT)
Dept: ENT CLINIC | Age: 79
End: 2024-07-31
Payer: MEDICARE

## 2024-07-31 VITALS
SYSTOLIC BLOOD PRESSURE: 130 MMHG | BODY MASS INDEX: 27.28 KG/M2 | HEIGHT: 68 IN | WEIGHT: 180 LBS | DIASTOLIC BLOOD PRESSURE: 76 MMHG

## 2024-07-31 DIAGNOSIS — B37.0 ORAL THRUSH: Primary | ICD-10-CM

## 2024-07-31 DIAGNOSIS — M26.622 ARTHRALGIA OF LEFT TEMPOROMANDIBULAR JOINT: ICD-10-CM

## 2024-07-31 PROCEDURE — 3078F DIAST BP <80 MM HG: CPT | Performed by: PHYSICIAN ASSISTANT

## 2024-07-31 PROCEDURE — 4004F PT TOBACCO SCREEN RCVD TLK: CPT | Performed by: PHYSICIAN ASSISTANT

## 2024-07-31 PROCEDURE — G8399 PT W/DXA RESULTS DOCUMENT: HCPCS | Performed by: PHYSICIAN ASSISTANT

## 2024-07-31 PROCEDURE — 3075F SYST BP GE 130 - 139MM HG: CPT | Performed by: PHYSICIAN ASSISTANT

## 2024-07-31 PROCEDURE — 1123F ACP DISCUSS/DSCN MKR DOCD: CPT | Performed by: PHYSICIAN ASSISTANT

## 2024-07-31 PROCEDURE — G8417 CALC BMI ABV UP PARAM F/U: HCPCS | Performed by: PHYSICIAN ASSISTANT

## 2024-07-31 PROCEDURE — G8427 DOCREV CUR MEDS BY ELIG CLIN: HCPCS | Performed by: PHYSICIAN ASSISTANT

## 2024-07-31 PROCEDURE — 1090F PRES/ABSN URINE INCON ASSESS: CPT | Performed by: PHYSICIAN ASSISTANT

## 2024-07-31 PROCEDURE — 99213 OFFICE O/P EST LOW 20 MIN: CPT | Performed by: PHYSICIAN ASSISTANT

## 2024-07-31 NOTE — PROGRESS NOTES
Mrs. Hatch is a pleasant 78-year-old  female that presents for a 1 month follow-up for oral thrush as well as left-sided TMJ.  Patient reports that she is still having some intolerance to salt and certain spices due to burning to the tongue surface.  She reports that the TMJ is much better since has been wearing her lower dentures at night.  She does admit to gritting her teeth.      Physical examination with the microscope demonstrated the patient to have normal TMs and canals bilaterally.  Oral exam demonstrated no evidence of thrush to be present confirmed by scraping with tongue blade.  She was noted to have evidence of biting the lateral aspect of the tongue which may be contributing to some of her symptoms.  The posterior pharynx demonstrated no masses or any abnormalities.  Neck exam demonstrated no lymphadenopathy or thyromegaly.      Impression: Clinically resolved oral thrush as well as resolved left-sided TMJ arthralgia    Plan: Due to the patient being back to baseline, she is to follow-up with me as needed.  She was reminded to call if she has any questions or concerns.      Electronically signed by JOANNA NASH PA-C on 7/31/24 at 1:51 PM [facility]

## 2024-08-05 DIAGNOSIS — N32.81 OAB (OVERACTIVE BLADDER): ICD-10-CM

## 2024-08-05 RX ORDER — OXYBUTYNIN CHLORIDE 5 MG/1
5 TABLET, EXTENDED RELEASE ORAL DAILY
Qty: 90 TABLET | Refills: 1 | OUTPATIENT
Start: 2024-08-05

## 2024-09-10 ENCOUNTER — OFFICE VISIT (OUTPATIENT)
Dept: FAMILY MEDICINE CLINIC | Age: 79
End: 2024-09-10
Payer: MEDICARE

## 2024-09-10 ENCOUNTER — HOSPITAL ENCOUNTER (OUTPATIENT)
Dept: GENERAL RADIOLOGY | Age: 79
Discharge: HOME | End: 2024-09-10
Payer: MEDICARE

## 2024-09-10 VITALS
OXYGEN SATURATION: 99 % | HEIGHT: 68 IN | HEART RATE: 80 BPM | BODY MASS INDEX: 27.13 KG/M2 | DIASTOLIC BLOOD PRESSURE: 86 MMHG | WEIGHT: 179 LBS | TEMPERATURE: 97.2 F | SYSTOLIC BLOOD PRESSURE: 134 MMHG

## 2024-09-10 DIAGNOSIS — R74.8 ALKALINE PHOSPHATASE ELEVATION: ICD-10-CM

## 2024-09-10 DIAGNOSIS — M25.532 LEFT WRIST PAIN: Primary | ICD-10-CM

## 2024-09-10 DIAGNOSIS — M25.532 LEFT WRIST PAIN: ICD-10-CM

## 2024-09-10 LAB
ALBUMIN SERPL-MCNC: 4.1 G/DL (ref 3.5–5.2)
ALP SERPL-CCNC: 103 U/L (ref 35–104)
ALT SERPL-CCNC: 14 U/L (ref 5–33)
ANION GAP SERPL CALCULATED.3IONS-SCNC: 14 MMOL/L (ref 7–19)
AST SERPL-CCNC: 17 U/L (ref 5–32)
BILIRUB SERPL-MCNC: 0.4 MG/DL (ref 0.2–1.2)
BUN SERPL-MCNC: 15 MG/DL (ref 8–23)
CALCIUM SERPL-MCNC: 10.3 MG/DL (ref 8.8–10.2)
CHLORIDE SERPL-SCNC: 95 MMOL/L (ref 98–111)
CO2 SERPL-SCNC: 23 MMOL/L (ref 22–29)
CREAT SERPL-MCNC: 0.9 MG/DL (ref 0.5–0.9)
GAMMA GLUTAMYL TRANSFERASE: 33 U/L (ref 5–36)
GLUCOSE SERPL-MCNC: 117 MG/DL (ref 70–99)
POTASSIUM SERPL-SCNC: 4.3 MMOL/L (ref 3.5–5)
PROT SERPL-MCNC: 7.3 G/DL (ref 6.4–8.3)
SODIUM SERPL-SCNC: 132 MMOL/L (ref 136–145)

## 2024-09-10 PROCEDURE — 3079F DIAST BP 80-89 MM HG: CPT | Performed by: NURSE PRACTITIONER

## 2024-09-10 PROCEDURE — G8417 CALC BMI ABV UP PARAM F/U: HCPCS | Performed by: NURSE PRACTITIONER

## 2024-09-10 PROCEDURE — 4004F PT TOBACCO SCREEN RCVD TLK: CPT | Performed by: NURSE PRACTITIONER

## 2024-09-10 PROCEDURE — 3075F SYST BP GE 130 - 139MM HG: CPT | Performed by: NURSE PRACTITIONER

## 2024-09-10 PROCEDURE — G8427 DOCREV CUR MEDS BY ELIG CLIN: HCPCS | Performed by: NURSE PRACTITIONER

## 2024-09-10 PROCEDURE — G8399 PT W/DXA RESULTS DOCUMENT: HCPCS | Performed by: NURSE PRACTITIONER

## 2024-09-10 PROCEDURE — 99213 OFFICE O/P EST LOW 20 MIN: CPT | Performed by: NURSE PRACTITIONER

## 2024-09-10 PROCEDURE — 1123F ACP DISCUSS/DSCN MKR DOCD: CPT | Performed by: NURSE PRACTITIONER

## 2024-09-10 PROCEDURE — 1090F PRES/ABSN URINE INCON ASSESS: CPT | Performed by: NURSE PRACTITIONER

## 2024-09-10 PROCEDURE — 73110 X-RAY EXAM OF WRIST: CPT

## 2024-09-10 PROCEDURE — 73130 X-RAY EXAM OF HAND: CPT

## 2024-09-10 SDOH — ECONOMIC STABILITY: FOOD INSECURITY: WITHIN THE PAST 12 MONTHS, THE FOOD YOU BOUGHT JUST DIDN'T LAST AND YOU DIDN'T HAVE MONEY TO GET MORE.: NEVER TRUE

## 2024-09-10 SDOH — ECONOMIC STABILITY: INCOME INSECURITY: HOW HARD IS IT FOR YOU TO PAY FOR THE VERY BASICS LIKE FOOD, HOUSING, MEDICAL CARE, AND HEATING?: NOT HARD AT ALL

## 2024-09-10 SDOH — ECONOMIC STABILITY: FOOD INSECURITY: WITHIN THE PAST 12 MONTHS, YOU WORRIED THAT YOUR FOOD WOULD RUN OUT BEFORE YOU GOT MONEY TO BUY MORE.: NEVER TRUE

## 2024-09-10 ASSESSMENT — ENCOUNTER SYMPTOMS
VOMITING: 0
DIARRHEA: 0
SORE THROAT: 0
SHORTNESS OF BREATH: 0
NAUSEA: 0
ABDOMINAL PAIN: 0
COUGH: 0
RHINORRHEA: 0
CONSTIPATION: 0
TROUBLE SWALLOWING: 0
SINUS PRESSURE: 0

## 2024-09-10 ASSESSMENT — PATIENT HEALTH QUESTIONNAIRE - PHQ9
SUM OF ALL RESPONSES TO PHQ QUESTIONS 1-9: 0
SUM OF ALL RESPONSES TO PHQ9 QUESTIONS 1 & 2: 0
2. FEELING DOWN, DEPRESSED OR HOPELESS: NOT AT ALL
1. LITTLE INTEREST OR PLEASURE IN DOING THINGS: NOT AT ALL
SUM OF ALL RESPONSES TO PHQ QUESTIONS 1-9: 0

## 2024-09-12 ENCOUNTER — TELEPHONE (OUTPATIENT)
Dept: FAMILY MEDICINE CLINIC | Age: 79
End: 2024-09-12

## 2024-09-13 ENCOUNTER — TELEPHONE (OUTPATIENT)
Dept: FAMILY MEDICINE CLINIC | Age: 79
End: 2024-09-13

## 2024-09-13 LAB — MITOCHONDRIA M2 IGG SER-ACNC: 100.2 UNITS (ref 0–24.9)

## 2024-09-23 ENCOUNTER — TELEPHONE (OUTPATIENT)
Dept: FAMILY MEDICINE CLINIC | Age: 79
End: 2024-09-23

## 2024-09-23 DIAGNOSIS — R93.89 ABNORMAL X-RAY: Primary | ICD-10-CM

## 2024-09-24 ENCOUNTER — TELEPHONE (OUTPATIENT)
Dept: FAMILY MEDICINE CLINIC | Age: 79
End: 2024-09-24

## 2024-09-24 LAB
CRP SERPL HS-MCNC: 0.97 MG/DL (ref 0–0.5)
ERYTHROCYTE [SEDIMENTATION RATE] IN BLOOD BY WESTERGREN METHOD: 10 MM/HR (ref 0–25)
RHEUMATOID FACT SER NEPH-ACNC: 11 IU/ML

## 2024-09-27 ENCOUNTER — TELEPHONE (OUTPATIENT)
Dept: FAMILY MEDICINE CLINIC | Age: 79
End: 2024-09-27

## 2024-09-27 LAB — NUCLEAR IGG SER QL IA: NORMAL

## 2024-10-07 ENCOUNTER — OFFICE VISIT (OUTPATIENT)
Dept: FAMILY MEDICINE CLINIC | Age: 79
End: 2024-10-07
Payer: MEDICARE

## 2024-10-07 VITALS
HEART RATE: 69 BPM | DIASTOLIC BLOOD PRESSURE: 94 MMHG | HEIGHT: 68 IN | WEIGHT: 181 LBS | SYSTOLIC BLOOD PRESSURE: 162 MMHG | TEMPERATURE: 98.1 F | BODY MASS INDEX: 27.43 KG/M2 | OXYGEN SATURATION: 96 %

## 2024-10-07 DIAGNOSIS — M19.041 ARTHRITIS OF RIGHT HAND: Primary | ICD-10-CM

## 2024-10-07 DIAGNOSIS — B37.0 THRUSH, ORAL: ICD-10-CM

## 2024-10-07 PROCEDURE — G8484 FLU IMMUNIZE NO ADMIN: HCPCS | Performed by: NURSE PRACTITIONER

## 2024-10-07 PROCEDURE — 99214 OFFICE O/P EST MOD 30 MIN: CPT | Performed by: NURSE PRACTITIONER

## 2024-10-07 PROCEDURE — 4004F PT TOBACCO SCREEN RCVD TLK: CPT | Performed by: NURSE PRACTITIONER

## 2024-10-07 PROCEDURE — 1090F PRES/ABSN URINE INCON ASSESS: CPT | Performed by: NURSE PRACTITIONER

## 2024-10-07 PROCEDURE — 3077F SYST BP >= 140 MM HG: CPT | Performed by: NURSE PRACTITIONER

## 2024-10-07 PROCEDURE — G8427 DOCREV CUR MEDS BY ELIG CLIN: HCPCS | Performed by: NURSE PRACTITIONER

## 2024-10-07 PROCEDURE — 1123F ACP DISCUSS/DSCN MKR DOCD: CPT | Performed by: NURSE PRACTITIONER

## 2024-10-07 PROCEDURE — 3080F DIAST BP >= 90 MM HG: CPT | Performed by: NURSE PRACTITIONER

## 2024-10-07 PROCEDURE — G8399 PT W/DXA RESULTS DOCUMENT: HCPCS | Performed by: NURSE PRACTITIONER

## 2024-10-07 PROCEDURE — G8417 CALC BMI ABV UP PARAM F/U: HCPCS | Performed by: NURSE PRACTITIONER

## 2024-10-07 RX ORDER — PREDNISONE 10 MG/1
TABLET ORAL
Qty: 42 TABLET | Refills: 0 | Status: SHIPPED | OUTPATIENT
Start: 2024-10-07 | End: 2024-10-18

## 2024-10-07 ASSESSMENT — ENCOUNTER SYMPTOMS
SINUS PRESSURE: 0
SHORTNESS OF BREATH: 0
ABDOMINAL PAIN: 0
RHINORRHEA: 0
COUGH: 0
TROUBLE SWALLOWING: 0
CONSTIPATION: 0
DIARRHEA: 0
NAUSEA: 0
SORE THROAT: 0
VOMITING: 0

## 2024-10-07 NOTE — PROGRESS NOTES
Ignacia Hatch (:  1945) is a 78 y.o. female,Established patient, here for evaluation of the following chief complaint(s):  Arthritis (Arthritis in right hand. Got shot in hand from dr natarajan  of last month. Very painful. Using voltaren gel and cbd oil ) and Thrush (Continues to have issues with thrush, since may. Has taken meds but hasn't completely cleared/ )      ASSESSMENT/PLAN:    ICD-10-CM    1. Arthritis of right hand  M19.041 predniSONE (DELTASONE) 10 MG tablet     Heber Justin MD, Rheumatology, [place]    Going to try treating with steroids. She does not want pain medications.       2. Thrush, oral  B37.0 Treating with magic mouthwash. She has tried clotrimazole troches.           No follow-ups on file.    SUBJECTIVE/OBJECTIVE:  HPI  Here for arthritis in the right hand.   Saw Dr Natarajan and got shot on 2024.  Very painful . Using voltaren gel and cdb oil.   Been taking tylenol   It is not helping. Can not take NSAIDS due to CKD  Friday I thought I broke it getting off the toilet.   Next appt with Dr Natarajan is Dec 23.   GELA and RA factor negative. Inflammatory markers negative.   Xrays are suggestive of inflammatory arthritis.     Thrush  Continues to have issues with thrush.  Have take medication but not completely clearing.   \"Everything tastes metallic. Mayonaise bach my tongue. Anything with vinegar burns my mouth\"      BP (!) 162/94 (Site: Left Upper Arm, Position: Sitting, Cuff Size: Large Adult)   Pulse 69   Temp 98.1 °F (36.7 °C) (Temporal)   Ht 1.727 m (5' 8\")   Wt 82.1 kg (181 lb)   SpO2 96%   BMI 27.52 kg/m²     Review of Systems   Constitutional:  Negative for activity change, appetite change, fatigue, fever and unexpected weight change.   HENT:  Negative for congestion, hearing loss, rhinorrhea, sinus pressure, sore throat and trouble swallowing.    Eyes:  Negative for visual disturbance.   Respiratory:  Negative for cough and shortness of breath.

## 2024-11-01 ENCOUNTER — HOSPITAL ENCOUNTER (OUTPATIENT)
Dept: ULTRASOUND IMAGING | Age: 79
Discharge: HOME | End: 2024-11-01
Payer: MEDICARE

## 2024-11-01 DIAGNOSIS — E04.2 MULTINODULAR GOITER (NONTOXIC): ICD-10-CM

## 2024-11-01 PROCEDURE — 76536 US EXAM OF HEAD AND NECK: CPT

## 2024-11-20 ENCOUNTER — OFFICE VISIT (OUTPATIENT)
Dept: FAMILY MEDICINE CLINIC | Age: 79
End: 2024-11-20

## 2024-11-20 VITALS
OXYGEN SATURATION: 97 % | TEMPERATURE: 98.6 F | HEART RATE: 69 BPM | SYSTOLIC BLOOD PRESSURE: 136 MMHG | DIASTOLIC BLOOD PRESSURE: 88 MMHG | WEIGHT: 184 LBS | BODY MASS INDEX: 27.89 KG/M2 | HEIGHT: 68 IN

## 2024-11-20 DIAGNOSIS — K14.6 TONGUE PAIN: Primary | ICD-10-CM

## 2024-11-20 PROBLEM — Z96.612 S/P REVERSE TOTAL SHOULDER ARTHROPLASTY, LEFT: Status: ACTIVE | Noted: 2024-11-20

## 2024-11-20 RX ORDER — CLOTRIMAZOLE 10 MG/1
10 LOZENGE ORAL
Qty: 50 TABLET | Refills: 0 | Status: SHIPPED | OUTPATIENT
Start: 2024-11-20 | End: 2024-11-30

## 2024-11-20 ASSESSMENT — ENCOUNTER SYMPTOMS
SORE THROAT: 0
SHORTNESS OF BREATH: 0
RHINORRHEA: 0
ABDOMINAL PAIN: 0
DIARRHEA: 0
TROUBLE SWALLOWING: 0
NAUSEA: 0
CONSTIPATION: 0
COUGH: 0
VOMITING: 0
SINUS PRESSURE: 0

## 2024-11-20 NOTE — PROGRESS NOTES
Ignacia Hatch (:  1945) is a 78 y.o. female,Established patient, here for evaluation of the following chief complaint(s):  Oral Pain (Continues to have issues with red tongue and pain. )      ASSESSMENT/PLAN:    ICD-10-CM    1. Tongue pain  K14.6 Vitamin B12     clotrimazole (MYCELEX) 10 MG river     CBC with Auto Differential          Return if symptoms worsen or fail to improve.    SUBJECTIVE/OBJECTIVE:  HPI  Here for problems with recurrent thrush.   Mouth is sore.   Patient reports everything tastes metallic. Mayonaise bach my tongue. Anything with vinegar burns tongue.   Magic mouthwash worked but ran out. Within a couple of a days it started coming back.   She is drinking alcohol (Blended whiskey with diet sprite) 2-3 drinks a week.   By end of the day my mouth is red and irritated.     Lab Results   Component Value Date    [id number] 731 10/28/2022         /88 (Site: Left Upper Arm, Position: Sitting, Cuff Size: Large Adult)   Pulse 69   Temp 98.6 °F (37 °C) (Temporal)   Ht 1.727 m (5' 8\")   Wt 83.5 kg (184 lb)   SpO2 97%   BMI 27.98 kg/m²     Review of Systems   Constitutional:  Negative for activity change, appetite change, fatigue, fever and unexpected weight change.   HENT:  Negative for congestion, hearing loss, rhinorrhea, sinus pressure, sore throat and trouble swallowing.         Mouth pain   Eyes:  Negative for visual disturbance.   Respiratory:  Negative for cough and shortness of breath.    Cardiovascular:  Negative for chest pain, palpitations and leg swelling.   Gastrointestinal:  Negative for abdominal pain, constipation, diarrhea, nausea and vomiting.   Endocrine: Negative for cold intolerance and heat intolerance.   Genitourinary:  Negative for flank pain, menstrual problem, pelvic pain, urgency and vaginal discharge.   Musculoskeletal:  Negative for arthralgias.   Skin:  Negative for rash.   Neurological:  Negative for headaches.   Psychiatric/Behavioral:  Negative

## 2024-11-25 ENCOUNTER — OFFICE VISIT (OUTPATIENT)
Dept: ENT CLINIC | Age: 79
End: 2024-11-25
Payer: MEDICARE

## 2024-11-25 VITALS
DIASTOLIC BLOOD PRESSURE: 80 MMHG | WEIGHT: 184 LBS | BODY MASS INDEX: 27.89 KG/M2 | HEIGHT: 68 IN | SYSTOLIC BLOOD PRESSURE: 128 MMHG

## 2024-11-25 DIAGNOSIS — E04.2 MULTINODULAR GOITER (NONTOXIC): Primary | ICD-10-CM

## 2024-11-25 PROCEDURE — G8399 PT W/DXA RESULTS DOCUMENT: HCPCS | Performed by: OTOLARYNGOLOGY

## 2024-11-25 PROCEDURE — 4004F PT TOBACCO SCREEN RCVD TLK: CPT | Performed by: OTOLARYNGOLOGY

## 2024-11-25 PROCEDURE — 1160F RVW MEDS BY RX/DR IN RCRD: CPT | Performed by: OTOLARYNGOLOGY

## 2024-11-25 PROCEDURE — G8417 CALC BMI ABV UP PARAM F/U: HCPCS | Performed by: OTOLARYNGOLOGY

## 2024-11-25 PROCEDURE — G8484 FLU IMMUNIZE NO ADMIN: HCPCS | Performed by: OTOLARYNGOLOGY

## 2024-11-25 PROCEDURE — 3079F DIAST BP 80-89 MM HG: CPT | Performed by: OTOLARYNGOLOGY

## 2024-11-25 PROCEDURE — G8427 DOCREV CUR MEDS BY ELIG CLIN: HCPCS | Performed by: OTOLARYNGOLOGY

## 2024-11-25 PROCEDURE — 3074F SYST BP LT 130 MM HG: CPT | Performed by: OTOLARYNGOLOGY

## 2024-11-25 PROCEDURE — 1090F PRES/ABSN URINE INCON ASSESS: CPT | Performed by: OTOLARYNGOLOGY

## 2024-11-25 PROCEDURE — 1123F ACP DISCUSS/DSCN MKR DOCD: CPT | Performed by: OTOLARYNGOLOGY

## 2024-11-25 PROCEDURE — 1159F MED LIST DOCD IN RCRD: CPT | Performed by: OTOLARYNGOLOGY

## 2024-11-25 PROCEDURE — 99214 OFFICE O/P EST MOD 30 MIN: CPT | Performed by: OTOLARYNGOLOGY

## 2024-11-25 ASSESSMENT — ENCOUNTER SYMPTOMS
EYES NEGATIVE: 1
RESPIRATORY NEGATIVE: 1
GASTROINTESTINAL NEGATIVE: 1
ALLERGIC/IMMUNOLOGIC NEGATIVE: 1

## 2024-11-25 NOTE — PROGRESS NOTES
2024    Ignacia Hatch (:  1945) is a 79 y.o. female, Established patient, here for evaluation of the following chief complaint(s):  Follow-up (THYROID )      Vitals:    24 1119   BP: 128/80   Weight: 83.5 kg (184 lb)   Height: 1.727 m (5' 8\")       Wt Readings from Last 3 Encounters:   24 83.5 kg (184 lb)   24 83.5 kg (184 lb)   10/07/24 82.1 kg (181 lb)       BP Readings from Last 3 Encounters:   24 128/80   24 136/88   10/07/24 (!) 162/94         SUBJECTIVE/OBJECTIVE:    Patient seen today for her thyroid.  The last ultrasound we got she had a 1.5 cm nodule on the left that was TI-RADS 4 but patient underwent a biopsy.  Most recent ultrasound shows no change in the size but there grading it is TI-RADS 5 now recommending biopsy.        Review of Systems   Constitutional: Negative.    HENT: Negative.     Eyes: Negative.    Respiratory: Negative.     Cardiovascular: Negative.    Gastrointestinal: Negative.    Endocrine: Negative.    Musculoskeletal: Negative.    Skin: Negative.    Allergic/Immunologic: Negative.    Neurological: Negative.    Hematological: Negative.    Psychiatric/Behavioral: Negative.          Physical Exam  Vitals reviewed.   Constitutional:       Appearance: Normal appearance. She is normal weight.   HENT:      Head: Normocephalic and atraumatic.      Right Ear: Tympanic membrane, ear canal and external ear normal.      Left Ear: Tympanic membrane, ear canal and external ear normal.      Nose: Nose normal.      Mouth/Throat:      Mouth: Mucous membranes are moist.      Pharynx: Oropharynx is clear.   Eyes:      Extraocular Movements: Extraocular movements intact.      Pupils: Pupils are equal, round, and reactive to light.   Cardiovascular:      Rate and Rhythm: Normal rate and regular rhythm.   Pulmonary:      Effort: Pulmonary effort is normal.      Breath sounds: Normal breath sounds.   Musculoskeletal:      Cervical back: Normal range of

## 2024-11-26 ENCOUNTER — TRANSCRIBE ORDERS (OUTPATIENT)
Dept: ADMINISTRATIVE | Facility: HOSPITAL | Age: 79
End: 2024-11-26
Payer: MEDICARE

## 2024-11-26 DIAGNOSIS — E04.2 MULTINODULAR GOITER (NONTOXIC): Primary | ICD-10-CM

## 2024-12-13 ENCOUNTER — HOSPITAL ENCOUNTER (OUTPATIENT)
Dept: ULTRASOUND IMAGING | Facility: HOSPITAL | Age: 79
Discharge: HOME | End: 2024-12-13
Payer: MEDICARE

## 2024-12-13 DIAGNOSIS — E04.2 MULTINODULAR GOITER (NONTOXIC): ICD-10-CM

## 2024-12-13 PROCEDURE — 76536 US EXAM OF HEAD AND NECK: CPT

## 2024-12-13 PROCEDURE — 76942 ECHO GUIDE FOR BIOPSY: CPT

## 2024-12-13 RX ORDER — LIDOCAINE HYDROCHLORIDE 10 MG/ML
5 INJECTION, SOLUTION INFILTRATION; PERINEURAL ONCE
Status: DISPENSED | OUTPATIENT
Start: 2024-12-13

## 2024-12-16 LAB
BEAKER LAB AP INTRAOPERATIVE CONSULTATION: NORMAL
LAB AP CASE REPORT: NORMAL
LAB AP CLINICAL INFORMATION: NORMAL
Lab: NORMAL
PATH REPORT.FINAL DX SPEC: NORMAL
PATH REPORT.GROSS SPEC: NORMAL

## 2024-12-16 NOTE — TELEPHONE ENCOUNTER
Patient had rescheduled her appointment due to her  having Covid.   Patient ask if her medication could be called she has Thrush.      Thank you       Refill sent to [facility] in [facility]    Electronically signed by JOANNA NASH PA-C on 7/23/24 at 5:02 PM CDT       No

## 2024-12-26 ENCOUNTER — TELEPHONE (OUTPATIENT)
Dept: INTERVENTIONAL RADIOLOGY/VASCULAR | Facility: HOSPITAL | Age: 79
End: 2024-12-26
Payer: MEDICARE

## 2025-01-20 DIAGNOSIS — N18.30 STAGE 3 CHRONIC KIDNEY DISEASE, UNSPECIFIED WHETHER STAGE 3A OR 3B CKD (HCC): ICD-10-CM

## 2025-01-20 DIAGNOSIS — I10 ESSENTIAL HYPERTENSION: ICD-10-CM

## 2025-01-20 RX ORDER — VALSARTAN 160 MG/1
160 TABLET ORAL DAILY
Qty: 90 TABLET | Refills: 3 | Status: SHIPPED | OUTPATIENT
Start: 2025-01-20

## 2025-01-20 NOTE — TELEPHONE ENCOUNTER
Received fax from pharmacy requesting refill on pts medication(s). Pt was last seen in office on 11/20/2024  and has a follow up scheduled for Visit date not found. Will send request to  Yuri Hancock  for patient.     Requested Prescriptions     Pending Prescriptions Disp Refills    valsartan (DIOVAN) 160 MG tablet [Pharmacy Med Name: VALSARTAN TABS 160MG] 90 tablet 3     Sig: TAKE 1 TABLET DAILY

## 2025-02-05 DIAGNOSIS — N32.81 OAB (OVERACTIVE BLADDER): ICD-10-CM

## 2025-02-05 RX ORDER — OXYBUTYNIN CHLORIDE 5 MG/1
5 TABLET, EXTENDED RELEASE ORAL DAILY
Qty: 90 TABLET | Refills: 1 | Status: SHIPPED | OUTPATIENT
Start: 2025-02-05

## 2025-02-05 NOTE — TELEPHONE ENCOUNTER
Received fax from pharmacy requesting refill on pts medication(s). Pt was last seen in office on 11/20/2024  and has a follow up scheduled for 6/6/25. Will send request to  Yuri Hancock  for patient.     Requested Prescriptions     Pending Prescriptions Disp Refills    oxyBUTYnin (DITROPAN-XL) 5 MG extended release tablet [Pharmacy Med Name: OXYBUTYNIN CL ER 5 MG TABLET] 90 tablet 1     Sig: TAKE 1 TABLET BY MOUTH EVERY DAY

## 2025-02-06 DIAGNOSIS — I10 ESSENTIAL HYPERTENSION: ICD-10-CM

## 2025-02-06 DIAGNOSIS — K21.9 GASTROESOPHAGEAL REFLUX DISEASE, UNSPECIFIED WHETHER ESOPHAGITIS PRESENT: ICD-10-CM

## 2025-02-06 DIAGNOSIS — E78.2 MIXED HYPERLIPIDEMIA: ICD-10-CM

## 2025-02-06 RX ORDER — AMLODIPINE BESYLATE 5 MG/1
5 TABLET ORAL DAILY
Qty: 90 TABLET | Refills: 3 | Status: SHIPPED | OUTPATIENT
Start: 2025-02-06

## 2025-02-06 RX ORDER — ATORVASTATIN CALCIUM 40 MG/1
40 TABLET, FILM COATED ORAL DAILY
Qty: 90 TABLET | Refills: 3 | Status: SHIPPED | OUTPATIENT
Start: 2025-02-06

## 2025-02-06 RX ORDER — METOPROLOL TARTRATE 100 MG/1
50 TABLET ORAL 2 TIMES DAILY
Qty: 90 TABLET | Refills: 3 | Status: SHIPPED | OUTPATIENT
Start: 2025-02-06

## 2025-02-06 RX ORDER — PANTOPRAZOLE SODIUM 40 MG/1
40 TABLET, DELAYED RELEASE ORAL
Qty: 90 TABLET | Refills: 3 | Status: SHIPPED | OUTPATIENT
Start: 2025-02-06

## 2025-02-06 NOTE — TELEPHONE ENCOUNTER
Received fax from pharmacy requesting refill on pts medication(s). Pt was last seen in office on 11/20/2024  and has a follow up scheduled for Visit date not found. Will send request to  Yuri Hancock  for authorization.     Requested Prescriptions     Pending Prescriptions Disp Refills    atorvastatin (LIPITOR) 40 MG tablet [Pharmacy Med Name: ATORVASTATIN TABS 40MG] 90 tablet 3     Sig: TAKE 1 TABLET DAILY    pantoprazole (PROTONIX) 40 MG tablet [Pharmacy Med Name: PANTOPRAZOLE SODIUM DR TABS 40MG] 90 tablet 3     Sig: TAKE 1 TABLET EVERY MORNING BEFORE BREAKFAST    metoprolol (LOPRESSOR) 100 MG tablet [Pharmacy Med Name: METOPROLOL TARTRATE TABS 100MG] 90 tablet 3     Sig: TAKE ONE-HALF (1/2) TABLET TWICE A DAY    amLODIPine (NORVASC) 5 MG tablet [Pharmacy Med Name: AMLODIPINE BESYLATE TABS 5MG] 90 tablet 3     Sig: TAKE 1 TABLET DAILY

## 2025-02-10 DIAGNOSIS — M79.89 SWELLING OF LOWER EXTREMITY: ICD-10-CM

## 2025-02-10 RX ORDER — POTASSIUM CHLORIDE 1500 MG/1
20 TABLET, EXTENDED RELEASE ORAL DAILY
Qty: 30 TABLET | Refills: 3 | Status: SHIPPED | OUTPATIENT
Start: 2025-02-10

## 2025-02-10 RX ORDER — FUROSEMIDE 20 MG/1
20 TABLET ORAL DAILY PRN
Qty: 90 TABLET | Refills: 3 | Status: SHIPPED | OUTPATIENT
Start: 2025-02-10

## 2025-02-10 NOTE — TELEPHONE ENCOUNTER
Patient called because she is currently in [place] and is needing her prescription for Laxix and Klor Con sent the [facility] there. She did not bring these with her because she doesn't take them all the time but needs them now.    Will send to provider for approval.    Requested Prescriptions     Pending Prescriptions Disp Refills    furosemide (LASIX) 20 MG tablet 90 tablet 3     Sig: Take 1 tablet by mouth daily as needed (edema)    potassium chloride (KLOR-CON M) 20 MEQ extended release tablet 30 tablet 3     Sig: Take 1 tablet by mouth daily

## 2025-04-03 ENCOUNTER — TRANSCRIBE ORDERS (OUTPATIENT)
Dept: ADMINISTRATIVE | Age: 80
End: 2025-04-03

## 2025-04-03 DIAGNOSIS — F17.210 CIGARETTE SMOKER: Primary | ICD-10-CM

## 2025-04-21 NOTE — TELEPHONE ENCOUNTER
Patient called requesting a refill of the Magic Mouthwash be sent to [facility]. She stated that they faxed something to us about an hour ago.     Advised patient that the clinic has not had time to see this yet and respond since they are in clinic.

## 2025-04-22 ENCOUNTER — OFFICE VISIT (OUTPATIENT)
Age: 80
End: 2025-04-22
Payer: MEDICARE

## 2025-04-22 VITALS
TEMPERATURE: 97.6 F | WEIGHT: 176 LBS | SYSTOLIC BLOOD PRESSURE: 134 MMHG | HEART RATE: 76 BPM | OXYGEN SATURATION: 94 % | HEIGHT: 68 IN | DIASTOLIC BLOOD PRESSURE: 86 MMHG | BODY MASS INDEX: 26.67 KG/M2

## 2025-04-22 DIAGNOSIS — B37.0 THRUSH, ORAL: ICD-10-CM

## 2025-04-22 DIAGNOSIS — K13.79 ORAL PAIN: Primary | ICD-10-CM

## 2025-04-22 PROBLEM — H65.91 MEE (MIDDLE EAR EFFUSION), RIGHT: Status: RESOLVED | Noted: 2024-06-06 | Resolved: 2025-04-22

## 2025-04-22 PROCEDURE — G8427 DOCREV CUR MEDS BY ELIG CLIN: HCPCS | Performed by: NURSE PRACTITIONER

## 2025-04-22 PROCEDURE — 3075F SYST BP GE 130 - 139MM HG: CPT | Performed by: NURSE PRACTITIONER

## 2025-04-22 PROCEDURE — 1160F RVW MEDS BY RX/DR IN RCRD: CPT | Performed by: NURSE PRACTITIONER

## 2025-04-22 PROCEDURE — 1159F MED LIST DOCD IN RCRD: CPT | Performed by: NURSE PRACTITIONER

## 2025-04-22 PROCEDURE — 4004F PT TOBACCO SCREEN RCVD TLK: CPT | Performed by: NURSE PRACTITIONER

## 2025-04-22 PROCEDURE — 1123F ACP DISCUSS/DSCN MKR DOCD: CPT | Performed by: NURSE PRACTITIONER

## 2025-04-22 PROCEDURE — 3079F DIAST BP 80-89 MM HG: CPT | Performed by: NURSE PRACTITIONER

## 2025-04-22 PROCEDURE — 99213 OFFICE O/P EST LOW 20 MIN: CPT | Performed by: NURSE PRACTITIONER

## 2025-04-22 PROCEDURE — G8417 CALC BMI ABV UP PARAM F/U: HCPCS | Performed by: NURSE PRACTITIONER

## 2025-04-22 PROCEDURE — G8399 PT W/DXA RESULTS DOCUMENT: HCPCS | Performed by: NURSE PRACTITIONER

## 2025-04-22 PROCEDURE — 1090F PRES/ABSN URINE INCON ASSESS: CPT | Performed by: NURSE PRACTITIONER

## 2025-04-22 RX ORDER — CLOTRIMAZOLE 10 MG/1
10 LOZENGE ORAL
Qty: 50 TABLET | Refills: 0 | Status: SHIPPED | OUTPATIENT
Start: 2025-04-22 | End: 2025-05-02

## 2025-04-22 RX ORDER — CLINDAMYCIN HYDROCHLORIDE 300 MG/1
300 CAPSULE ORAL 3 TIMES DAILY
Qty: 30 CAPSULE | Refills: 0 | Status: SHIPPED | OUTPATIENT
Start: 2025-04-22 | End: 2025-05-02

## 2025-04-22 RX ORDER — PREDNISONE 1 MG/1
1 TABLET ORAL 2 TIMES DAILY
COMMUNITY
Start: 2025-04-15

## 2025-04-22 SDOH — ECONOMIC STABILITY: FOOD INSECURITY: WITHIN THE PAST 12 MONTHS, THE FOOD YOU BOUGHT JUST DIDN'T LAST AND YOU DIDN'T HAVE MONEY TO GET MORE.: NEVER TRUE

## 2025-04-22 SDOH — ECONOMIC STABILITY: FOOD INSECURITY: WITHIN THE PAST 12 MONTHS, YOU WORRIED THAT YOUR FOOD WOULD RUN OUT BEFORE YOU GOT MONEY TO BUY MORE.: NEVER TRUE

## 2025-04-22 ASSESSMENT — ENCOUNTER SYMPTOMS
RHINORRHEA: 0
TROUBLE SWALLOWING: 0
CONSTIPATION: 0
ABDOMINAL PAIN: 0
SINUS PRESSURE: 0
VOMITING: 0
NAUSEA: 0
DIARRHEA: 0
COUGH: 0
SORE THROAT: 0
SHORTNESS OF BREATH: 0

## 2025-04-22 ASSESSMENT — PATIENT HEALTH QUESTIONNAIRE - PHQ9
SUM OF ALL RESPONSES TO PHQ QUESTIONS 1-9: 0
1. LITTLE INTEREST OR PLEASURE IN DOING THINGS: NOT AT ALL
2. FEELING DOWN, DEPRESSED OR HOPELESS: NOT AT ALL

## 2025-04-22 NOTE — PROGRESS NOTES
Ignacia Hatch (:  1945) is a 79 y.o. female, Established patient, here for evaluation of the following chief complaint(s):  Oral Pain (Pain in top of mouth started 3-4 days ago. Was trying to eat over the weekend, but couldn't bite down without pain.  knot in the roof of mouth. Couldn't wear dentures due to pain. Started running fever. Chills, hot flashes. Horrible taste in mouth. Facial pain. \"It tastes like I have an abscess\" tongue is raw feeling. )         Assessment & Plan  1. Oral thrush: Symptoms suggest a possible sinus-related issue rather than thrush, as sinus pressure can cause gum and tooth discomfort. No visible signs of thrush were observed during the examination. The knot described may have been an abscess, which appears to have drained spontaneously.  - Warm salt water rinses.  - Prescription for clotrimazole troches to manage potential thrush.  - Antibiotic regimen initiated to address any potential oral infection, to be taken three times a day with food.  - Notify the clinic if symptoms change or worsen.    2. Pneumonia: Hospitalized in [place] for pneumonia on 2025 and has since recovered. Reports no current chest pain or respiratory issues related to the pneumonia.  - No specific treatment required at this time as recovery is complete.    3. Chronic obstructive pulmonary disease (COPD): Currently on breathing treatments and has a follow-up appointment with Dr. Barry tomorrow. Recently underwent a breathing test and is awaiting results.  - Continues to manage COPD with prescribed treatments.  - Monitoring for any exacerbations or changes in symptoms.    Follow-up  - Follow-up on 2025 for wellness visit or earlier if necessary.    Results        ICD-10-CM    1. Oral pain  K13.79 clindamycin (CLEOCIN) 300 MG capsule     clotrimazole (MYCELEX) 10 MG river      2. Thrush, oral  B37.0 clindamycin (CLEOCIN) 300 MG capsule     clotrimazole (MYCELEX) 10 MG river

## 2025-05-08 ENCOUNTER — HOSPITAL ENCOUNTER (OUTPATIENT)
Dept: GENERAL RADIOLOGY | Age: 80
Discharge: HOME | End: 2025-05-08
Payer: MEDICARE

## 2025-05-08 DIAGNOSIS — F17.210 CIGARETTE SMOKER: ICD-10-CM

## 2025-05-08 PROCEDURE — 71271 CT THORAX LUNG CANCER SCR C-: CPT

## 2025-05-09 DIAGNOSIS — M79.89 SWELLING OF LOWER EXTREMITY: ICD-10-CM

## 2025-05-09 RX ORDER — POTASSIUM CHLORIDE 1500 MG/1
20 TABLET, EXTENDED RELEASE ORAL DAILY
Qty: 90 TABLET | Refills: 3 | Status: SHIPPED | OUTPATIENT
Start: 2025-05-09

## 2025-05-09 NOTE — TELEPHONE ENCOUNTER
Received fax from pharmacy requesting refill on pts medication(s). Pt was last seen in office on 4/22/2025  and has a follow up scheduled for 6/6/2025. Will send request to  Yuri Hancock  for authorization.     Requested Prescriptions     Pending Prescriptions Disp Refills    potassium chloride (KLOR-CON M) 20 MEQ extended release tablet [Pharmacy Med Name: POTASSIUM CL ER 20 MEQ TAB MCR] 90 tablet 3     Sig: TAKE 1 TABLET BY MOUTH EVERY DAY

## 2025-05-15 DIAGNOSIS — N32.81 OAB (OVERACTIVE BLADDER): ICD-10-CM

## 2025-05-15 RX ORDER — OXYBUTYNIN CHLORIDE 5 MG/1
5 TABLET, EXTENDED RELEASE ORAL DAILY
Qty: 90 TABLET | Refills: 1 | Status: SHIPPED | OUTPATIENT
Start: 2025-05-15

## 2025-05-15 NOTE — TELEPHONE ENCOUNTER
Received fax from pharmacy requesting refill on pts medication(s). Pt was last seen in office on 4/22/2025  and has a follow up scheduled for 6/6/2025. Will send request to  Yuri Hancock  for patient.     Requested Prescriptions     Pending Prescriptions Disp Refills    oxyBUTYnin (DITROPAN-XL) 5 MG extended release tablet 90 tablet 1     Sig: Take 1 tablet by mouth daily

## 2025-06-25 ENCOUNTER — OFFICE VISIT (OUTPATIENT)
Age: 80
End: 2025-06-25
Payer: MEDICARE

## 2025-06-25 ENCOUNTER — RESULTS FOLLOW-UP (OUTPATIENT)
Age: 80
End: 2025-06-25

## 2025-06-25 VITALS
HEART RATE: 67 BPM | SYSTOLIC BLOOD PRESSURE: 122 MMHG | TEMPERATURE: 97.7 F | HEIGHT: 68 IN | OXYGEN SATURATION: 97 % | WEIGHT: 177.25 LBS | BODY MASS INDEX: 26.86 KG/M2 | DIASTOLIC BLOOD PRESSURE: 70 MMHG

## 2025-06-25 DIAGNOSIS — E55.9 VITAMIN D DEFICIENCY: ICD-10-CM

## 2025-06-25 DIAGNOSIS — R53.83 FATIGUE, UNSPECIFIED TYPE: ICD-10-CM

## 2025-06-25 DIAGNOSIS — N18.30 STAGE 3 CHRONIC KIDNEY DISEASE, UNSPECIFIED WHETHER STAGE 3A OR 3B CKD (HCC): ICD-10-CM

## 2025-06-25 DIAGNOSIS — Z12.31 ENCOUNTER FOR SCREENING MAMMOGRAM FOR MALIGNANT NEOPLASM OF BREAST: ICD-10-CM

## 2025-06-25 DIAGNOSIS — I73.9 PVD (PERIPHERAL VASCULAR DISEASE): ICD-10-CM

## 2025-06-25 DIAGNOSIS — J44.9 CHRONIC OBSTRUCTIVE PULMONARY DISEASE, UNSPECIFIED COPD TYPE (HCC): ICD-10-CM

## 2025-06-25 DIAGNOSIS — E78.2 MIXED HYPERLIPIDEMIA: ICD-10-CM

## 2025-06-25 DIAGNOSIS — I10 ESSENTIAL HYPERTENSION: ICD-10-CM

## 2025-06-25 DIAGNOSIS — K21.9 GASTROESOPHAGEAL REFLUX DISEASE, UNSPECIFIED WHETHER ESOPHAGITIS PRESENT: ICD-10-CM

## 2025-06-25 DIAGNOSIS — Z78.0 POST-MENOPAUSE: ICD-10-CM

## 2025-06-25 DIAGNOSIS — I50.32 CHRONIC DIASTOLIC HEART FAILURE (HCC): ICD-10-CM

## 2025-06-25 DIAGNOSIS — Z00.00 MEDICARE ANNUAL WELLNESS VISIT, SUBSEQUENT: ICD-10-CM

## 2025-06-25 DIAGNOSIS — Z00.00 MEDICARE ANNUAL WELLNESS VISIT, SUBSEQUENT: Primary | ICD-10-CM

## 2025-06-25 PROBLEM — Z96.612 S/P REVERSE TOTAL SHOULDER ARTHROPLASTY, LEFT: Status: RESOLVED | Noted: 2024-11-20 | Resolved: 2025-06-25

## 2025-06-25 LAB
25(OH)D3 SERPL-MCNC: 112 NG/ML
ALBUMIN SERPL-MCNC: 4.2 G/DL (ref 3.5–5.2)
ALP SERPL-CCNC: 120 U/L (ref 35–104)
ALT SERPL-CCNC: 12 U/L (ref 10–35)
ANION GAP SERPL CALCULATED.3IONS-SCNC: 14 MMOL/L (ref 8–16)
AST SERPL-CCNC: 21 U/L (ref 10–35)
BASOPHILS # BLD: 0 K/UL (ref 0–0.2)
BASOPHILS NFR BLD: 0.1 % (ref 0–1)
BILIRUB SERPL-MCNC: 0.6 MG/DL (ref 0.2–1.2)
BUN SERPL-MCNC: 13 MG/DL (ref 8–23)
CALCIUM SERPL-MCNC: 9.7 MG/DL (ref 8.8–10.2)
CHLORIDE SERPL-SCNC: 104 MMOL/L (ref 98–107)
CHOLEST SERPL-MCNC: 178 MG/DL (ref 0–199)
CO2 SERPL-SCNC: 22 MMOL/L (ref 22–29)
CREAT SERPL-MCNC: 1 MG/DL (ref 0.5–0.9)
CREAT UR-MCNC: 31.9 MG/DL (ref 28–217)
EOSINOPHIL # BLD: 0 K/UL (ref 0–0.6)
EOSINOPHIL NFR BLD: 0.3 % (ref 0–5)
ERYTHROCYTE [DISTWIDTH] IN BLOOD BY AUTOMATED COUNT: 15.7 % (ref 11.5–14.5)
GLUCOSE SERPL-MCNC: 92 MG/DL (ref 70–99)
HCT VFR BLD AUTO: 42.8 % (ref 37–47)
HDLC SERPL-MCNC: 94 MG/DL (ref 40–60)
HGB BLD-MCNC: 13.5 G/DL (ref 12–16)
IMM GRANULOCYTES # BLD: 0 K/UL
LDLC SERPL CALC-MCNC: 68 MG/DL
LYMPHOCYTES # BLD: 1.9 K/UL (ref 1.1–4.5)
LYMPHOCYTES NFR BLD: 21.7 % (ref 20–40)
MCH RBC QN AUTO: 33.8 PG (ref 27–31)
MCHC RBC AUTO-ENTMCNC: 31.5 G/DL (ref 33–37)
MCV RBC AUTO: 107.3 FL (ref 81–99)
MICROALBUMIN UR-MCNC: 15.4 MG/DL (ref 0–1.99)
MICROALBUMIN/CREAT UR-RTO: 482.8 MG/G (ref 0–29)
MONOCYTES # BLD: 0.6 K/UL (ref 0–0.9)
MONOCYTES NFR BLD: 6.4 % (ref 0–10)
NEUTROPHILS # BLD: 6.3 K/UL (ref 1.5–7.5)
NEUTS SEG NFR BLD: 71.1 % (ref 50–65)
PLATELET # BLD AUTO: 204 K/UL (ref 130–400)
PMV BLD AUTO: 10.5 FL (ref 9.4–12.3)
POTASSIUM SERPL-SCNC: 3.9 MMOL/L (ref 3.5–5.1)
PROT SERPL-MCNC: 7.1 G/DL (ref 6.4–8.3)
RBC # BLD AUTO: 3.99 M/UL (ref 4.2–5.4)
SODIUM SERPL-SCNC: 140 MMOL/L (ref 136–145)
T4 FREE SERPL-MCNC: 1.21 NG/DL (ref 0.93–1.7)
TRIGL SERPL-MCNC: 79 MG/DL (ref 0–149)
TSH SERPL DL<=0.005 MIU/L-ACNC: 0.99 UIU/ML (ref 0.27–4.2)
VIT B12 SERPL-MCNC: 166 PG/ML (ref 232–1245)
WBC # BLD AUTO: 8.9 K/UL (ref 4.8–10.8)

## 2025-06-25 PROCEDURE — 3078F DIAST BP <80 MM HG: CPT | Performed by: NURSE PRACTITIONER

## 2025-06-25 PROCEDURE — 1123F ACP DISCUSS/DSCN MKR DOCD: CPT | Performed by: NURSE PRACTITIONER

## 2025-06-25 PROCEDURE — 1159F MED LIST DOCD IN RCRD: CPT | Performed by: NURSE PRACTITIONER

## 2025-06-25 PROCEDURE — 3074F SYST BP LT 130 MM HG: CPT | Performed by: NURSE PRACTITIONER

## 2025-06-25 PROCEDURE — G0439 PPPS, SUBSEQ VISIT: HCPCS | Performed by: NURSE PRACTITIONER

## 2025-06-25 SDOH — ECONOMIC STABILITY: FOOD INSECURITY: WITHIN THE PAST 12 MONTHS, THE FOOD YOU BOUGHT JUST DIDN'T LAST AND YOU DIDN'T HAVE MONEY TO GET MORE.: NEVER TRUE

## 2025-06-25 SDOH — ECONOMIC STABILITY: FOOD INSECURITY: WITHIN THE PAST 12 MONTHS, YOU WORRIED THAT YOUR FOOD WOULD RUN OUT BEFORE YOU GOT MONEY TO BUY MORE.: NEVER TRUE

## 2025-06-25 ASSESSMENT — PATIENT HEALTH QUESTIONNAIRE - PHQ9
5. POOR APPETITE OR OVEREATING: NOT AT ALL
SUM OF ALL RESPONSES TO PHQ QUESTIONS 1-9: 0
6. FEELING BAD ABOUT YOURSELF - OR THAT YOU ARE A FAILURE OR HAVE LET YOURSELF OR YOUR FAMILY DOWN: NOT AT ALL
1. LITTLE INTEREST OR PLEASURE IN DOING THINGS: NOT AT ALL
2. FEELING DOWN, DEPRESSED OR HOPELESS: NOT AT ALL
8. MOVING OR SPEAKING SO SLOWLY THAT OTHER PEOPLE COULD HAVE NOTICED. OR THE OPPOSITE, BEING SO FIGETY OR RESTLESS THAT YOU HAVE BEEN MOVING AROUND A LOT MORE THAN USUAL: NOT AT ALL
4. FEELING TIRED OR HAVING LITTLE ENERGY: NOT AT ALL
10. IF YOU CHECKED OFF ANY PROBLEMS, HOW DIFFICULT HAVE THESE PROBLEMS MADE IT FOR YOU TO DO YOUR WORK, TAKE CARE OF THINGS AT HOME, OR GET ALONG WITH OTHER PEOPLE: NOT DIFFICULT AT ALL
SUM OF ALL RESPONSES TO PHQ QUESTIONS 1-9: 0
3. TROUBLE FALLING OR STAYING ASLEEP: NOT AT ALL
9. THOUGHTS THAT YOU WOULD BE BETTER OFF DEAD, OR OF HURTING YOURSELF: NOT AT ALL
SUM OF ALL RESPONSES TO PHQ QUESTIONS 1-9: 0
SUM OF ALL RESPONSES TO PHQ QUESTIONS 1-9: 0
7. TROUBLE CONCENTRATING ON THINGS, SUCH AS READING THE NEWSPAPER OR WATCHING TELEVISION: NOT AT ALL

## 2025-06-25 ASSESSMENT — ENCOUNTER SYMPTOMS
VOMITING: 0
TROUBLE SWALLOWING: 0
SHORTNESS OF BREATH: 0
NAUSEA: 0
COUGH: 0
ABDOMINAL PAIN: 0
RHINORRHEA: 0
SINUS PRESSURE: 0
CONSTIPATION: 0
DIARRHEA: 1
SORE THROAT: 0

## 2025-06-25 ASSESSMENT — LIFESTYLE VARIABLES
HAS A RELATIVE, FRIEND, DOCTOR, OR ANOTHER HEALTH PROFESSIONAL EXPRESSED CONCERN ABOUT YOUR DRINKING OR SUGGESTED YOU CUT DOWN: NO
HOW OFTEN DURING THE LAST YEAR HAVE YOU FAILED TO DO WHAT WAS NORMALLY EXPECTED FROM YOU BECAUSE OF DRINKING: NEVER
HOW OFTEN DURING THE LAST YEAR HAVE YOU FOUND THAT YOU WERE NOT ABLE TO STOP DRINKING ONCE YOU HAD STARTED: NEVER
HOW OFTEN DO YOU HAVE A DRINK CONTAINING ALCOHOL: 4 OR MORE TIMES A WEEK
HOW OFTEN DURING THE LAST YEAR HAVE YOU BEEN UNABLE TO REMEMBER WHAT HAPPENED THE NIGHT BEFORE BECAUSE YOU HAD BEEN DRINKING: NEVER
HAVE YOU OR SOMEONE ELSE BEEN INJURED AS A RESULT OF YOUR DRINKING: NO
HOW OFTEN DURING THE LAST YEAR HAVE YOU HAD A FEELING OF GUILT OR REMORSE AFTER DRINKING: NEVER
HOW MANY STANDARD DRINKS CONTAINING ALCOHOL DO YOU HAVE ON A TYPICAL DAY: 1 OR 2
HOW OFTEN DURING THE LAST YEAR HAVE YOU NEEDED AN ALCOHOLIC DRINK FIRST THING IN THE MORNING TO GET YOURSELF GOING AFTER A NIGHT OF HEAVY DRINKING: NEVER

## 2025-06-25 NOTE — PROGRESS NOTES
Medicare Annual Wellness Visit    Ignacia Hatch is here for Medicare AWV (Annual well visit. Patient have stomach concerns. Problems with BM. Not feeling well since Friday.)    Assessment & Plan  1. Medicare annual wellness visit.  - Blood pressure readings have shown improvement but continue to exhibit occasional elevations.  - Concerns regarding potential falls due to a previous hip fracture.  - Currently utilizing hearing aids for auditory support.  - Comprehensive blood workup and urine specimen collection will be conducted today.    2. Chronic obstructive pulmonary disease (COPD): Chronic.  - Continues to smoke and is not ready to quit.  - Currently using nebulizer as needed and remains on Yupelri.  - Advised to get an influenza vaccine in the first week of October and consider the RSV vaccine due to lung disease.    3. Hyperlipidemia.  - Currently on atorvastatin 40 mg daily.  - No new symptoms or issues reported.  - Medication appears effective in managing condition.  - Continue current medication regimen.    4. Hypertension.  - On valsartan 160 mg daily, metoprolol 100 mg half a tablet twice daily, Lasix 20 mg daily as needed for swelling, and amlodipine 5 mg daily.  - Blood pressure controlled at 122/70.  - Medication regimen appears effective.  - Continue current medication regimen.    5. Gastroesophageal reflux disease (GERD).  - On pantoprazole 40 mg daily.  - Reports no issues with reflux.  - Medication appears effective.  - Continue current medication regimen.    6. Overactive bladder.  - On oxybutynin 5 mg extended release daily.  - No new symptoms or issues reported.  - Medication appears effective.  - Continue current medication regimen.    7. Vitamin D deficiency.  - Takes vitamin D 50,000 units once weekly.  - No new symptoms or issues reported.  - Continue current supplementation.    8. Emphysema: Chronic.  - Continues to smoke and is not ready to quit.  - Currently using nebulizer as needed and

## 2025-06-25 NOTE — PATIENT INSTRUCTIONS
three per week) Tofu Nuts or seeds (unsalted, dry-roasted), low-sodium peanut butter Dried peas, beans, and lentils   Any smoked, cured, salted, or canned meat, fish, or poultry (including siddiqui, chipped beef, cold cuts, hot dogs, sausages, sardines, and anchovies) Poultry skins Breaded and/or fried fish or meats Canned peas, beans, and lentils Salted nuts   Fats and Oils   Olive oil and canola oil Low-sodium, low-fat salad dressings and mayonnaise   Butter, margarine, coconut and palm oils, siddiqui fat   Snacks, Sweets, and Condiments   Low-sodium or unsalted versions of broths, soups, soy sauce, and condiments Pepper, herbs, and spices; vinegar, lemon, or lime juice Low-fat frozen desserts (yogurt, sherbet, fruit bars) Sugar, cocoa powder, honey, syrup, jam, and preserves Low-fat, trans-fat free cookies, cakes, and pies Maciej and animal crackers, fig bars, clark snaps   High-fat desserts Broth, soups, gravies, and sauces, made from instant mixes or other high-sodium ingredients Salted snack foods Canned olives Meat tenderizers, seasoning salt, and most flavored vinegars   Beverages   Low-sodium carbonated beverages Tea and coffee in moderation Soy milk   Commercially softened water   Suggestions   Make whole grains, fruits, and vegetables the base of your diet.    Choose heart-healthy fats such as canola, olive, and flaxseed oil, and foods high in heart-healthy fats, such as nuts, seeds, soybeans, tofu, and fish.    Eat fish at least twice per week; the fish highest in omega-3 fatty acids and lowest in mercury include salmon, herring, mackerel, sardines, and canned chunk light tuna. If you eat fish less than twice per week or have high triglycerides, talk to your doctor about taking fish oil supplements.    Read food labels.   For products low in fat and cholesterol, look for fat free, low-fat, cholesterol free, saturated fat free, and trans fat freeAlso scan the Nutrition Facts Label, which lists saturated fat,

## 2025-06-27 ENCOUNTER — CLINICAL SUPPORT (OUTPATIENT)
Age: 80
End: 2025-06-27

## 2025-06-27 DIAGNOSIS — E53.8 B12 DEFICIENCY: Primary | ICD-10-CM

## 2025-06-27 RX ORDER — CYANOCOBALAMIN 1000 UG/ML
1000 INJECTION, SOLUTION INTRAMUSCULAR; SUBCUTANEOUS ONCE
Status: COMPLETED | OUTPATIENT
Start: 2025-06-27 | End: 2025-06-27

## 2025-06-27 RX ADMIN — CYANOCOBALAMIN 1000 MCG: 1000 INJECTION, SOLUTION INTRAMUSCULAR; SUBCUTANEOUS at 10:15

## 2025-06-27 NOTE — PROGRESS NOTES
After obtaining consent and per order of Yuri CAPPS, gave patient Vitamin B12 1000mcg injection in Left deltoid, patient tolerated well.  Medication was not supplied by the patient.

## 2025-07-03 ENCOUNTER — CLINICAL SUPPORT (OUTPATIENT)
Age: 80
End: 2025-07-03

## 2025-07-03 DIAGNOSIS — E53.8 B12 DEFICIENCY: Primary | ICD-10-CM

## 2025-07-03 RX ORDER — CYANOCOBALAMIN 1000 UG/ML
1000 INJECTION, SOLUTION INTRAMUSCULAR; SUBCUTANEOUS ONCE
Status: COMPLETED | OUTPATIENT
Start: 2025-07-03 | End: 2025-07-03

## 2025-07-03 RX ADMIN — CYANOCOBALAMIN 1000 MCG: 1000 INJECTION, SOLUTION INTRAMUSCULAR; SUBCUTANEOUS at 10:21

## 2025-07-08 DIAGNOSIS — Z00.00 MEDICARE ANNUAL WELLNESS VISIT, SUBSEQUENT: ICD-10-CM

## 2025-07-08 DIAGNOSIS — Z78.0 POST-MENOPAUSE: ICD-10-CM

## 2025-07-09 RX ORDER — ERGOCALCIFEROL 1.25 MG/1
50000 CAPSULE, LIQUID FILLED ORAL WEEKLY
Qty: 12 CAPSULE | Refills: 3 | Status: SHIPPED | OUTPATIENT
Start: 2025-07-09

## 2025-07-14 ENCOUNTER — CLINICAL SUPPORT (OUTPATIENT)
Age: 80
End: 2025-07-14
Payer: MEDICARE

## 2025-07-14 DIAGNOSIS — E53.8 B12 DEFICIENCY: Primary | ICD-10-CM

## 2025-07-14 PROCEDURE — 96372 THER/PROPH/DIAG INJ SC/IM: CPT | Performed by: NURSE PRACTITIONER

## 2025-07-14 RX ORDER — CYANOCOBALAMIN 1000 UG/ML
1000 INJECTION, SOLUTION INTRAMUSCULAR; SUBCUTANEOUS ONCE
Status: COMPLETED | OUTPATIENT
Start: 2025-07-14 | End: 2025-07-14

## 2025-07-14 RX ADMIN — CYANOCOBALAMIN 1000 MCG: 1000 INJECTION, SOLUTION INTRAMUSCULAR; SUBCUTANEOUS at 13:13

## 2025-07-14 NOTE — PROGRESS NOTES
After obtaining consent and per order of Yuri CAPPS, gave patient Vitamin B12 1000mcg injection in Right deltoid, patient tolerated well.  Medication was not supplied by the patient.

## 2025-07-18 ENCOUNTER — OFFICE VISIT (OUTPATIENT)
Age: 80
End: 2025-07-18
Payer: MEDICARE

## 2025-07-18 ENCOUNTER — HOSPITAL ENCOUNTER (OUTPATIENT)
Dept: GENERAL RADIOLOGY | Age: 80
Discharge: HOME | End: 2025-07-18
Payer: MEDICARE

## 2025-07-18 VITALS
BODY MASS INDEX: 26.73 KG/M2 | DIASTOLIC BLOOD PRESSURE: 66 MMHG | TEMPERATURE: 97.9 F | SYSTOLIC BLOOD PRESSURE: 128 MMHG | WEIGHT: 176.38 LBS | HEART RATE: 69 BPM | OXYGEN SATURATION: 96 % | HEIGHT: 68 IN

## 2025-07-18 DIAGNOSIS — R51.9 ACUTE NONINTRACTABLE HEADACHE, UNSPECIFIED HEADACHE TYPE: ICD-10-CM

## 2025-07-18 DIAGNOSIS — R51.9 ACUTE NONINTRACTABLE HEADACHE, UNSPECIFIED HEADACHE TYPE: Primary | ICD-10-CM

## 2025-07-18 DIAGNOSIS — H53.9 VISUAL CHANGES: ICD-10-CM

## 2025-07-18 DIAGNOSIS — E53.8 B12 DEFICIENCY: ICD-10-CM

## 2025-07-18 LAB
ALBUMIN SERPL-MCNC: 4 G/DL (ref 3.5–5.2)
ALP SERPL-CCNC: 98 U/L (ref 35–104)
ALT SERPL-CCNC: 6 U/L (ref 10–35)
ANION GAP SERPL CALCULATED.3IONS-SCNC: 12 MMOL/L (ref 8–16)
AST SERPL-CCNC: 17 U/L (ref 10–35)
BACTERIA #/AREA URNS HPF: ABNORMAL /HPF
BASOPHILS # BLD: 0 K/UL (ref 0–0.2)
BASOPHILS NFR BLD: 0.4 % (ref 0–1)
BILIRUB SERPL-MCNC: 0.4 MG/DL (ref 0.2–1.2)
BILIRUB UR QL STRIP: NEGATIVE
BUN SERPL-MCNC: 16 MG/DL (ref 8–23)
CALCIUM SERPL-MCNC: 9.8 MG/DL (ref 8.8–10.2)
CHLORIDE SERPL-SCNC: 104 MMOL/L (ref 98–107)
CLARITY UR: CLEAR
CO2 SERPL-SCNC: 20 MMOL/L (ref 22–29)
COARSE GRAN CASTS #/AREA URNS LPF: ABNORMAL /LPF (ref 0–5)
COLOR UR: YELLOW
CREAT SERPL-MCNC: 1 MG/DL (ref 0.5–0.9)
EOSINOPHIL # BLD: 0 K/UL (ref 0–0.6)
EOSINOPHIL NFR BLD: 0.5 % (ref 0–5)
ERYTHROCYTE [DISTWIDTH] IN BLOOD BY AUTOMATED COUNT: 15.6 % (ref 11.5–14.5)
FINE GRAN CASTS #/AREA URNS HPF: ABNORMAL /LPF (ref 0–2)
GLUCOSE SERPL-MCNC: 84 MG/DL (ref 70–99)
GLUCOSE UR STRIP.AUTO-MCNC: NEGATIVE MG/DL
HCT VFR BLD AUTO: 42.4 % (ref 37–47)
HGB BLD-MCNC: 13.6 G/DL (ref 12–16)
HGB UR STRIP.AUTO-MCNC: NEGATIVE MG/L
HYALINE CASTS #/AREA URNS LPF: ABNORMAL /LPF (ref 0–5)
IMM GRANULOCYTES # BLD: 0 K/UL
KETONES UR STRIP.AUTO-MCNC: ABNORMAL MG/DL
LEUKOCYTE ESTERASE UR QL STRIP.AUTO: NEGATIVE
LYMPHOCYTES # BLD: 1.9 K/UL (ref 1.1–4.5)
LYMPHOCYTES NFR BLD: 22.3 % (ref 20–40)
MCH RBC QN AUTO: 34.2 PG (ref 27–31)
MCHC RBC AUTO-ENTMCNC: 32.1 G/DL (ref 33–37)
MCV RBC AUTO: 106.5 FL (ref 81–99)
MONOCYTES # BLD: 0.6 K/UL (ref 0–0.9)
MONOCYTES NFR BLD: 7.2 % (ref 0–10)
NEUTROPHILS # BLD: 5.8 K/UL (ref 1.5–7.5)
NEUTS SEG NFR BLD: 69.2 % (ref 50–65)
NITRITE UR QL STRIP.AUTO: NEGATIVE
PH UR STRIP.AUTO: 5.5 [PH] (ref 5–8)
PLATELET # BLD AUTO: 241 K/UL (ref 130–400)
PMV BLD AUTO: 10.6 FL (ref 9.4–12.3)
POTASSIUM SERPL-SCNC: 3.5 MMOL/L (ref 3.5–5.1)
PROT SERPL-MCNC: 6.5 G/DL (ref 6.4–8.3)
PROT UR STRIP.AUTO-MCNC: 30 MG/DL
RBC # BLD AUTO: 3.98 M/UL (ref 4.2–5.4)
RBC #/AREA URNS HPF: ABNORMAL /HPF (ref 0–2)
SODIUM SERPL-SCNC: 136 MMOL/L (ref 136–145)
SP GR UR STRIP.AUTO: 1.03 (ref 1–1.03)
SQUAMOUS #/AREA URNS HPF: ABNORMAL /HPF
UROBILINOGEN UR STRIP.AUTO-MCNC: 0.2 E.U./DL
WBC # BLD AUTO: 8.4 K/UL (ref 4.8–10.8)
WBC #/AREA URNS HPF: ABNORMAL /HPF (ref 0–5)

## 2025-07-18 PROCEDURE — 99214 OFFICE O/P EST MOD 30 MIN: CPT | Performed by: NURSE PRACTITIONER

## 2025-07-18 PROCEDURE — 70450 CT HEAD/BRAIN W/O DYE: CPT

## 2025-07-18 PROCEDURE — 96372 THER/PROPH/DIAG INJ SC/IM: CPT | Performed by: NURSE PRACTITIONER

## 2025-07-18 PROCEDURE — 1090F PRES/ABSN URINE INCON ASSESS: CPT | Performed by: NURSE PRACTITIONER

## 2025-07-18 PROCEDURE — 4004F PT TOBACCO SCREEN RCVD TLK: CPT | Performed by: NURSE PRACTITIONER

## 2025-07-18 PROCEDURE — 1159F MED LIST DOCD IN RCRD: CPT | Performed by: NURSE PRACTITIONER

## 2025-07-18 PROCEDURE — G8427 DOCREV CUR MEDS BY ELIG CLIN: HCPCS | Performed by: NURSE PRACTITIONER

## 2025-07-18 PROCEDURE — G8417 CALC BMI ABV UP PARAM F/U: HCPCS | Performed by: NURSE PRACTITIONER

## 2025-07-18 PROCEDURE — 3078F DIAST BP <80 MM HG: CPT | Performed by: NURSE PRACTITIONER

## 2025-07-18 PROCEDURE — 1123F ACP DISCUSS/DSCN MKR DOCD: CPT | Performed by: NURSE PRACTITIONER

## 2025-07-18 PROCEDURE — 3074F SYST BP LT 130 MM HG: CPT | Performed by: NURSE PRACTITIONER

## 2025-07-18 PROCEDURE — 1160F RVW MEDS BY RX/DR IN RCRD: CPT | Performed by: NURSE PRACTITIONER

## 2025-07-18 PROCEDURE — G8399 PT W/DXA RESULTS DOCUMENT: HCPCS | Performed by: NURSE PRACTITIONER

## 2025-07-18 RX ORDER — CYANOCOBALAMIN 1000 UG/ML
1000 INJECTION, SOLUTION INTRAMUSCULAR; SUBCUTANEOUS ONCE
Status: COMPLETED | OUTPATIENT
Start: 2025-07-18 | End: 2025-07-18

## 2025-07-18 RX ADMIN — CYANOCOBALAMIN 1000 MCG: 1000 INJECTION, SOLUTION INTRAMUSCULAR; SUBCUTANEOUS at 10:40

## 2025-07-18 SDOH — ECONOMIC STABILITY: FOOD INSECURITY: WITHIN THE PAST 12 MONTHS, YOU WORRIED THAT YOUR FOOD WOULD RUN OUT BEFORE YOU GOT MONEY TO BUY MORE.: NEVER TRUE

## 2025-07-18 SDOH — ECONOMIC STABILITY: FOOD INSECURITY: WITHIN THE PAST 12 MONTHS, THE FOOD YOU BOUGHT JUST DIDN'T LAST AND YOU DIDN'T HAVE MONEY TO GET MORE.: NEVER TRUE

## 2025-07-18 ASSESSMENT — PATIENT HEALTH QUESTIONNAIRE - PHQ9
2. FEELING DOWN, DEPRESSED OR HOPELESS: NEARLY EVERY DAY
SUM OF ALL RESPONSES TO PHQ QUESTIONS 1-9: 3
SUM OF ALL RESPONSES TO PHQ QUESTIONS 1-9: 3
5. POOR APPETITE OR OVEREATING: NOT AT ALL
4. FEELING TIRED OR HAVING LITTLE ENERGY: NOT AT ALL
3. TROUBLE FALLING OR STAYING ASLEEP: NOT AT ALL
SUM OF ALL RESPONSES TO PHQ QUESTIONS 1-9: 3
SUM OF ALL RESPONSES TO PHQ QUESTIONS 1-9: 3
10. IF YOU CHECKED OFF ANY PROBLEMS, HOW DIFFICULT HAVE THESE PROBLEMS MADE IT FOR YOU TO DO YOUR WORK, TAKE CARE OF THINGS AT HOME, OR GET ALONG WITH OTHER PEOPLE: NOT DIFFICULT AT ALL
9. THOUGHTS THAT YOU WOULD BE BETTER OFF DEAD, OR OF HURTING YOURSELF: NOT AT ALL
6. FEELING BAD ABOUT YOURSELF - OR THAT YOU ARE A FAILURE OR HAVE LET YOURSELF OR YOUR FAMILY DOWN: NOT AT ALL
7. TROUBLE CONCENTRATING ON THINGS, SUCH AS READING THE NEWSPAPER OR WATCHING TELEVISION: NOT AT ALL
1. LITTLE INTEREST OR PLEASURE IN DOING THINGS: NOT AT ALL
8. MOVING OR SPEAKING SO SLOWLY THAT OTHER PEOPLE COULD HAVE NOTICED. OR THE OPPOSITE, BEING SO FIGETY OR RESTLESS THAT YOU HAVE BEEN MOVING AROUND A LOT MORE THAN USUAL: NOT AT ALL

## 2025-07-18 ASSESSMENT — ENCOUNTER SYMPTOMS
NAUSEA: 0
SHORTNESS OF BREATH: 0
SORE THROAT: 0
RHINORRHEA: 0
COUGH: 0
DIARRHEA: 0
SINUS PRESSURE: 0
ABDOMINAL PAIN: 0
CONSTIPATION: 0
VOMITING: 0
TROUBLE SWALLOWING: 0

## 2025-07-18 NOTE — PROGRESS NOTES
After obtaining consent and per order of victorino CAPPS, gave patient Vitamin B12 1000mcg injection in Right deltoid, patient tolerated well.  Medication was not supplied by the patient.

## 2025-07-18 NOTE — PROGRESS NOTES
Ignacia aHtch (:  1945) is a 79 y.o. female, Established patient, here for evaluation of the following chief complaint(s):  Headache (Patient stated that she have headaches for the past 3 weeks; right after  with some fever; pt stated that her whole left body side was hurting that day that she took some tylenol and lortab. Patient stated that she is dizzy right now; pt stated that the head ache is on the left side of her head. No other concerns at the time.)         Assessment & Plan  1. Headache: Acute.  - Sharp pains in the left eye and left side of the body, worsening with hearing aids, and waking up at 3:00 AM.  - Comprehensive diagnostic approach includes CT scan of the head, blood work, and urine analysis. Potential COVID-19 symptoms discussed.  - B12 injection administered today.  - Increase water intake.  - If symptoms worsen, seek immediate medical attention at the ER.    2. Cough: Chronic.  - Persistent cough attributed to smoking habit.  - No new or worsening symptoms noted.  - Advised to monitor symptoms and report any changes.    3. Dizziness: Acute.  - Reports dizziness and visual changes, including difficulty reading when covering the right eye.  - Further evaluation planned with diagnostic tests including CT scan of the head.  - If symptoms persist or worsen, seek further medical evaluation.    Follow-up  - Follow-up appointment scheduled for 2025.  - Increase fluid intake, specifically water.  - If symptoms worsen, go to the ER.    Results        ICD-10-CM    1. Acute nonintractable headache, unspecified headache type  R51.9 CT HEAD WO CONTRAST     CBC with Auto Differential     Comprehensive Metabolic Panel     Lyme Disease Acute Reflexive Panel     Rickettsia Rickettsii ([place] Spotted Fever -RMSF) Antibodies IgG & IgM by IFA     Urinalysis    I question if her symptoms could be something viral given it started with fevers that have since resolved.  With her

## 2025-07-20 LAB — B BURGDOR.VLSE1+PEPC10 AB SER IA-ACNC: 0.87 IV

## 2025-07-22 LAB
R RICKETTSI IGG TITR SER IF: NORMAL {TITER}
R RICKETTSI IGM TITR SER IF: NORMAL {TITER}

## 2025-07-23 ENCOUNTER — OFFICE VISIT (OUTPATIENT)
Age: 80
End: 2025-07-23
Payer: MEDICARE

## 2025-07-23 VITALS
TEMPERATURE: 97.7 F | HEART RATE: 73 BPM | SYSTOLIC BLOOD PRESSURE: 132 MMHG | HEIGHT: 68 IN | BODY MASS INDEX: 26.73 KG/M2 | DIASTOLIC BLOOD PRESSURE: 78 MMHG | OXYGEN SATURATION: 97 % | WEIGHT: 176.38 LBS

## 2025-07-23 DIAGNOSIS — R51.9 ACUTE NONINTRACTABLE HEADACHE, UNSPECIFIED HEADACHE TYPE: ICD-10-CM

## 2025-07-23 DIAGNOSIS — H53.9 VISUAL CHANGES: Primary | ICD-10-CM

## 2025-07-23 PROCEDURE — G8427 DOCREV CUR MEDS BY ELIG CLIN: HCPCS | Performed by: NURSE PRACTITIONER

## 2025-07-23 PROCEDURE — G8399 PT W/DXA RESULTS DOCUMENT: HCPCS | Performed by: NURSE PRACTITIONER

## 2025-07-23 PROCEDURE — 3075F SYST BP GE 130 - 139MM HG: CPT | Performed by: NURSE PRACTITIONER

## 2025-07-23 PROCEDURE — 99213 OFFICE O/P EST LOW 20 MIN: CPT | Performed by: NURSE PRACTITIONER

## 2025-07-23 PROCEDURE — 1123F ACP DISCUSS/DSCN MKR DOCD: CPT | Performed by: NURSE PRACTITIONER

## 2025-07-23 PROCEDURE — 3078F DIAST BP <80 MM HG: CPT | Performed by: NURSE PRACTITIONER

## 2025-07-23 PROCEDURE — 4004F PT TOBACCO SCREEN RCVD TLK: CPT | Performed by: NURSE PRACTITIONER

## 2025-07-23 PROCEDURE — 1160F RVW MEDS BY RX/DR IN RCRD: CPT | Performed by: NURSE PRACTITIONER

## 2025-07-23 PROCEDURE — 1090F PRES/ABSN URINE INCON ASSESS: CPT | Performed by: NURSE PRACTITIONER

## 2025-07-23 PROCEDURE — 1159F MED LIST DOCD IN RCRD: CPT | Performed by: NURSE PRACTITIONER

## 2025-07-23 PROCEDURE — G8417 CALC BMI ABV UP PARAM F/U: HCPCS | Performed by: NURSE PRACTITIONER

## 2025-07-23 SDOH — ECONOMIC STABILITY: FOOD INSECURITY: WITHIN THE PAST 12 MONTHS, YOU WORRIED THAT YOUR FOOD WOULD RUN OUT BEFORE YOU GOT MONEY TO BUY MORE.: NEVER TRUE

## 2025-07-23 SDOH — ECONOMIC STABILITY: FOOD INSECURITY: WITHIN THE PAST 12 MONTHS, THE FOOD YOU BOUGHT JUST DIDN'T LAST AND YOU DIDN'T HAVE MONEY TO GET MORE.: NEVER TRUE

## 2025-07-23 ASSESSMENT — ENCOUNTER SYMPTOMS
NAUSEA: 0
VOMITING: 0
CONSTIPATION: 0
SINUS PRESSURE: 0
SORE THROAT: 0
RHINORRHEA: 0
ABDOMINAL PAIN: 0
TROUBLE SWALLOWING: 0
DIARRHEA: 0
COUGH: 0
SHORTNESS OF BREATH: 0

## 2025-07-23 ASSESSMENT — PATIENT HEALTH QUESTIONNAIRE - PHQ9
SUM OF ALL RESPONSES TO PHQ QUESTIONS 1-9: 0
SUM OF ALL RESPONSES TO PHQ QUESTIONS 1-9: 0
3. TROUBLE FALLING OR STAYING ASLEEP: NOT AT ALL
7. TROUBLE CONCENTRATING ON THINGS, SUCH AS READING THE NEWSPAPER OR WATCHING TELEVISION: NOT AT ALL
10. IF YOU CHECKED OFF ANY PROBLEMS, HOW DIFFICULT HAVE THESE PROBLEMS MADE IT FOR YOU TO DO YOUR WORK, TAKE CARE OF THINGS AT HOME, OR GET ALONG WITH OTHER PEOPLE: NOT DIFFICULT AT ALL
9. THOUGHTS THAT YOU WOULD BE BETTER OFF DEAD, OR OF HURTING YOURSELF: NOT AT ALL
1. LITTLE INTEREST OR PLEASURE IN DOING THINGS: NOT AT ALL
2. FEELING DOWN, DEPRESSED OR HOPELESS: NOT AT ALL
8. MOVING OR SPEAKING SO SLOWLY THAT OTHER PEOPLE COULD HAVE NOTICED. OR THE OPPOSITE, BEING SO FIGETY OR RESTLESS THAT YOU HAVE BEEN MOVING AROUND A LOT MORE THAN USUAL: NOT AT ALL
SUM OF ALL RESPONSES TO PHQ QUESTIONS 1-9: 0
6. FEELING BAD ABOUT YOURSELF - OR THAT YOU ARE A FAILURE OR HAVE LET YOURSELF OR YOUR FAMILY DOWN: NOT AT ALL
4. FEELING TIRED OR HAVING LITTLE ENERGY: NOT AT ALL
5. POOR APPETITE OR OVEREATING: NOT AT ALL
SUM OF ALL RESPONSES TO PHQ QUESTIONS 1-9: 0

## 2025-07-23 NOTE — PROGRESS NOTES
sulfate HFA (VENTOLIN HFA) 108 (90 Base) MCG/ACT inhaler Inhale 2 puffs into the lungs 4 times daily as needed for Wheezing  Patient not taking: Reported on 6/25/2025  [place] APRN   Cholecalciferol (VITAMIN D) 50 MCG (2000 UT) CAPS capsule Take by mouth Daily  Provider, MD Brissa        Allergies   Allergen Reactions    Cephalexin Rash and Other (See Comments)    Pregabalin Other (See Comments)     \"jitters\"    Sulfamethoxazole-Trimethoprim Rash and Other (See Comments)    Macrobid [Nitrofurantoin]      Vomiting and diarrhea    Sulfa Antibiotics Rash       Past Medical History:   Diagnosis Date    Arthritis     COPD (chronic obstructive pulmonary disease) (HCC)     Emphysema of lung (HCC)     Hyperlipidemia     Hypertension     Pneumonia     in the past    Vitamin B12 deficiency     Vitamin D deficiency        Past Surgical History:   Procedure Laterality Date    APPENDECTOMY      BACK SURGERY      BREAST SURGERY      biopsy    BREAST SURGERY Right 01/04/2023    biopsy dr barker    HAND SURGERY Bilateral     HEMIARTHROPLASTY HIP Left 10/16/2017    HIP HEMIARTHROPLASTY performed by Garrick Garcia MD at [facility] OR    HYSTERECTOMY (CERVIX STATUS UNKNOWN)      JOINT REPLACEMENT      s/p left hip, bilateral total knee replacements    KNEE SURGERY Bilateral     L 2008/ R 2009    NECK SURGERY      TONSILLECTOMY         Family History   Problem Relation Age of Onset    Cancer Mother     Heart Attack Mother         cause of death    Cancer Father          Review of Systems   Constitutional:  Negative for activity change, appetite change, fatigue, fever and unexpected weight change.   HENT:  Negative for congestion, hearing loss, rhinorrhea, sinus pressure, sore throat and trouble swallowing.    Eyes:  Positive for visual disturbance.   Respiratory:  Negative for cough and shortness of breath.    Cardiovascular:  Negative for chest pain, palpitations and leg swelling.   Gastrointestinal:  Negative for

## 2025-07-23 NOTE — PATIENT INSTRUCTIONS
Continue to ensure good hydration, sleep habits.   Recommend getting eye exam.     Return in Oct for flu shot

## 2025-07-30 LAB
CRP SERPL-MCNC: 3.3 MG/L (ref 0–5)
ERYTHROCYTE [SEDIMENTATION RATE] IN BLOOD BY WESTERGREN METHOD: 4 MM/HR (ref 0–25)

## 2025-08-05 ENCOUNTER — OFFICE VISIT (OUTPATIENT)
Age: 80
End: 2025-08-05
Payer: MEDICARE

## 2025-08-05 VITALS
HEART RATE: 77 BPM | TEMPERATURE: 98.9 F | DIASTOLIC BLOOD PRESSURE: 82 MMHG | SYSTOLIC BLOOD PRESSURE: 134 MMHG | WEIGHT: 179 LBS | OXYGEN SATURATION: 97 % | BODY MASS INDEX: 27.22 KG/M2

## 2025-08-05 DIAGNOSIS — R51.9 ACUTE NONINTRACTABLE HEADACHE, UNSPECIFIED HEADACHE TYPE: Primary | ICD-10-CM

## 2025-08-05 DIAGNOSIS — H53.9 VISUAL CHANGES: ICD-10-CM

## 2025-08-05 DIAGNOSIS — H35.30 MACULAR DEGENERATION OF BOTH EYES, UNSPECIFIED TYPE: ICD-10-CM

## 2025-08-05 PROCEDURE — 3079F DIAST BP 80-89 MM HG: CPT | Performed by: NURSE PRACTITIONER

## 2025-08-05 PROCEDURE — G8417 CALC BMI ABV UP PARAM F/U: HCPCS | Performed by: NURSE PRACTITIONER

## 2025-08-05 PROCEDURE — 1123F ACP DISCUSS/DSCN MKR DOCD: CPT | Performed by: NURSE PRACTITIONER

## 2025-08-05 PROCEDURE — 99214 OFFICE O/P EST MOD 30 MIN: CPT | Performed by: NURSE PRACTITIONER

## 2025-08-05 PROCEDURE — G8427 DOCREV CUR MEDS BY ELIG CLIN: HCPCS | Performed by: NURSE PRACTITIONER

## 2025-08-05 PROCEDURE — 4004F PT TOBACCO SCREEN RCVD TLK: CPT | Performed by: NURSE PRACTITIONER

## 2025-08-05 PROCEDURE — G8399 PT W/DXA RESULTS DOCUMENT: HCPCS | Performed by: NURSE PRACTITIONER

## 2025-08-05 PROCEDURE — 1090F PRES/ABSN URINE INCON ASSESS: CPT | Performed by: NURSE PRACTITIONER

## 2025-08-05 PROCEDURE — 1159F MED LIST DOCD IN RCRD: CPT | Performed by: NURSE PRACTITIONER

## 2025-08-05 PROCEDURE — 3075F SYST BP GE 130 - 139MM HG: CPT | Performed by: NURSE PRACTITIONER

## 2025-08-05 ASSESSMENT — ENCOUNTER SYMPTOMS
CONSTIPATION: 0
ABDOMINAL PAIN: 0
NAUSEA: 0
SORE THROAT: 0
TROUBLE SWALLOWING: 0
DIARRHEA: 0
RHINORRHEA: 0
VOMITING: 0
COUGH: 0
SINUS PRESSURE: 0
SHORTNESS OF BREATH: 0

## 2025-08-15 ENCOUNTER — HOSPITAL ENCOUNTER (OUTPATIENT)
Dept: MRI IMAGING | Age: 80
Discharge: HOME | End: 2025-08-15
Payer: MEDICARE

## 2025-08-15 DIAGNOSIS — R51.9 ACUTE NONINTRACTABLE HEADACHE, UNSPECIFIED HEADACHE TYPE: ICD-10-CM

## 2025-08-15 DIAGNOSIS — H53.9 VISUAL CHANGES: ICD-10-CM

## 2025-08-15 PROCEDURE — 70553 MRI BRAIN STEM W/O & W/DYE: CPT

## 2025-08-15 PROCEDURE — 70544 MR ANGIOGRAPHY HEAD W/O DYE: CPT

## 2025-08-15 PROCEDURE — 6360000004 HC RX CONTRAST MEDICATION: Performed by: NURSE PRACTITIONER

## 2025-08-15 PROCEDURE — A9577 INJ MULTIHANCE: HCPCS | Performed by: NURSE PRACTITIONER

## 2025-08-15 RX ADMIN — GADOBENATE DIMEGLUMINE 17 ML: 529 INJECTION, SOLUTION INTRAVENOUS at 10:17

## 2025-08-18 ENCOUNTER — TELEPHONE (OUTPATIENT)
Dept: NEUROSURGERY | Age: 80
End: 2025-08-18

## 2025-08-18 ENCOUNTER — TELEPHONE (OUTPATIENT)
Age: 80
End: 2025-08-18

## 2025-08-18 DIAGNOSIS — G31.9 BRAIN ATROPHY: ICD-10-CM

## 2025-08-18 DIAGNOSIS — G93.89 BRAIN MASS: ICD-10-CM

## 2025-08-18 DIAGNOSIS — R51.9 ACUTE NONINTRACTABLE HEADACHE, UNSPECIFIED HEADACHE TYPE: ICD-10-CM

## 2025-08-18 DIAGNOSIS — D32.9 MENINGIOMA (HCC): Primary | ICD-10-CM

## 2025-08-25 ENCOUNTER — PATIENT MESSAGE (OUTPATIENT)
Age: 80
End: 2025-08-25

## 2025-09-02 ENCOUNTER — OFFICE VISIT (OUTPATIENT)
Age: 80
End: 2025-09-02
Payer: MEDICARE

## 2025-09-02 ENCOUNTER — TELEPHONE (OUTPATIENT)
Age: 80
End: 2025-09-02

## 2025-09-02 VITALS
HEART RATE: 76 BPM | HEIGHT: 68 IN | SYSTOLIC BLOOD PRESSURE: 124 MMHG | WEIGHT: 175 LBS | OXYGEN SATURATION: 94 % | TEMPERATURE: 97.1 F | BODY MASS INDEX: 26.52 KG/M2 | DIASTOLIC BLOOD PRESSURE: 76 MMHG

## 2025-09-02 DIAGNOSIS — E53.8 B12 DEFICIENCY: ICD-10-CM

## 2025-09-02 DIAGNOSIS — N32.81 OAB (OVERACTIVE BLADDER): ICD-10-CM

## 2025-09-02 DIAGNOSIS — D32.9 MENINGIOMA (HCC): Primary | ICD-10-CM

## 2025-09-02 PROCEDURE — 1159F MED LIST DOCD IN RCRD: CPT | Performed by: NURSE PRACTITIONER

## 2025-09-02 PROCEDURE — 1123F ACP DISCUSS/DSCN MKR DOCD: CPT | Performed by: NURSE PRACTITIONER

## 2025-09-02 PROCEDURE — 99214 OFFICE O/P EST MOD 30 MIN: CPT | Performed by: NURSE PRACTITIONER

## 2025-09-02 PROCEDURE — 96372 THER/PROPH/DIAG INJ SC/IM: CPT | Performed by: NURSE PRACTITIONER

## 2025-09-02 PROCEDURE — 3078F DIAST BP <80 MM HG: CPT | Performed by: NURSE PRACTITIONER

## 2025-09-02 PROCEDURE — G8417 CALC BMI ABV UP PARAM F/U: HCPCS | Performed by: NURSE PRACTITIONER

## 2025-09-02 PROCEDURE — G8399 PT W/DXA RESULTS DOCUMENT: HCPCS | Performed by: NURSE PRACTITIONER

## 2025-09-02 PROCEDURE — 3074F SYST BP LT 130 MM HG: CPT | Performed by: NURSE PRACTITIONER

## 2025-09-02 PROCEDURE — 1090F PRES/ABSN URINE INCON ASSESS: CPT | Performed by: NURSE PRACTITIONER

## 2025-09-02 PROCEDURE — 4004F PT TOBACCO SCREEN RCVD TLK: CPT | Performed by: NURSE PRACTITIONER

## 2025-09-02 PROCEDURE — G8427 DOCREV CUR MEDS BY ELIG CLIN: HCPCS | Performed by: NURSE PRACTITIONER

## 2025-09-02 RX ORDER — PREDNISONE 20 MG/1
20 TABLET ORAL 2 TIMES DAILY
Qty: 10 TABLET | Refills: 0 | Status: SHIPPED | OUTPATIENT
Start: 2025-09-02 | End: 2025-09-07

## 2025-09-02 RX ORDER — CYANOCOBALAMIN 1000 UG/ML
1000 INJECTION, SOLUTION INTRAMUSCULAR; SUBCUTANEOUS ONCE
Status: COMPLETED | OUTPATIENT
Start: 2025-09-02 | End: 2025-09-02

## 2025-09-02 RX ORDER — OXYBUTYNIN CHLORIDE 5 MG/1
5 TABLET, EXTENDED RELEASE ORAL DAILY
Qty: 90 TABLET | Refills: 1 | Status: SHIPPED | OUTPATIENT
Start: 2025-09-02

## 2025-09-02 RX ADMIN — CYANOCOBALAMIN 1000 MCG: 1000 INJECTION, SOLUTION INTRAMUSCULAR; SUBCUTANEOUS at 09:25

## 2025-09-02 SDOH — ECONOMIC STABILITY: FOOD INSECURITY: WITHIN THE PAST 12 MONTHS, YOU WORRIED THAT YOUR FOOD WOULD RUN OUT BEFORE YOU GOT MONEY TO BUY MORE.: NEVER TRUE

## 2025-09-02 SDOH — ECONOMIC STABILITY: FOOD INSECURITY: WITHIN THE PAST 12 MONTHS, THE FOOD YOU BOUGHT JUST DIDN'T LAST AND YOU DIDN'T HAVE MONEY TO GET MORE.: NEVER TRUE

## 2025-09-02 ASSESSMENT — PATIENT HEALTH QUESTIONNAIRE - PHQ9
SUM OF ALL RESPONSES TO PHQ QUESTIONS 1-9: 0
2. FEELING DOWN, DEPRESSED OR HOPELESS: NOT AT ALL
SUM OF ALL RESPONSES TO PHQ QUESTIONS 1-9: 0
SUM OF ALL RESPONSES TO PHQ QUESTIONS 1-9: 0
1. LITTLE INTEREST OR PLEASURE IN DOING THINGS: NOT AT ALL
SUM OF ALL RESPONSES TO PHQ QUESTIONS 1-9: 0

## 2025-09-02 ASSESSMENT — ENCOUNTER SYMPTOMS
RHINORRHEA: 0
VOMITING: 0
TROUBLE SWALLOWING: 0
SORE THROAT: 0
DIARRHEA: 0
CONSTIPATION: 0
NAUSEA: 0
SINUS PRESSURE: 0
SHORTNESS OF BREATH: 0
ABDOMINAL PAIN: 0

## (undated) DEVICE — SURGICAL PROCEDURE PACK HIP TOT DBD CDS LOURDES HOSP LTX

## (undated) DEVICE — BNDG ELAS ECON W/CLIP 4IN 5YD LF STRL

## (undated) DEVICE — SYR LL TP 10ML STRL

## (undated) DEVICE — SPNG GZ STRL 2S 4X4 12PLY

## (undated) DEVICE — SUTURE VCRL + SZ 2-0 L36IN ABSRB UD L36MM CT-1 1/2 CIR VCP945H

## (undated) DEVICE — GLV SURG SENSICARE W/ALOE PF LF 7.5 STRL

## (undated) DEVICE — BNDG ELAS W/CLIP 6IN 10YD LF STRL

## (undated) DEVICE — SOLUTION IV IRRIG POUR BRL 0.9% SODIUM CHL 2F7124

## (undated) DEVICE — BNDG ADHS CURAD FLX/FABRC 1X3IN STRL LF

## (undated) DEVICE — CATH VASC RF CLOSUREFAST 7CM 7F100CM

## (undated) DEVICE — 3M™ IOBAN™ 2 ANTIMICROBIAL INCISE DRAPE 6650EZ: Brand: IOBAN™ 2

## (undated) DEVICE — SHEATH INTRO MICRO 7F 11CM

## (undated) DEVICE — SUTURE MCRYL SZ 3 0 L18IN ABSRB UD PS 2 3 8 CIR REV CUT NDL MCP497G

## (undated) DEVICE — BAPTIST TURNOVER KIT: Brand: MEDLINE INDUSTRIES, INC.

## (undated) DEVICE — ADHESIVE SKIN CLSR 0.7ML TOP DERMBND ADV

## (undated) DEVICE — NEEDLE, QUINCKE, 20GX3.5": Brand: MEDLINE

## (undated) DEVICE — STPCK 4WY ON/OFF VLV M/COLAR FIT 45PSI STRL

## (undated) DEVICE — PAD MINOR UNIVERSAL: Brand: MEDLINE INDUSTRIES, INC.

## (undated) DEVICE — BANDAGE,GAUZE,BULKEE II,4.5"X4.1YD,STRL: Brand: MEDLINE

## (undated) DEVICE — VORTEX AND MIXOR NOZZLE LONG TAPERED: Brand: VORTEX

## (undated) DEVICE — STRIP,CLOSURE,WOUND,MEDI-STRIP,1/2X4: Brand: MEDLINE

## (undated) DEVICE — GLOVE SURG SZ 85 L12IN FNGR THK13MIL BRN LTX SYN POLYMER W

## (undated) DEVICE — STERILE ULTRASOUND GEL, SAFEWRAP: Brand: ECOVUE

## (undated) DEVICE — Z INACTIVE USE 2660664 SOLUTION IRRIG 3000ML 0.9% SOD CHL USP UROMATIC PLAS CONT

## (undated) DEVICE — ST INF 2NDARY 20DRP VNT/NOVNT 30IN

## (undated) DEVICE — SYSTEM SKIN CLSR 22CM DERMBND PRINEO

## (undated) DEVICE — DISPOSABLE FEMORAL CEMENT                                    COMPRESSOR CAP STERILE

## (undated) DEVICE — GLOVE SURG SZ 85 L12IN FNGR THK94MIL TRNSLUC YEL LTX

## (undated) DEVICE — SUTURE FIBERWIRE SZ 2 L38IN NONABSORBABLE BLU L36.6MM 1/2 AR7202

## (undated) DEVICE — ST TB EXT STANDARDBORE 30IN

## (undated) DEVICE — INTENDED FOR TISSUE SEPARATION, AND OTHER PROCEDURES THAT REQUIRE A SHARP SURGICAL BLADE TO PUNCTURE OR CUT.: Brand: BARD-PARKER ® STAINLESS STEEL BLADES

## (undated) DEVICE — DRESSING FOAM W10XL30CM ANTIMIC SELF ADH SAFETAC TECHNOLOGY

## (undated) DEVICE — STERILE DISPOSABLE EQUIPMENT COVER - CASSETTE COVER 20" X 40" - CUFF: Brand: PREFERRED MEDICAL PRODUCTS, LLC

## (undated) DEVICE — STERILE (14X122CM) TELESCOPICALLY-FOLDED COVER: Brand: CIV-CLEAR™ TRANSDUCER COVER

## (undated) DEVICE — Device: Brand: POWER-FLO®

## (undated) DEVICE — SUTURE VCRL + SZ 1 L27IN ABSRB VLT CP-1 1/2 CIR REV CUT NDL VCP268H

## (undated) DEVICE — APPL CHLORAPREP HI/LITE 26ML ORNG

## (undated) DEVICE — MCLASS OSCILLATING SAW BLADE 19 X 1.27 (0.050") X 90 MM: Brand: MCLASS